# Patient Record
Sex: MALE | Race: WHITE | Employment: OTHER | URBAN - METROPOLITAN AREA
[De-identification: names, ages, dates, MRNs, and addresses within clinical notes are randomized per-mention and may not be internally consistent; named-entity substitution may affect disease eponyms.]

---

## 2017-03-08 ENCOUNTER — HOSPITAL ENCOUNTER (OUTPATIENT)
Dept: NEUROLOGY | Facility: CLINIC | Age: 82
Setting detail: THERAPIES SERIES
Discharge: STILL ADMITTED OR ADMINISTRATIVE | End: 2017-03-08
Attending: PSYCHIATRY & NEUROLOGY

## 2017-03-08 DIAGNOSIS — F06.70 MILD NEUROCOGNITIVE DISORDER DUE TO PARKINSON'S DISEASE (H): ICD-10-CM

## 2017-03-08 DIAGNOSIS — H91.90 HOH (HARD OF HEARING), UNSPECIFIED LATERALITY: ICD-10-CM

## 2017-03-08 DIAGNOSIS — G20.A1 MILD NEUROCOGNITIVE DISORDER DUE TO PARKINSON'S DISEASE (H): ICD-10-CM

## 2017-03-08 DIAGNOSIS — G20.A1 PARKINSON DISEASE (H): ICD-10-CM

## 2017-03-08 DIAGNOSIS — G47.52 REM SLEEP BEHAVIOR DISORDER: ICD-10-CM

## 2017-03-31 ENCOUNTER — HOSPITAL ENCOUNTER (OUTPATIENT)
Dept: PHYSICAL THERAPY | Age: 82
Setting detail: THERAPIES SERIES
Discharge: STILL ADMITTED OR ADMINISTRATIVE | End: 2017-03-31
Attending: PSYCHIATRY & NEUROLOGY

## 2017-03-31 ENCOUNTER — HOSPITAL ENCOUNTER (OUTPATIENT)
Dept: SPEECH THERAPY | Age: 82
Setting detail: THERAPIES SERIES
Discharge: STILL ADMITTED OR ADMINISTRATIVE | End: 2017-03-31
Attending: PSYCHIATRY & NEUROLOGY

## 2017-03-31 ENCOUNTER — HOSPITAL ENCOUNTER (OUTPATIENT)
Dept: OCCUPATIONAL THERAPY | Age: 82
Setting detail: THERAPIES SERIES
Discharge: STILL ADMITTED OR ADMINISTRATIVE | End: 2017-03-31
Attending: PSYCHIATRY & NEUROLOGY

## 2017-03-31 DIAGNOSIS — R27.9 LACK OF COORDINATION: ICD-10-CM

## 2017-03-31 DIAGNOSIS — G20.A1 HYPOKINETIC PARKINSONIAN DYSPHONIA (H): ICD-10-CM

## 2017-03-31 DIAGNOSIS — R29.898 WEAKNESS OF PROXIMAL END OF LOWER EXTREMITY: ICD-10-CM

## 2017-03-31 DIAGNOSIS — R29.3 POSTURAL INSTABILITY: ICD-10-CM

## 2017-03-31 DIAGNOSIS — G20.A1 PARKINSON DISEASE (H): ICD-10-CM

## 2017-03-31 DIAGNOSIS — R25.8 BRADYKINESIA: ICD-10-CM

## 2017-03-31 DIAGNOSIS — R49.0 HYPOKINETIC PARKINSONIAN DYSPHONIA (H): ICD-10-CM

## 2017-04-19 ENCOUNTER — HOSPITAL ENCOUNTER (OUTPATIENT)
Dept: SPEECH THERAPY | Age: 82
Setting detail: THERAPIES SERIES
Discharge: STILL ADMITTED OR ADMINISTRATIVE | End: 2017-04-19
Attending: PSYCHIATRY & NEUROLOGY

## 2017-04-19 ENCOUNTER — HOSPITAL ENCOUNTER (OUTPATIENT)
Dept: PHYSICAL THERAPY | Age: 82
Setting detail: THERAPIES SERIES
Discharge: STILL ADMITTED OR ADMINISTRATIVE | End: 2017-04-19
Attending: PSYCHIATRY & NEUROLOGY

## 2017-04-19 ENCOUNTER — HOSPITAL ENCOUNTER (OUTPATIENT)
Dept: OCCUPATIONAL THERAPY | Age: 82
Setting detail: THERAPIES SERIES
Discharge: STILL ADMITTED OR ADMINISTRATIVE | End: 2017-04-19
Attending: PSYCHIATRY & NEUROLOGY

## 2017-04-19 DIAGNOSIS — G20.A1 HYPOKINETIC PARKINSONIAN DYSPHONIA (H): ICD-10-CM

## 2017-04-19 DIAGNOSIS — R25.8 BRADYKINESIA: ICD-10-CM

## 2017-04-19 DIAGNOSIS — H53.2 DOUBLE VISION: ICD-10-CM

## 2017-04-19 DIAGNOSIS — R27.9 LACK OF COORDINATION: ICD-10-CM

## 2017-04-19 DIAGNOSIS — R29.898 WEAKNESS OF PROXIMAL END OF LOWER EXTREMITY: ICD-10-CM

## 2017-04-19 DIAGNOSIS — R49.0 HYPOKINETIC PARKINSONIAN DYSPHONIA (H): ICD-10-CM

## 2017-04-19 DIAGNOSIS — R29.3 POSTURAL INSTABILITY: ICD-10-CM

## 2017-04-21 ENCOUNTER — HOSPITAL ENCOUNTER (OUTPATIENT)
Dept: OCCUPATIONAL THERAPY | Age: 82
Setting detail: THERAPIES SERIES
Discharge: STILL ADMITTED OR ADMINISTRATIVE | End: 2017-04-21
Attending: PSYCHIATRY & NEUROLOGY

## 2017-04-21 ENCOUNTER — HOSPITAL ENCOUNTER (OUTPATIENT)
Dept: SPEECH THERAPY | Age: 82
Setting detail: THERAPIES SERIES
Discharge: STILL ADMITTED OR ADMINISTRATIVE | End: 2017-04-21
Attending: PSYCHIATRY & NEUROLOGY

## 2017-04-21 ENCOUNTER — HOSPITAL ENCOUNTER (OUTPATIENT)
Dept: PHYSICAL THERAPY | Age: 82
Setting detail: THERAPIES SERIES
Discharge: STILL ADMITTED OR ADMINISTRATIVE | End: 2017-04-21
Attending: PSYCHIATRY & NEUROLOGY

## 2017-04-21 DIAGNOSIS — R27.9 LACK OF COORDINATION: ICD-10-CM

## 2017-04-21 DIAGNOSIS — R25.8 BRADYKINESIA: ICD-10-CM

## 2017-04-21 DIAGNOSIS — G20.A1 HYPOKINETIC PARKINSONIAN DYSPHONIA (H): ICD-10-CM

## 2017-04-21 DIAGNOSIS — R49.0 HYPOKINETIC PARKINSONIAN DYSPHONIA (H): ICD-10-CM

## 2017-04-21 DIAGNOSIS — H53.2 DOUBLE VISION: ICD-10-CM

## 2017-04-21 DIAGNOSIS — R29.898 WEAKNESS OF PROXIMAL END OF LOWER EXTREMITY: ICD-10-CM

## 2017-04-21 DIAGNOSIS — R29.3 POSTURAL INSTABILITY: ICD-10-CM

## 2017-04-24 ENCOUNTER — HOSPITAL ENCOUNTER (OUTPATIENT)
Dept: OCCUPATIONAL THERAPY | Age: 82
Setting detail: THERAPIES SERIES
Discharge: STILL ADMITTED OR ADMINISTRATIVE | End: 2017-04-24
Attending: PSYCHIATRY & NEUROLOGY

## 2017-04-24 ENCOUNTER — HOSPITAL ENCOUNTER (OUTPATIENT)
Dept: SPEECH THERAPY | Age: 82
Setting detail: THERAPIES SERIES
Discharge: STILL ADMITTED OR ADMINISTRATIVE | End: 2017-04-24
Attending: PSYCHIATRY & NEUROLOGY

## 2017-04-24 ENCOUNTER — HOSPITAL ENCOUNTER (OUTPATIENT)
Dept: PHYSICAL THERAPY | Age: 82
Setting detail: THERAPIES SERIES
Discharge: STILL ADMITTED OR ADMINISTRATIVE | End: 2017-04-24
Attending: PSYCHIATRY & NEUROLOGY

## 2017-04-24 DIAGNOSIS — R29.3 POSTURAL INSTABILITY: ICD-10-CM

## 2017-04-24 DIAGNOSIS — G20.A1 HYPOKINETIC PARKINSONIAN DYSPHONIA (H): ICD-10-CM

## 2017-04-24 DIAGNOSIS — R27.9 LACK OF COORDINATION: ICD-10-CM

## 2017-04-24 DIAGNOSIS — R25.8 BRADYKINESIA: ICD-10-CM

## 2017-04-24 DIAGNOSIS — R49.0 HYPOKINETIC PARKINSONIAN DYSPHONIA (H): ICD-10-CM

## 2017-04-26 ENCOUNTER — HOSPITAL ENCOUNTER (OUTPATIENT)
Dept: SPEECH THERAPY | Age: 82
Setting detail: THERAPIES SERIES
Discharge: STILL ADMITTED OR ADMINISTRATIVE | End: 2017-04-26
Attending: PSYCHIATRY & NEUROLOGY

## 2017-04-26 ENCOUNTER — HOSPITAL ENCOUNTER (OUTPATIENT)
Dept: PHYSICAL THERAPY | Age: 82
Setting detail: THERAPIES SERIES
Discharge: STILL ADMITTED OR ADMINISTRATIVE | End: 2017-04-26
Attending: PSYCHIATRY & NEUROLOGY

## 2017-04-26 ENCOUNTER — HOSPITAL ENCOUNTER (OUTPATIENT)
Dept: OCCUPATIONAL THERAPY | Age: 82
Setting detail: THERAPIES SERIES
Discharge: STILL ADMITTED OR ADMINISTRATIVE | End: 2017-04-26
Attending: PSYCHIATRY & NEUROLOGY

## 2017-04-26 DIAGNOSIS — R29.3 POSTURAL INSTABILITY: ICD-10-CM

## 2017-04-26 DIAGNOSIS — R25.8 BRADYKINESIA: ICD-10-CM

## 2017-04-26 DIAGNOSIS — R27.9 LACK OF COORDINATION: ICD-10-CM

## 2017-04-26 DIAGNOSIS — G20.A1 HYPOKINETIC PARKINSONIAN DYSPHONIA (H): ICD-10-CM

## 2017-04-26 DIAGNOSIS — R49.0 HYPOKINETIC PARKINSONIAN DYSPHONIA (H): ICD-10-CM

## 2017-04-26 DIAGNOSIS — H53.2 DOUBLE VISION: ICD-10-CM

## 2017-04-26 DIAGNOSIS — R29.898 WEAKNESS OF PROXIMAL END OF LOWER EXTREMITY: ICD-10-CM

## 2017-05-01 ENCOUNTER — HOSPITAL ENCOUNTER (OUTPATIENT)
Dept: OCCUPATIONAL THERAPY | Age: 82
Setting detail: THERAPIES SERIES
Discharge: STILL ADMITTED OR ADMINISTRATIVE | End: 2017-05-01
Attending: PSYCHIATRY & NEUROLOGY

## 2017-05-01 ENCOUNTER — HOSPITAL ENCOUNTER (OUTPATIENT)
Dept: SPEECH THERAPY | Age: 82
Setting detail: THERAPIES SERIES
Discharge: STILL ADMITTED OR ADMINISTRATIVE | End: 2017-05-01
Attending: PSYCHIATRY & NEUROLOGY

## 2017-05-01 ENCOUNTER — HOSPITAL ENCOUNTER (OUTPATIENT)
Dept: PHYSICAL THERAPY | Age: 82
Setting detail: THERAPIES SERIES
Discharge: STILL ADMITTED OR ADMINISTRATIVE | End: 2017-05-01
Attending: PSYCHIATRY & NEUROLOGY

## 2017-05-01 DIAGNOSIS — R25.8 BRADYKINESIA: ICD-10-CM

## 2017-05-01 DIAGNOSIS — H53.2 DOUBLE VISION: ICD-10-CM

## 2017-05-01 DIAGNOSIS — R29.3 POSTURAL INSTABILITY: ICD-10-CM

## 2017-05-01 DIAGNOSIS — R49.0 HYPOKINETIC PARKINSONIAN DYSPHONIA (H): ICD-10-CM

## 2017-05-01 DIAGNOSIS — G20.A1 HYPOKINETIC PARKINSONIAN DYSPHONIA (H): ICD-10-CM

## 2017-05-03 ENCOUNTER — HOSPITAL ENCOUNTER (OUTPATIENT)
Dept: OCCUPATIONAL THERAPY | Age: 82
Setting detail: THERAPIES SERIES
Discharge: STILL ADMITTED OR ADMINISTRATIVE | End: 2017-05-03
Attending: PSYCHIATRY & NEUROLOGY

## 2017-05-03 ENCOUNTER — HOSPITAL ENCOUNTER (OUTPATIENT)
Dept: SPEECH THERAPY | Age: 82
Setting detail: THERAPIES SERIES
Discharge: STILL ADMITTED OR ADMINISTRATIVE | End: 2017-05-03
Attending: PSYCHIATRY & NEUROLOGY

## 2017-05-03 ENCOUNTER — HOSPITAL ENCOUNTER (OUTPATIENT)
Dept: PHYSICAL THERAPY | Age: 82
Setting detail: THERAPIES SERIES
Discharge: STILL ADMITTED OR ADMINISTRATIVE | End: 2017-05-03
Attending: PSYCHIATRY & NEUROLOGY

## 2017-05-03 DIAGNOSIS — R49.0 HYPOKINETIC PARKINSONIAN DYSPHONIA (H): ICD-10-CM

## 2017-05-03 DIAGNOSIS — G20.A1 HYPOKINETIC PARKINSONIAN DYSPHONIA (H): ICD-10-CM

## 2017-05-03 DIAGNOSIS — R25.8 BRADYKINESIA: ICD-10-CM

## 2017-05-03 DIAGNOSIS — R29.3 POSTURAL INSTABILITY: ICD-10-CM

## 2017-05-08 ENCOUNTER — HOSPITAL ENCOUNTER (OUTPATIENT)
Dept: PHYSICAL THERAPY | Age: 82
Setting detail: THERAPIES SERIES
Discharge: STILL ADMITTED OR ADMINISTRATIVE | End: 2017-05-08
Attending: PSYCHIATRY & NEUROLOGY

## 2017-05-08 ENCOUNTER — HOSPITAL ENCOUNTER (OUTPATIENT)
Dept: OCCUPATIONAL THERAPY | Age: 82
Setting detail: THERAPIES SERIES
Discharge: STILL ADMITTED OR ADMINISTRATIVE | End: 2017-05-08
Attending: PSYCHIATRY & NEUROLOGY

## 2017-05-08 ENCOUNTER — HOSPITAL ENCOUNTER (OUTPATIENT)
Dept: SPEECH THERAPY | Age: 82
Setting detail: THERAPIES SERIES
Discharge: STILL ADMITTED OR ADMINISTRATIVE | End: 2017-05-08
Attending: PSYCHIATRY & NEUROLOGY

## 2017-05-08 DIAGNOSIS — R29.898 WEAKNESS OF PROXIMAL END OF LOWER EXTREMITY: ICD-10-CM

## 2017-05-08 DIAGNOSIS — R25.8 BRADYKINESIA: ICD-10-CM

## 2017-05-08 DIAGNOSIS — R29.3 POSTURAL INSTABILITY: ICD-10-CM

## 2017-05-08 DIAGNOSIS — G20.A1 HYPOKINETIC PARKINSONIAN DYSPHONIA (H): ICD-10-CM

## 2017-05-08 DIAGNOSIS — R49.0 HYPOKINETIC PARKINSONIAN DYSPHONIA (H): ICD-10-CM

## 2017-05-08 DIAGNOSIS — R27.8 COORDINATION IMPAIRMENT: ICD-10-CM

## 2017-05-10 ENCOUNTER — HOSPITAL ENCOUNTER (OUTPATIENT)
Dept: OCCUPATIONAL THERAPY | Age: 82
Setting detail: THERAPIES SERIES
Discharge: STILL ADMITTED OR ADMINISTRATIVE | End: 2017-05-10
Attending: PSYCHIATRY & NEUROLOGY

## 2017-05-10 ENCOUNTER — HOSPITAL ENCOUNTER (OUTPATIENT)
Dept: SPEECH THERAPY | Age: 82
Setting detail: THERAPIES SERIES
Discharge: STILL ADMITTED OR ADMINISTRATIVE | End: 2017-05-10
Attending: PSYCHIATRY & NEUROLOGY

## 2017-05-10 ENCOUNTER — HOSPITAL ENCOUNTER (OUTPATIENT)
Dept: PHYSICAL THERAPY | Age: 82
Setting detail: THERAPIES SERIES
Discharge: STILL ADMITTED OR ADMINISTRATIVE | End: 2017-05-10
Attending: PSYCHIATRY & NEUROLOGY

## 2017-05-10 DIAGNOSIS — R29.898 WEAKNESS OF PROXIMAL END OF LOWER EXTREMITY: ICD-10-CM

## 2017-05-10 DIAGNOSIS — R29.3 POSTURAL INSTABILITY: ICD-10-CM

## 2017-05-10 DIAGNOSIS — R27.9 LACK OF COORDINATION: ICD-10-CM

## 2017-05-10 DIAGNOSIS — H53.2 DOUBLE VISION: ICD-10-CM

## 2017-05-10 DIAGNOSIS — R25.8 BRADYKINESIA: ICD-10-CM

## 2017-05-10 DIAGNOSIS — G20.A1 HYPOKINETIC PARKINSONIAN DYSPHONIA (H): ICD-10-CM

## 2017-05-10 DIAGNOSIS — R27.8 COORDINATION IMPAIRMENT: ICD-10-CM

## 2017-05-10 DIAGNOSIS — R49.0 HYPOKINETIC PARKINSONIAN DYSPHONIA (H): ICD-10-CM

## 2017-05-15 ENCOUNTER — HOSPITAL ENCOUNTER (OUTPATIENT)
Dept: PHYSICAL THERAPY | Age: 82
Setting detail: THERAPIES SERIES
Discharge: STILL ADMITTED OR ADMINISTRATIVE | End: 2017-05-15
Attending: PSYCHIATRY & NEUROLOGY

## 2017-05-15 ENCOUNTER — HOSPITAL ENCOUNTER (OUTPATIENT)
Dept: OCCUPATIONAL THERAPY | Age: 82
Setting detail: THERAPIES SERIES
Discharge: STILL ADMITTED OR ADMINISTRATIVE | End: 2017-05-15
Attending: PSYCHIATRY & NEUROLOGY

## 2017-05-15 DIAGNOSIS — R27.9 LACK OF COORDINATION: ICD-10-CM

## 2017-05-15 DIAGNOSIS — H53.2 DOUBLE VISION: ICD-10-CM

## 2017-05-15 DIAGNOSIS — R25.8 BRADYKINESIA: ICD-10-CM

## 2017-05-15 DIAGNOSIS — R27.8 COORDINATION IMPAIRMENT: ICD-10-CM

## 2017-05-15 DIAGNOSIS — R29.898 WEAKNESS OF PROXIMAL END OF LOWER EXTREMITY: ICD-10-CM

## 2017-05-15 DIAGNOSIS — R29.3 POSTURAL INSTABILITY: ICD-10-CM

## 2017-05-17 ENCOUNTER — HOSPITAL ENCOUNTER (OUTPATIENT)
Dept: PHYSICAL THERAPY | Age: 82
Setting detail: THERAPIES SERIES
Discharge: STILL ADMITTED OR ADMINISTRATIVE | End: 2017-05-17
Attending: PSYCHIATRY & NEUROLOGY

## 2017-05-17 ENCOUNTER — HOSPITAL ENCOUNTER (OUTPATIENT)
Dept: OCCUPATIONAL THERAPY | Age: 82
Setting detail: THERAPIES SERIES
Discharge: STILL ADMITTED OR ADMINISTRATIVE | End: 2017-05-17
Attending: PSYCHIATRY & NEUROLOGY

## 2017-05-17 DIAGNOSIS — R25.8 BRADYKINESIA: ICD-10-CM

## 2017-05-17 DIAGNOSIS — R29.898 WEAKNESS OF PROXIMAL END OF LOWER EXTREMITY: ICD-10-CM

## 2017-05-17 DIAGNOSIS — R29.3 POSTURAL INSTABILITY: ICD-10-CM

## 2017-05-22 ENCOUNTER — HOSPITAL ENCOUNTER (OUTPATIENT)
Dept: PHYSICAL THERAPY | Age: 82
Setting detail: THERAPIES SERIES
Discharge: STILL ADMITTED OR ADMINISTRATIVE | End: 2017-05-22
Attending: PSYCHIATRY & NEUROLOGY

## 2017-05-22 DIAGNOSIS — R29.3 POSTURAL INSTABILITY: ICD-10-CM

## 2017-05-22 DIAGNOSIS — R25.8 BRADYKINESIA: ICD-10-CM

## 2017-05-24 ENCOUNTER — HOSPITAL ENCOUNTER (OUTPATIENT)
Dept: PHYSICAL THERAPY | Age: 82
Setting detail: THERAPIES SERIES
Discharge: STILL ADMITTED OR ADMINISTRATIVE | End: 2017-05-24
Attending: PSYCHIATRY & NEUROLOGY

## 2017-05-24 DIAGNOSIS — R29.3 POSTURAL INSTABILITY: ICD-10-CM

## 2017-05-24 DIAGNOSIS — R29.898 WEAKNESS OF PROXIMAL END OF LOWER EXTREMITY: ICD-10-CM

## 2017-05-24 DIAGNOSIS — R25.8 BRADYKINESIA: ICD-10-CM

## 2017-05-26 ENCOUNTER — APPOINTMENT (OUTPATIENT)
Dept: GENERAL RADIOLOGY | Facility: CLINIC | Age: 82
End: 2017-05-26
Attending: EMERGENCY MEDICINE
Payer: MEDICARE

## 2017-05-26 ENCOUNTER — HOSPITAL ENCOUNTER (OUTPATIENT)
Facility: CLINIC | Age: 82
Setting detail: OBSERVATION
Discharge: HOME | End: 2017-05-27
Attending: EMERGENCY MEDICINE | Admitting: INTERNAL MEDICINE
Payer: MEDICARE

## 2017-05-26 DIAGNOSIS — R55 SYNCOPE, UNSPECIFIED SYNCOPE TYPE: ICD-10-CM

## 2017-05-26 DIAGNOSIS — F02.80 LEWY BODY DEMENTIA WITHOUT BEHAVIORAL DISTURBANCE (H): ICD-10-CM

## 2017-05-26 DIAGNOSIS — G31.83 LEWY BODY DEMENTIA WITHOUT BEHAVIORAL DISTURBANCE (H): ICD-10-CM

## 2017-05-26 DIAGNOSIS — G20.A1 PARKINSON'S DISEASE (H): Primary | ICD-10-CM

## 2017-05-26 LAB
ABO + RH BLD: NORMAL
ABO + RH BLD: NORMAL
ANION GAP SERPL CALCULATED.3IONS-SCNC: 5 MMOL/L (ref 3–14)
BASOPHILS # BLD AUTO: 0 10E9/L (ref 0–0.2)
BASOPHILS NFR BLD AUTO: 0.3 %
BLD GP AB SCN SERPL QL: NORMAL
BLOOD BANK CMNT PATIENT-IMP: NORMAL
BUN SERPL-MCNC: 27 MG/DL (ref 7–30)
CALCIUM SERPL-MCNC: 8.5 MG/DL (ref 8.5–10.1)
CHLORIDE SERPL-SCNC: 106 MMOL/L (ref 94–109)
CO2 SERPL-SCNC: 30 MMOL/L (ref 20–32)
CREAT SERPL-MCNC: 0.99 MG/DL (ref 0.66–1.25)
DIFFERENTIAL METHOD BLD: ABNORMAL
EOSINOPHIL # BLD AUTO: 0.1 10E9/L (ref 0–0.7)
EOSINOPHIL NFR BLD AUTO: 1.8 %
ERYTHROCYTE [DISTWIDTH] IN BLOOD BY AUTOMATED COUNT: 13.8 % (ref 10–15)
GFR SERPL CREATININE-BSD FRML MDRD: 72 ML/MIN/1.7M2
GLUCOSE SERPL-MCNC: 96 MG/DL (ref 70–99)
HCT VFR BLD AUTO: 45.4 % (ref 40–53)
HGB BLD-MCNC: 14.7 G/DL (ref 13.3–17.7)
IMM GRANULOCYTES # BLD: 0 10E9/L (ref 0–0.4)
IMM GRANULOCYTES NFR BLD: 0.5 %
LACTATE BLD-SCNC: 0.9 MMOL/L (ref 0.7–2.1)
LYMPHOCYTES # BLD AUTO: 2 10E9/L (ref 0.8–5.3)
LYMPHOCYTES NFR BLD AUTO: 30.5 %
MAGNESIUM SERPL-MCNC: 2 MG/DL (ref 1.6–2.3)
MCH RBC QN AUTO: 30.6 PG (ref 26.5–33)
MCHC RBC AUTO-ENTMCNC: 32.4 G/DL (ref 31.5–36.5)
MCV RBC AUTO: 94 FL (ref 78–100)
MONOCYTES # BLD AUTO: 0.6 10E9/L (ref 0–1.3)
MONOCYTES NFR BLD AUTO: 8.9 %
NEUTROPHILS # BLD AUTO: 3.8 10E9/L (ref 1.6–8.3)
NEUTROPHILS NFR BLD AUTO: 58 %
NRBC # BLD AUTO: 0 10*3/UL
NRBC BLD AUTO-RTO: 0 /100
PLATELET # BLD AUTO: 132 10E9/L (ref 150–450)
POTASSIUM SERPL-SCNC: 4.1 MMOL/L (ref 3.4–5.3)
RBC # BLD AUTO: 4.81 10E12/L (ref 4.4–5.9)
SODIUM SERPL-SCNC: 141 MMOL/L (ref 133–144)
SPECIMEN EXP DATE BLD: NORMAL
TROPONIN I SERPL-MCNC: NORMAL UG/L (ref 0–0.04)
TSH SERPL DL<=0.005 MIU/L-ACNC: 0.62 MU/L (ref 0.4–4)
WBC # BLD AUTO: 6.5 10E9/L (ref 4–11)

## 2017-05-26 PROCEDURE — 85025 COMPLETE CBC W/AUTO DIFF WBC: CPT | Performed by: EMERGENCY MEDICINE

## 2017-05-26 PROCEDURE — 86850 RBC ANTIBODY SCREEN: CPT | Performed by: EMERGENCY MEDICINE

## 2017-05-26 PROCEDURE — 83605 ASSAY OF LACTIC ACID: CPT | Performed by: EMERGENCY MEDICINE

## 2017-05-26 PROCEDURE — 93005 ELECTROCARDIOGRAM TRACING: CPT

## 2017-05-26 PROCEDURE — A9270 NON-COVERED ITEM OR SERVICE: HCPCS | Mod: GY | Performed by: INTERNAL MEDICINE

## 2017-05-26 PROCEDURE — 99285 EMERGENCY DEPT VISIT HI MDM: CPT | Mod: 25

## 2017-05-26 PROCEDURE — 96361 HYDRATE IV INFUSION ADD-ON: CPT

## 2017-05-26 PROCEDURE — 83735 ASSAY OF MAGNESIUM: CPT | Performed by: EMERGENCY MEDICINE

## 2017-05-26 PROCEDURE — 25000125 ZZHC RX 250: Performed by: INTERNAL MEDICINE

## 2017-05-26 PROCEDURE — 86901 BLOOD TYPING SEROLOGIC RH(D): CPT | Performed by: EMERGENCY MEDICINE

## 2017-05-26 PROCEDURE — 96360 HYDRATION IV INFUSION INIT: CPT

## 2017-05-26 PROCEDURE — 99220 ZZC INITIAL OBSERVATION CARE,LEVL III: CPT | Performed by: INTERNAL MEDICINE

## 2017-05-26 PROCEDURE — 84484 ASSAY OF TROPONIN QUANT: CPT | Performed by: EMERGENCY MEDICINE

## 2017-05-26 PROCEDURE — 71020 XR CHEST 2 VW: CPT

## 2017-05-26 PROCEDURE — 84443 ASSAY THYROID STIM HORMONE: CPT | Performed by: EMERGENCY MEDICINE

## 2017-05-26 PROCEDURE — 86900 BLOOD TYPING SEROLOGIC ABO: CPT | Performed by: EMERGENCY MEDICINE

## 2017-05-26 PROCEDURE — G0378 HOSPITAL OBSERVATION PER HR: HCPCS

## 2017-05-26 PROCEDURE — 80048 BASIC METABOLIC PNL TOTAL CA: CPT | Performed by: EMERGENCY MEDICINE

## 2017-05-26 PROCEDURE — 25000132 ZZH RX MED GY IP 250 OP 250 PS 637: Mod: GY | Performed by: INTERNAL MEDICINE

## 2017-05-26 RX ORDER — ROPINIROLE 0.25 MG/1
0.5 TABLET, FILM COATED ORAL AT BEDTIME
Status: DISCONTINUED | OUTPATIENT
Start: 2017-05-26 | End: 2017-05-27 | Stop reason: HOSPADM

## 2017-05-26 RX ORDER — ROPINIROLE 0.25 MG/1
0.5 TABLET, FILM COATED ORAL 3 TIMES DAILY
Status: DISCONTINUED | OUTPATIENT
Start: 2017-05-26 | End: 2017-05-27 | Stop reason: HOSPADM

## 2017-05-26 RX ORDER — SODIUM CHLORIDE AND POTASSIUM CHLORIDE 150; 900 MG/100ML; MG/100ML
INJECTION, SOLUTION INTRAVENOUS CONTINUOUS
Status: DISPENSED | OUTPATIENT
Start: 2017-05-26 | End: 2017-05-27

## 2017-05-26 RX ORDER — INDOMETHACIN 25 MG/1
25 CAPSULE ORAL 3 TIMES DAILY PRN
Status: DISCONTINUED | OUTPATIENT
Start: 2017-05-26 | End: 2017-05-27 | Stop reason: HOSPADM

## 2017-05-26 RX ORDER — CARBIDOPA AND LEVODOPA 25; 100 MG/1; MG/1
1 TABLET ORAL
Status: DISCONTINUED | OUTPATIENT
Start: 2017-05-27 | End: 2017-05-27 | Stop reason: HOSPADM

## 2017-05-26 RX ORDER — ACETAMINOPHEN 325 MG/1
975 TABLET ORAL EVERY 6 HOURS PRN
Status: DISCONTINUED | OUTPATIENT
Start: 2017-05-26 | End: 2017-05-27 | Stop reason: HOSPADM

## 2017-05-26 RX ORDER — LIDOCAINE 40 MG/G
CREAM TOPICAL
Status: DISCONTINUED | OUTPATIENT
Start: 2017-05-26 | End: 2017-05-27 | Stop reason: HOSPADM

## 2017-05-26 RX ORDER — ROPINIROLE 0.25 MG/1
0.25 TABLET, FILM COATED ORAL 2 TIMES DAILY
Status: DISCONTINUED | OUTPATIENT
Start: 2017-05-27 | End: 2017-05-27 | Stop reason: HOSPADM

## 2017-05-26 RX ORDER — NALOXONE HYDROCHLORIDE 0.4 MG/ML
.1-.4 INJECTION, SOLUTION INTRAMUSCULAR; INTRAVENOUS; SUBCUTANEOUS
Status: DISCONTINUED | OUTPATIENT
Start: 2017-05-26 | End: 2017-05-27 | Stop reason: HOSPADM

## 2017-05-26 RX ORDER — NITROGLYCERIN 0.4 MG/1
0.4 TABLET SUBLINGUAL EVERY 5 MIN PRN
Status: DISCONTINUED | OUTPATIENT
Start: 2017-05-26 | End: 2017-05-27 | Stop reason: HOSPADM

## 2017-05-26 RX ORDER — DONEPEZIL HYDROCHLORIDE 10 MG/1
10 TABLET, FILM COATED ORAL AT BEDTIME
Status: DISCONTINUED | OUTPATIENT
Start: 2017-05-26 | End: 2017-05-27 | Stop reason: HOSPADM

## 2017-05-26 RX ORDER — POLYETHYLENE GLYCOL 3350 17 G/17G
17 POWDER, FOR SOLUTION ORAL DAILY PRN
Status: DISCONTINUED | OUTPATIENT
Start: 2017-05-26 | End: 2017-05-27 | Stop reason: HOSPADM

## 2017-05-26 RX ORDER — ONDANSETRON 4 MG/1
4 TABLET, ORALLY DISINTEGRATING ORAL EVERY 6 HOURS PRN
Status: DISCONTINUED | OUTPATIENT
Start: 2017-05-26 | End: 2017-05-27 | Stop reason: HOSPADM

## 2017-05-26 RX ORDER — CARBIDOPA AND LEVODOPA 50; 200 MG/1; MG/1
1 TABLET, EXTENDED RELEASE ORAL AT BEDTIME
Status: DISCONTINUED | OUTPATIENT
Start: 2017-05-26 | End: 2017-05-27 | Stop reason: HOSPADM

## 2017-05-26 RX ORDER — CLONAZEPAM 1 MG/1
1 TABLET ORAL AT BEDTIME
Status: DISCONTINUED | OUTPATIENT
Start: 2017-05-26 | End: 2017-05-27 | Stop reason: HOSPADM

## 2017-05-26 RX ORDER — ONDANSETRON 2 MG/ML
4 INJECTION INTRAMUSCULAR; INTRAVENOUS EVERY 6 HOURS PRN
Status: DISCONTINUED | OUTPATIENT
Start: 2017-05-26 | End: 2017-05-27 | Stop reason: HOSPADM

## 2017-05-26 RX ADMIN — ROPINIROLE HYDROCHLORIDE 0.5 MG: 0.25 TABLET, FILM COATED ORAL at 21:46

## 2017-05-26 RX ADMIN — POTASSIUM CHLORIDE AND SODIUM CHLORIDE: 900; 150 INJECTION, SOLUTION INTRAVENOUS at 21:30

## 2017-05-26 RX ADMIN — CLONAZEPAM 1 MG: 1 TABLET ORAL at 21:48

## 2017-05-26 RX ADMIN — ROPINIROLE HYDROCHLORIDE 0.5 MG: 0.25 TABLET, FILM COATED ORAL at 21:51

## 2017-05-26 RX ADMIN — DONEPEZIL HYDROCHLORIDE 10 MG: 10 TABLET ORAL at 21:46

## 2017-05-26 RX ADMIN — CARBIDOPA AND LEVODOPA 1 TABLET: 50; 200 TABLET, EXTENDED RELEASE ORAL at 21:46

## 2017-05-26 ASSESSMENT — ENCOUNTER SYMPTOMS
DIAPHORESIS: 1
HEADACHES: 0
LIGHT-HEADEDNESS: 1
CHEST TIGHTNESS: 0
PALPITATIONS: 0
FEVER: 0
SHORTNESS OF BREATH: 0

## 2017-05-26 NOTE — ED NOTES
.RHEDHANDOFF.      BP stable.  Please see flowsheet.  Monitor SR-SB, no ectopy noted.   HR 57-70.  Patient denies any pain.  Patient up with assist of one to bathroom.  Patient needs frequent verbal guidance for tasks at hand.  Spouse and son at bedside.  Family kept updated regarding bed availability.   All questions answered.  Patient has taken own supply of Sinamet for his scheduled dose at 13:00 and 16:00.       Chief Complaint:  Fall     History provided by the patient's wife secondary to the patient's dementia.   HPI   Carlos Neri is a 81 year old male with Parkinson's with a history of recurrent falls who presents via EMS for evaluation after a fall. He and his wife were dining at a restaurant just prior to arrival when the patient had an unwitnessed fall from a sitting position. At this time, the wife found him and staff called EMS. Upon arrival the patient states he cannot recall the events leading up to or including the fall, and wife was not present. He states he may have been light-headed. Wife states the patient had a brief episode of diaphoresis 2 days prior, but notes no other traumatic events or symptoms. The patient denies any significant pain or injuries, chest pain, shortness of breath, palpitations, and has no other concerns at this time.      Allergies:  Morphine Sulfate      Medications:    carbidopa-levodopa (SINEMET)  MG per tablet  aspirin 81 MG chewable tablet  rOPINIRole (REQUIP) 0.5 MG tablet  carbidopa-levodopa (SINEMET CR)  MG per tablet  clonazePAM (KLONOPIN) 0.5 MG tablet  multivitamin, therapeutic with minerals (MULTI-VITAMIN) TABS  oxyCODONE-acetaminophen (PERCOCET) 5-325 MG per tablet  indomethacin (INDOCIN) 25 MG capsule     Past Medical History:    Lewy body dementia  Parkinson disease    RECEIVING UNIT ED HANDOFF REVIEW    Above ED Nurse Handoff Report was reviewed: Yes  Reviewed by: Jewels Marte on May 26, 2017 at 6:03 PM

## 2017-05-26 NOTE — IP AVS SNAPSHOT
` ` Patient Information     Patient Name Sex Carlos Morin ([id number]) Male 1935       Room Bed    224      Patient Demographics     Address Phone    [id number] [place] [address] [phone] (Home)  none (Work)  [phone] (Mobile)      Patient Ethnicity & Race     Ethnic Group Patient Race    American White      Emergency Contact(s)     Name Relation Home Work Mobile    Haylee Neri Spouse [phone]        Documents on File        Status Date Received Description       Documents for the Patient    Privacy Notice - [facility]       Insurance Card Received 06/10/13     External Medication Information Consent       Patient ID Received 06/10/13     Consent for Services - Hospital/Clinic  ()      Consent for EHR Access Sent 13     [facility] Specified Other       HIM CRISTHIAN Authorization  13 [facility] 2013    Consent to Communicate Received 13     HIM CRISTHIAN Authorization - File Only Received 13     Consent for Services - Hospital/Clinic Received () 13     Consent for Services - Hospital/Clinic       Privacy Notice - [facility] Received 13     Consent for EHR Access Received 13     HIM CRISTHIAN Authorization  13 [employer]    Consent for Services/Privacy Notice - Hospital/Clinic Received 17     Insurance Card Received 17     Patient ID Received 17        Documents for the Encounter    CMS IM for Patient Signature         Admission Information     Attending Provider Admitting Provider Admission Type Admission Date/Time    Cyndee Rogel, Cyndee Nicholas DO Emergency 17  1315    Discharge Date Hospital Service Auth/Cert Status Service Area     Observation [facility]    Unit Room/Bed Admission Status        OBSERVATION DEPT  Admission (Confirmed)       Admission     Complaint    Syncope      Hospital Account     Name Acct ID  Class Status Primary Coverage    Carlos Neri [id number] Observation Open MEDICARE - MEDICARE FOR HB SUPPLEMENT            Guarantor Account (for Hospital Account #[id number])     Name Relation to Pt Service Area Active? Acct Type    Carlos Neri Self FCS Yes Personal/Family    Address Phone          [address] [place] [address] [phone](H)  none(O)              Coverage Information (for Hospital Account #[id number])     1. MEDICARE/MEDICARE FOR HB SUPPLEMENT     F/O Payor/Plan Precert #    MEDICARE/MEDICARE FOR HB SUPPLEMENT     Subscriber Subscriber #    Carlos Neri [id number]    Address Phone    [employer]  [address]  [place] [address] [phone]          2. BCBS/BCBS PLATINUM BLUE     F/O Payor/Plan Precert #    BCBS/BCBS PLATINUM BLUE     Subscriber Subscriber #    Carlos Neri [id number]    Address Phone    [address] [address]  [place], [place] [id number] [phone]

## 2017-05-26 NOTE — IP AVS SNAPSHOT
MRN:[id number]                      After Visit Summary   5/26/2017    Carlos Neri    MRN: [id number]           Thank you!     Thank you for choosing [facility] for your care. Our goal is always to provide you with excellent care. Hearing back from our patients is one way we can continue to improve our services. Please take a few minutes to complete the written survey that you may receive in the mail after you visit. If you would like to speak to someone directly about your visit please contact Patient Relations at [phone]. Thank you!          Patient Information     Date Of Birth          1935        About your hospital stay     You were admitted on:  May 26, 2017 You last received care in the:  [facility] Observation Department    You were discharged on:  May 27, 2017        Reason for your hospital stay       You were admitted due to concerns for a fall and fainting episode. Your workup included basic labs and chest x-ray which did not show any acute infectious cause for your symptoms. An ultrasound of your heart was obtained which did not show any acute changes. You were monitored on telemetry which did not show any abnormal heart rhythms. You were evaluated by physical therapy who felt you would benefit from additional rehab. Social work was consulted and a home nurse will bet set up to come to your home to evaluate your needs for additional outpatient physical therapy and occupational therapy.                  Who to Call     For medical emergencies, please call [phone].  For non-urgent questions about your medical care, please call your primary care provider or clinic, [phone]          Attending Provider     Provider Specialty    Siddharth Hayes MD Emergency Medicine    Cyndee Rogel DO Internal Medicine       Primary Care Provider Office Phone # Fax #    Danny Morales [phone]       [facility]  [facility] [address] [facility] [address]        After Care Instructions     Activity       Your activity upon discharge: activity as tolerated            Diet       Follow this diet upon discharge: Orders Placed This Encounter      Regular Diet Adult                  Follow-up Appointments     Follow-up and recommended labs and tests        Follow up with primary care provider, Danny Morales, within 7 days for hospital follow- up.  No follow up labs or test are needed.  Keep scheduled follow up with Dr. Chapin of neurology.                  Additional Services     Home care nursing referral       RN skilled nursing visit. RN to assess home safety and patient's mobility and need for additional therapy.    Your provider has ordered home care nursing services. If you have not been contacted within 2 days of your discharge please call the inpatient department phone number at [phone] .                             Future tests that were ordered for you     Zio Patch Holter       Assess for arrhythmia                  Pending Results     Date and Time Order Name Status Description    5/26/2017 1328 EKG 12 lead Preliminary             Statement of Approval     Ordered          05/27/17 6637  I have reviewed and agree with all the recommendations and orders detailed in this document.  EFFECTIVE NOW     Approved and electronically signed by:  Eleni Tuttle PA-C             Admission Information     Date & Time Provider Department Dept. Phone    5/26/2017 Cyndee Rogel DO [facility] Observation Department [phone]      Your Vitals Were     Blood Pressure Pulse Temperature Respirations Height Weight    118/66 (BP Location: Left arm) 61 96.6  F (35.9  C) (Oral) 18 1.829 m (6') 78 kg (172 lb)    Pulse Oximetry BMI (Body Mass Index)                96% 23.33 kg/m2          MyChart Information     [employer] lets you send messages to your doctor, view your test results, renew your  "prescriptions, schedule appointments and more. To sign up, go to [email or web address] . Click on \"Log in\" on the left side of the screen, which will take you to the Welcome page. Then click on \"Sign up Now\" on the right side of the page.     You will be asked to enter the access code listed below, as well as some personal information. Please follow the directions to create your username and password.     Your access code is: [email or web address]  Expires: 2017  2:36 PM     Your access code will  in 90 days. If you need help or a new code, please call your [place] clinic or [phone].        Care EveryWhere ID     This is your Care EveryWhere ID. This could be used by other organizations to access your [place] medical records  [id number]           Review of your medicines      CONTINUE these medicines which have NOT CHANGED        Dose / Directions    * carbidopa-levodopa  MG per tablet   Commonly known as:  SINEMET        Dose:  1 tablet   Take 1 tablet by mouth 5 times daily 0700, 1000, 1300, 1600, 1900   Refills:  0       * carbidopa-levodopa  MG per CR tablet   Commonly known as:  SINEMET CR        Dose:  1 tablet   Take 1 tablet by mouth At Bedtime.   Refills:  0       DONEPEZIL HCL PO        Dose:  10 mg   Take 10 mg by mouth At Bedtime   Refills:  0       indomethacin 25 MG capsule   Commonly known as:  INDOCIN        Dose:  25 mg   Take 25 mg by mouth 3 times daily as needed.   Refills:  0       klonoPIN 0.5 MG tablet   Generic drug:  clonazePAM        Dose:  1 mg   Take 1 mg by mouth At Bedtime 2 tablets = 1 mg   Refills:  0       * rOPINIRole 0.5 MG tablet   Commonly known as:  REQUIP        Dose:  0.5 mg   Take 0.5 mg by mouth 3 times daily. 0600,1400,2200   Refills:  0       * ROPINIROLE HCL PO        Dose:  0.25 mg   Take 0.25 mg by mouth 2 times daily 0700, 1600   Refills:  0       * ROPINIROLE HCL PO        Dose:  0.5 mg   Take 0.5 mg by mouth At Bedtime 2 tablets of 0.25 mg = " 0.5 mg   Refills:  0       * Notice:  This list has 5 medication(s) that are the same as other medications prescribed for you. Read the directions carefully, and ask your doctor or other care provider to review them with you.             Protect others around you: Learn how to safely use, store and throw away your medicines at www.disposemymeds.org.             Medication List: This is a list of all your medications and when to take them. Check marks below indicate your daily home schedule. Keep this list as a reference.      Medications           Morning Afternoon Evening Bedtime As Needed    * carbidopa-levodopa  MG per tablet   Commonly known as:  SINEMET   Take 1 tablet by mouth 5 times daily 0700, 1000, 1300, 1600, 1900   Last time this was given:  1 tablet on 5/27/2017  1:07 PM                                * carbidopa-levodopa  MG per CR tablet   Commonly known as:  SINEMET CR   Take 1 tablet by mouth At Bedtime.   Last time this was given:  1 tablet on 5/26/2017  9:46 PM                                DONEPEZIL HCL PO   Take 10 mg by mouth At Bedtime   Last time this was given:  10 mg on 5/26/2017  9:46 PM                                indomethacin 25 MG capsule   Commonly known as:  INDOCIN   Take 25 mg by mouth 3 times daily as needed.                                klonoPIN 0.5 MG tablet   Take 1 mg by mouth At Bedtime 2 tablets = 1 mg   Last time this was given:  1 mg on 5/26/2017  9:48 PM   Generic drug:  clonazePAM                                * rOPINIRole 0.5 MG tablet   Commonly known as:  REQUIP   Take 0.5 mg by mouth 3 times daily. 0600,1400,2200   Last time this was given:  0.25 mg on 5/27/2017  7:25 AM                                * ROPINIROLE HCL PO   Take 0.25 mg by mouth 2 times daily 0700, 1600   Last time this was given:  0.25 mg on 5/27/2017  7:25 AM                                * ROPINIROLE HCL PO   Take 0.5 mg by mouth At Bedtime 2 tablets of 0.25 mg = 0.5 mg   Last  time this was given:  0.25 mg on 5/27/2017  7:25 AM                                * Notice:  This list has 5 medication(s) that are the same as other medications prescribed for you. Read the directions carefully, and ask your doctor or other care provider to review them with you.

## 2017-05-26 NOTE — ED NOTES
Patient and his wife were at [employer] for lunch; patient was at the table; wife went to their gift shop; patient fell? Passed out? Unwitnessed incident.    Wife came back to the table and patient was on the floor.  Medics were called and patient referred to the ED for further work-up.  Patient has not recollection of incident.  ABCs intact.

## 2017-05-26 NOTE — IP AVS SNAPSHOT
[facility] Observation Department    [address]    [facility] [address]    Phone:  [phone]                                       After Visit Summary   5/26/2017    Carlos Neri    MRN: [id number]           After Visit Summary Signature Page     I have received my discharge instructions, and my questions have been answered. I have discussed any challenges I see with this plan with the nurse or doctor.    ..........................................................................................................................................  Patient/Patient Representative Signature      ..........................................................................................................................................  Patient Representative Print Name and Relationship to Patient    ..................................................               ................................................  Date                                            Time    ..........................................................................................................................................  Reviewed by Signature/Title    ...................................................              ..............................................  Date                                                            Time

## 2017-05-26 NOTE — PHARMACY-ADMISSION MEDICATION HISTORY
Admission medication history interview status for this patient is complete. See [facility] admission navigator for allergy information, prior to admission medications and immunization status.     Medication history interview source(s):Patient and Family  Medication history resources (including written lists, pill bottles, clinic record):None  Primary pharmacy:[facility]    Changes made to PTA medication list:  Added: donepezil   Deleted: Percocet, aspirin 81 mg, multivitamin  Changed: Sinemet 25/100 mg from 1.5 tablets 4 times daily to 1 tab 5 times daily. Clonazepam 0.75 mg to 1 mg at bedtime. Ropinirole from 0.5 mg tid to 0.25 mg bid and 0.5 mg at bedtime.     Actions taken by pharmacist (provider contacted, etc): Called pharmacy. Pt and pt's wife knew how tablets and frequency. Medication strength per pharmacy.     Additional medication history information:none    Medication reconciliation/reorder completed by provider prior to medication history? No      Prior to Admission medications    Medication Sig Last Dose Taking? Auth Provider   ROPINIROLE HCL PO Take 0.25 mg by mouth 2 times daily 0700, 1600 5/26/2017 at x2 Yes Unknown, Entered By History   ROPINIROLE HCL PO Take 0.5 mg by mouth At Bedtime 2 tablets of 0.25 mg = 0.5 mg 5/25/2017 at Unknown time Yes Unknown, Entered By History   DONEPEZIL HCL PO Take 10 mg by mouth At Bedtime 5/25/2017 at Unknown time Yes Unknown, Entered By History   carbidopa-levodopa (SINEMET)  MG per tablet Take 1 tablet by mouth 5 times daily 0700, 1000, 1300, 1600, 1900 5/26/2017 at x4 Yes Unknown, Entered By History   rOPINIRole (REQUIP) 0.5 MG tablet Take 0.5 mg by mouth 3 times daily. 0600,1400,2200 5/26/2017 at Unknown time Yes Unknown, Entered By History   carbidopa-levodopa (SINEMET CR)  MG per tablet Take 1 tablet by mouth At Bedtime. 5/25/2017 at Unknown time Yes Unknown, Entered By History   clonazePAM (KLONOPIN) 0.5 MG tablet Take 1 mg by mouth At Bedtime 2 tablets = 1 mg  5/25/2017 at Unknown time Yes Unknown, Entered By History   indomethacin (INDOCIN) 25 MG capsule Take 25 mg by mouth 3 times daily as needed. prn Yes Unknown, Entered By History

## 2017-05-26 NOTE — ED NOTES
MD has been with patient and family discussing plan of care.  Son and wife of patient are at bedside.

## 2017-05-26 NOTE — ED PROVIDER NOTES
History     Chief Complaint:  Fall    History provided by the patient's wife secondary to the patient's dementia.   HPI   Carlos Neri is a 81 year old male with Parkinson's with a history of recurrent falls who presents via EMS for evaluation after a fall. He and his wife were dining at a restaurant just prior to arrival when the patient had an unwitnessed fall from a sitting position. At this time, the wife found him and staff called EMS. Upon arrival the patient states he cannot recall the events leading up to or including the fall, and wife was not present. He states he may have been light-headed. Wife states the patient had a brief episode of diaphoresis 2 days prior, but notes no other traumatic events or symptoms. The patient denies any significant pain or injuries, chest pain, shortness of breath, palpitations, and has no other concerns at this time.     Allergies:  Morphine Sulfate     Medications:    carbidopa-levodopa (SINEMET)  MG per tablet  aspirin 81 MG chewable tablet  rOPINIRole (REQUIP) 0.5 MG tablet  carbidopa-levodopa (SINEMET CR)  MG per tablet  clonazePAM (KLONOPIN) 0.5 MG tablet  multivitamin, therapeutic with minerals (MULTI-VITAMIN) TABS  oxyCODONE-acetaminophen (PERCOCET) 5-325 MG per tablet  indomethacin (INDOCIN) 25 MG capsule    Past Medical History:    Lewy body dementia  Parkinson disease    Past Surgical History:    Hernia repair    Family History:    History reviewed. No pertinent family history.    Social History:  Presents to the Emergency Department with his wife.  Marital Status:    Smoking status: Never smoker  Alcohol use: No    Review of Systems   Constitutional: Positive for diaphoresis (resolved). Negative for fever.   Respiratory: Negative for chest tightness and shortness of breath.    Cardiovascular: Negative for chest pain and palpitations.   Neurological: Positive for syncope (possibly) and light-headedness. Negative for headaches.   All other  systems reviewed and are negative.    Physical Exam     Patient Vitals for the past 24 hrs:   BP Temp Temp src Pulse Heart Rate Resp SpO2 Height Weight   05/26/17 1550 - - - - 63 - 97 % - -   05/26/17 1545 120/82 - - - 62 - - - -   05/26/17 1445 - - - - 64 - 97 % - -   05/26/17 1415 115/68 - - - 58 - 94 % - -   05/26/17 1345 102/67 - - - 58 18 94 % - -   05/26/17 1339 111/66 - - - - - - - -   05/26/17 1330 105/71 97.9  F (36.6  C) Oral 61 61 20 95 % 1.829 m (6') 78 kg (172 lb)   05/26/17 1324 105/71 - - - - - 96 % - -     Physical Exam  Constitutional:  Appears well-developed and well-nourished. Alert. Conversant. Non toxic.  HENT:   Head: Atraumatic. No depressed skull fracture, Racoon Eyes, Deleon's sign, or hemotympanum. Face normal.  Nose: Nose normal.  Mouth/Throat: Oral mucosa is clear and moist. no trismus. Pharynx normal. Tonsils symmetric. No tonsillar enlargement, erythema, or exudate.  Eyes: Conjunctivae normal. EOM normal. Pupils equal, round, and reactive to light. No scleral icterus.   Neck: Normal range of motion. Neck supple. No tracheal deviation present.   Cardiovascular: Normal rate, regular rhythm. No gallop. No friction rub. No murmur heard. Symmetric radial artery pulses .  No JVD.  Pulmonary/Chest: Effort normal. No stridor. No respiratory distress. No wheezes. No rales. No rhonchi . No tenderness.   Abdominal: Soft. Bowel sounds normal. No distension. No mass. No tenderness. No rebound. No guarding.   Musculoskeletal: Normal range of motion. No edema. No tenderness. No deformity   Lymph: No cervical adenopathy.   Neurological: Alert and oriented to person, place, and time. Normal strength. Mild tremor. CN II-VII intact. No sensory deficit. GCS eye subscore is 4. GCS verbal subscore is 5. GCS motor subscore is 6. Normal coordination   Skin: Skin is warm and dry. No rash noted. No pallor. Normal capillary refill.  Psychiatric:  Normal mood. Normal affect.     Emergency Department Course      Imaging:  Radiology findings were communicated with the patient and his wife who voiced understanding of the findings.  XR Chest 2 Views  IMPRESSION: Emphysematous lungs without evidence of acute abnormality.  Report per radiology     Laboratory:  Laboratory findings were communicated with the patient and his wife who voiced understanding of the findings.  CBC: (L) o/w WNL. (WBC 6.5, HGB 14.7)   BMP: AWNL (Creatinine 0.99)  Lactic Acid: 0.9  Troponin (Collected 1513): <0.015  ABO/Rh: AB, RH(D) positive, Antibody screen negative     Emergency Department Course:  Nursing notes and vitals reviewed.  I performed an exam of the patient as documented above.   The patient was sent for a XR Chest 2 Views while in the emergency department, results above.   IV was inserted and blood was drawn for laboratory testing, results above.  1729: I spoke with Dr. Rogel of the hospitalist service regarding patient's presentation, findings, and plan of care.  1745: Patient rechecked and updated.   I discussed the treatment plan with the patient. They expressed understanding of this plan and consented to admission. I discussed the patient with Dr. Rogel, who will admit the patient to a monitored bed for further evaluation and treatment.  I personally reviewed the laboratory and imaging results with the Patient and spouse and answered all related questions prior to admission.     Impression & Plan      Medical Decision Making:  Carlos Neri is a 81 year old male with history of Parkinson's disease, but no known history of diabetes, heart disease who is brought in by EMS from a restaurant this afternoon after he had an event.  He was found on the floor next to his restaurant table.  His fall was unwitnessed but we believe that he fainted as he doesn't recall any intention to stand up prior to the event.  Concern here is for syncope.  No antecedent symptoms to suggest a vasovagal event.  Cardiac monitor here in  the ER shows normal sinus rhythm, bordering on sinus bradycardia.  No other signs of sinus pause or ventricular escape rhythms.  No tachydysrhythmias.  He will require further cardiac monitoring to look for an occult brief episode.  He had a similar episode of possible syncope about 5 years ago with a negative workup.    At this point EKG shows no sign of ischemia and troponin is negative.  He is not anemic.  No other symptoms to suggest GI bleeding or other internal hemorrhage.  He has no headache or new focal deficits to suggest intracranial hemorrhage as cause for his syncope.     Plan he has no pain and denies any injury from the fall.    We will admit to hospitalist service for telemetry monitoring, serial troponins, further workup for syncope.    Diagnosis:    ICD-10-CM    1. Syncope, unspecified syncope type R55      Disposition:   Admitted to obs under the supervision of Dr. Rogel.    Scribe Disclosure:  Nadine CARBALLO, am serving as a scribe at 2:04 PM on 5/26/2017 to document services personally performed by Siddharth Hayes MD, based on my observations and the provider's statements to me.    5/26/2017   [facility] EMERGENCY DEPARTMENT       Siddharth Hayes MD  05/26/17 5827

## 2017-05-26 NOTE — ED NOTES
Patient was incontinent of urine.  Linens and clothing changed.  Patient is on commode with family and call light in reach.

## 2017-05-27 ENCOUNTER — APPOINTMENT (OUTPATIENT)
Dept: CARDIOLOGY | Facility: CLINIC | Age: 82
End: 2017-05-27
Attending: INTERNAL MEDICINE
Payer: MEDICARE

## 2017-05-27 ENCOUNTER — APPOINTMENT (OUTPATIENT)
Dept: PHYSICAL THERAPY | Facility: CLINIC | Age: 82
End: 2017-05-27
Attending: INTERNAL MEDICINE
Payer: MEDICARE

## 2017-05-27 VITALS
SYSTOLIC BLOOD PRESSURE: 110 MMHG | DIASTOLIC BLOOD PRESSURE: 63 MMHG | HEART RATE: 61 BPM | OXYGEN SATURATION: 95 % | WEIGHT: 172 LBS | TEMPERATURE: 97.7 F | HEIGHT: 72 IN | RESPIRATION RATE: 18 BRPM | BODY MASS INDEX: 23.3 KG/M2

## 2017-05-27 LAB
ALBUMIN SERPL-MCNC: 3.2 G/DL (ref 3.4–5)
ALBUMIN UR-MCNC: NEGATIVE MG/DL
ALP SERPL-CCNC: 57 U/L (ref 40–150)
ALT SERPL W P-5'-P-CCNC: 9 U/L (ref 0–70)
ANION GAP SERPL CALCULATED.3IONS-SCNC: 4 MMOL/L (ref 3–14)
APPEARANCE UR: CLEAR
AST SERPL W P-5'-P-CCNC: 11 U/L (ref 0–45)
BASOPHILS # BLD AUTO: 0 10E9/L (ref 0–0.2)
BASOPHILS NFR BLD AUTO: 0.5 %
BILIRUB SERPL-MCNC: 0.8 MG/DL (ref 0.2–1.3)
BILIRUB UR QL STRIP: NEGATIVE
BUN SERPL-MCNC: 22 MG/DL (ref 7–30)
CALCIUM SERPL-MCNC: 8.3 MG/DL (ref 8.5–10.1)
CHLORIDE SERPL-SCNC: 110 MMOL/L (ref 94–109)
CO2 SERPL-SCNC: 28 MMOL/L (ref 20–32)
COLOR UR AUTO: YELLOW
CREAT SERPL-MCNC: 0.87 MG/DL (ref 0.66–1.25)
DIFFERENTIAL METHOD BLD: ABNORMAL
EOSINOPHIL # BLD AUTO: 0.1 10E9/L (ref 0–0.7)
EOSINOPHIL NFR BLD AUTO: 1.7 %
ERYTHROCYTE [DISTWIDTH] IN BLOOD BY AUTOMATED COUNT: 13.7 % (ref 10–15)
GFR SERPL CREATININE-BSD FRML MDRD: 85 ML/MIN/1.7M2
GLUCOSE SERPL-MCNC: 95 MG/DL (ref 70–99)
GLUCOSE UR STRIP-MCNC: NEGATIVE MG/DL
HCT VFR BLD AUTO: 44.9 % (ref 40–53)
HGB BLD-MCNC: 14 G/DL (ref 13.3–17.7)
HGB UR QL STRIP: NEGATIVE
IMM GRANULOCYTES # BLD: 0 10E9/L (ref 0–0.4)
IMM GRANULOCYTES NFR BLD: 0.3 %
KETONES UR STRIP-MCNC: 5 MG/DL
LEUKOCYTE ESTERASE UR QL STRIP: NEGATIVE
LYMPHOCYTES # BLD AUTO: 2 10E9/L (ref 0.8–5.3)
LYMPHOCYTES NFR BLD AUTO: 34 %
MCH RBC QN AUTO: 29.4 PG (ref 26.5–33)
MCHC RBC AUTO-ENTMCNC: 31.2 G/DL (ref 31.5–36.5)
MCV RBC AUTO: 94 FL (ref 78–100)
MONOCYTES # BLD AUTO: 0.5 10E9/L (ref 0–1.3)
MONOCYTES NFR BLD AUTO: 8.8 %
MUCOUS THREADS #/AREA URNS LPF: PRESENT /LPF
NEUTROPHILS # BLD AUTO: 3.2 10E9/L (ref 1.6–8.3)
NEUTROPHILS NFR BLD AUTO: 54.7 %
NITRATE UR QL: NEGATIVE
NRBC # BLD AUTO: 0 10*3/UL
NRBC BLD AUTO-RTO: 0 /100
PH UR STRIP: 6 PH (ref 5–7)
PLATELET # BLD AUTO: 115 10E9/L (ref 150–450)
POTASSIUM SERPL-SCNC: 4.1 MMOL/L (ref 3.4–5.3)
PROT SERPL-MCNC: 6.1 G/DL (ref 6.8–8.8)
RBC # BLD AUTO: 4.77 10E12/L (ref 4.4–5.9)
RBC #/AREA URNS AUTO: 0 /HPF (ref 0–2)
SODIUM SERPL-SCNC: 142 MMOL/L (ref 133–144)
SP GR UR STRIP: 1.01 (ref 1–1.03)
URN SPEC COLLECT METH UR: ABNORMAL
UROBILINOGEN UR STRIP-MCNC: 4 MG/DL (ref 0–2)
WBC # BLD AUTO: 5.8 10E9/L (ref 4–11)
WBC #/AREA URNS AUTO: 0 /HPF (ref 0–2)

## 2017-05-27 PROCEDURE — 93306 TTE W/DOPPLER COMPLETE: CPT

## 2017-05-27 PROCEDURE — A9270 NON-COVERED ITEM OR SERVICE: HCPCS | Mod: GY | Performed by: INTERNAL MEDICINE

## 2017-05-27 PROCEDURE — 85025 COMPLETE CBC W/AUTO DIFF WBC: CPT | Performed by: INTERNAL MEDICINE

## 2017-05-27 PROCEDURE — 97161 PT EVAL LOW COMPLEX 20 MIN: CPT | Mod: GP | Performed by: PHYSICAL THERAPIST

## 2017-05-27 PROCEDURE — 93306 TTE W/DOPPLER COMPLETE: CPT | Mod: 26 | Performed by: INTERNAL MEDICINE

## 2017-05-27 PROCEDURE — 97530 THERAPEUTIC ACTIVITIES: CPT | Mod: GP | Performed by: PHYSICAL THERAPIST

## 2017-05-27 PROCEDURE — 80053 COMPREHEN METABOLIC PANEL: CPT | Performed by: INTERNAL MEDICINE

## 2017-05-27 PROCEDURE — 96361 HYDRATE IV INFUSION ADD-ON: CPT

## 2017-05-27 PROCEDURE — 25000132 ZZH RX MED GY IP 250 OP 250 PS 637: Mod: GY | Performed by: INTERNAL MEDICINE

## 2017-05-27 PROCEDURE — 81001 URINALYSIS AUTO W/SCOPE: CPT | Performed by: INTERNAL MEDICINE

## 2017-05-27 PROCEDURE — 99217 ZZC OBSERVATION CARE DISCHARGE: CPT | Performed by: PHYSICIAN ASSISTANT

## 2017-05-27 PROCEDURE — 36415 COLL VENOUS BLD VENIPUNCTURE: CPT | Performed by: INTERNAL MEDICINE

## 2017-05-27 PROCEDURE — G0378 HOSPITAL OBSERVATION PER HR: HCPCS

## 2017-05-27 PROCEDURE — 40000193 ZZH STATISTIC PT WARD VISIT: Performed by: PHYSICAL THERAPIST

## 2017-05-27 PROCEDURE — 97116 GAIT TRAINING THERAPY: CPT | Mod: GP | Performed by: PHYSICAL THERAPIST

## 2017-05-27 RX ADMIN — ROPINIROLE HYDROCHLORIDE 0.25 MG: 0.25 TABLET, FILM COATED ORAL at 07:25

## 2017-05-27 RX ADMIN — CARBIDOPA AND LEVODOPA 1 TABLET: 25; 100 TABLET ORAL at 10:07

## 2017-05-27 RX ADMIN — CARBIDOPA AND LEVODOPA 1 TABLET: 25; 100 TABLET ORAL at 07:24

## 2017-05-27 RX ADMIN — ROPINIROLE HYDROCHLORIDE 0.5 MG: 0.25 TABLET, FILM COATED ORAL at 06:29

## 2017-05-27 RX ADMIN — CARBIDOPA AND LEVODOPA 1 TABLET: 25; 100 TABLET ORAL at 13:07

## 2017-05-27 NOTE — PLAN OF CARE
Problem: Discharge Planning  Goal: Discharge Planning (Adult, OB, Behavioral, Peds)  ROOM # 224     Living Situation (if not independent, order SW consult): independently with wife in [facility], no services  Facility name: N/A  : Wife Stella     Activity level at baseline: independent with cane  Activity level on admit: assist of 1        Patient registered to observation; given Patient Bill of Rights; given the opportunity to ask questions about observation status and their plan of care.  Patient has been oriented to the observation room, bathroom and call light is in place.     Discussed discharge goals and expectations with patient/family.

## 2017-05-27 NOTE — PROGRESS NOTES
05/27/17 1100   Quick Adds   Type of Visit Initial PT Evaluation   Living Environment   Lives With spouse   Living Arrangements house   Home Accessibility stairs to enter home;stairs (1 railing present)   Number of Stairs to Enter Home 3   Number of Stairs Within Home 12  (basement- Pt reports not going down there x2 weeks)   Stair Railings at Home inside, present at both sides   Transportation Available car   Living Environment Comment Pt lives in rambler-style home with spouse, who cooks, cleans and drives Pt. Previously, Pt I in all ADls, dressing, bathing, ambulation. Adult son, Curtis, very involved.    Self-Care   Dominant Hand right   Usual Activity Tolerance good   Current Activity Tolerance fair   Regular Exercise yes   Activity/Exercise Type walking   Exercise Amount/Frequency 30 mins   Equipment Currently Used at Home cane, straight;shower chair   Activity/Exercise/Self-Care Comment Pt previously performing HEP from [employer] daily, walking 30 minutes without AD for exercise I outside.    Functional Level Prior   Ambulation 0-->independent   Transferring 0-->independent   Toileting 0-->independent   Bathing 0-->independent   Dressing 0-->independent   Eating 0-->independent   Communication 0-->understands/communicates without difficulty   Swallowing 0-->swallows foods/liquids without difficulty   Cognition 0 - no cognition issues reported   Fall history within last six months yes   Number of times patient has fallen within last six months 1   Which of the above functional risks had a recent onset or change? ambulation;transferring;bathing;dressing;fall history   Prior Functional Level Comment Pt previously I in all cares, ambulation   General Information   Onset of Illness/Injury or Date of Surgery - Date 05/26/17   Referring Physician Cyndee Rogel, DO   Patient/Family Goals Statement Pt verbalizes reservation about going home safely due to unsteadiness and unknown origen of syncope.   "  Pertinent History of Current Problem (include personal factors and/or comorbidities that impact the POC) Carlos Neri is a 81 year old male who presented to [facility] with unwitnessed syncope and fall to the ground from a seated position at resturaunt. Pt reported syncope. Pt has Parkinsons and was in OPPT for Parkinsons up until 4 days ago (was discharged to home program).   Precautions/Limitations fall precautions   Cognitive Status Examination   Orientation orientation to person, place and time   Level of Consciousness alert   Follows Commands and Answers Questions 100% of the time   Personal Safety and Judgment intact   Memory intact   Pain Assessment   Patient Currently in Pain No   Posture    Posture Forward head position;Kyphosis   Range of Motion (ROM)   ROM Quick Adds No deficits were identified   Strength   Strength Comments Pt demonstrates anti-gravity strength all 4 limbs. Pt tremors increased from baseline.    Bed Mobility   Bed Mobility Comments MinAx1-2 with positioning supine d/t \"ansy-ness\". Verbal cues needed for supine to sit transfer with bed rail, SBAx1.    Transfer Skills   Transfer Comments Pt demonstrates sit to stand CGAx1 with 2WW off toilet and EOB, VC needed for safe hand placement.    Gait   Gait Comments Pt ambulates with 2WW, tremors present, reports increased from baseline, 200' SBAx1 until 150'', then Pt reports feeling \"funny.\" Denies pain, dizziness, nausea. \"just feels like when I need my Parkinsons medication.\" Unable to complete stair evaluation due to this sensation, required sitting rest at EOB.    Balance   Balance Comments Once ambulating, Pt demonstrates safe dynamic balance, speed and use of 2WW. Static balance fair due to tremors/ Parkinsons.    General Therapy Interventions   Planned Therapy Interventions gait training;neuromuscular re-education;strengthening;transfer training;risk factor education;home program guidelines;progressive activity/exercise "   Intervention Comments Transfer, gait and stair evaluation   Clinical Impression   Criteria for Skilled Therapeutic Intervention yes, treatment indicated   PT Diagnosis Decreased endurance and from baseline, increasing falls risk with transfer, ambulation and stair ADls.    Influenced by the following impairments Decreased endurance and from baseline, increasing falls risk with transfer, ambulation and stair ADls.    Functional limitations due to impairments Decreased endurance and from baseline, increasing falls risk with transfer, ambulation and stair ADls.    Clinical Presentation Stable/Uncomplicated   Clinical Presentation Rationale Decreased endurance and from baseline, increasing falls risk with transfer, ambulation and stair ADls.    Clinical Decision Making (Complexity) Low complexity   Therapy Frequency` daily   Predicted Duration of Therapy Intervention (days/wks) 3 days   Anticipated Discharge Disposition Transitional Care Facility   Risk & Benefits of therapy have been explained Yes   Patient, Family & other staff in agreement with plan of care Yes   Total Evaluation Time   Total Evaluation Time (Minutes) 35

## 2017-05-27 NOTE — PLAN OF CARE
Problem: Discharge Planning  Goal: Discharge Planning (Adult, OB, Behavioral, Peds)  Outcome: No Change  PRIMARY DIAGNOSIS: Fall   Primary Symptoms: weakness      OUTPATIENT/OBSERVATION GOALS TO BE MET BEFORE DISCHARGE:      1. Orthostatic symptoms (BP decrease or HR increase with patient upright) Ortho's negative  2. Vital Signs stable? Yes  3. Documented urine output: Yes  4. Tolerating PO fluid: Yes  5. Symptoms improved: Yes  6. Labs WNL? Yes.   7. ADLs back to baseline?  No  8. Activity and level of assistance: Assist of 1 w/ walker  9. Pain status: Denies  10. Barriers to discharge noted - yes. PT to see pt      Interpretation of rhythm per telemetry tech: SR HR 60      Pt is alert and oriented x4. Pt denies any dizziness. Pt up to the bathroom with A1 w/walker due to tremors. Pt uses cane at baseline. PT to see pt at 11 am.

## 2017-05-27 NOTE — PLAN OF CARE
Problem: Discharge Planning  Goal: Discharge Planning (Adult, OB, Behavioral, Peds)  Outcome: No Change  PRIMARY DIAGNOSIS: Fall   Primary Symptoms: weakness      OUTPATIENT/OBSERVATION GOALS TO BE MET BEFORE DISCHARGE:      1. Orthostatic symptoms (BP decrease or HR increase with patient upright) Ortho's negative  2. Vital Signs stable? Yes  3. Documented urine output: Yes  4. Tolerating PO fluid: Yes  5. Symptoms improved: Yes  6. Labs WNL? Yes  7. ADLs back to baseline?  No, gen wkns  8. Activity and level of assistance: Assist of 1 w/ walker  9. Pain status: Denies  10. Barriers to discharge noted -  PT, echo in am      Interpretation of rhythm per telemetry tech: SR 60      VSS, up with assist of 1 with walker, A&Ox4, speech clear and logical, denies n/t, LS clear, RA, denies pain, dizziness and nausea, BS A&Ax4, passing gas, skin intact, will continue to monitor and provide supportive cares.

## 2017-05-27 NOTE — H&P
"[facility]    Hospitalist History and Physical    Name: Carlos Neri    MRN: [id number]  YOB: 1935    Age: 81 year old  Date of Admission:  5/26/2017    Assessment & Plan   Carlos Neri is a 81 year old male who presented to [facility] with unwitnessed syncope and fall to the ground from a seated position    1.  Syncope and fall to ground  Mr. Neri is a 80 yo male pt with a h/o parkinson's disease who presents to the ER after sustaining a syncopal episode at a restaurant.  He feels his normal self.  Recent new incontinence reported (will check UA/UC).  No palpitation or clear prodrome.  Was diaphoretic briefly earlier this week.    Will give IVF overnight and place on tele.  Echo in the am.  Await UA results.  Will order magnesium and tsh as add-on.  Will need to consider cardiac monitoring in the outpt setting if uneventful tele monitoring overnight.    2.  Parkinson's disease and restless leg syndrome  Resume home meds.  Follows with Dr. Tracy Chapin from [facility]---last increased dose of sinemet was in the fall 2016, per wife.    3. Dehydration, mild  Will give some IVF overnight.    DVT Prophylaxis: Ambulate every shift  Code Status: Full Code    Disposition: Expected discharge in 1-2 days once cardiac arrythmia is ruled out.    Primary Care Physician   Danny Morales    Chief Complaint   Unwitnessed syncope and fall in restaurant    History is obtained from the patient, wife and son in room.  Also discussed with Dr. Hayes of the ED department.    History of Present Illness   Carlos Nrei is a 81 year old male who presents to the ER with family.  Unwitnessed syncope and fall to the ground while awaiting lunch at [employer] restaurant earlier today.  He remembers starting to drink his coffee and then \"awakening\" on the floor.  Coffee was spilled on his pants.  He was not diaphoretic; no CP or palpitations.  Wife reports a brief " "episode of diaphoresis at a restaurant two days prior, but was short-lived and no syncope at that time.      He report new urinary incontinence 2 nights ago.  No SOB or fever.  No clear leg swelling or other recent falls.  No new CP or SOB with exertion.  Has been attending outpt PT/OT/speech therapy twice a week.  Coughs occasionally when eating---no choking.    Feels well now.  Occasional \"tooth aches\", but none recent or current.    Past Medical History    Past Medical History:   Diagnosis Date     Lewy body dementia      Parkinson disease (H)        Past Surgical History   Past Surgical History:   Procedure Laterality Date     HERNIA REPAIR         Prior to Admission Medications   Prior to Admission Medications   Prescriptions Last Dose Informant Patient Reported? Taking?   DONEPEZIL HCL PO 5/25/2017 at Unknown time  Yes Yes   Sig: Take 10 mg by mouth At Bedtime   ROPINIROLE HCL PO 5/26/2017 at x2  Yes Yes   Sig: Take 0.25 mg by mouth 2 times daily 0700, 1600   ROPINIROLE HCL PO 5/25/2017 at Unknown time  Yes Yes   Sig: Take 0.5 mg by mouth At Bedtime 2 tablets of 0.25 mg = 0.5 mg   carbidopa-levodopa (SINEMET CR)  MG per tablet 5/25/2017 at Unknown time  Yes Yes   Sig: Take 1 tablet by mouth At Bedtime.   carbidopa-levodopa (SINEMET)  MG per tablet 5/26/2017 at x4  Yes Yes   Sig: Take 1 tablet by mouth 5 times daily 0700, 1000, 1300, 1600, 1900   clonazePAM (KLONOPIN) 0.5 MG tablet 5/25/2017 at Unknown time  Yes Yes   Sig: Take 1 mg by mouth At Bedtime 2 tablets = 1 mg   indomethacin (INDOCIN) 25 MG capsule prn  Yes Yes   Sig: Take 25 mg by mouth 3 times daily as needed.   rOPINIRole (REQUIP) 0.5 MG tablet 5/26/2017 at Unknown time  Yes Yes   Sig: Take 0.5 mg by mouth 3 times daily. 0600,1400,2200      Facility-Administered Medications: None     Allergies   Allergies   Allergen Reactions     Morphine Sulfate        Social History   Social History   Substance Use Topics     Smoking status: Not on " file     Smokeless tobacco: Not on file     Alcohol use Not on file     Social History     Social History Narrative   lives with wife; uses walker and cane occassionally    Family History   Family history reviewed with patient and is noncontributory.    Review of Systems   A Comprehensive greater than 10 system review of systems was carried out.  Pertinent positives and negatives are noted above.  Otherwise negative for contributory information.    Physical Exam   Temp: 96.2  F (35.7  C) Temp src: Oral BP: 106/80 Pulse: 61 Heart Rate: 65 Resp: 16 SpO2: 94 % O2 Device: None (Room air)    Vital Signs with Ranges  Temp:  [96.2  F (35.7  C)-97.9  F (36.6  C)] 96.2  F (35.7  C)  Pulse:  [61] 61  Heart Rate:  [58-72] 65  Resp:  [16-20] 16  BP: (102-151)/() 106/80  SpO2:  [94 %-98 %] 94 %  172 lbs 0 oz      GEN:  Alert, oriented x 3, comfortable, NAD, tremors at rest occasionally.  HEENT:  Normocephalic/atraumatic, pupils reactive, no scleral icterus, no nasal discharge, mouth moist, no oral ulcers or thrush noted.  Dental work and fillings noted---no no clear open wounds or pus noted on limited examination  NECK: no clear thyromegaly or JVD  LA: none palpable in the cervical/clavicular area bilaterally  CV:  Regular rate and rhythm, no clear murmur to ausc.  S1 + S2 noted, no S3 or S4.  LUNGS:  Clear to auscultation bilaterally.  No rales/rhonchi/wheezing auscultated bilaterally.  No costal retractions bilaterally.  Symmetric chest rise on inhalation noted.  ABD:  Active bowel sounds, soft, non-tender/non-distended.  No rebound/guarding/rigidity.  No masses palpated.  No obvious HSM to exam.  EXT:  No edema or cyanosis bilaterally. No joint synovitis noted.  No calf-tenderness or asymmetry noted.  SKIN:  Dry to touch, no rashes or jaundice noted.  PSYCH:  Mood flattened (masked facies), not agitiated.  NEURO:  Resting tremors to both arms noted intermittently, masked facies, some rigidity in the arms noted to  passive ROM    Data   Data reviewed today:  I reviewed the studies reports and additionally personally reviewed the EKG tracing showing nsr with no clear ST wave changes or T wave abnormalities.  and the chest x-ray image(s) showing emph. changes, no clear infiltrates.    tsh and magnesium - pending    Recent Labs  Lab 05/26/17  1513   WBC 6.5   HGB 14.7   HCT 45.4   MCV 94   *       Recent Labs  Lab 05/26/17  1513      POTASSIUM 4.1   CHLORIDE 106   CO2 30   ANIONGAP 5   GLC 96   BUN 27   CR 0.99   GFRESTIMATED 72   GFRESTBLACK 87   SADIA 8.5     No results for input(s): CULT in the last 168 hours.  No results for input(s): INR in the last 168 hours.  No results for input(s): COLOR, APPEARANCE, URINEGLC, URINEBILI, URINEKETONE, SG, UBLD, URINEPH, PROTEIN, UROBILINOGEN, NITRITE, LEUKEST, RBCU, WBCU in the last 168 hours.    Recent Results (from the past 24 hour(s))   XR Chest 2 Views    Narrative    CHEST TWO VIEWS 5/26/2017 3:40 PM     HISTORY: Syncope    COMPARISON: 6/10/2013    FINDINGS: Heart size upper normal but unchanged. The lungs are  hyperexpanded with coarsened interstitial markings. No lobar  consolidation, pneumothorax, or pleural effusion.       Impression    IMPRESSION: Emphysematous lungs without evidence of acute abnormality.      MATT SAEED MD

## 2017-05-27 NOTE — PLAN OF CARE
"Problem: Goal Outcome Summary  Goal: Goal Outcome Summary  DC Planner PT   Patient plan for D/C: TCU  Current status: PT evaluation completed. Pt demonstrates SBAx1 for bed mobility and supine to sit tranfers with verbal cuing. Positioning supine due to \"ansy-ness\" MinAx2. Sit to stand transfers SBAx1. Ambulates 200\" with 2WW with good speed, balance, safety until 150' when Pt reports feeling \"funny.\" Denied dizziness, SOB, nausea or pain. Able to return to room with CGAx1 with 2WW to EOB sitting rest. Not able to complete stair evaluation due to \"funny\" feeling. Nursing and team alerted.   Barriers to return to prior living situation: Reduced ambulation and ADL safety from baseline.   Recommendations for D/C: TCU  Rationale for D/C recommendations: Reduced ambulation safety from baseline increasing falls risk.        Entered by: Nadia Valdes 05/27/2017 12:10 PM         "

## 2017-05-27 NOTE — PLAN OF CARE
Problem: Discharge Planning  Goal: Discharge Planning (Adult, OB, Behavioral, Peds)  Outcome: No Change  PRIMARY DIAGNOSIS: Fall   Primary Symptoms: weakness     OUTPATIENT/OBSERVATION GOALS TO BE MET BEFORE DISCHARGE:     1. Orthostatic symptoms (BP decrease or HR increase with patient upright)?  N/A  2. Vital Signs stable? Yes  3. Documented urine output: Yes  4. Tolerating PO fluid: Yes  5. Symptoms improved: Yes  6. Labs WNL? Yes  7. ADLs back to baseline?  No, gen wkns  8. Activity and level of assistance: Assist of 1  9. Pain status: Denies  10. Barriers to discharge noted - awaiting orders     Interpretation of rhythm per telemetry tech: N/A -- awaiting orders     VSS, up with assist of 1 with a cane, A&Ox4, speech clear and logical, denies n/t, LS clear, RA, denies pain and nausea, BS A&Ax4, passing gas, skin intact, awaiting orders, wife and son at bedside, will continue to monitor and provide supportive cares.

## 2017-05-27 NOTE — PLAN OF CARE
Problem: Discharge Planning  Goal: Discharge Planning (Adult, OB, Behavioral, Peds)  Outcome: No Change  PRIMARY DIAGNOSIS: Fall   Primary Symptoms: weakness      OUTPATIENT/OBSERVATION GOALS TO BE MET BEFORE DISCHARGE:      1. Orthostatic symptoms (BP decrease or HR increase with patient upright) Ortho's negative  2. Vital Signs stable? Yes  3. Documented urine output: Yes  4. Tolerating PO fluid: Yes  5. Symptoms improved: Yes  6. Labs WNL? Yes. UA sent, waiting for results.  7. ADLs back to baseline?  No, gen wkns  8. Activity and level of assistance: Assist of 1 w/ walker  9. Pain status: Denies  10. Barriers to discharge noted -  PT, echo in am      Interpretation of rhythm per telemetry tech: SR 60      VSS. Up with assist of 1 with walker, pt had slow somewhat unsteady gait when ambulating to bathroom. A&Ox4, speech clear and logical, denies n/t, LS clear, RA, denies pain, dizziness and nausea, BS A&Ax4, passing gas, skin intact, will continue to monitor and provide supportive cares.

## 2017-05-27 NOTE — DISCHARGE SUMMARY
[facility] Outpatient / Observation Unit  Discharge Summary        Carlos Neri MRN# [id number]   YOB: 1935 Age: 81 year old     Date of Admission:  5/26/2017  Date of Discharge:  5/27/2017  Admitting Physician:  Cyndee Rogel, DO  Discharge Physician: Eleni Tuttle PA-C  Discharging Service: Hospitalist      Primary Provider: Danny Morales  Primary Care Physician Phone Number: [phone]         Primary Discharge Diagnoses:    Carlos Neri is a 82 y/o male with PMH significant for Parkinson's disease and Lewy body dementia who was admitted on 5/26/2017 for episode of syncope.     1. Syncopal episode: unclear exact etiology but suspect may be related to vasovagal episode and dehydration. Patient's wife does report the patient has had prior episodes of falling asleep easily which may of occurred this time as well along with his h/o Parkinson's he has had recurrent falls occur. No findings on telemetry overnight but will have zio patch placed as an outpatient to rule out arrhythmia as the cause.   2. S/p fall: no injuries sustained during fall          Secondary Discharge Diagnoses:     Past Medical History:   Diagnosis Date     Lewy body dementia      Parkinson disease (H)             Code Status:      Full Code        Brief Hospital Summary:       Reason for your hospital stay      You were admitted due to concerns for a fall and fainting episode. Your workup included basic labs and chest x-ray which did not show any acute infectious cause for your symptoms. An ultrasound of your heart was obtained which did not show any acute changes. You were monitored on telemetry which did not show any abnormal heart rhythms. You were evaluated by physical therapy who felt you would benefit from additional rehab. Social work was consulted and a home nurse will bet set up to come to your home to evaluate your needs for additional outpatient physical therapy and occupational therapy.        Please refer to initial admission history and physical for further details.   Briefly, Carlos Neri was admitted on 5/26/2017 for episode of syncope and subsequent fall.Initial work up in the ED did not reveal evidence of significant cardiac arrhthymias or neurologic abnormalities.  Pt was registered to the Observation Unit for further evaluation.      Pt was placed on telemetry and started on IVF hydration. ECHO was performed to r/o significant valvular dysfunction with results outlined below. Labs reviewed and significant results addressed. On the day of discharge, pt has not had any further episodes of syncope, no significant arrhythmias detected or concerning ECHO findings as the cause of syncope. Vitals were stable and pt was deemed safe for discharge. Medications were reviewed and adjustments made as necessary. Pt is instructed to follow up as below.           Significant Lab During Hospitalization:        Recent Labs  Lab 05/27/17 0535 05/26/17  1513   WBC 5.8 6.5   HGB 14.0 14.7   HCT 44.9 45.4   MCV 94 94   * 132*       Recent Labs  Lab 05/27/17 0535 05/26/17  1513    141   POTASSIUM 4.1 4.1   CHLORIDE 110* 106   CO2 28 30   ANIONGAP 4 5   GLC 95 96   BUN 22 27   CR 0.87 0.99   GFRESTIMATED 85 72   GFRESTBLACK >90African American GFR Calc 87   SADIA 8.3* 8.5              Significant Imaging During Hospitalization:      Results for orders placed or performed during the hospital encounter of 05/26/17   XR Chest 2 Views    Narrative    CHEST TWO VIEWS 5/26/2017 3:40 PM     HISTORY: Syncope    COMPARISON: 6/10/2013    FINDINGS: Heart size upper normal but unchanged. The lungs are  hyperexpanded with coarsened interstitial markings. No lobar  consolidation, pneumothorax, or pleural effusion.       Impression    IMPRESSION: Emphysematous lungs without evidence of acute abnormality.      MATT SAEED MD     Echocardiogram:     Interpretation Summary     The visual ejection fraction is  estimated at 60-65%.  There is mild concentric left ventricular hypertrophy.  The right ventricle is normal in size and function.  Thickened mitral valve anterior leaflet  Moderate aortic root dilatation.  The rhythm was normal sinus.  The study was technically difficult. Compared to prior study, there is no  significant change.         Pending Results:      None        Consultations This Hospital Stay:      Consultation during this admission received from physical therapy.         Discharge Instructions and Follow-Up:      Follow-up Appointments    Follow up with primary care provider, Danny Morales, within 7 days   for hospital follow- up.  No follow up labs or test are needed.  Keep scheduled follow up with Dr. Chapin of neurology.            Discharge Disposition:      Discharged to home         Discharge Medications:        Current Discharge Medication List      CONTINUE these medications which have NOT CHANGED    Details   !! ROPINIROLE HCL PO Take 0.25 mg by mouth 2 times daily 0700, 1600      !! ROPINIROLE HCL PO Take 0.5 mg by mouth At Bedtime 2 tablets of 0.25 mg = 0.5 mg      DONEPEZIL HCL PO Take 10 mg by mouth At Bedtime      carbidopa-levodopa (SINEMET)  MG per tablet Take 1 tablet by mouth 5 times daily 0700, 1000, 1300, 1600, 1900      !! rOPINIRole (REQUIP) 0.5 MG tablet Take 0.5 mg by mouth 3 times daily. 0600,1400,2200      carbidopa-levodopa (SINEMET CR)  MG per tablet Take 1 tablet by mouth At Bedtime.      clonazePAM (KLONOPIN) 0.5 MG tablet Take 1 mg by mouth At Bedtime 2 tablets = 1 mg      indomethacin (INDOCIN) 25 MG capsule Take 25 mg by mouth 3 times daily as needed.       !! - Potential duplicate medications found. Please discuss with provider.            Allergies:         Allergies   Allergen Reactions     Morphine Sulfate            Condition and Physical on Discharge:      Discharge condition: Stable   Vitals: Blood pressure 118/66, pulse 61, temperature 96.6  F (35.9   C), temperature source Oral, resp. rate 18, height 1.829 m (6'), weight 78 kg (172 lb), SpO2 96 %.  172 lbs 0 oz      GENERAL:  Comfortable.  PSYCH: pleasant, oriented, No acute distress.  HEENT:  PERRLA. Normal conjunctiva, normal hearing, nasal mucosa and oropharynx are normal.  NECK:  Supple, no neck vein distention, adenopathy or bruits, normal thyroid.  HEART:  Normal S1, S2 with no murmur, no pericardial rub, gallops or S3 or S4.  LUNGS:  Clear to auscultation, normal respiratory effort. No wheezing, rales or ronchi.  ABDOMEN:  Soft, no hepatosplenomegaly, normal bowel sounds. Non-tender, non distended.   EXTREMITIES:  No pedal edema, +2 radial pulses bilateral and equal.  SKIN:  Dry to touch, No rash, wound or ulcerations.   NEUROLOGIC: bilateral UE resting tremors, masked facies, CN 2-12 intact, sensation is intact with no focal deficits.   PSYCH: masked facies, not agitated     Eleni Tuttle PA-C

## 2017-05-27 NOTE — CONSULTS
D:  Discharge planning  I/A:  Met with pt, son, wife.  D/W Arleen CHAPARRO.  Pt lives at home with wife and son.  Wife is with him 24/7.  She is not willing to pay privately for TCU and wants him to return home with her.  Will arrange for home RN support and they can evaluate need for PT/OT/HHA.  Wife requests referral to [facility].  Referral made.  P:  No further SW needs at this time.  All questions answered.

## 2017-05-27 NOTE — PLAN OF CARE
Problem: Discharge Planning  Goal: Discharge Planning (Adult, OB, Behavioral, Peds)  Outcome: Completed Date Met:  05/27/17  Patient's After Visit Summary was reviewed with patient and family.   Patient verbalized understanding of After Visit Summary, recommended follow up and was given an opportunity to ask questions.   Discharge medications sent home with patient/family: Not applicable   Discharged with spouse

## 2017-05-28 LAB — INTERPRETATION ECG - MUSE: NORMAL

## 2017-05-28 NOTE — PLAN OF CARE
Problem: Goal Outcome Summary  Goal: Goal Outcome Summary  Physical Therapy Discharge Summary     Reason for therapy discharge:    Discharged to transitional care facility.     Progress towards therapy goal(s). See goals on Care Plan in [facility] electronic health record for goal details.  Goals not met.  Barriers to achieving goals:   discharge on same date as initial evaluation.     Therapy recommendation(s):    Continued therapy is recommended.  Rationale/Recommendations:  strengthening and mobility for return home at [facility].   Patient not seen by documenting therapist, information obtained from previous therapy notes.

## 2017-05-30 ENCOUNTER — HOSPITAL ENCOUNTER (OUTPATIENT)
Dept: CARDIOLOGY | Facility: CLINIC | Age: 82
Discharge: HOME | End: 2017-05-30
Attending: PHYSICIAN ASSISTANT | Admitting: PHYSICIAN ASSISTANT
Payer: MEDICARE

## 2017-05-30 DIAGNOSIS — R55 SYNCOPE, UNSPECIFIED SYNCOPE TYPE: ICD-10-CM

## 2017-05-30 PROCEDURE — 0296T ZIO PATCH HOLTER: CPT | Performed by: PHYSICIAN ASSISTANT

## 2017-05-30 PROCEDURE — 0298T ZZC EXT ECG > 48HR TO 21 DAY REVIEW AND INTERPRETATN: CPT | Performed by: INTERNAL MEDICINE

## 2017-08-23 ENCOUNTER — HOSPITAL ENCOUNTER (OUTPATIENT)
Dept: CT IMAGING | Facility: CLINIC | Age: 82
Discharge: HOME | End: 2017-08-23
Attending: PSYCHIATRY & NEUROLOGY

## 2017-08-23 ENCOUNTER — HOSPITAL ENCOUNTER (OUTPATIENT)
Dept: NEUROLOGY | Facility: CLINIC | Age: 82
Setting detail: THERAPIES SERIES
Discharge: STILL ADMITTED OR ADMINISTRATIVE | End: 2017-08-23
Attending: PSYCHIATRY & NEUROLOGY

## 2017-08-23 DIAGNOSIS — R26.89 POOR BALANCE: ICD-10-CM

## 2017-08-23 DIAGNOSIS — H91.90 HOH (HARD OF HEARING), UNSPECIFIED LATERALITY: ICD-10-CM

## 2017-08-23 DIAGNOSIS — F03.90 DEMENTIA WITHOUT BEHAVIORAL DISTURBANCE (H): ICD-10-CM

## 2017-08-23 DIAGNOSIS — G20.A1 PARKINSON DISEASE (H): ICD-10-CM

## 2017-08-23 DIAGNOSIS — G47.52 REM SLEEP BEHAVIOR DISORDER: ICD-10-CM

## 2017-08-23 DIAGNOSIS — R55 SYNCOPE: ICD-10-CM

## 2017-08-28 ENCOUNTER — HOSPITAL ENCOUNTER (OUTPATIENT)
Dept: NEUROLOGY | Facility: CLINIC | Age: 82
Discharge: HOME | End: 2017-08-28
Attending: PSYCHIATRY & NEUROLOGY

## 2017-08-28 DIAGNOSIS — R55 SYNCOPE: ICD-10-CM

## 2017-08-28 DIAGNOSIS — G20.A1 PARKINSON DISEASE (H): ICD-10-CM

## 2017-10-04 ENCOUNTER — HOSPITAL ENCOUNTER (OUTPATIENT)
Dept: NEUROLOGY | Facility: CLINIC | Age: 82
Setting detail: THERAPIES SERIES
Discharge: STILL ADMITTED OR ADMINISTRATIVE | End: 2017-10-04
Attending: PSYCHIATRY & NEUROLOGY

## 2017-10-04 DIAGNOSIS — G25.81 RLS (RESTLESS LEGS SYNDROME): ICD-10-CM

## 2017-10-04 DIAGNOSIS — G20.A1 PARKINSON DISEASE (H): ICD-10-CM

## 2017-10-04 DIAGNOSIS — G47.52 REM SLEEP BEHAVIOR DISORDER: ICD-10-CM

## 2017-10-25 ENCOUNTER — AMBULATORY - HEALTHEAST (OUTPATIENT)
Dept: NEUROLOGY | Facility: CLINIC | Age: 82
End: 2017-10-25

## 2017-10-25 DIAGNOSIS — G20.A1 PARKINSON DISEASE (H): ICD-10-CM

## 2017-11-27 ENCOUNTER — HOSPITAL ENCOUNTER (EMERGENCY)
Facility: CLINIC | Age: 82
Discharge: HOME | End: 2017-11-27
Attending: EMERGENCY MEDICINE | Admitting: EMERGENCY MEDICINE
Payer: MEDICARE

## 2017-11-27 ENCOUNTER — APPOINTMENT (OUTPATIENT)
Dept: CT IMAGING | Facility: CLINIC | Age: 82
End: 2017-11-27
Attending: EMERGENCY MEDICINE
Payer: MEDICARE

## 2017-11-27 ENCOUNTER — APPOINTMENT (OUTPATIENT)
Dept: GENERAL RADIOLOGY | Facility: CLINIC | Age: 82
End: 2017-11-27
Attending: EMERGENCY MEDICINE
Payer: MEDICARE

## 2017-11-27 VITALS
HEART RATE: 63 BPM | OXYGEN SATURATION: 96 % | SYSTOLIC BLOOD PRESSURE: 127 MMHG | TEMPERATURE: 98.4 F | RESPIRATION RATE: 18 BRPM | DIASTOLIC BLOOD PRESSURE: 72 MMHG
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
## 2017-11-27 DIAGNOSIS — S32.020A CLOSED COMPRESSION FRACTURE OF SECOND LUMBAR VERTEBRA, INITIAL ENCOUNTER: ICD-10-CM

## 2017-11-27 LAB
ALBUMIN UR-MCNC: NEGATIVE MG/DL
ANION GAP SERPL CALCULATED.3IONS-SCNC: 6 MMOL/L (ref 3–14)
APPEARANCE UR: CLEAR
BACTERIA #/AREA URNS HPF: ABNORMAL /HPF
BASOPHILS # BLD AUTO: 0 10E9/L (ref 0–0.2)
BASOPHILS NFR BLD AUTO: 0.3 %
BILIRUB UR QL STRIP: NEGATIVE
BUN SERPL-MCNC: 22 MG/DL (ref 7–30)
CALCIUM SERPL-MCNC: 8.6 MG/DL (ref 8.5–10.1)
CHLORIDE SERPL-SCNC: 109 MMOL/L (ref 94–109)
CO2 SERPL-SCNC: 27 MMOL/L (ref 20–32)
COLOR UR AUTO: ABNORMAL
CREAT SERPL-MCNC: 0.91 MG/DL (ref 0.66–1.25)
DIFFERENTIAL METHOD BLD: NORMAL
EOSINOPHIL # BLD AUTO: 0.1 10E9/L (ref 0–0.7)
EOSINOPHIL NFR BLD AUTO: 0.8 %
ERYTHROCYTE [DISTWIDTH] IN BLOOD BY AUTOMATED COUNT: 14.1 % (ref 10–15)
GFR SERPL CREATININE-BSD FRML MDRD: 79 ML/MIN/1.7M2
GLUCOSE SERPL-MCNC: 91 MG/DL (ref 70–99)
GLUCOSE UR STRIP-MCNC: NEGATIVE MG/DL
HCT VFR BLD AUTO: 41.4 % (ref 40–53)
HGB BLD-MCNC: 13.4 G/DL (ref 13.3–17.7)
HGB UR QL STRIP: ABNORMAL
IMM GRANULOCYTES # BLD: 0.2 10E9/L (ref 0–0.4)
IMM GRANULOCYTES NFR BLD: 1.6 %
INTERPRETATION ECG - MUSE: NORMAL
KETONES UR STRIP-MCNC: 5 MG/DL
LEUKOCYTE ESTERASE UR QL STRIP: NEGATIVE
LYMPHOCYTES # BLD AUTO: 1.6 10E9/L (ref 0.8–5.3)
LYMPHOCYTES NFR BLD AUTO: 16.5 %
MCH RBC QN AUTO: 30.3 PG (ref 26.5–33)
MCHC RBC AUTO-ENTMCNC: 32.4 G/DL (ref 31.5–36.5)
MCV RBC AUTO: 94 FL (ref 78–100)
MONOCYTES # BLD AUTO: 0.6 10E9/L (ref 0–1.3)
MONOCYTES NFR BLD AUTO: 6.4 %
MUCOUS THREADS #/AREA URNS LPF: PRESENT /LPF
NEUTROPHILS # BLD AUTO: 7.1 10E9/L (ref 1.6–8.3)
NEUTROPHILS NFR BLD AUTO: 74.4 %
NITRATE UR QL: NEGATIVE
NRBC # BLD AUTO: 0 10*3/UL
NRBC BLD AUTO-RTO: 0 /100
PH UR STRIP: 5 PH (ref 5–7)
PLATELET # BLD AUTO: 152 10E9/L (ref 150–450)
POTASSIUM SERPL-SCNC: 3.8 MMOL/L (ref 3.4–5.3)
RBC # BLD AUTO: 4.42 10E12/L (ref 4.4–5.9)
RBC #/AREA URNS AUTO: 6 /HPF (ref 0–2)
SODIUM SERPL-SCNC: 142 MMOL/L (ref 133–144)
SOURCE: ABNORMAL
SP GR UR STRIP: 1.02 (ref 1–1.03)
SPERM #/AREA URNS HPF: PRESENT /HPF
SQUAMOUS #/AREA URNS AUTO: <1 /HPF (ref 0–1)
TROPONIN I SERPL-MCNC: <0.015 UG/L (ref 0–0.04)
UROBILINOGEN UR STRIP-MCNC: 0 MG/DL (ref 0–2)
WBC # BLD AUTO: 9.5 10E9/L (ref 4–11)
WBC #/AREA URNS AUTO: 2 /HPF (ref 0–2)

## 2017-11-27 PROCEDURE — 72100 X-RAY EXAM L-S SPINE 2/3 VWS: CPT

## 2017-11-27 PROCEDURE — 81001 URINALYSIS AUTO W/SCOPE: CPT | Performed by: EMERGENCY MEDICINE

## 2017-11-27 PROCEDURE — A9270 NON-COVERED ITEM OR SERVICE: HCPCS | Mod: GY | Performed by: EMERGENCY MEDICINE

## 2017-11-27 PROCEDURE — 25000132 ZZH RX MED GY IP 250 OP 250 PS 637: Mod: GY | Performed by: EMERGENCY MEDICINE

## 2017-11-27 PROCEDURE — 96360 HYDRATION IV INFUSION INIT: CPT

## 2017-11-27 PROCEDURE — 80048 BASIC METABOLIC PNL TOTAL CA: CPT | Performed by: EMERGENCY MEDICINE

## 2017-11-27 PROCEDURE — 99285 EMERGENCY DEPT VISIT HI MDM: CPT | Mod: 25

## 2017-11-27 PROCEDURE — 70450 CT HEAD/BRAIN W/O DYE: CPT

## 2017-11-27 PROCEDURE — 84484 ASSAY OF TROPONIN QUANT: CPT | Performed by: EMERGENCY MEDICINE

## 2017-11-27 PROCEDURE — 85025 COMPLETE CBC W/AUTO DIFF WBC: CPT | Performed by: EMERGENCY MEDICINE

## 2017-11-27 PROCEDURE — 93005 ELECTROCARDIOGRAM TRACING: CPT

## 2017-11-27 PROCEDURE — 25000128 H RX IP 250 OP 636: Performed by: EMERGENCY MEDICINE

## 2017-11-27 PROCEDURE — 96361 HYDRATE IV INFUSION ADD-ON: CPT

## 2017-11-27 RX ORDER — ACETAMINOPHEN 500 MG
1000 TABLET ORAL EVERY 4 HOURS PRN
Status: DISCONTINUED | OUTPATIENT
Start: 2017-11-27 | End: 2017-11-27 | Stop reason: HOSPADM

## 2017-11-27 RX ORDER — SODIUM CHLORIDE 9 MG/ML
1000 INJECTION, SOLUTION INTRAVENOUS CONTINUOUS
Status: DISCONTINUED | OUTPATIENT
Start: 2017-11-27 | End: 2017-11-27 | Stop reason: HOSPADM

## 2017-11-27 RX ORDER — LIDOCAINE 40 MG/G
CREAM TOPICAL
Status: DISCONTINUED | OUTPATIENT
Start: 2017-11-27 | End: 2017-11-27 | Stop reason: HOSPADM

## 2017-11-27 RX ADMIN — SODIUM CHLORIDE 500 ML: 9 INJECTION, SOLUTION INTRAVENOUS at 03:42

## 2017-11-27 RX ADMIN — ACETAMINOPHEN 1000 MG: 500 TABLET, FILM COATED ORAL at 03:40

## 2017-11-27 ASSESSMENT — ENCOUNTER SYMPTOMS
BACK PAIN: 1
WEAKNESS: 1
HEADACHES: 0

## 2017-11-27 NOTE — DISCHARGE INSTRUCTIONS
Take tylenol 500 mg oral every 6 hours or motrin 400 mg oral every 4 hours as needed for pain.  Discharge Instructions  Back Pain  You were seen today for back pain. Back pain can have many causes, but most will get better without surgery or other specific treatment. Sometimes there is a herniated ( slipped ) disc. We don t usually do MRI scans to look for these right away, since most herniated discs will get better on their own with time.  Today, we did not find any evidence that your back pain was caused by a serious condition, such as an infection, fracture, or tumor. However, sometimes symptoms develop over time and cannot be found during an emergency visit, so it is very important that you follow up with your primary doctor.  Return to the Emergency Department if:    You develop a fever with your back pain.     You have weakness or change in sensation in one or both legs.    You lose control of your bowels or bladder, or can t empty your bladder.    Your pain gets much worse.     Follow-up with your doctor:    Unless your pain has completely gone away, please make an appointment with your doctor within one week.  You may need further management of your back pain, such as more pain medication, imaging such as an X-ray or MRI, or physical therapy.    What can I do to help myself?    Remain Active -- People are often afraid that they will hurt their back further or delay recovery by remaining active, but this is one of the best things you can do for your back. In fact, prolonged bed rest is not recommended. Studies have shown that people with low back pain recover faster when they remain active. Movement helps to bring blood flow to the muscles and relieve muscle spasms as well as preventing loss of muscle strength.    Heat -- Using a heating pad can help with low back pain during the first few weeks. Do not sleep with a heating pad, as you can be burned.     Pain medications - You may take a pain medication such  as Tylenol  (acetaminophen), Advil , Nuprin  (ibuprofen) or Aleve  (naproxen).  If you have been given a narcotic such as Vicodin  (hydrocodone with acetaminophen), Percocet  (oxycodone with acetaminophen), codeine, or a muscle relaxant such as Flexeril  (cyclobenzaprine) or Soma  (carisoprodol), do not drive for four hours after you have taken it. If the narcotic contains Tylenol  (acetaminophen), do not take Tylenol  with it. All narcotics will cause constipation, so eat a high fiber diet.   If you were given a prescription for medicine here today, be sure to read all of the information (including the package insert) that comes with your prescription.  This will include important information about the medicine, its side effects, and any warnings that you need to know about.  The pharmacist who fills the prescription can provide more information and answer questions you may have about the medicine.  If you have questions or concerns that the pharmacist cannot address, please call or return to the Emergency Department.   Opioid Medication Information    Pain medications are among the most commonly prescribed medicines, so we are including this information for all our patients. If you did not receive pain medication or get a prescription for pain medicine, you can ignore it.     You may have been given a prescription for an opioid (narcotic) pain medicine and/or have received a pain medicine while here in the Emergency Department. These medicines can make you drowsy or impaired. You must not drive, operate dangerous equipment, or engage in any other dangerous activities while taking these medications. If you drive while taking these medications, you could be arrested for DUI, or driving under the influence. Do not drink any alcohol while you are taking these medications.     Opioid pain medications can cause addiction. If you have a history of chemical dependency of any type, you are at a higher risk of becoming  addicted to pain medications.  Only take these prescribed medications to treat your pain when all other options have been tried. Take it for as short a time and as few doses as possible. Store your pain pills in a secure place, as they are frequently stolen and provide a dangerous opportunity for children or visitors in your house to start abusing these powerful medications. We will not replace any lost or stolen medicine.  As soon as your pain is better, you should flush all your remaining medication.     Many prescription pain medications contain Tylenol  (acetaminophen), including Vicodin , Tylenol #3 , Norco , Lortab , and Percocet .  You should not take any extra pills of Tylenol  if you are using these prescription medications or you can get very sick.  Do not ever take more than 3000 mg of acetaminophen in any 24 hour period.    All opioids tend to cause constipation. Drink plenty of water and eat foods that have a lot of fiber, such as fruits, vegetables, prune juice, apple juice and high fiber cereal.  Take a laxative if you don t move your bowels at least every other day. Miralax , Milk of Magnesia, Colace , or Senna  can be used to keep you regular.      Remember that you can always come back to the Emergency Department if you are not able to see your regular doctor in the amount of time listed above, if you get any new symptoms, or if there is anything that worries you.

## 2017-11-27 NOTE — ED AVS SNAPSHOT
[facility] Emergency Department    [address]    [facility] [address]    Phone:  [phone]    Fax:  [phone]                                       Carlos Neri   MRN: [id number]    Department:  [facility] Emergency Department   Date of Visit:  11/27/2017           After Visit Summary Signature Page     I have received my discharge instructions, and my questions have been answered. I have discussed any challenges I see with this plan with the nurse or doctor.    ..........................................................................................................................................  Patient/Patient Representative Signature      ..........................................................................................................................................  Patient Representative Print Name and Relationship to Patient    ..................................................               ................................................  Date                                            Time    ..........................................................................................................................................  Reviewed by Signature/Title    ...................................................              ..............................................  Date                                                            Time

## 2017-11-27 NOTE — ED PROVIDER NOTES
History     Chief Complaint:  Back Pain and Fall    The history is provided by the spouse and the patient.      Carlos Neri is a 82 year old male with history of Lewy body dementia and Parkinson's who presents with back pain and fall. The patient's spouse states that the patient is experiencing more frequent falls recently. 2 days ago the patient fell in the kitchen and hit his head against a cabinet. The spouse states that the patient seemed to be staring off into the distance, but then was able to get himself up off the ground and ambulate. She also reports that he fell into his walker's seat yesterday evening at around 2130. Since then he has been complaining of lower back pain. The patient denies any headache, loss of consciousness, or other medical concerns. Of note, the spouse states the patient had similar falls and pain this past Spring and was seen here in the ED.    Allergies:  Morphine Sulfate      Medications:    Ropinirole  Donepezil  Sinemet  Requip  Klonopin  Indocin    Past Medical History:    Lewy body dementia  Parkinson disease  Renal carcinoma  Gout    Past Surgical History:    Hernia Repair  Tonsillectomy  Left kidney removal    Family History:    History reviewed. No pertinent family history.      Social History:  Presents via EMS   Tobacco use: Former smoker  Alcohol use: Yes  PCP: Danny Morales    Marital Status:       Review of Systems   Musculoskeletal: Positive for back pain.   Neurological: Positive for weakness. Negative for syncope and headaches.   All other systems reviewed and are negative.    Physical Exam     Patient Vitals for the past 24 hrs:   BP Temp Temp src Pulse Resp SpO2   11/27/17 0315 127/72 98.4  F (36.9  C) Oral 63 18 96 %      Physical Exam  Constitutional:  Oriented to person, place, and time. Elderly. Mild resting tremors noted. Dementia baseline.  HENT:   Head:    Normocephalic.   Mouth/Throat:   Oropharynx is clear and moist.   Eyes:    EOM are  normal. Pupils are equal, round, and reactive to light.   Neck:    Neck supple.   Cardiovascular:  Normal rate, regular rhythm and normal heart sounds.      Exam reveals no gallop and no friction rub.       No murmur heard.  Pulmonary/Chest:  Effort normal and breath sounds normal.      No respiratory distress. No wheezes. No rales.      No reproducible chest wall pain.  Abdominal:   Soft. No distension. No tenderness. No rebound and no guarding.   Musculoskeletal:  Normal range of motion. No midline cervical or thoracic tenderness. Mild lower lumbar sacral tenderness, no step off  Neurological:   Alert and oriented to person, place, and time. GCS 15. 5/5 strength in all 4 extremities.  Sensation intact to light touch in all 4 extremities.           Moves all 4 extremities spontaneously    Skin:    No rash noted. No pallor.      Emergency Department Course   ECG (3:27:33):  Rate 61 bpm. IA interval 144. QRS duration 88. QT/QTc 412/414. P-R-T axes 58 14 37. Normal sinus rhythm. Normal ECG. Agree with computer interpretation. Interpreted at 0333 by Behzad Quintero MD.     Imaging:  Radiographic findings were communicated with the patient and family who voiced understanding of the findings.  CT Head w/o Contrast  IMPRESSION: No evidence of acute intracranial trauma.    HERLINDA VARGAS MD    Lumbar Spine XR, 2-3 views  IMPRESSION:   1. Age-indeterminate mild compression deformity of the superior endplate of the L2 vertebral body.  2. Age-indeterminate mild compression deformity of the L3 vertebral body.  3. Normal alignment of the lumbar spine.  4. Mild to moderate degenerative changes in the lumbar spine.    HERLINDA VARGAS MD    Imaging independently reviewed and agree with radiologist interpretation.      Laboratory:  CBC: WNL (WBC 9.5, HGB 13.4, )   BMP: WNL (Creatinine 0.91)  0405: Troponin: <0.015    UA: Ketone 5 (A), Blood Small (A), RBC/HPF 6 (H), Bacteria Moderate (A), Mucous Urine Present (A), Sperm  Present (A), o/w Negative     Interventions:  0340: Tylenol 1000 mg PO  0342:  mL IV Bolus     Emergency Department Course:  Past medical records, nursing notes, and vitals reviewed.  0312: I performed an exam of the patient and obtained history, as documented above.     0520: I rechecked the patient. Findings and plan explained to the Patient and family. Patient discharged home with instructions regarding supportive care, medications, and reasons to return. The importance of close follow-up was reviewed.      I personally reviewed the laboratory results with the patient and family and answered all related questions prior to discharge.   Impression & Plan    Medical Decision Making:  Carlos Neri is a 82 year old male that came in status post fall earlier today. He has had frequent falls including hitting his head 2 days ago. He is complaining of new onset of lower back pain No weakness or numbness. No loss of control of bowel or bladder. No concerns for neurologic compromise or cauda equina. I did obtain a CT of his head as well as XR of his lumbar spine. Head CT shows no evidence of acute trauma. Lumbar XR shows possible compression fracture of L2 and L3 which I am unable to determine whether is acute or chronic. Workup is otherwise nonspecific for causes of falls which includes and is not limited to orthostatic hypotension, Parkinson's disease, anemia, hyperglycemia, arrhythmia, ACS equivalent, UTI, or other causes. I am suspicious of his Parkinson's causing his frequent falls at this time. I encouraged continued use of Tylenol and ibuprofen. He has been referred to spine surgery for reevaluation of huis compression fractures and they are to return for any worsening back pain, numbness, weakness, or any other concerning symptoms.      Diagnosis:    ICD-10-CM   1. Closed compression fracture of second lumbar vertebra, initial encounter (H) S32.020A   2. Fall, initial encounter W19.XXXA        Disposition:  Discharged to home with plan as outlined.        Neo De Luna  11/27/2017   [facility] EMERGENCY DEPARTMENT  I, Neo De Luna, am serving as a scribe at 3:12 AM on 11/27/2017 to document services personally performed by Behzad Quintero MD based on my observations and the provider's statements to me.       Behzad Quintero MD  11/27/17 0505

## 2017-11-27 NOTE — ED AVS SNAPSHOT
[facility] Emergency Department    [address] [place] [address]    Phone:  [phone]    Fax:  [phone]                                       Carlos Neri   MRN: [id number]    Department:  [facility] Emergency Department   Date of Visit:  11/27/2017           Patient Information     Date Of Birth          1935        Your diagnoses for this visit were:     Closed compression fracture of second lumbar vertebra, initial encounter (H)     Fall, initial encounter        You were seen by Behzad Quintero MD.      Follow-up Information     Follow up with Danny Moarles DO. Call in 3 days.    Specialty:  Family Practice    Contact information:    [facility]  [address]  [facility] [address] [phone]          Follow up with Parag Louis MD. Call in 3 days.    Specialty:  Neurosurgery    Contact information:    [facility]  [address] [facility] [address]  [facility] [address]  [phone]          Discharge Instructions       Take tylenol 500 mg oral every 6 hours or motrin 400 mg oral every 4 hours as needed for pain.  Discharge Instructions  Back Pain  You were seen today for back pain. Back pain can have many causes, but most will get better without surgery or other specific treatment. Sometimes there is a herniated ( slipped ) disc. We don t usually do MRI scans to look for these right away, since most herniated discs will get better on their own with time.  Today, we did not find any evidence that your back pain was caused by a serious condition, such as an infection, fracture, or tumor. However, sometimes symptoms develop over time and cannot be found during an emergency visit, so it is very important that you follow up with your primary doctor.  Return to the Emergency Department if:    You develop a fever with your back pain.     You have weakness or change in sensation in one or both legs.    You  lose control of your bowels or bladder, or can t empty your bladder.    Your pain gets much worse.     Follow-up with your doctor:    Unless your pain has completely gone away, please make an appointment with your doctor within one week.  You may need further management of your back pain, such as more pain medication, imaging such as an X-ray or MRI, or physical therapy.    What can I do to help myself?    Remain Active -- People are often afraid that they will hurt their back further or delay recovery by remaining active, but this is one of the best things you can do for your back. In fact, prolonged bed rest is not recommended. Studies have shown that people with low back pain recover faster when they remain active. Movement helps to bring blood flow to the muscles and relieve muscle spasms as well as preventing loss of muscle strength.    Heat -- Using a heating pad can help with low back pain during the first few weeks. Do not sleep with a heating pad, as you can be burned.     Pain medications - You may take a pain medication such as Tylenol  (acetaminophen), Advil , Nuprin  (ibuprofen) or Aleve  (naproxen).  If you have been given a narcotic such as Vicodin  (hydrocodone with acetaminophen), Percocet  (oxycodone with acetaminophen), codeine, or a muscle relaxant such as Flexeril  (cyclobenzaprine) or Soma  (carisoprodol), do not drive for four hours after you have taken it. If the narcotic contains Tylenol  (acetaminophen), do not take Tylenol  with it. All narcotics will cause constipation, so eat a high fiber diet.   If you were given a prescription for medicine here today, be sure to read all of the information (including the package insert) that comes with your prescription.  This will include important information about the medicine, its side effects, and any warnings that you need to know about.  The pharmacist who fills the prescription can provide more information and answer questions you may have  about the medicine.  If you have questions or concerns that the pharmacist cannot address, please call or return to the Emergency Department.   Opioid Medication Information    Pain medications are among the most commonly prescribed medicines, so we are including this information for all our patients. If you did not receive pain medication or get a prescription for pain medicine, you can ignore it.     You may have been given a prescription for an opioid (narcotic) pain medicine and/or have received a pain medicine while here in the Emergency Department. These medicines can make you drowsy or impaired. You must not drive, operate dangerous equipment, or engage in any other dangerous activities while taking these medications. If you drive while taking these medications, you could be arrested for DUI, or driving under the influence. Do not drink any alcohol while you are taking these medications.     Opioid pain medications can cause addiction. If you have a history of chemical dependency of any type, you are at a higher risk of becoming addicted to pain medications.  Only take these prescribed medications to treat your pain when all other options have been tried. Take it for as short a time and as few doses as possible. Store your pain pills in a secure place, as they are frequently stolen and provide a dangerous opportunity for children or visitors in your house to start abusing these powerful medications. We will not replace any lost or stolen medicine.  As soon as your pain is better, you should flush all your remaining medication.     Many prescription pain medications contain Tylenol  (acetaminophen), including Vicodin , Tylenol #3 , Norco , Lortab , and Percocet .  You should not take any extra pills of Tylenol  if you are using these prescription medications or you can get very sick.  Do not ever take more than 3000 mg of acetaminophen in any 24 hour period.    All opioids tend to cause constipation. Drink  plenty of water and eat foods that have a lot of fiber, such as fruits, vegetables, prune juice, apple juice and high fiber cereal.  Take a laxative if you don t move your bowels at least every other day. Miralax , Milk of Magnesia, Colace , or Senna  can be used to keep you regular.      Remember that you can always come back to the Emergency Department if you are not able to see your regular doctor in the amount of time listed above, if you get any new symptoms, or if there is anything that worries you.        Discharge References/Attachments     FRACTURE, VERTEBRAL COMPRESSION (ENGLISH)      24 Hour Appointment Hotline       To make an appointment at any [facility], call [phone]). If you don't have a family doctor or clinic, we will help you find one. [facility] are conveniently located to serve the needs of you and your family.             Review of your medicines      Our records show that you are taking the medicines listed below. If these are incorrect, please call your family doctor or clinic.        Dose / Directions Last dose taken    * carbidopa-levodopa  MG per tablet   Commonly known as:  SINEMET   Dose:  1 tablet        Take 1 tablet by mouth 5 times daily 0700, 1000, 1300, 1600, 1900   Refills:  0        * carbidopa-levodopa  MG per CR tablet   Commonly known as:  SINEMET CR   Dose:  1 tablet        Take 1 tablet by mouth At Bedtime.   Refills:  0        DONEPEZIL HCL PO   Dose:  10 mg        Take 10 mg by mouth At Bedtime   Refills:  0        indomethacin 25 MG capsule   Commonly known as:  INDOCIN   Dose:  25 mg        Take 25 mg by mouth 3 times daily as needed.   Refills:  0        klonoPIN 0.5 MG tablet   Dose:  1 mg   Generic drug:  clonazePAM        Take 1 mg by mouth At Bedtime 2 tablets = 1 mg   Refills:  0        * rOPINIRole 0.5 MG tablet   Commonly known as:  REQUIP   Dose:  0.5 mg        Take 0.5 mg by mouth 3 times daily. 0600,1400,2200    Refills:  0        * ROPINIROLE HCL PO   Dose:  0.25 mg        Take 0.25 mg by mouth 2 times daily 0700, 1600   Refills:  0        * ROPINIROLE HCL PO   Dose:  0.5 mg        Take 0.5 mg by mouth At Bedtime 2 tablets of 0.25 mg = 0.5 mg   Refills:  0        * Notice:  This list has 5 medication(s) that are the same as other medications prescribed for you. Read the directions carefully, and ask your doctor or other care provider to review them with you.            Procedures and tests performed during your visit     Basic metabolic panel    CBC with platelets differential    CT Head w/o Contrast    EKG 12-lead, tracing only    Lumbar spine XR, 2-3 views    Peripheral IV catheter    Troponin I    UA with Microscopic      Orders Needing Specimen Collection     None      Pending Results     Date and Time Order Name Status Description    11/27/2017 0322 EKG 12-lead, tracing only Preliminary             Pending Culture Results     No orders found from 11/25/2017 to 11/28/2017.            Pending Results Instructions     If you had any lab results that were not finalized at the time of your Discharge, you can call the ED Lab Result RN at [phone]. You will be contacted by this team for any positive Lab results or changes in treatment. The nurses are available 7 days a week from 10A to 6:30P.  You can leave a message 24 hours per day and they will return your call.        Test Results From Your Hospital Stay        11/27/2017  4:19 AM      Component Results     Component Value Ref Range & Units Status    WBC 9.5 4.0 - 11.0 10e9/L Final    RBC Count 4.42 4.4 - 5.9 10e12/L Final    Hemoglobin 13.4 13.3 - 17.7 g/dL Final    Hematocrit 41.4 40.0 - 53.0 % Final    MCV 94 78 - 100 fl Final    MCH 30.3 26.5 - 33.0 pg Final    MCHC 32.4 31.5 - 36.5 g/dL Final    RDW 14.1 10.0 - 15.0 % Final    Platelet Count 152 150 - 450 10e9/L Final    Diff Method Automated Method  Final    % Neutrophils 74.4 % Final    % Lymphocytes 16.5 %  Final    % Monocytes 6.4 % Final    % Eosinophils 0.8 % Final    % Basophils 0.3 % Final    % Immature Granulocytes 1.6 % Final    Nucleated RBCs 0 0 /100 Final    Absolute Neutrophil 7.1 1.6 - 8.3 10e9/L Final    Absolute Lymphocytes 1.6 0.8 - 5.3 10e9/L Final    Absolute Monocytes 0.6 0.0 - 1.3 10e9/L Final    Absolute Eosinophils 0.1 0.0 - 0.7 10e9/L Final    Absolute Basophils 0.0 0.0 - 0.2 10e9/L Final    Abs Immature Granulocytes 0.2 0 - 0.4 10e9/L Final    Absolute Nucleated RBC 0.0  Final         11/27/2017  4:05 AM      Component Results     Component Value Ref Range & Units Status    Sodium 142 133 - 144 mmol/L Final    Potassium 3.8 3.4 - 5.3 mmol/L Final    Chloride 109 94 - 109 mmol/L Final    Carbon Dioxide 27 20 - 32 mmol/L Final    Anion Gap 6 3 - 14 mmol/L Final    Glucose 91 70 - 99 mg/dL Final    Urea Nitrogen 22 7 - 30 mg/dL Final    Creatinine 0.91 0.66 - 1.25 mg/dL Final    GFR Estimate 79 >60 mL/min/1.7m2 Final    Non  GFR Calc    GFR Estimate If Black >90 >60 mL/min/1.7m2 Final    African American GFR Calc    Calcium 8.6 8.5 - 10.1 mg/dL Final         11/27/2017  4:05 AM      Component Results     Component Value Ref Range & Units Status    Troponin I ES <0.015 0.000 - 0.045 ug/L Final    The 99th percentile for upper reference range is 0.045 ug/L.  Troponin values   in the range of 0.045 - 0.120 ug/L may be associated with risks of adverse   clinical events.           11/27/2017  4:56 AM      Component Results     Component Value Ref Range & Units Status    Color Urine Bev  Final    Appearance Urine Clear  Final    Glucose Urine Negative NEG^Negative mg/dL Final    Bilirubin Urine Negative NEG^Negative Final    Ketones Urine 5 (A) NEG^Negative mg/dL Final    Specific Gravity Urine 1.019 1.003 - 1.035 Final    Blood Urine Small (A) NEG^Negative Final    pH Urine 5.0 5.0 - 7.0 pH Final    Protein Albumin Urine Negative NEG^Negative mg/dL Final    Urobilinogen mg/dL 0.0 0.0 -  2.0 mg/dL Final    Nitrite Urine Negative NEG^Negative Final    Leukocyte Esterase Urine Negative NEG^Negative Final    Source Catheterized Urine  Final    WBC Urine 2 0 - 2 /HPF Final    RBC Urine 6 (H) 0 - 2 /HPF Final    Bacteria Urine Moderate (A) NEG^Negative /HPF Final    Squamous Epithelial /HPF Urine <1 0 - 1 /HPF Final    Mucous Urine Present (A) NEG^Negative /LPF Final    sperm Present (A) NEG^Negative /HPF Final         11/27/2017  4:22 AM      Narrative     LUMBAR SPINE 3 VIEWS   11/27/2017 4:03 AM     HISTORY: Injury.    COMPARISON: None.        Impression     IMPRESSION:   1. Age-indeterminate mild compression deformity of the superior  endplate of the L2 vertebral body.  2. Age-indeterminate mild compression deformity of the L3 vertebral  body.  3. Normal alignment of the lumbar spine.  4. Mild to moderate degenerative changes in the lumbar spine.    HERLINDA VARGAS MD         11/27/2017  4:11 AM      Narrative     CT HEAD WITHOUT CONTRAST   11/27/2017 3:51 AM     HISTORY: Injury.    COMPARISON: 6/10/2013.    TECHNIQUE: Without intravenous contrast, helical sections were  acquired through the brain. Coronal reconstructions were generated.  Radiation dose for this scan was reduced using automated exposure  control, adjustment of the mA and/or kV according to the patient's  size, or iterative reconstruction technique.    FINDINGS: Mild to moderate diffuse cerebral atrophy. Mild  periventricular white matter low attenuation, likely relating to  chronic small vessel ischemic disease. No intra-axial mass, mass  effect or midline shift. Normal gray-white matter differentiation. No  visualized acute intra-axial hemorrhage. The cerebral ventricles are  normal in caliber. The basal cisterns are patent. No extra-axial fluid  collection. Mild polypoid mucosal thickening in the left maxillary  sinus.        Impression     IMPRESSION: No evidence of acute intracranial trauma.    HERLINDA VARGAS MD                 Clinical Quality Measure: Blood Pressure Screening     Your blood pressure was checked while you were in the emergency department today. The last reading we obtained was  BP: 127/72 . Please read the guidelines below about what these numbers mean and what you should do about them.  If your systolic blood pressure (the top number) is less than 120 and your diastolic blood pressure (the bottom number) is less than 80, then your blood pressure is normal. There is nothing more that you need to do about it.  If your systolic blood pressure (the top number) is 120-139 or your diastolic blood pressure (the bottom number) is 80-89, your blood pressure may be higher than it should be. You should have your blood pressure rechecked within a year by a primary care provider.  If your systolic blood pressure (the top number) is 140 or greater or your diastolic blood pressure (the bottom number) is 90 or greater, you may have high blood pressure. High blood pressure is treatable, but if left untreated over time it can put you at risk for heart attack, stroke, or kidney failure. You should have your blood pressure rechecked by a primary care provider within the next 4 weeks.  If your provider in the emergency department today gave you specific instructions to follow-up with your doctor or provider even sooner than that, you should follow that instruction and not wait for up to 4 weeks for your follow-up visit.        Thank you for choosing [facility]       Thank you for choosing [facility] for your care. Our goal is always to provide you with excellent care. Hearing back from our patients is one way we can continue to improve our services. Please take a few minutes to complete the written survey that you may receive in the mail after you visit with us. Thank you!        [facility] Information     [facility] lets you send messages to your doctor, view your test results, renew your prescriptions, schedule appointments and more. To sign up,  "go to [email or web address] . Click on \"Log in\" on the left side of the screen, which will take you to the Welcome page. Then click on \"Sign up Now\" on the right side of the page.     You will be asked to enter the access code listed below, as well as some personal information. Please follow the directions to create your username and password.     Your access code is: [email or web address]  Expires: 2018  5:13 AM     Your access code will  in 90 days. If you need help or a new code, please call your [facility] clinic or [phone].        Care EveryWhere ID     This is your Care EveryWhere ID. This could be used by other organizations to access your [facility] medical records  [id number]        Equal Access to Services     MARIA T SWANN : Victor Manuel Ortega, robert mireles, librado de la rosa, ángela ross. So [facility] [phone].    ATENCIÓN: Si habla español, tiene a arias disposición servicios gratuitos de asistencia lingüística. Homa al [phone].    We comply with applicable federal civil rights laws and [place] laws. We do not discriminate on the basis of race, color, national origin, age, disability, sex, sexual orientation, or gender identity.            After Visit Summary       This is your record. Keep this with you and show to your community pharmacist(s) and doctor(s) at your next visit.                  "

## 2017-12-06 ENCOUNTER — HOSPITAL ENCOUNTER (OUTPATIENT)
Dept: NEUROLOGY | Facility: CLINIC | Age: 82
Setting detail: THERAPIES SERIES
Discharge: STILL ADMITTED OR ADMINISTRATIVE | End: 2017-12-06
Attending: PSYCHIATRY & NEUROLOGY

## 2017-12-06 DIAGNOSIS — G47.52 REM SLEEP BEHAVIOR DISORDER: ICD-10-CM

## 2017-12-06 DIAGNOSIS — G20.A1 PARKINSON DISEASE (H): ICD-10-CM

## 2017-12-06 DIAGNOSIS — F03.90 DEMENTIA WITHOUT BEHAVIORAL DISTURBANCE (H): ICD-10-CM

## 2017-12-06 DIAGNOSIS — R55 SYNCOPE: ICD-10-CM

## 2017-12-06 DIAGNOSIS — G25.81 RLS (RESTLESS LEGS SYNDROME): ICD-10-CM

## 2017-12-13 ENCOUNTER — TRANSFERRED RECORDS (OUTPATIENT)
Dept: HEALTH INFORMATION MANAGEMENT | Facility: CLINIC | Age: 82
End: 2017-12-13

## 2018-01-10 ENCOUNTER — HOSPITAL ENCOUNTER (OUTPATIENT)
Dept: NEUROLOGY | Facility: CLINIC | Age: 83
Setting detail: THERAPIES SERIES
Discharge: STILL ADMITTED OR ADMINISTRATIVE | End: 2018-01-10
Attending: PSYCHIATRY & NEUROLOGY

## 2018-01-10 DIAGNOSIS — G47.52 REM SLEEP BEHAVIOR DISORDER: ICD-10-CM

## 2018-01-10 DIAGNOSIS — G20.A1 PARKINSON DISEASE (H): ICD-10-CM

## 2018-01-10 DIAGNOSIS — F03.90 DEMENTIA WITHOUT BEHAVIORAL DISTURBANCE (H): ICD-10-CM

## 2018-01-19 ENCOUNTER — AMBULATORY - HEALTHEAST (OUTPATIENT)
Dept: NEUROLOGY | Facility: CLINIC | Age: 83
End: 2018-01-19

## 2018-04-04 ENCOUNTER — HOSPITAL ENCOUNTER (OUTPATIENT)
Dept: NEUROLOGY | Facility: CLINIC | Age: 83
Setting detail: THERAPIES SERIES
Discharge: STILL ADMITTED OR ADMINISTRATIVE | End: 2018-04-04
Attending: PSYCHIATRY & NEUROLOGY

## 2018-04-04 DIAGNOSIS — F03.90 DEMENTIA WITHOUT BEHAVIORAL DISTURBANCE (H): ICD-10-CM

## 2018-04-04 DIAGNOSIS — G20.A1 PARKINSON DISEASE (H): ICD-10-CM

## 2018-04-04 DIAGNOSIS — G47.52 REM SLEEP BEHAVIOR DISORDER: ICD-10-CM

## 2018-04-04 DIAGNOSIS — R55 SYNCOPE: ICD-10-CM

## 2018-04-04 DIAGNOSIS — G25.81 RLS (RESTLESS LEGS SYNDROME): ICD-10-CM

## 2018-05-11 ENCOUNTER — APPOINTMENT (OUTPATIENT)
Dept: CT IMAGING | Facility: CLINIC | Age: 83
End: 2018-05-11
Attending: EMERGENCY MEDICINE
Payer: MEDICARE

## 2018-05-11 ENCOUNTER — HOSPITAL ENCOUNTER (OUTPATIENT)
Facility: CLINIC | Age: 83
Setting detail: OBSERVATION
Discharge: NURSING, REHAB OR LONG-TERM CARE | End: 2018-05-13
Attending: EMERGENCY MEDICINE | Admitting: INTERNAL MEDICINE
Payer: MEDICARE
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
## 2018-05-11 PROBLEM — R29.6 FALLS FREQUENTLY: Status: ACTIVE | Noted: 2018-05-11

## 2018-05-11 LAB
ALBUMIN UR-MCNC: NEGATIVE MG/DL
ANION GAP SERPL CALCULATED.3IONS-SCNC: 6 MMOL/L (ref 3–14)
APPEARANCE UR: CLEAR
BASOPHILS # BLD AUTO: 0 10E9/L (ref 0–0.2)
BASOPHILS NFR BLD AUTO: 0.2 %
BILIRUB UR QL STRIP: NEGATIVE
BUN SERPL-MCNC: 25 MG/DL (ref 7–30)
CALCIUM SERPL-MCNC: 8.2 MG/DL (ref 8.5–10.1)
CHLORIDE SERPL-SCNC: 108 MMOL/L (ref 94–109)
CO2 SERPL-SCNC: 29 MMOL/L (ref 20–32)
COLOR UR AUTO: ABNORMAL
CREAT SERPL-MCNC: 0.86 MG/DL (ref 0.66–1.25)
DIFFERENTIAL METHOD BLD: ABNORMAL
EOSINOPHIL # BLD AUTO: 0.1 10E9/L (ref 0–0.7)
EOSINOPHIL NFR BLD AUTO: 1.5 %
ERYTHROCYTE [DISTWIDTH] IN BLOOD BY AUTOMATED COUNT: 14 % (ref 10–15)
GFR SERPL CREATININE-BSD FRML MDRD: 85 ML/MIN/1.7M2
GLUCOSE SERPL-MCNC: 107 MG/DL (ref 70–99)
GLUCOSE UR STRIP-MCNC: NEGATIVE MG/DL
HCT VFR BLD AUTO: 42.2 % (ref 40–53)
HGB BLD-MCNC: 13.7 G/DL (ref 13.3–17.7)
HGB UR QL STRIP: NEGATIVE
IMM GRANULOCYTES # BLD: 0 10E9/L (ref 0–0.4)
IMM GRANULOCYTES NFR BLD: 0.4 %
KETONES UR STRIP-MCNC: 5 MG/DL
LEUKOCYTE ESTERASE UR QL STRIP: NEGATIVE
LYMPHOCYTES # BLD AUTO: 0.7 10E9/L (ref 0.8–5.3)
LYMPHOCYTES NFR BLD AUTO: 15.7 %
MCH RBC QN AUTO: 30.8 PG (ref 26.5–33)
MCHC RBC AUTO-ENTMCNC: 32.5 G/DL (ref 31.5–36.5)
MCV RBC AUTO: 95 FL (ref 78–100)
MONOCYTES # BLD AUTO: 0.3 10E9/L (ref 0–1.3)
MONOCYTES NFR BLD AUTO: 7.4 %
MUCOUS THREADS #/AREA URNS LPF: PRESENT /LPF
NEUTROPHILS # BLD AUTO: 3.4 10E9/L (ref 1.6–8.3)
NEUTROPHILS NFR BLD AUTO: 74.8 %
NITRATE UR QL: NEGATIVE
NRBC # BLD AUTO: 0 10*3/UL
NRBC BLD AUTO-RTO: 0 /100
PH UR STRIP: 6 PH (ref 5–7)
PLATELET # BLD AUTO: 127 10E9/L (ref 150–450)
POTASSIUM SERPL-SCNC: 3.7 MMOL/L (ref 3.4–5.3)
RBC # BLD AUTO: 4.45 10E12/L (ref 4.4–5.9)
RBC #/AREA URNS AUTO: 1 /HPF (ref 0–2)
SODIUM SERPL-SCNC: 143 MMOL/L (ref 133–144)
SOURCE: ABNORMAL
SP GR UR STRIP: 1.02 (ref 1–1.03)
UROBILINOGEN UR STRIP-MCNC: 4 MG/DL (ref 0–2)
WBC # BLD AUTO: 4.6 10E9/L (ref 4–11)
WBC #/AREA URNS AUTO: 1 /HPF (ref 0–5)

## 2018-05-11 PROCEDURE — 72125 CT NECK SPINE W/O DYE: CPT

## 2018-05-11 PROCEDURE — 81001 URINALYSIS AUTO W/SCOPE: CPT | Performed by: INTERNAL MEDICINE

## 2018-05-11 PROCEDURE — 70450 CT HEAD/BRAIN W/O DYE: CPT

## 2018-05-11 PROCEDURE — A9270 NON-COVERED ITEM OR SERVICE: HCPCS | Mod: GY | Performed by: INTERNAL MEDICINE

## 2018-05-11 PROCEDURE — 80048 BASIC METABOLIC PNL TOTAL CA: CPT | Performed by: EMERGENCY MEDICINE

## 2018-05-11 PROCEDURE — G0378 HOSPITAL OBSERVATION PER HR: HCPCS

## 2018-05-11 PROCEDURE — 99220 ZZC INITIAL OBSERVATION CARE,LEVL III: CPT | Performed by: INTERNAL MEDICINE

## 2018-05-11 PROCEDURE — 85025 COMPLETE CBC W/AUTO DIFF WBC: CPT | Performed by: EMERGENCY MEDICINE

## 2018-05-11 PROCEDURE — 93005 ELECTROCARDIOGRAM TRACING: CPT

## 2018-05-11 PROCEDURE — 99285 EMERGENCY DEPT VISIT HI MDM: CPT | Mod: 25

## 2018-05-11 PROCEDURE — 25000132 ZZH RX MED GY IP 250 OP 250 PS 637: Mod: GY | Performed by: INTERNAL MEDICINE

## 2018-05-11 RX ORDER — ROPINIROLE 0.25 MG/1
0.25 TABLET, FILM COATED ORAL 3 TIMES DAILY
Status: DISCONTINUED | OUTPATIENT
Start: 2018-05-12 | End: 2018-05-13 | Stop reason: HOSPADM

## 2018-05-11 RX ORDER — ACETAMINOPHEN 325 MG/1
650 TABLET ORAL EVERY 4 HOURS PRN
Status: DISCONTINUED | OUTPATIENT
Start: 2018-05-11 | End: 2018-05-13 | Stop reason: HOSPADM

## 2018-05-11 RX ORDER — GABAPENTIN 300 MG/1
300 CAPSULE ORAL AT BEDTIME
COMMUNITY
End: 2019-04-18 | Stop reason: DRUGHIGH

## 2018-05-11 RX ORDER — ROPINIROLE 0.25 MG/1
0.25 TABLET, FILM COATED ORAL AT BEDTIME
COMMUNITY

## 2018-05-11 RX ORDER — CARBIDOPA AND LEVODOPA 50; 200 MG/1; MG/1
1 TABLET, EXTENDED RELEASE ORAL AT BEDTIME
Status: DISCONTINUED | OUTPATIENT
Start: 2018-05-11 | End: 2018-05-13 | Stop reason: HOSPADM

## 2018-05-11 RX ORDER — ONDANSETRON 2 MG/ML
4 INJECTION INTRAMUSCULAR; INTRAVENOUS ONCE
Status: DISCONTINUED | OUTPATIENT
Start: 2018-05-11 | End: 2018-05-11

## 2018-05-11 RX ORDER — ROPINIROLE 0.25 MG/1
0.25 TABLET, FILM COATED ORAL AT BEDTIME
Status: DISCONTINUED | OUTPATIENT
Start: 2018-05-11 | End: 2018-05-13 | Stop reason: HOSPADM

## 2018-05-11 RX ORDER — NALOXONE HYDROCHLORIDE 0.4 MG/ML
.1-.4 INJECTION, SOLUTION INTRAMUSCULAR; INTRAVENOUS; SUBCUTANEOUS
Status: DISCONTINUED | OUTPATIENT
Start: 2018-05-11 | End: 2018-05-13 | Stop reason: HOSPADM

## 2018-05-11 RX ORDER — ONDANSETRON 4 MG/1
4 TABLET, ORALLY DISINTEGRATING ORAL EVERY 6 HOURS PRN
Status: DISCONTINUED | OUTPATIENT
Start: 2018-05-11 | End: 2018-05-13 | Stop reason: HOSPADM

## 2018-05-11 RX ORDER — CARBIDOPA AND LEVODOPA 25; 100 MG/1; MG/1
1 TABLET ORAL
Status: DISCONTINUED | OUTPATIENT
Start: 2018-05-11 | End: 2018-05-13 | Stop reason: HOSPADM

## 2018-05-11 RX ORDER — GABAPENTIN 300 MG/1
300 CAPSULE ORAL AT BEDTIME
Status: DISCONTINUED | OUTPATIENT
Start: 2018-05-11 | End: 2018-05-13 | Stop reason: HOSPADM

## 2018-05-11 RX ORDER — ONDANSETRON 2 MG/ML
4 INJECTION INTRAMUSCULAR; INTRAVENOUS EVERY 6 HOURS PRN
Status: DISCONTINUED | OUTPATIENT
Start: 2018-05-11 | End: 2018-05-13 | Stop reason: HOSPADM

## 2018-05-11 RX ORDER — ACETAMINOPHEN 650 MG/1
650 SUPPOSITORY RECTAL EVERY 4 HOURS PRN
Status: DISCONTINUED | OUTPATIENT
Start: 2018-05-11 | End: 2018-05-13 | Stop reason: HOSPADM

## 2018-05-11 RX ORDER — LANOLIN ALCOHOL/MO/W.PET/CERES
3 CREAM (GRAM) TOPICAL AT BEDTIME
COMMUNITY
End: 2018-08-28

## 2018-05-11 RX ORDER — CLONAZEPAM 1 MG/1
1 TABLET ORAL AT BEDTIME
Status: DISCONTINUED | OUTPATIENT
Start: 2018-05-11 | End: 2018-05-13 | Stop reason: HOSPADM

## 2018-05-11 RX ORDER — ENTACAPONE 200 MG/1
100 TABLET ORAL 4 TIMES DAILY
COMMUNITY

## 2018-05-11 RX ORDER — DONEPEZIL HYDROCHLORIDE 10 MG/1
10 TABLET, FILM COATED ORAL AT BEDTIME
Status: DISCONTINUED | OUTPATIENT
Start: 2018-05-11 | End: 2018-05-13 | Stop reason: HOSPADM

## 2018-05-11 RX ORDER — LANOLIN ALCOHOL/MO/W.PET/CERES
3 CREAM (GRAM) TOPICAL AT BEDTIME
Status: DISCONTINUED | OUTPATIENT
Start: 2018-05-11 | End: 2018-05-13 | Stop reason: HOSPADM

## 2018-05-11 RX ORDER — ROPINIROLE 0.25 MG/1
0.75 TABLET, FILM COATED ORAL 3 TIMES DAILY
COMMUNITY

## 2018-05-11 RX ADMIN — LINEZOLID 100 MG: 600 TABLET, FILM COATED ORAL at 22:25

## 2018-05-11 RX ADMIN — ROPINIROLE HYDROCHLORIDE 0.25 MG: 0.25 TABLET, FILM COATED ORAL at 22:26

## 2018-05-11 RX ADMIN — CARBIDOPA AND LEVODOPA 1 TABLET: 25; 100 TABLET ORAL at 22:26

## 2018-05-11 RX ADMIN — MELATONIN TAB 3 MG 3 MG: 3 TAB at 22:26

## 2018-05-11 RX ADMIN — DONEPEZIL HYDROCHLORIDE 10 MG: 10 TABLET, FILM COATED ORAL at 22:26

## 2018-05-11 RX ADMIN — CARBIDOPA AND LEVODOPA 1 TABLET: 50; 200 TABLET, EXTENDED RELEASE ORAL at 22:26

## 2018-05-11 RX ADMIN — CLONAZEPAM 1 MG: 1 TABLET ORAL at 22:26

## 2018-05-11 RX ADMIN — GABAPENTIN 300 MG: 300 CAPSULE ORAL at 22:26

## 2018-05-11 ASSESSMENT — ENCOUNTER SYMPTOMS: NECK PAIN: 0

## 2018-05-11 NOTE — IP AVS SNAPSHOT
[facility] Observation Department    [address]    [facility] [address]    Phone:  [phone]                                       After Visit Summary   5/11/2018    Carlos Neri    MRN: [id number]           After Visit Summary Signature Page     I have received my discharge instructions, and my questions have been answered. I have discussed any challenges I see with this plan with the nurse or doctor.    ..........................................................................................................................................  Patient/Patient Representative Signature      ..........................................................................................................................................  Patient Representative Print Name and Relationship to Patient    ..................................................               ................................................  Date                                            Time    ..........................................................................................................................................  Reviewed by Signature/Title    ...................................................              ..............................................  Date                                                            Time

## 2018-05-11 NOTE — IP AVS SNAPSHOT
MRN:[id number]                      After Visit Summary   5/11/2018    Carlos Neri    MRN: [id number]           Thank you!     Thank you for choosing [facility] for your care. Our goal is always to provide you with excellent care. Hearing back from our patients is one way we can continue to improve our services. Please take a few minutes to complete the written survey that you may receive in the mail after you visit. If you would like to speak to someone directly about your visit please contact Patient Relations at [phone]. Thank you!          Patient Information     Date Of Birth          1935        About your hospital stay     You were admitted on:  May 11, 2018 You last received care in the:  [facility] Observation Department    You were discharged on:  May 13, 2018        Reason for your hospital stay       Frequent falls. Likely related to Parkinson's                  Who to Call     For medical emergencies, please call 911.  For non-urgent questions about your medical care, please call your primary care provider or clinic, [phone]          Attending Provider     Provider Specialty    Mel Pradhan MD Emergency Medicine    Andrew Law MD Internal Medicine       Primary Care Provider Office Phone # Fax #    Danny Morales [phone]      After Care Instructions     Activity - Up with nursing assistance           Advance Diet as Tolerated       Follow this diet upon discharge: Regular            Fall precautions           General info for SNF       Length of Stay Estimate: Short Term Care: Estimated # of Days 31-90  Condition at Discharge: Stable  Level of care:skilled   Rehabilitation Potential: Fair  Admission H&P remains valid and up-to-date: Yes  Recent Chemotherapy: N/A  Use Nursing Home Standing Orders: Yes            Mantoux instructions       Give two-step Mantoux (PPD) Per Facility Policy Yes                 "  Additional Services     Occupational Therapy Adult Consult       Evaluate and treat as clinically indicated.    Reason:   Frequent falls/Parkinsons            Physical Therapy Adult Consult       Evaluate and treat as clinically indicated.    Reason:  Frequent falls/Parkinsons                             Future tests that were ordered for you     Walker adult                 Pending Results     No orders found from 2018 to 2018.            Statement of Approval     Ordered          18 0948  I have reviewed and agree with all the recommendations and orders detailed in this document.  EFFECTIVE NOW     Approved and electronically signed by:  Eleuterio Moe MD             Admission Information     Date & Time Provider Department Dept. Phone    2018 Andrew Law MD [facility] Observation Department [phone]      Your Vitals Were     Blood Pressure Pulse Temperature Respirations Height Weight    131/73 (BP Location: Left arm) 62 96.8  F (36  C) (Oral) 20 1.829 m (6') 75.3 kg (166 lb)    Pulse Oximetry BMI (Body Mass Index)                93% 22.51 kg/m2          [employer] Information     [employer] lets you send messages to your doctor, view your test results, renew your prescriptions, schedule appointments and more. To sign up, go to [email or web address] . Click on \"Log in\" on the left side of the screen, which will take you to the Welcome page. Then click on \"Sign up Now\" on the right side of the page.     You will be asked to enter the access code listed below, as well as some personal information. Please follow the directions to create your username and password.     Your access code is: [email or web address]  Expires: 2018 10:14 AM     Your access code will  in 90 days. If you need help or a new code, please call your [place] clinic or [phone].        Care EveryWhere ID     This is your Care EveryWhere ID. This could be used by other organizations to access your " [place] medical records  [id number]        Equal Access to Services     MARIA T SWANN : Hadii aad ku hadserjioronda Soofelia, waaxda luqadaha, qaybta kaarceliaguera de la rosa, ángela carrerafabianapramod ross. So [facility] [phone].    ATENCIÓN: Si habla español, tiene a arias disposición servicios gratuitos de asistencia lingüística. [facility] [phone].    We comply with applicable federal civil rights laws and [place] laws. We do not discriminate on the basis of race, color, national origin, age, disability, sex, sexual orientation, or gender identity.               Review of your medicines      CONTINUE these medicines which have NOT CHANGED        Dose / Directions    * carbidopa-levodopa  MG per tablet   Commonly known as:  SINEMET        Dose:  1 tablet   Take 1 tablet by mouth 5 times daily 0700, 1000, 1300, 1600, 1900   Refills:  0       * carbidopa-levodopa  MG per CR tablet   Commonly known as:  SINEMET CR        Dose:  1 tablet   Take 1 tablet by mouth At Bedtime.   Refills:  0       DONEPEZIL HCL PO        Dose:  10 mg   Take 10 mg by mouth At Bedtime   Refills:  0       entacapone 200 MG tablet   Commonly known as:  COMTAN        Dose:  100 mg   Take 100 mg by mouth 5 times daily At 0700,1000, 1300, 1600, 1900   Refills:  0       gabapentin 300 MG capsule   Commonly known as:  NEURONTIN        Dose:  300 mg   Take 300 mg by mouth At Bedtime   Refills:  0       indomethacin 25 MG capsule   Commonly known as:  INDOCIN        Dose:  25 mg   Take 25 mg by mouth 3 times daily as needed.   Refills:  0       klonoPIN 0.5 MG tablet   Generic drug:  clonazePAM        Dose:  1 mg   Take 1 mg by mouth At Bedtime 2 tablets = 1 mg   Refills:  0       melatonin 3 MG tablet        Dose:  3 mg   Take 3 mg by mouth At Bedtime   Refills:  0       * rOPINIRole 0.25 MG tablet   Commonly known as:  REQUIP        Dose:  0.25 mg   Take 0.25 mg by mouth 3 times daily At 0700, 1100, 1600   Refills:  0       *  rOPINIRole 0.25 MG tablet   Commonly known as:  REQUIP        Dose:  0.25 mg   Take 0.25 mg by mouth At Bedtime   Refills:  0       * Notice:  This list has 4 medication(s) that are the same as other medications prescribed for you. Read the directions carefully, and ask your doctor or other care provider to review them with you.             Protect others around you: Learn how to safely use, store and throw away your medicines at www.disposemymeds.org.             Medication List: This is a list of all your medications and when to take them. Check marks below indicate your daily home schedule. Keep this list as a reference.      Medications           Morning Afternoon Evening Bedtime As Needed    * carbidopa-levodopa  MG per tablet   Commonly known as:  SINEMET   Take 1 tablet by mouth 5 times daily 0700, 1000, 1300, 1600, 1900   Last time this was given:  1 tablet on 5/13/2018 10:45 AM                                * carbidopa-levodopa  MG per CR tablet   Commonly known as:  SINEMET CR   Take 1 tablet by mouth At Bedtime.   Last time this was given:  1 tablet on 5/12/2018 10:05 PM                                DONEPEZIL HCL PO   Take 10 mg by mouth At Bedtime   Last time this was given:  10 mg on 5/12/2018 10:09 PM                                entacapone 200 MG tablet   Commonly known as:  COMTAN   Take 100 mg by mouth 5 times daily At 0700,1000, 1300, 1600, 1900   Last time this was given:  100 mg on 5/13/2018 10:45 AM                                gabapentin 300 MG capsule   Commonly known as:  NEURONTIN   Take 300 mg by mouth At Bedtime   Last time this was given:  300 mg on 5/12/2018 10:09 PM                                indomethacin 25 MG capsule   Commonly known as:  INDOCIN   Take 25 mg by mouth 3 times daily as needed.                                klonoPIN 0.5 MG tablet   Take 1 mg by mouth At Bedtime 2 tablets = 1 mg   Last time this was given:  1 mg on 5/12/2018 10:09 PM   Generic  drug:  clonazePAM                                melatonin 3 MG tablet   Take 3 mg by mouth At Bedtime   Last time this was given:  3 mg on 5/12/2018 10:10 PM                                * rOPINIRole 0.25 MG tablet   Commonly known as:  REQUIP   Take 0.25 mg by mouth 3 times daily At 0700, 1100, 1600   Last time this was given:  0.25 mg on 5/13/2018 10:45 AM                                * rOPINIRole 0.25 MG tablet   Commonly known as:  REQUIP   Take 0.25 mg by mouth At Bedtime   Last time this was given:  0.25 mg on 5/13/2018 10:45 AM                                * Notice:  This list has 4 medication(s) that are the same as other medications prescribed for you. Read the directions carefully, and ask your doctor or other care provider to review them with you.

## 2018-05-11 NOTE — ED TRIAGE NOTES
Patient presents with complaints of multiple falls the past month, Today patient had two falls and hit his head. Denies LOC. Last fall was at 1400. Patient  complains of left side pain from fall.  Family states that patient falls 4-5 times a week.  ABC intact without need for intervention at this time.

## 2018-05-11 NOTE — IP AVS SNAPSHOT
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 underlying dementia.  His falls occur after he stumbles.  There is no loss of consciousness.  Today, he was walking when he stumbled and subsequently hit his head on the cabinet.  There was no loss of consciousness.  He uses a walker at home to ambulate at home.  In the ER over here, he was seen by Dr. Leal.  I discussed care with her.  He denies any chest pain or shortness of breath.  He denies any fevers or chills.  He denies any new medications.  He denies any lightheadedness or dizziness.  He [facility] at [facility].         Significant Physical Exam Findings:  Patient today is doing well. States earlier this am he had some blurry vision in his left eye. He states this has been happening for about 6 months. He has had maybe about 10 episodes of blurry vision that resolves on its own. He had some sort of  laser treatment to his eyes and sees an ophthalmologist. Vision is now normal. No chest pain, sob, abdo pain, n/v/d  s1s2 rrr, lungs clear, pupils equal and reactive. EOM normal      Procedures:  none     Imaging:[JY1.1]  Results for orders placed or performed during the hospital encounter of 05/11/18   CT Head w/o Contrast    Narrative    CT SCAN OF THE HEAD WITHOUT CONTRAST  5/11/2018  5:01 PM     HISTORY: Multiple falls in the past month with 2 falls today and head  trauma. Left-sided pain.    TECHNIQUE: Axial images of the head and coronal reformations without  IV contrast material. Radiation dose for this scan was reduced using  automated exposure control, adjustment of the mA and/or kV according  to patient size, or iterative reconstruction technique.    COMPARISON: 11/27/2017    FINDINGS: There is generalized atrophy of the brain. There is low  attenuation in the white matter of the cerebral hemispheres consistent  with sequelae of small vessel ischemic disease. There is no evidence  of intracranial hemorrhage, mass, acute infarct or anomaly.     The visualized portions of the sinuses and mastoids appear normal.  There is no evidence of trauma.       Impression    IMPRESSION:   1. No acute abnormality.  2. Atrophy of the brain. White matter changes consistent with sequelae  of small vessel ischemic disease.  3. No change.    JULIÁN LINCOLN MD   CT Cervical Spine w/o Contrast    Narrative    CT CERVICAL SPINE WITHOUT CONTRAST   5/11/2018 5:04 PM     HISTORY: Trauma, multiple falls with two falls today. Head trauma and  left-sided pain.     TECHNIQUE: Axial images of the cervical spine were obtained without  intravenous contrast. Multiplanar reformations were performed.   Radiation dose for this scan was reduced using automated exposure  control, adjustment of the mA and/or kV according to patient size, or  iterative reconstruction technique.    COMPARISON: 6/10/2013    FINDINGS: There is no evidence of  fracture. There is multilevel  degenerative disc disease and degenerative facet arthropathy. There is  no soft tissue swelling. There is no significant spinal canal  stenosis. There is acquired fusion of C6-C7 anteriorly.    Radiolucency in the spinous process of T1 noted previously is  unchanged. Tiny right apical pulmonary nodules unchanged.      Impression    IMPRESSION:  1. No acute abnormality.  2. Degenerative changes and acquired fusion at C6-C7. Since previous  exam the degenerative changes have advanced at C2-C3 and C3-C4.  3. No change in right apical 5 mm pulmonary nodule or in the  radiolucency in the T1 spinous process, indicating that these are  benign.    JULIÁN LINCOLN MD[JY1.3]        Consultations:  No consultations were requested during this admission.     Significant Lab Results:[JY1.1]    Recent Labs  Lab 05/11/18  1549   WBC 4.6   HGB 13.7   HCT 42.2   MCV 95   *[JY1.3]          Lab Results   Component Value Date     05/11/2018     11/27/2017     05/27/2017    Lab Results   Component Value Date    CHLORIDE 108 05/11/2018    CHLORIDE 109 11/27/2017    CHLORIDE 110 05/27/2017    Lab Results   Component Value Date    BUN 25 05/11/2018    BUN 22 11/27/2017    BUN 22 05/27/2017      Lab Results   Component Value Date    POTASSIUM 3.7 05/11/2018    POTASSIUM 3.8 11/27/2017    POTASSIUM 4.1 05/27/2017    Lab Results   Component Value Date    CO2 29 05/11/2018    CO2 27 11/27/2017    CO2 28 05/27/2017    Lab Results   Component Value Date    CR 0.86 05/11/2018    CR 0.91 11/27/2017    CR 0.87 05/27/2017           Pending Results:    Unresulted Labs Ordered in the Past 30 Days of this Admission     No orders found from 3/12/2018 to 5/12/2018.           Discharge Instructions and Follow-Up:   Discharge diet:   Active Diet Order      Regular Diet Adult      Advance Diet as Tolerated   Discharge activity: Activity as tolerated   Discharge follow-up: Follow up with primary care  provider in 7 days   Outpatient therapy: None    Home Care agency: None    Other instructions: None      Hospital Course:  Patient was admitted to the Obs Unit. He has a history of frequent falls. He was recommended to go to TCU. He has a bed for today. He has been stable while here. As above, he had some blurry vision this am. This appears chronic. I did speak to his wife. She states he has been seeing an ophthalmologist. He has had some sort of laser treatment. He should follow-up as an outpt.     Total time spent in face to face contact with the patient and coordinating discharge was:  40 Minutes.    Eleuterio Moe MD  Pager: [phone][JY1.1]     Revision History        User Key Date/Time User Provider Type Action    > JY1.3 5/13/2018 10:04 AM Eleuterio Moe MD Physician Sign     JY1.2 5/13/2018  9:53 AM Eleutreio Moe MD Physician      JY1.1 5/13/2018  9:52 AM Eleuterio Moe MD Physician                   Consult Notes     No notes of this type exist for this encounter.         Progress Notes - Physician (Notes for yesterday and today)      Progress Notes by Ivette Aparicio MD at 5/12/2018  3:09 PM     Author:  Ivette Aparicio MD Service:  Internal Medicine Author Type:  Physician    Filed:  5/12/2018  3:27 PM Date of Service:  5/12/2018  3:09 PM Creation Time:  5/12/2018  3:09 PM    Status:  Signed :  Ivette Aparicio MD (Physician)         [facility]  Hospitalist Progress Note( observation unit)[NS1.1]        Ivette Aparicio MD  05/12/2018[NS1.2]        Interval History:[NS1.1]      Patient was evaluated by physical therapy  It was recommended to go to TCU  Initially patient and family was reluctant as it is going to be out of pocket cost.  But then they agreed later in the day  Patient has history of recurrent falls  His blood pressure was lower but that was because when they were checking his blood pressure he had so much tremors that accurate reading could not be  obtained  He continued to have tremors due to his Parkinson's disease  He walks with the help of walker  Denied any nausea vomiting diarrhea or fever or any lightheadedness or dizziness[NS1.3]         Assessment and Plan:[NS1.1]      82-year-old male patient with past medical history significant for Parkinson's disease and dementia who presented to the emergency room on May 10 due to recurrent falls    Recurrent fall;  -Related to her Parkinson's disease  -He uses a walker but has slowness of movement and incoordination.  -He was seen by physical therapy and they recommended that he should go to TCU for rehab  -Initially family was reluctant as it would be out-of-pocket cost but then they agreed  -He lives with his wife and son  - is making arrangement for him to go to the transitional care unit  -Patient had episodes of low blood pressure which could be due to the fact that they were checking it with automatic cuff and was moving so much due to tremors  When checked it carefully his blood pressure was fine and he is entirely asymptomatic for low BP   We will continue to monitor his blood pressure    Parkinson's disease and restless leg:  Patient also has essential tremors  -He is followed by neurologist Tracy Chapin as out patient   -Continue his PTA medications which include Sinemet, Requip and Comtane    Dementia;  He is on Aricept    Pain is under control and is not complaining of any pain    Disposition:  -Will be discharged to TCU tomorrow  - is making arrangements    Discussed care with patient and his RN as well as with  for discharge planning  Also discussed with his wife and son               This note was done by using voice recognition software.    Time spent >35 minutes[NS1.3]       Physical Exam:[NS1.1]      Heart Rate: 83[NS1.2],[NS1.1] Blood pressure 112/61, pulse 72, temperature 97.4  F (36.3  C), temperature source Oral, resp. rate 16, height 1.829 m (6'), weight  75.3 kg (166 lb), SpO2 96 %.  Vitals:    05/11/18 2036   Weight: 75.3 kg (166 lb)[NS1.2]     Vital Signs with Ranges[NS1.1]  Temp:  [97  F (36.1  C)-98  F (36.7  C)] 97.4  F (36.3  C)  Pulse:  [72-98] 72  Heart Rate:  [57-98] 83  Resp:  [16-18] 16  BP: ()/() 112/61  SpO2:  [94 %-100 %] 96 %[NS1.2]  I/O's Last 24 hours[NS1.1]  I/O last 3 completed shifts:  In: -   Out: 675 [Urine:675][NS1.2]    Constitutional:[NS1.1]  He is alert and awake but has generalized tremors and shakes when he walks[NS1.3]   Lungs:[NS1.1] Good air entry on both sides of lungs[NS1.3]     Cardiovascular:[NS1.1] S1 and S2 no S3[NS1.3]      Abdomen:    Skin:    Other:           Medications:[NS1.1]          carbidopa-levodopa  1 tablet Oral At Bedtime     carbidopa-levodopa  1 tablet Oral 5x Daily     clonazePAM  1 mg Oral At Bedtime     donepezil (ARICEPT) tablet 10 mg  10 mg Oral At Bedtime     entacapone  100 mg Oral 5x Daily     gabapentin  300 mg Oral At Bedtime     melatonin  3 mg Oral At Bedtime     rOPINIRole  0.25 mg Oral TID     rOPINIRole  0.25 mg Oral At Bedtime[NS1.2]     PRN Meds:[NS1.1] acetaminophen, acetaminophen, naloxone, ondansetron **OR** ondansetron[NS1.2]         Data:      All new lab and imaging data was reviewed.[NS1.1]   Recent Labs   Lab Test  05/11/18   1549  11/27/17   0330  05/27/17   0535   WBC  4.6  9.5  5.8   HGB  13.7  13.4  14.0   MCV  95  94  94   PLT  127*  152  115*      Recent Labs   Lab Test  05/11/18   1549  11/27/17   0330  05/27/17   0535   NA  143  142  142   POTASSIUM  3.7  3.8  4.1   CHLORIDE  108  109  110*   CO2  29  27  28   BUN  25  22  22   CR  0.86  0.91  0.87   ANIONGAP  6  6  4   SADIA  8.2*  8.6  8.3*   GLC  107*  91  95     Recent Labs   Lab Test  11/27/17   0330  05/26/17   1513  06/10/13   0955   TROPI  <0.015  <0.015  The 99th percentile for upper reference range is 0.045 ug/L.  Troponin values in   the range of 0.045 - 0.120 ug/L may be associated with risks of adverse    clinical events.    <0.012[NS1.2]             Revision History        User Key Date/Time User Provider Type Action    > NS1.3 5/12/2018  3:27 PM Ivette Aparicio MD Physician Sign     NS1.2 5/12/2018  3:10 PM Ivette Aparicio MD Physician      NS1.1 5/12/2018  3:09 PM Ivette Aparicio MD Physician                   Procedure Notes     No notes of this type exist for this encounter.         Progress Notes - Therapies (Notes from 05/10/18 through 05/13/18)      Progress Notes by Shilpa Velazquez PT at 5/12/2018  6:15 PM     Author:  Shilpa Velazquez PT Service:  (none) Author Type:  Physical Therapist    Filed:  5/12/2018  6:15 PM Date of Service:  5/12/2018  6:15 PM Creation Time:  5/12/2018  6:15 PM    Status:  Signed :  Shilpa Velazquez PT (Physical Therapist)          05/12/18 1100   Quick Adds   Type of Visit Initial PT Evaluation   Living Environment   Lives With spouse;child(lacey), adult   Living Arrangements house   Home Accessibility stairs to enter home   Number of Stairs to Enter Home 2   Number of Stairs Within Home 0   Stair Railings at Home outside, present at both sides   Transportation Available car;family or friend will provide   Living Environment Comment Pt lives in a one level home, has grab bar for the two steps to enter the home, pt's son is currently living with his parents   Self-Care   Usual Activity Tolerance good   Current Activity Tolerance moderate   Regular Exercise no   Equipment Currently Used at Home shower chair  (4WW)   Activity/Exercise/Self-Care Comment Pt's wife states that pt dresses himself, ambulates in the house mod I with his 4WW, he cooks his own breakfast.    Functional Level Prior   Ambulation 1-->assistive equipment   Transferring 1-->assistive equipment   Toileting 0-->independent   Bathing 1-->assistive equipment   Dressing 0-->independent   Eating 0-->independent   Communication 0-->understands/communicates without difficulty   Swallowing 0-->swallows foods/liquids  without difficulty   Cognition 1 - attention or memory deficits   Fall history within last six months yes   Number of times patient has fallen within last six months 12   Which of the above functional risks had a recent onset or change? ambulation;transferring;toileting;bathing;dressing;fall history   Prior Functional Level Comment Pt's wife states that in the last week that pt has fallen more frequently, having more difficulty with ambulating with 4WW where he is pushing it too far ahead and only using one hand on the 4WW   General Information   Onset of Illness/Injury or Date of Surgery - Date 05/11/18   Referring Physician Andrew Law MD   Patient/Family Goals Statement Pt's wife and son would like pt to have skilled PT at TCU   Pertinent History of Current Problem (include personal factors and/or comorbidities that impact the POC) Pt is a 82 year old male with hx of Parkinson's, lewy body dementia, frequent falls, renal cell carcinoma. Pt has a hx of frequent falls however pt's wife stated over the last week they have increased and pt had 2 falls yesterday.    Precautions/Limitations fall precautions   General Observations Pt is resting in bed, wife and son and the bedside. Pt's supine BP 90/58.   Cognitive Status Examination   Orientation person   Level of Consciousness alert   Follows Commands and Answers Questions 75% of the time   Personal Safety and Judgment impaired   Memory impaired   Pain Assessment   Patient Currently in Pain No   Posture    Posture Forward head position;Protracted shoulders   Range of Motion (ROM)   ROM Comment Pt's B UE and B LE ROM WFL.    Strength   Strength Comments PT's B UE and B LE grossly 4+/5 in all major muscle groups, appears to be generalized weakness    Bed Mobility   Bed Mobility Comments Pt transferred supine to sit with SBA, increased time and effort.    Transfer Skills   Transfer Comments Pt tranfserred sit to and from stand with CGA, moderate cues for safety and  "hand placment.    Gait   Gait Comments Pt ambulated 100 feet with FWW and min A x 1 due decreased balance, unsteady gait, difficutly with turns and keeping his body inside of the FWW.    Balance   Balance Comments Pt has fair static standing balance and poor dynamic standing balance, requires assist x 1 and FWW for safety .   Sensory Examination   Sensory Perception Comments NT   Coordination   Coordination Comments not formally tested however pt exhibits decreased B UE and B LE coordination as seen through functional movement   General Therapy Interventions   Planned Therapy Interventions gait training;balance training;strengthening;transfer training;home program guidelines;progressive activity/exercise   Clinical Impression   Criteria for Skilled Therapeutic Intervention yes, treatment indicated   PT Diagnosis difficulty with gait   Influenced by the following impairments decreased balance, decreased cognition, decreased strength   Functional limitations due to impairments need for assist with all functional mobility, gait for safety   Clinical Presentation Evolving/Changing   Clinical Presentation Rationale Pt is a 82 year old male with Parkinson's and lewy body dementia, hx of  frequent falls, unable to demonstrate safe use of FWW with gait and would benefit from skilled PT to work on improving his safety with transfers, gait and ADL's with use of FWW.    Clinical Decision Making (Complexity) Moderate complexity   Therapy Frequency` daily   Predicted Duration of Therapy Intervention (days/wks) 5 days   Anticipated Equipment Needs at Discharge front wheeled walker   Anticipated Discharge Disposition Transitional Care Facility   Risk & Benefits of therapy have been explained Yes   Patient, Family & other staff in agreement with plan of care Yes   [facility] AM-PAC TM \"6 Clicks\"   2016, Trustees of [facility], under license to [employer].  All rights reserved.   6 Clicks Short Forms Basic Mobility " "Inpatient Short Form   [facility] AM-PAC  \"6 Clicks\" V.2 Basic Mobility Inpatient Short Form   1. Turning from your back to your side while in a flat bed without using bedrails? 4 - None   2. Moving from lying on your back to sitting on the side of a flat bed without using bedrails? 4 - None   3. Moving to and from a bed to a chair (including a wheelchair)? 3 - A Little   4. Standing up from a chair using your arms (e.g., wheelchair, or bedside chair)? 3 - A Little   5. To walk in hospital room? 3 - A Little   6. Climbing 3-5 steps with a railing? 2 - A Lot   Basic Mobility Raw Score (Score out of 24.Lower scores equate to lower levels of function) 19   Total Evaluation Time   Total Evaluation Time (Minutes) 15[KM1.1]        Revision History        User Key Date/Time User Provider Type Action    > KM1.1 5/12/2018  6:15 PM Shilpa Velazquez, PT Physical Therapist Sign                                                      INTERAGENCY TRANSFER FORM - LAB / IMAGING / EKG / EMG RESULTS   5/11/2018                       [facility] OBSERVATION DEPARTMENT: [phone]            Unresulted Labs     None         Lab Results - 3 Days      UA with Microscopic reflex to Culture [[id number]] (Abnormal)  Resulted: 05/11/18 2219, Result status: Final result    Ordering provider: Andrew Law MD  05/11/18 2027 Resulting lab: [facility]    Specimen Information    Type Source Collected On   Unspecified Urine  05/11/18 2120          Components       Value Reference Range Flag Lab   Color Urine Bev   FrRdHs   Appearance Urine Clear   FrRdHs   Glucose Urine Negative NEG^Negative mg/dL  FrRdHs   Bilirubin Urine Negative NEG^Negative  FrRdHs   Ketones Urine 5 NEG^Negative mg/dL A FrRdHs   Specific Gravity Urine 1.018 1.003 - 1.035  FrRdHs   Blood Urine Negative NEG^Negative  FrRdHs   pH Urine 6.0 5.0 - 7.0 pH  FrRdHs   Protein Albumin Urine Negative NEG^Negative mg/dL  FrRdHs   Urobilinogen mg/dL 4.0 " 0.0 - 2.0 mg/dL H FrRdHs   Nitrite Urine Negative NEG^Negative  FrRdHs   Leukocyte Esterase Urine Negative NEG^Negative  FrRdHs   Source Unspecified Urine   FrRdHs   WBC Urine 1 0 - 5 /HPF  FrRdHs   RBC Urine 1 0 - 2 /HPF  FrRdHs   Mucous Urine Present NEG^Negative /LPF A RdHs            Basic metabolic panel [[id number]] (Abnormal)  Resulted: 05/11/18 1641, Result status: Final result    Ordering provider: Mel Pradhan MD  05/11/18 1605 Resulting lab: [facility]    Specimen Information    Type Source Collected On   Blood  05/11/18 1549          Components       Value Reference Range Flag Lab   Sodium 143 133 - 144 mmol/L  FrRdHs   Potassium 3.7 3.4 - 5.3 mmol/L  FrRdHs   Chloride 108 94 - 109 mmol/L  FrRdHs   Carbon Dioxide 29 20 - 32 mmol/L  FrRdHs   Anion Gap 6 3 - 14 mmol/L  FrRdHs   Glucose 107 70 - 99 mg/dL H FrRdHs   Urea Nitrogen 25 7 - 30 mg/dL  FrRdHs   Creatinine 0.86 0.66 - 1.25 mg/dL  FrRdHs   GFR Estimate 85 >60 mL/min/1.7m2  FrRdHs   Comment:  Non  GFR Calc   GFR Estimate If Black >90 >60 mL/min/1.7m2  Rd   Comment:  African American GFR Calc   Calcium 8.2 8.5 - 10.1 mg/dL L FrRdHs            CBC with platelets differential [[id number]] (Abnormal)  Resulted: 05/11/18 1628, Result status: Final result    Ordering provider: Mel Pradhan MD  05/11/18 1605 Resulting lab: [facility]    Specimen Information    Type Source Collected On   Blood  05/11/18 1549          Components       Value Reference Range Flag Lab   WBC 4.6 4.0 - 11.0 10e9/L  FrRdHs   RBC Count 4.45 4.4 - 5.9 10e12/L  FrRdHs   Hemoglobin 13.7 13.3 - 17.7 g/dL  FrRdHs   Hematocrit 42.2 40.0 - 53.0 %  FrRdHs   MCV 95 78 - 100 fl  FrRdHs   MCH 30.8 26.5 - 33.0 pg  FrRdHs   MCHC 32.5 31.5 - 36.5 g/dL  FrRdHs   RDW 14.0 10.0 - 15.0 %  FrRdHs   Platelet Count 127 150 - 450 10e9/L L FrRdHs   Diff Method Automated Method   FrRdHs   % Neutrophils 74.8 %  FrRdHs   % Lymphocytes 15.7 %  FrRdHs   %  Monocytes 7.4 %  FrRdHs   % Eosinophils 1.5 %  FrRdHs   % Basophils 0.2 %  FrRdHs   % Immature Granulocytes 0.4 %  FrRdHs   Nucleated RBCs 0 0 /100  FrRdHs   Absolute Neutrophil 3.4 1.6 - 8.3 10e9/L  FrRdHs   Absolute Lymphocytes 0.7 0.8 - 5.3 10e9/L L FrRdHs   Absolute Monocytes 0.3 0.0 - 1.3 10e9/L  FrRdHs   Absolute Eosinophils 0.1 0.0 - 0.7 10e9/L  FrRdHs   Absolute Basophils 0.0 0.0 - 0.2 10e9/L  FrRdHs   Abs Immature Granulocytes 0.0 0 - 0.4 10e9/L  FrRdHs   Absolute Nucleated RBC 0.0   FrRdHs            Testing Performed By     Lab - Abbreviation Name Director Address Valid Date Range    [facility] [address]  [facility] [address] 05/08/15 1057 - Present               Imaging Results - 3 Days      CT Cervical Spine w/o Contrast [[id number]]  Resulted: 05/11/18 2118, Result status: Edited Result - FINAL    Ordering provider: Mel Pradhan MD  05/11/18 1605 Resulted by: Felix Worthington MD    Performed: 05/11/18 1645 - 05/11/18 1704 Resulting lab: RADIOLOGY RESULTS    Narrative:       CT CERVICAL SPINE WITHOUT CONTRAST   5/11/2018 5:04 PM     HISTORY: Trauma, multiple falls with two falls today. Head trauma and  left-sided pain.     TECHNIQUE: Axial images of the cervical spine were obtained without  intravenous contrast. Multiplanar reformations were performed.   Radiation dose for this scan was reduced using automated exposure  control, adjustment of the mA and/or kV according to patient size, or  iterative reconstruction technique.    COMPARISON: 6/10/2013    FINDINGS: There is no evidence of fracture. There is multilevel  degenerative disc disease and degenerative facet arthropathy. There is  no soft tissue swelling. There is no significant spinal canal  stenosis. There is acquired fusion of C6-C7 anteriorly.    Radiolucency in the spinous process of T1 noted previously is  unchanged. Tiny right apical pulmonary nodules unchanged.      Impression:        IMPRESSION:  1. No acute abnormality.  2. Degenerative changes and acquired fusion at C6-C7. Since previous  exam the degenerative changes have advanced at C2-C3 and C3-C4.  3. No change in right apical 5 mm pulmonary nodule or in the  radiolucency in the T1 spinous process, indicating that these are  benign.    FELIX LINCOLN MD      CT Head w/o Contrast [[id number]]  Resulted: 05/11/18 2118, Result status: Edited Result - FINAL    Ordering provider: Mel Pradhan MD  05/11/18 1605 Resulted by: Felix Lincoln MD    Performed: 05/11/18 1645 - 05/11/18 1701 Resulting lab: RADIOLOGY RESULTS    Narrative:       CT SCAN OF THE HEAD WITHOUT CONTRAST  5/11/2018  5:01 PM     HISTORY: Multiple falls in the past month with 2 falls today and head  trauma. Left-sided pain.    TECHNIQUE: Axial images of the head and coronal reformations without  IV contrast material. Radiation dose for this scan was reduced using  automated exposure control, adjustment of the mA and/or kV according  to patient size, or iterative reconstruction technique.    COMPARISON: 11/27/2017    FINDINGS: There is generalized atrophy of the brain. There is low  attenuation in the white matter of the cerebral hemispheres consistent  with sequelae of small vessel ischemic disease. There is no evidence  of intracranial hemorrhage, mass, acute infarct or anomaly.     The visualized portions of the sinuses and mastoids appear normal.  There is no evidence of trauma.       Impression:       IMPRESSION:   1. No acute abnormality.  2. Atrophy of the brain. White matter changes consistent with sequelae  of small vessel ischemic disease.  3. No change.    FELIX LINCOLN MD      Testing Performed By     Lab - Abbreviation Name Director Address Valid Date Range    104 - Rad Rslts RADIOLOGY RESULTS Unknown Unknown 02/16/05 1553 - Present            Encounter-Level Documents:     There are no encounter-level documents.      Order-Level Documents:     There are no  order-level documents.

## 2018-05-11 NOTE — PROGRESS NOTES
I was asked to speak with the patient and his spouse about different options if he was found to be medically cleared. I discussed options with the patient and his wife regarding TCU and cost. We also discussed home care and the services provided. I gave them a booklet on TCUs and answered their questions.

## 2018-05-11 NOTE — ED NOTES
Pt assisted in using urinal.  Pt unable to void while standing due to unsteadiness.  Attempted to position urinal while sittting, but pt unable to void.  Pt finally seated on commode and able to urinate.  Pt with significant difficulty maintiaining balance while standing.  Unable to initiate movement of legs without momentum of movement in direction needed.

## 2018-05-11 NOTE — ED PROVIDER NOTES
History     Chief Complaint:  Fall    HPI   Carlos Neri is a 82 year old male with history of Parkinson's disease and dementiawho presents to the emergency department today for evaluation after a fall. The patient reports having two falls today, most recently around 1400. The patient states it second fall he hit his head, but denies any loss of consciousness. The patient denies any dizziness or chest pain preceding his falls, he states he was just walking from one room to the other when he collapsed and fell. He did hit his left side on the ground and is now complaining of pain to his left side. He denies any neck pain. Family at bedside are concerned as the patient has been falling more recently, around 4-5 times weekly. They do note the patient has balance issues at baseline secondary to his Parkinson's disease. The patient has previously undergone eight sessions of physical therapy one month ago, however they note the patient did not continue this therapy at home.The patient last saw his neurologist one month ago at which time the patient's neurologist recommended the patient undergo more physical therapy.  His wife notes a few days ago the patient had what appeared to be a near-syncopal episode while seated at the table for a meal. The patient has not had any syncope with any of his falls in the last month, but he does describe a felling of his body tensing up and a need to suddenly grab onto something before some of his falls.     Allergies:  Morphine Sulfate      Medications:   carbidopa-levodopa (SINEMET CR)  MG per tablet  carbidopa-levodopa (SINEMET)  MG per tablet  clonazePAM (KLONOPIN) 0.5 MG tablet  DONEPEZIL HCL PO  indomethacin (INDOCIN) 25 MG capsule  rOPINIRole (REQUIP) 0.5 MG tablet  ROPINIROLE HCL PO    Past Medical History:    Dementia   Parkinson's disease    Past Surgical History:    Hernia repair     Family History:    History reviewed. No pertinent family history.      Social  History:  The patient was accompanied to the ED by his wife and son.  Smoking Status: No  Smokeless Tobacco: No  Alcohol Use: No   Marital Status:       Review of Systems   Musculoskeletal: Positive for gait problem. Negative for neck pain.   Neurological: Negative for syncope.   All other systems reviewed and are negative.    Physical Exam   First Vitals:  BP: (!) 141/123  Pulse: 98  Heart Rate: 98  Temp: 97.8  F (36.6  C)  Resp: 18  SpO2: 99 %    Physical Exam  General: Patient is alert and interactive when I enter the room, tired appearing  Head:  The scalp, face, and head appear normal  Eyes:  Conjunctivae are normal  ENT:    The nose is normal    Pinnae are normal    External acoustic canals are normal  Neck:  Trachea midline, mild midline cervical spinal tenderness  CV:  Pulses are normal, RRR.    Resp:  No respiratory distress, CTAB   Abdomen:      Soft, non-tender, non-distended  Musc:  Normal muscular tone    No major joint effusions  Skin:  No rash or lesions noted  Neuro: Slowed speech. Moves extremities. Slow to initiation movements.   Psych: Awake. Alert.  Normal affect.  Appropriate interactions.    Emergency Department Course     ECG:  Indication: Fall  Completed at 1637.  Read at 1637.   Normal sinus rhythm. Normal ECG.   Rate 63 bpm. WY interval 150. QRS duration 86. QT/QTc 408/417. P-R-T axes 68 37 54.     Imaging:  Radiology findings were communicated with the patient who voiced understanding of the findings.    CT Head w/o contrast:  IMPRESSION:   1. No acute abnormality.  2. Atrophy of the brain. White matter changes consistent with sequelae  of small vessel ischemic disease.  3. No change.  Report per radiology     Radiology findings were communicated with the patient who voiced understanding of the findings.    CT Cervical Spine w/o contrast:  IMPRESSION:  1. No acute abnormality.  2. Degenerative changes and acquired fusion at C6-C7. Since previous  exam the degenerative changes have  advanced at C2-C3 and C3-C4.  3. No change in right apical 5 mm pulmonary nodule or in the  radiolucency in the T1 spinous process, indicating that these are  benign.  Report per radiology      Laboratory:  Laboratory findings were communicated with the patient who voiced understanding of the findings.    CBC: WBC 4.6, HGB 13.7,  (L)  BMP: Glucose 107 (H), Calcium 8.2 (L), o/w WNL (Creatinine 0.86)     Emergency Department Course:  Nursing notes and vitals reviewed.  1552 I performed an exam of the patient as documented above.   IV was inserted and blood was drawn for laboratory testing, results above.   The patient was sent for a head CT while in the emergency department, results above.    EKG obtained in the ED, see results above.    1642 Patient rechecked and updated.    1829 I spoke with Dr. Law of the Hospitalist service regarding patient's presentation, findings, and plan of care.   1844 Patient rechecked and updated.    I discussed the treatment plan with the patient. They expressed understanding of this plan and consented to admission. I discussed the patient with Dr. Law, who will admit the patient to a monitored bed for further evaluation and treatment. I personally reviewed the laboratory and imaging results with the Patient and family and answered all related questions prior to admission.   Impression & Plan      Medical Decision Making:  Carlos Neri is a 82 year old male wit history of Parkinson's and lewy body dementia who presents with multiple falls today. The patient arrives and clearly has symptoms of Parkinson's with slow initiation of both speech and movements. He has no signs of trauma and denies any pain. Given that he did hit his head and has mild neck pain on exam, I did do a CT cervical spine and CT head. These were negative. His blood work was also negative including an EKG. He does not really have any syncope so not concerned for ACS or any PE. Patient was not able to give  a urine as he has difficulties with this. I had a long discussion with the family about the next steps in his care. It seems that this is a progressive issue and is all related to his Parkinson's and dementia. They were concerned about cost so I had the  and I consult with the patient and the hospitalist. The best management of his frequent falls is to be admitted under observation. He needs assessed as he seems to be falling more frequently and is at risk for hurting himself. The patient and family were in agreement with this plan. Patient was admitted to observation. Do not think any acute medical condition beyond Parkinson's is going on. Patient admitted to obs in stable condition.     Diagnosis:    ICD-10-CM    1. Fall W19.XXXA        Disposition:  Admitted under the supervision of Dr. Ani Priest Disclosure:  I, Gomez Matta, am serving as a scribe at 3:47 PM on 5/11/2018 to document services personally performed by Mel Pradhan MD based on my observations and the provider's statements to me.    5/11/2018   [facility] EMERGENCY DEPARTMENT       Mel Pradhan MD  05/11/18 [id number]

## 2018-05-12 ENCOUNTER — APPOINTMENT (OUTPATIENT)
Dept: PHYSICAL THERAPY | Facility: CLINIC | Age: 83
End: 2018-05-12
Attending: INTERNAL MEDICINE
Payer: MEDICARE

## 2018-05-12 PROCEDURE — 97530 THERAPEUTIC ACTIVITIES: CPT | Mod: GP

## 2018-05-12 PROCEDURE — A9270 NON-COVERED ITEM OR SERVICE: HCPCS | Mod: GY | Performed by: INTERNAL MEDICINE

## 2018-05-12 PROCEDURE — 97116 GAIT TRAINING THERAPY: CPT | Mod: GP,XU

## 2018-05-12 PROCEDURE — 99225 ZZC SUBSEQUENT OBSERVATION CARE,LEVEL II: CPT | Performed by: INTERNAL MEDICINE

## 2018-05-12 PROCEDURE — G0378 HOSPITAL OBSERVATION PER HR: HCPCS

## 2018-05-12 PROCEDURE — 25000132 ZZH RX MED GY IP 250 OP 250 PS 637: Mod: GY | Performed by: INTERNAL MEDICINE

## 2018-05-12 PROCEDURE — 40000193 ZZH STATISTIC PT WARD VISIT

## 2018-05-12 PROCEDURE — 97162 PT EVAL MOD COMPLEX 30 MIN: CPT | Mod: GP

## 2018-05-12 PROCEDURE — 99207 ZZC CDG-CODE CATEGORY CHANGED: CPT | Performed by: INTERNAL MEDICINE

## 2018-05-12 RX ADMIN — LINEZOLID 100 MG: 600 TABLET, FILM COATED ORAL at 22:09

## 2018-05-12 RX ADMIN — DONEPEZIL HYDROCHLORIDE 10 MG: 10 TABLET, FILM COATED ORAL at 22:09

## 2018-05-12 RX ADMIN — ROPINIROLE HYDROCHLORIDE 0.25 MG: 0.25 TABLET, FILM COATED ORAL at 06:40

## 2018-05-12 RX ADMIN — CARBIDOPA AND LEVODOPA 1 TABLET: 25; 100 TABLET ORAL at 14:27

## 2018-05-12 RX ADMIN — CLONAZEPAM 1 MG: 1 TABLET ORAL at 22:09

## 2018-05-12 RX ADMIN — LINEZOLID 100 MG: 600 TABLET, FILM COATED ORAL at 10:56

## 2018-05-12 RX ADMIN — ROPINIROLE HYDROCHLORIDE 0.25 MG: 0.25 TABLET, FILM COATED ORAL at 10:55

## 2018-05-12 RX ADMIN — LINEZOLID 100 MG: 600 TABLET, FILM COATED ORAL at 18:44

## 2018-05-12 RX ADMIN — CARBIDOPA AND LEVODOPA 1 TABLET: 25; 100 TABLET ORAL at 08:19

## 2018-05-12 RX ADMIN — CARBIDOPA AND LEVODOPA 1 TABLET: 25; 100 TABLET ORAL at 10:55

## 2018-05-12 RX ADMIN — CARBIDOPA AND LEVODOPA 1 TABLET: 25; 100 TABLET ORAL at 18:45

## 2018-05-12 RX ADMIN — CARBIDOPA AND LEVODOPA 1 TABLET: 50; 200 TABLET, EXTENDED RELEASE ORAL at 22:05

## 2018-05-12 RX ADMIN — GABAPENTIN 300 MG: 300 CAPSULE ORAL at 22:09

## 2018-05-12 RX ADMIN — LINEZOLID 100 MG: 600 TABLET, FILM COATED ORAL at 08:19

## 2018-05-12 RX ADMIN — MELATONIN TAB 3 MG 3 MG: 3 TAB at 22:10

## 2018-05-12 RX ADMIN — LINEZOLID 100 MG: 600 TABLET, FILM COATED ORAL at 14:27

## 2018-05-12 RX ADMIN — ROPINIROLE HYDROCHLORIDE 0.25 MG: 0.25 TABLET, FILM COATED ORAL at 22:09

## 2018-05-12 RX ADMIN — ROPINIROLE HYDROCHLORIDE 0.25 MG: 0.25 TABLET, FILM COATED ORAL at 16:25

## 2018-05-12 RX ADMIN — CARBIDOPA AND LEVODOPA 1 TABLET: 25; 100 TABLET ORAL at 22:09

## 2018-05-12 NOTE — PROGRESS NOTES
[facility]  Hospitalist Progress Note( observation unit)        Ivette Aparicio MD  05/12/2018        Interval History:      Patient was evaluated by physical therapy  It was recommended to go to TCU  Initially patient and family was reluctant as it is going to be out of pocket cost.  But then they agreed later in the day  Patient has history of recurrent falls  His blood pressure was lower but that was because when they were checking his blood pressure he had so much tremors that accurate reading could not be obtained  He continued to have tremors due to his Parkinson's disease  He walks with the help of walker  Denied any nausea vomiting diarrhea or fever or any lightheadedness or dizziness         Assessment and Plan:      82-year-old male patient with past medical history significant for Parkinson's disease and dementia who presented to the emergency room on May 10 due to recurrent falls    Recurrent fall;  -Related to her Parkinson's disease  -He uses a walker but has slowness of movement and incoordination.  -He was seen by physical therapy and they recommended that he should go to TCU for rehab  -Initially family was reluctant as it would be out-of-pocket cost but then they agreed  -He lives with his wife and son  - is making arrangement for him to go to the transitional care unit  -Patient had episodes of low blood pressure which could be due to the fact that they were checking it with automatic cuff and was moving so much due to tremors  When checked it carefully his blood pressure was fine and he is entirely asymptomatic for low BP   We will continue to monitor his blood pressure    Parkinson's disease and restless leg:  Patient also has essential tremors  -He is followed by neurologist Tracy Chapin as out patient   -Continue his PTA medications which include Sinemet, Requip and Comtane    Dementia;  He is on Aricept    Pain is under control and is not complaining of any  pain    Disposition:  -Will be discharged to TCU tomorrow  - is making arrangements    Discussed care with patient and his RN as well as with  for discharge planning  Also discussed with his wife and son               This note was done by using voice recognition software.    Time spent >35 minutes       Physical Exam:      Heart Rate: 83, Blood pressure 112/61, pulse 72, temperature 97.4  F (36.3  C), temperature source Oral, resp. rate 16, height 1.829 m (6'), weight 75.3 kg (166 lb), SpO2 96 %.  Vitals:    05/11/18 2036   Weight: 75.3 kg (166 lb)     Vital Signs with Ranges  Temp:  [97  F (36.1  C)-98  F (36.7  C)] 97.4  F (36.3  C)  Pulse:  [72-98] 72  Heart Rate:  [57-98] 83  Resp:  [16-18] 16  BP: ()/() 112/61  SpO2:  [94 %-100 %] 96 %  I/O's Last 24 hours  I/O last 3 completed shifts:  In: -   Out: 675 [Urine:675]    Constitutional:  He is alert and awake but has generalized tremors and shakes when he walks   Lungs: Good air entry on both sides of lungs     Cardiovascular: S1 and S2 no S3      Abdomen:    Skin:    Other:           Medications:          carbidopa-levodopa  1 tablet Oral At Bedtime     carbidopa-levodopa  1 tablet Oral 5x Daily     clonazePAM  1 mg Oral At Bedtime     donepezil (ARICEPT) tablet 10 mg  10 mg Oral At Bedtime     entacapone  100 mg Oral 5x Daily     gabapentin  300 mg Oral At Bedtime     melatonin  3 mg Oral At Bedtime     rOPINIRole  0.25 mg Oral TID     rOPINIRole  0.25 mg Oral At Bedtime     PRN Meds: acetaminophen, acetaminophen, naloxone, ondansetron **OR** ondansetron         Data:      All new lab and imaging data was reviewed.   Recent Labs   Lab Test  05/11/18   1549  11/27/17 0330 05/27/17   0535   WBC  4.6  9.5  5.8   HGB  13.7  13.4  14.0   MCV  95  94  94   PLT  127*  152  115*      Recent Labs   Lab Test  05/11/18   1549  11/27/17   0330  05/27/17   0535   NA  143  142  142   POTASSIUM  3.7  3.8  4.1   CHLORIDE  108  109  110*    CO2  29  27  28   BUN  25  22  22   CR  0.86  0.91  0.87   ANIONGAP  6  6  4   SADIA  8.2*  8.6  8.3*   GLC  107*  91  95     Recent Labs   Lab Test  11/27/17   0330  05/26/17   1513  06/10/13   0955   TROPI  <0.015  <0.015  The 99th percentile for upper reference range is 0.045 ug/L.  Troponin values in   the range of 0.045 - 0.120 ug/L may be associated with risks of adverse   clinical events.    <0.012

## 2018-05-12 NOTE — PLAN OF CARE
Problem: Patient Care Overview  Goal: Plan of Care/Patient Progress Review  PRIMARY DIAGNOSIS: GENERALIZED WEAKNESS/FALL     OUTPATIENT/OBSERVATION GOALS TO BE MET BEFORE DISCHARGE  1. Orthostatic performed: N/A     2. Tolerating PO medications: Yes     3. Return to near baseline physical activity: No     4. Cleared for discharge by consultants (if involved): No     Discharge Planner Nurse   Safe discharge environment identified: Yes  Barriers to discharge: Yes        Alert and oriented, speech delayed. Denies pain. Up with asssit x2 and walker. UA negative. Pt bed alarm on for safety. Will continue to monitor.     Please review provider order for any additional goals.   Nurse to notify provider when observation goals have been met and patient is ready for discharge.

## 2018-05-12 NOTE — PHARMACY-ADMISSION MEDICATION HISTORY
Admission medication history interview status for this patient is complete. See Psychiatric admission navigator for allergy information, prior to admission medications and immunization status.     Medication history interview source(s):Patient and Family  Medication history resources (including written lists, pill bottles, clinic record):med list  Primary pharmacy:[facility] [address]    Changes made to PTA medication list:  Added: melatonin, gabapentin, entacapone  Deleted: -  Changed: ropinirole    Actions taken by pharmacist (provider contacted, etc):None     Additional medication history information:None    Medication reconciliation/reorder completed by provider prior to medication history? No    Do you take OTC medications (eg tylenol, ibuprofen, fish oil, eye/ear drops, etc)? no(Y/N)    For patients on insulin therapy: no (Y/N)  Lantus/levemir/NPH/Mix 70/30 dose:   (Y/N) (see Med list for doses)   Sliding scale Novolog Y/N  If Yes, do you have a baseline novolog pre-meal dose:  units with meals  Patients eat three meals a day:   Y/N    How many episodes of hypoglycemia do you have per week: _______  How many missed doses do you have per week: ______  How many times do you check your blood glucose per day: _______   Any Barriers to therapy - Be specific :  cost of medications, comfortable with giving injections (if applicable), comfortable and confident with current diabetes regimen: Y/N ______________      Prior to Admission medications    Medication Sig Last Dose Taking? Auth Provider   carbidopa-levodopa (SINEMET CR)  MG per tablet Take 1 tablet by mouth At Bedtime. 5/10/2018 at Unknown time Yes Unknown, Entered By History   carbidopa-levodopa (SINEMET)  MG per tablet Take 1 tablet by mouth 5 times daily 0700, 1000, 1300, 1600, 1900 5/11/2018 at x3 Yes Unknown, Entered By History   clonazePAM (KLONOPIN) 0.5 MG tablet Take 1 mg by mouth At Bedtime 2 tablets = 1 mg 5/10/2018 at Unknown time Yes Unknown,  Entered By History   DONEPEZIL HCL PO Take 10 mg by mouth At Bedtime 5/10/2018 at Unknown time Yes Unknown, Entered By History   entacapone (COMTAN) 200 MG tablet Take 100 mg by mouth 5 times daily At 0700,1000, 1300, 1600, 1900 5/11/2018 at x3 Yes Unknown, Entered By History   gabapentin (NEURONTIN) 300 MG capsule Take 300 mg by mouth At Bedtime 5/10/2018 at Unknown time Yes Unknown, Entered By History   melatonin 3 MG tablet Take 3 mg by mouth At Bedtime 5/10/2018 at Unknown time Yes Unknown, Entered By History   rOPINIRole (REQUIP) 0.25 MG tablet Take 0.25 mg by mouth 3 times daily At 0700, 1100, 1600 5/11/2018 at x2 Yes Unknown, Entered By History   rOPINIRole (REQUIP) 0.25 MG tablet Take 0.25 mg by mouth At Bedtime 5/10/2018 at Unknown time Yes Unknown, Entered By History   indomethacin (INDOCIN) 25 MG capsule Take 25 mg by mouth 3 times daily as needed.   Unknown, Entered By History

## 2018-05-12 NOTE — PROVIDER NOTIFICATION
RN notified of low BP. BP re-checked, 77/54. Pt asymptomatic. Denies lightheadedness, dizziness. Pt up in chair eating crackers. Provider notified and will assess pt before deciding on if intervention is needed. Primary RN notified.

## 2018-05-12 NOTE — PLAN OF CARE
Problem: Patient Care Overview  Goal: Plan of Care/Patient Progress Review  PRIMARY DIAGNOSIS: GENERALIZED WEAKNESS    OUTPATIENT/OBSERVATION GOALS TO BE MET BEFORE DISCHARGE  1. Orthostatic performed: No    2. Tolerating PO medications: Yes    3. Return to near baseline physical activity: No    4. Cleared for discharge by consultants (if involved): No    Pt A&O, forgetful at times. VSS. Pt denies pain or discomfort this morning. Up with Ax2 with walker. Plan for PT consult. SW consulted. Will continue to monoitor.    Discharge Planner Nurse   Safe discharge environment identified: No  Barriers to discharge: Yes       Entered by: Nisreen Barrientos 05/12/2018 8:30 AM     Please review provider order for any additional goals.   Nurse to notify provider when observation goals have been met and patient is ready for discharge.

## 2018-05-12 NOTE — PLAN OF CARE
Problem: Patient Care Overview  Goal: Plan of Care/Patient Progress Review  Outcome: No Change  PRIMARY DIAGNOSIS: GENERALIZED WEAKNESS     OUTPATIENT/OBSERVATION GOALS TO BE MET BEFORE DISCHARGE  1. Orthostatic performed: No     2. Tolerating PO medications: Yes - able to take 2 pills at a time w/ sips of water     3. Return to near baseline physical activity: No - PT had pt up w/ gait belt and walker in bundy.  Earlier today pt was heavy assist of 2 to get to [facility].     4. Cleared for discharge by consultants (if involved): No - PT recommending TCU, if pt not agreeable to TCU he will at least need a front wheeled walker at dc.     Discharge Planner Nurse   Safe discharge environment identified: No  Barriers to discharge: Yes - not safe to return to current living situation         Please review provider order for any additional goals.   Nurse to notify provider when observation goals have been met and patient is ready for discharge.    BPs carter, MD saw pt but no fluids ordered.  BPs are difficult to assess due to patient's tremor w/ lewy body.  Pt forgetful but aox4.  Up w/ 1-2, changes depending on patient's rigidity.  SW visited with patient and family who are agreeable to paying out of pocket for TCU.  Due to time of day, he will stay one more night on Observation and dc tomorrow to a TCU.

## 2018-05-12 NOTE — PROGRESS NOTES
D: [facility] is following to coordinate d/c to [facility].   A: Pt was accepted at [facility] for Sunday.  P: [facility] will continue to follow to coordinate for d/c.     JANUSZ Price  Casual  [phone]

## 2018-05-12 NOTE — H&P
Admitted:     05/11/2018      CHIEF COMPLAINT:  Multiple falls.      HISTORY OF PRESENT ILLNESS:  This is an 82-year-old gentlemanwho presents with multiple falls.  The patient has a history of falling that is longstanding.  He at least has had 3-4 times a week, whereby he has fallen.  He had 2 falls today.  Subsequently, he was brought into the ER for further evaluation.  Most of the history is obtained from his wife as the patient has underlying dementia.  His falls occur after he stumbles.  There is no loss of consciousness.  Today, he was walking when he stumbled and subsequently hit his head on the cabinet.  There was no loss of consciousness.  He uses a walker at home to ambulate at home.  In the ER over here, he was seen by Dr. Leal.  I discussed care with her.  He denies any chest pain or shortness of breath.  He denies any fevers or chills.  He denies any new medications.  He denies any lightheadedness or dizziness.  He follows at [facility].      ALLERGIES:  HE IS ALLERGIC TO MORPHINE.      MEDICATIONS:  His medication list includes Sinemet, Aricept, Requip, Klonopin.      PAST MEDICAL HISTORY:  Significant for Lewy body dementia, Parkinson disease, restless leg syndrome, renal cell carcinoma, status post nephrectomy, essential tremor.      PAST SURGICAL HISTORY:  Significant for nephrectomy, tonsillectomy, left hernia repair.      SOCIAL HISTORY:  He does not smoke.  He does not drink alcohol.      FAMILY HISTORY:  Significant for depression in his father.      REVIEW OF SYSTEMS:  As mentioned in the HPI.  No fevers or chills.  All other systems are extensively reviewed and deemed unremarkable and negative.      PHYSICAL EXAMINATION:   VITAL SIGNS:  Temperature is 97.8, pulse 98, blood pressure is 132/79, respiratory rate 18, O2 sat is 97% on room air.   GENERAL:  The patient is alert, awake, stoic, oriented to somewhat time.  He is aware that we are in the correct month.  However, is  not sure about the year.  He knows he lives [place].  In no acute distress.  He follows commands.   HEENT:  His pupils equal, round, reactive to light.  His head is normocephalic, atraumatic.  Pharynx, there is no exudate noted.   LUNGS:  Clear to auscultation bilaterally.   HEART:  Regular rate, S1, S2 normal.  No murmurs or gallops.   ABDOMEN:  Soft, nontender, nondistended, with good bowel sounds.   EXTREMITIES:  There is no edema.   NEUROLOGIC:  Cranial nerves II-XII are grossly within normal limits.  Finger-to-nose is grossly within normal limits, though he does have a tremor.  On his upper extremities, he has a fine tremor.  His power in the upper extremities is 5/5; in the lower, he is 4/5.      LABORATORY:  Lab work obtained included a basic metabolic panel as well as a CBC with diff, which is grossly within normal limits other than a platelet count of 127,000.      IMAGING:  He had the following imaging studies carried out here in the ER:  CT of the head with no contrast which showed no acute abnormality, atrophy of the brain.  CT of the C-spine which showed no acute abnormality.  No change in the apical 5 mm pulmonary nodule or the radiolucency in the T1 spinous process, indicating that these are benign.  An EKG performed showed normal sinus rhythm at 63 beats per minute.  There are no significant ST-T wave changes to indicate ischemia.      ASSESSMENT AND PLAN:   1.  Fall, likely due to lack of balance.  We will admit him under observation status.  We will place him on fall precautions.  We will have a sitter in place as he does get confused in an unfamiliar environment.  Will have Physical Therapy and Occupational Therapy evaluate him.  He may benefit from a rehab facility.  He has been to a transitional care unit before.   2.  Parkinson disease.  We will resume his home meds once we consult.      CODE STATUS:  Full code.      He will be admitted under observation status.         JAYLEN KESSLER MD              D: 2018   T: 2018   MT: VIJAY      Name:     CORINNE SERRANO   MRN:      [id number]        Account:      [id number]   :      1935        Admitted:     2018                   Document: [id number]

## 2018-05-12 NOTE — PLAN OF CARE
Problem: Patient Care Overview  Goal: Plan of Care/Patient Progress Review  OT: admitted to observation status after having several falls    Discharge Planner OT   Patient plan for discharge: unknown  Current status: IP OT evaluation not needed at this time, PT will plan to complete assessment to provide safe discharge recommendations.   Barriers to return to prior living situation: unknown  Recommendations for discharge: defer to PT  Rationale for recommendations: pt under observation status, will plan to have PT assessment and OT assessment not indicated at this time, will complete order.        Entered by: Valeri Barahona 05/12/2018 10:36 AM

## 2018-05-12 NOTE — PLAN OF CARE
Problem: Patient Care Overview  Goal: Plan of Care/Patient Progress Review  PRIMARY DIAGNOSIS: GENERALIZED WEAKNESS/FALL    OUTPATIENT/OBSERVATION GOALS TO BE MET BEFORE DISCHARGE  1. Orthostatic performed: N/A    2. Tolerating PO medications: Yes    3. Return to near baseline physical activity: No    4. Cleared for discharge by consultants (if involved): No    Discharge Planner Nurse   Safe discharge environment identified: Yes  Barriers to discharge: Yes       Entered by: Nereida Woody 05/11/2018 11:08 PM     VSS. Alert and oriented, speech delayed. Denies pain. BLE scabs from falls. Right knee open wound from fall, mepilex applied. Up with asssit x2 and walker. UA negative.  Please review provider order for any additional goals.   Nurse to notify provider when observation goals have been met and patient is ready for discharge.

## 2018-05-12 NOTE — PROGRESS NOTES
05/12/18 1100   Quick Adds   Type of Visit Initial PT Evaluation   Living Environment   Lives With spouse;child(lacey), adult   Living Arrangements house   Home Accessibility stairs to enter home   Number of Stairs to Enter Home 2   Number of Stairs Within Home 0   Stair Railings at Home outside, present at both sides   Transportation Available car;family or friend will provide   Living Environment Comment Pt lives in a one level home, has grab bar for the two steps to enter the home, pt's son is currently living with his parents   Self-Care   Usual Activity Tolerance good   Current Activity Tolerance moderate   Regular Exercise no   Equipment Currently Used at Home shower chair  (4WW)   Activity/Exercise/Self-Care Comment Pt's wife states that pt dresses himself, ambulates in the house mod I with his 4WW, he cooks his own breakfast.    Functional Level Prior   Ambulation 1-->assistive equipment   Transferring 1-->assistive equipment   Toileting 0-->independent   Bathing 1-->assistive equipment   Dressing 0-->independent   Eating 0-->independent   Communication 0-->understands/communicates without difficulty   Swallowing 0-->swallows foods/liquids without difficulty   Cognition 1 - attention or memory deficits   Fall history within last six months yes   Number of times patient has fallen within last six months 12   Which of the above functional risks had a recent onset or change? ambulation;transferring;toileting;bathing;dressing;fall history   Prior Functional Level Comment Pt's wife states that in the last week that pt has fallen more frequently, having more difficulty with ambulating with 4WW where he is pushing it too far ahead and only using one hand on the 4WW   General Information   Onset of Illness/Injury or Date of Surgery - Date 05/11/18   Referring Physician Andrew Law MD   Patient/Family Goals Statement Pt's wife and son would like pt to have skilled PT at [facility]   Pertinent History of Current Problem  (include personal factors and/or comorbidities that impact the POC) Pt is a 82 year old male with hx of Parkinson's, lewy body dementia, frequent falls, renal cell carcinoma. Pt has a hx of frequent falls however pt's wife stated over the last week they have increased and pt had 2 falls yesterday.    Precautions/Limitations fall precautions   General Observations Pt is resting in bed, wife and son and the bedside. Pt's supine BP 90/58.   Cognitive Status Examination   Orientation person   Level of Consciousness alert   Follows Commands and Answers Questions 75% of the time   Personal Safety and Judgment impaired   Memory impaired   Pain Assessment   Patient Currently in Pain No   Posture    Posture Forward head position;Protracted shoulders   Range of Motion (ROM)   ROM Comment Pt's B UE and B LE ROM WFL.    Strength   Strength Comments PT's B UE and B LE grossly 4+/5 in all major muscle groups, appears to be generalized weakness    Bed Mobility   Bed Mobility Comments Pt transferred supine to sit with SBA, increased time and effort.    Transfer Skills   Transfer Comments Pt tranfserred sit to and from stand with CGA, moderate cues for safety and hand placment.    Gait   Gait Comments Pt ambulated 100 feet with FWW and min A x 1 due decreased balance, unsteady gait, difficutly with turns and keeping his body inside of the FWW.    Balance   Balance Comments Pt has fair static standing balance and poor dynamic standing balance, requires assist x 1 and FWW for safety .   Sensory Examination   Sensory Perception Comments NT   Coordination   Coordination Comments not formally tested however pt exhibits decreased B UE and B LE coordination as seen through functional movement   General Therapy Interventions   Planned Therapy Interventions gait training;balance training;strengthening;transfer training;home program guidelines;progressive activity/exercise   Clinical Impression   Criteria for Skilled Therapeutic Intervention  "yes, treatment indicated   PT Diagnosis difficulty with gait   Influenced by the following impairments decreased balance, decreased cognition, decreased strength   Functional limitations due to impairments need for assist with all functional mobility, gait for safety   Clinical Presentation Evolving/Changing   Clinical Presentation Rationale Pt is a 82 year old male with Parkinson's and lewy body dementia, hx of  frequent falls, unable to demonstrate safe use of FWW with gait and would benefit from skilled PT to work on improving his safety with transfers, gait and ADL's with use of FWW.    Clinical Decision Making (Complexity) Moderate complexity   Therapy Frequency` daily   Predicted Duration of Therapy Intervention (days/wks) 5 days   Anticipated Equipment Needs at Discharge front wheeled walker   Anticipated Discharge Disposition Transitional Care Facility   Risk & Benefits of therapy have been explained Yes   Patient, Family & other staff in agreement with plan of care Yes   [facility] TM \"6 Clicks\"   2016, Trustees of [facility], under license to [employer].  All rights reserved.   6 Clicks Short Forms Basic Mobility Inpatient Short Form   [facility]  \"6 Clicks\" V.2 Basic Mobility Inpatient Short Form   1. Turning from your back to your side while in a flat bed without using bedrails? 4 - None   2. Moving from lying on your back to sitting on the side of a flat bed without using bedrails? 4 - None   3. Moving to and from a bed to a chair (including a wheelchair)? 3 - A Little   4. Standing up from a chair using your arms (e.g., wheelchair, or bedside chair)? 3 - A Little   5. To walk in hospital room? 3 - A Little   6. Climbing 3-5 steps with a railing? 2 - A Lot   Basic Mobility Raw Score (Score out of 24.Lower scores equate to lower levels of function) 19   Total Evaluation Time   Total Evaluation Time (Minutes) 15     "

## 2018-05-12 NOTE — PLAN OF CARE
Problem: Patient Care Overview  Goal: Plan of Care/Patient Progress Review  Discharge Planner PT   Patient plan for discharge: Pt did not state a plan but pt's wife would like pt to go to a TCU for daily therapy   Current status: PT: PT evaluation ordered, chart reviewed, PT evaluation completed for this 82 year old male with hx of Parkinson's and lewy body dementia who was brought to the hospital due to frequent falls at home. Pt's wife and son were present during the PT evaluation and provided pt's hx on his prior level of function. Pt lives with his wife and son and they report that pt has been ambulating with a 4WW modified I, getting himself dressed, and even preparing his own breakfast independently until the last week he started to have more frequent falls, 2 falls yesterday. Pt required supervision for supine to sit at the EOB, CGA for sit to and from standing transfer with a FWW, and min A x 1 to ambulate 100 feet with FWW due to festinating gait and difficulty with turns with use of the FWW. Pt's BP was low during PT session, averaging upper 80's over upper 50's, however pt denied any lightheadness or dizziness.   Barriers to return to prior living situation: Pt has 2 steps to enter the home, pt's family states they are unable to provide 24 hour assist and pt gets up on his own at night to use the BR. Pt requires 24 hour assist with mobility and gait for safety at this time.   Recommendations for discharge: TCU  Rationale for recommendations: Pt requires min A x 1 with transfers and ambulation at this time due to decreased balance, coordination, festinating gait, requires 24 hour assist which pt's family is not able to provide at this time, pt would benefit from skilled PT at the TCU setting to work on increasing his safety with functional mobility and gait with FWW, reducing his fall risk.       Entered by: Shilpa Velazquez 05/12/2018 12:21 PM
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
## 2018-05-12 NOTE — PLAN OF CARE
Problem: Patient Care Overview  Goal: Plan of Care/Patient Progress Review  ROOM # 221    Living Situation (if not independent, order SW consult): at home with wife and son  Facility name:  : [facility] (wife) [phone]    Activity level at baseline: with walker by himself  Activity level on admit: assist of two      Patient registered to observation; given Patient Bill of Rights; given the opportunity to ask questions about observation status and their plan of care.  Patient has been oriented to the observation room, bathroom and call light is in place.    Discussed discharge goals and expectations with patient/family.

## 2018-05-12 NOTE — PLAN OF CARE
Problem: Patient Care Overview  Goal: Plan of Care/Patient Progress Review  Outcome: No Change  PRIMARY DIAGNOSIS: GENERALIZED WEAKNESS    OUTPATIENT/OBSERVATION GOALS TO BE MET BEFORE DISCHARGE  1. Orthostatic performed: No    2. Tolerating PO medications: Yes    3. Return to near baseline physical activity: No.  Patient requires the assist of two caregivers to pivot/transfer to bedside commode.  Patient is unsteady on      Feet, profound shakiness of upper extremities noted.      4. Cleared for discharge by consultants (if involved): No    VSS.  LS clear, adequate sats on room air.  Patient alert, orientated x 4, cooperative with cares.  Patient requires the assist of two caregivers to pivot/transfer  To bedside commode.  Unsteadiness on feet noted.  Patient taking oral medications without difficulty.  Plan:  Continue to provide supportive cares.     Discharge Planner Nurse   Safe discharge environment identified: No,  to see, as TCU is planned,    Barriers to discharge: Yes, identification of TCU planned.         Entered by: Karen M. Lesch 05/12/2018 6:18 PM     Please review provider order for any additional goals.   Nurse to notify provider when observation goals have been met and patient is ready for discharge.

## 2018-05-13 VITALS
HEART RATE: 62 BPM | HEIGHT: 72 IN | DIASTOLIC BLOOD PRESSURE: 52 MMHG | SYSTOLIC BLOOD PRESSURE: 114 MMHG | RESPIRATION RATE: 20 BRPM | BODY MASS INDEX: 22.48 KG/M2 | OXYGEN SATURATION: 97 % | WEIGHT: 166 LBS | TEMPERATURE: 96.3 F

## 2018-05-13 LAB — INTERPRETATION ECG - MUSE: NORMAL

## 2018-05-13 PROCEDURE — G0378 HOSPITAL OBSERVATION PER HR: HCPCS

## 2018-05-13 PROCEDURE — 99217 ZZC OBSERVATION CARE DISCHARGE: CPT | Performed by: HOSPITALIST

## 2018-05-13 PROCEDURE — 25000132 ZZH RX MED GY IP 250 OP 250 PS 637: Mod: GY | Performed by: INTERNAL MEDICINE

## 2018-05-13 PROCEDURE — A9270 NON-COVERED ITEM OR SERVICE: HCPCS | Mod: GY | Performed by: INTERNAL MEDICINE

## 2018-05-13 RX ADMIN — LINEZOLID 100 MG: 600 TABLET, FILM COATED ORAL at 13:50

## 2018-05-13 RX ADMIN — ROPINIROLE HYDROCHLORIDE 0.25 MG: 0.25 TABLET, FILM COATED ORAL at 06:25

## 2018-05-13 RX ADMIN — LINEZOLID 100 MG: 600 TABLET, FILM COATED ORAL at 10:45

## 2018-05-13 RX ADMIN — LINEZOLID 100 MG: 600 TABLET, FILM COATED ORAL at 08:33

## 2018-05-13 RX ADMIN — CARBIDOPA AND LEVODOPA 1 TABLET: 25; 100 TABLET ORAL at 10:45

## 2018-05-13 RX ADMIN — CARBIDOPA AND LEVODOPA 1 TABLET: 25; 100 TABLET ORAL at 13:50

## 2018-05-13 RX ADMIN — CARBIDOPA AND LEVODOPA 1 TABLET: 25; 100 TABLET ORAL at 08:33

## 2018-05-13 RX ADMIN — ROPINIROLE HYDROCHLORIDE 0.25 MG: 0.25 TABLET, FILM COATED ORAL at 10:45

## 2018-05-13 NOTE — PLAN OF CARE
Problem: Patient Care Overview  Goal: Plan of Care/Patient Progress Review  Problem: Patient Care Overview  Goal: Plan of Care/Patient Progress Review  Patient's After Visit Summary was reviewed with patient and/or spouse.   Patient verbalized understanding of After Visit Summary, recommended follow up and was given an opportunity to ask questions.   Discharge medications sent home with patient/family: No, no new meds.    Discharged with spouse and son in WC to TCU. All belongings sent with patient.      /59 (BP Location: Right arm)  Pulse 71  Temp 97.6  F (36.4  C) (Oral)  Resp 20  Wt 59.7 kg (131 lb 9.6 oz)  SpO2 94%     OBSERVATION patient END time: 1410

## 2018-05-13 NOTE — PLAN OF CARE
Problem: Patient Care Overview  Goal: Plan of Care/Patient Progress Review      Physical Therapy Discharge Summary    Reason for therapy discharge:    Discharged to transitional care facility.    Progress towards therapy goal(s). See goals on Care Plan [facility] electronic health record for goal details.  Goals not met.  Barriers to achieving goals:   discharge from facility.    Therapy recommendation(s):    Continued therapy is recommended.  Rationale/Recommendations:  PT eval and treat as indicated at TCU.     Note: Pt not seen by documenting PT on this date. Information obtained from chart review.

## 2018-05-13 NOTE — PROGRESS NOTES
D: SWS is following to coordinate d/c to TCU. Pt was accepted at [facility]. Pt has a d/c order.  A: SW called pt's wife and lvm for a return call. The TCU like pt there after 1:00pm. SW sent the d/c order. SW met with pt and family. Pt is due to d/c with family at 2:30. [facility] was updated regarding the d/c time. The updated order with code status and DME was sent.  P: Pt to d/c to [facility] today ay 2:30.    JANUSZ Price  Casual SW [phone]

## 2018-05-13 NOTE — PROGRESS NOTES
Your information has been submitted on May 13th, 2018 at 10:18:06 AM CDT. The confirmation number is [id number]

## 2018-05-13 NOTE — PLAN OF CARE
Problem: Patient Care Overview  Goal: Plan of Care/Patient Progress Review  PRIMARY DIAGNOSIS: Fall and weakness     OUTPATIENT/OBSERVATION GOALS TO BE MET BEFORE DISCHARGE  1. Orthostatic performed: No    2. Tolerating PO medications: Yes    3. Return to near baseline physical activity: No    4. Cleared for discharge by consultants (if involved): No    Discharge Planner Nurse   Safe discharge environment identified: Yes  Barriers to discharge: Yes, patient not medically cleared.       Entered by: Merlyn Platt 05/13/2018 8:51 AM     Please review provider order for any additional goals.   Nurse to notify provider when observation goals have been met and patient is ready for discharge.    Patient is alert to person, place, and time. Patient has a history of dementia. VPM in room. Patient does not know why he is here. VS WNL and documented. Lung sounds clear in all lobes and patient on RA. Denies SOB. Active bowel sounds and patient not sure when his LBM was. Incontinent of bladder and wears a depends. Patient is a heavy 2 assist for pivot transfers.  Patient on a regular diet and ate 100% of his breakfast. Patient has been complaining of his eye sight being blurry and only seeing half of the viewing field. MD aware. In notes patient has been accepted to [facility] today.     /67 (BP Location: Right arm)  Pulse 62  Temp 95.7  F (35.4  C) (Oral)  Resp 16  Ht 1.829 m (6')  Wt 75.3 kg (166 lb)  SpO2 95%  BMI 22.51 kg/m2

## 2018-05-13 NOTE — PLAN OF CARE
Problem: Patient Care Overview  Goal: Plan of Care/Patient Progress Review  Outcome: No Change  PRIMARY DIAGNOSIS: GENERALIZED WEAKNESS     OUTPATIENT/OBSERVATION GOALS TO BE MET BEFORE DISCHARGE  1. Orthostatic performed: No     2. Tolerating PO medications: Yes     3. Return to near baseline physical activity: No.  Patient requires the assist of two caregivers to pivot/transfer to bedside commode.  Patient is unsteady on  Feet, profound shakiness of upper extremities noted, increasing agitation and confusion  This pm.      4. Cleared for discharge by consultants (if involved): No      Patient becoming increasingly agitated, frequently calling out, attempting to get out of bed.  VPM placed for patient safety.  Patient requires the assist of two caregivers to pivot/transfer to bedside commode.  Unsteadiness on feet noted.  Patient taking oral medications without difficulty.  Plan:  Continue to provide supportive cares.      Discharge Planner Nurse   Safe discharge environment identified: No,  to see, as TCU is planned,    Barriers to discharge: Yes, identification of TCU planned.         Entered by: Karen M. Lesch 05/12/2018 6:18 PM  Please review provider order for any additional goals.   Nurse to notify provider when observation goals have been met and patient is ready for discharge.

## 2018-05-13 NOTE — PLAN OF CARE
Problem: Patient Care Overview  Goal: Plan of Care/Patient Progress Review  Outcome: No Change  PRIMARY DIAGNOSIS: GENERALIZED WEAKNESS    OUTPATIENT/OBSERVATION GOALS TO BE MET BEFORE DISCHARGE  1. Orthostatic performed: No    2. Tolerating PO medications: Yes    3. Return to near baseline physical activity: No.  Patient requires the assist of two caregivers to pivot/transfer to bedside commode.  Patient is unsteady on feet. Has severe tremors  In UE          4. Cleared for discharge by consultants (if involved): No    Alert and oriented  X 4 VSS.  LS clear.   cooperative with cares. Assist of two to pivot/transfer to bedside commode.  Unsteady on feet.  Tolerating regular diet Continue to provide supportive cares.     Discharge Planner Nurse   Safe discharge environment identified: No,  to see, as TCU is planned,    Barriers to discharge: Yes, identification of TCU planned.         Entered by: Nicole MCMAHAN Che 05/13/2018 5:59 AM     Please review provider order for any additional goals.   Nurse to notify provider when observation goals have been met and patient is ready for discharge.

## 2018-05-13 NOTE — PLAN OF CARE
Problem: Patient Care Overview  Goal: Plan of Care/Patient Progress Review  Outcome: No Change  PRIMARY DIAGNOSIS: GENERALIZED WEAKNESS    OUTPATIENT/OBSERVATION GOALS TO BE MET BEFORE DISCHARGE  1. Orthostatic performed: No    2. Tolerating PO medications: Yes    3. Return to near baseline physical activity: No.  Patient requires the assist of two caregivers to pivot/transfer to bedside commode.  Patient is unsteady on feet. Has severe tremors  In UE          4. Cleared for discharge by consultants (if involved): No    Alert and oriented  X 4 VSS.  LS clear.   cooperative with cares. Assist of two to pivot/transfer to bedside commode.  Unsteady on feet.  Tolerating regular diet Continue to provide supportive cares.     Discharge Planner Nurse   Safe discharge environment identified: No,  to see, as TCU is planned,    Barriers to discharge: Yes, identification of TCU planned.         Entered by: Nicole MCMAHAN Che 05/13/2018 1:58 AM     Please review provider order for any additional goals.   Nurse to notify provider when observation goals have been met and patient is ready for discharge.

## 2018-05-13 NOTE — DISCHARGE SUMMARY
[facility]  Discharge Summary  Name: Carlos Neri    MRN: [id number]  YOB: 1935    Age: 82 year old  Date of Discharge:  5/13/2018  Date of Admission: 5/11/2018  Primary Care Provider: Danny Morales  Discharge Physician:  Eleuterio Moe MD  Discharging Service:  Hospitalist  Date of Service (when I saw the patient): 05/13/18    Discharge Diagnosis:  Frequent falls     Other Diagnosis:  Lewy body dementia, Parkinson disease, restless leg syndrome, renal cell carcinoma, status post nephrectomy, essential tremor.      Discharge Disposition:  Discharged to short-term care facility     Allergies:  Allergies   Allergen Reactions     Morphine Sulfate         Discharge Medications:   Current Discharge Medication List      CONTINUE these medications which have NOT CHANGED    Details   carbidopa-levodopa (SINEMET CR)  MG per tablet Take 1 tablet by mouth At Bedtime.      carbidopa-levodopa (SINEMET)  MG per tablet Take 1 tablet by mouth 5 times daily 0700, 1000, 1300, 1600, 1900      clonazePAM (KLONOPIN) 0.5 MG tablet Take 1 mg by mouth At Bedtime 2 tablets = 1 mg      DONEPEZIL HCL PO Take 10 mg by mouth At Bedtime      entacapone (COMTAN) 200 MG tablet Take 100 mg by mouth 5 times daily At 0700,1000, 1300, 1600, 1900      gabapentin (NEURONTIN) 300 MG capsule Take 300 mg by mouth At Bedtime      melatonin 3 MG tablet Take 3 mg by mouth At Bedtime      !! rOPINIRole (REQUIP) 0.25 MG tablet Take 0.25 mg by mouth 3 times daily At 0700, 1100, 1600      !! rOPINIRole (REQUIP) 0.25 MG tablet Take 0.25 mg by mouth At Bedtime      indomethacin (INDOCIN) 25 MG capsule Take 25 mg by mouth 3 times daily as needed.       !! - Potential duplicate medications found. Please discuss with provider.           Condition on Discharge:  Discharge condition: Stable   Discharge vitals: Blood pressure 122/67, pulse 62, temperature 95.7  F (35.4  C), temperature source Oral, resp. rate 16, height 1.829  m (6'), weight 75.3 kg (166 lb), SpO2 95 %.   Code status on discharge: Full Code     History of Illness:  See detailed admission note for full details.  This is an 82-year-old gentleman  who presents with multiple falls.  The patient has a history of falling that is longstanding.  There are occasions when he  .  He at least has had 3-4 times a week, whereby he has fallen.  He had 2 falls today.  Subsequently, he was brought into the ER for further evaluation.  Most of the history is obtained from his wife as the patient has underlying dementia.  His falls occur after he stumbles.  There is no loss of consciousness.  Today, he was walking when he stumbled and subsequently hit his head on the cabinet.  There was no loss of consciousness.  He uses a walker at home to ambulate at home.  In the ER over here, he was seen by Dr. Leal.  I discussed care with her.  He denies any chest pain or shortness of breath.  He denies any fevers or chills.  He denies any new medications.  He denies any lightheadedness or dizziness.  He follows at [facility].         Significant Physical Exam Findings:  Patient today is doing well. States earlier this am he had some blurry vision in his left eye. He states this has been happening for about 6 months. He has had maybe about 10 episodes of blurry vision that resolves on its own. He had some sort of laser treatment to his eyes and sees an ophthalmologist. Vision is now normal. No chest pain, sob, abdo pain, n/v/d  s1s2 rrr, lungs clear, pupils equal and reactive. EOM normal      Procedures:  none     Imaging:  Results for orders placed or performed during the hospital encounter of 05/11/18   CT Head w/o Contrast    Narrative    CT SCAN OF THE HEAD WITHOUT CONTRAST  5/11/2018  5:01 PM     HISTORY: Multiple falls in the past month with 2 falls today and head  trauma. Left-sided pain.    TECHNIQUE: Axial images of the head and coronal reformations without  IV contrast  material. Radiation dose for this scan was reduced using  automated exposure control, adjustment of the mA and/or kV according  to patient size, or iterative reconstruction technique.    COMPARISON: 11/27/2017    FINDINGS: There is generalized atrophy of the brain. There is low  attenuation in the white matter of the cerebral hemispheres consistent  with sequelae of small vessel ischemic disease. There is no evidence  of intracranial hemorrhage, mass, acute infarct or anomaly.     The visualized portions of the sinuses and mastoids appear normal.  There is no evidence of trauma.       Impression    IMPRESSION:   1. No acute abnormality.  2. Atrophy of the brain. White matter changes consistent with sequelae  of small vessel ischemic disease.  3. No change.    JULIÁN LINCOLN MD   CT Cervical Spine w/o Contrast    Narrative    CT CERVICAL SPINE WITHOUT CONTRAST   5/11/2018 5:04 PM     HISTORY: Trauma, multiple falls with two falls today. Head trauma and  left-sided pain.     TECHNIQUE: Axial images of the cervical spine were obtained without  intravenous contrast. Multiplanar reformations were performed.   Radiation dose for this scan was reduced using automated exposure  control, adjustment of the mA and/or kV according to patient size, or  iterative reconstruction technique.    COMPARISON: 6/10/2013    FINDINGS: There is no evidence of fracture. There is multilevel  degenerative disc disease and degenerative facet arthropathy. There is  no soft tissue swelling. There is no significant spinal canal  stenosis. There is acquired fusion of C6-C7 anteriorly.    Radiolucency in the spinous process of T1 noted previously is  unchanged. Tiny right apical pulmonary nodules unchanged.      Impression    IMPRESSION:  1. No acute abnormality.  2. Degenerative changes and acquired fusion at C6-C7. Since previous  exam the degenerative changes have advanced at C2-C3 and C3-C4.  3. No change in right apical 5 mm pulmonary nodule  or in the  radiolucency in the T1 spinous process, indicating that these are  benign.    JULIÁN LINCOLN MD        Consultations:  No consultations were requested during this admission.     Significant Lab Results:    Recent Labs  Lab 05/11/18  1549   WBC 4.6   HGB 13.7   HCT 42.2   MCV 95   *          Lab Results   Component Value Date     05/11/2018     11/27/2017     05/27/2017    Lab Results   Component Value Date    CHLORIDE 108 05/11/2018    CHLORIDE 109 11/27/2017    CHLORIDE 110 05/27/2017    Lab Results   Component Value Date    BUN 25 05/11/2018    BUN 22 11/27/2017    BUN 22 05/27/2017      Lab Results   Component Value Date    POTASSIUM 3.7 05/11/2018    POTASSIUM 3.8 11/27/2017    POTASSIUM 4.1 05/27/2017    Lab Results   Component Value Date    CO2 29 05/11/2018    CO2 27 11/27/2017    CO2 28 05/27/2017    Lab Results   Component Value Date    CR 0.86 05/11/2018    CR 0.91 11/27/2017    CR 0.87 05/27/2017           Pending Results:    Unresulted Labs Ordered in the Past 30 Days of this Admission     No orders found from 3/12/2018 to 5/12/2018.           Discharge Instructions and Follow-Up:   Discharge diet:   Active Diet Order      Regular Diet Adult      Advance Diet as Tolerated   Discharge activity: Activity as tolerated   Discharge follow-up: Follow up with primary care provider in 7 days   Outpatient therapy: None    Home Care agency: None    Other instructions: None      Hospital Course:  Patient was admitted to the Obs Unit. He has a history of frequent falls. He was recommended to go to TCU. He has a bed for today. He has been stable while here. As above, he had some blurry vision this am. This appears chronic. I did speak to his wife. She states he has been seeing an ophthalmologist. He has had some sort of laser treatment. He should follow-up as an outpt.     Total time spent in face to face contact with the patient and coordinating discharge was:  40 Minutes.    Eleuterio  MD Abhi  Pager: [phone]

## 2018-05-23 ENCOUNTER — RECORDS - HEALTHEAST (OUTPATIENT)
Dept: LAB | Facility: CLINIC | Age: 83
End: 2018-05-23

## 2018-05-24 LAB
ANION GAP SERPL CALCULATED.3IONS-SCNC: 13 MMOL/L (ref 5–18)
BUN SERPL-MCNC: 22 MG/DL (ref 8–28)
CALCIUM SERPL-MCNC: 9.7 MG/DL (ref 8.5–10.5)
CHLORIDE BLD-SCNC: 104 MMOL/L (ref 98–107)
CO2 SERPL-SCNC: 24 MMOL/L (ref 22–31)
CREAT SERPL-MCNC: 0.89 MG/DL (ref 0.7–1.3)
ERYTHROCYTE [DISTWIDTH] IN BLOOD BY AUTOMATED COUNT: 13.5 % (ref 11–14.5)
GFR SERPL CREATININE-BSD FRML MDRD: >60 ML/MIN/1.73M2
GLUCOSE BLD-MCNC: 96 MG/DL (ref 70–125)
HCT VFR BLD AUTO: 50.7 % (ref 40–54)
HGB BLD-MCNC: 15.8 G/DL (ref 14–18)
MCH RBC QN AUTO: 30.4 PG (ref 27–34)
MCHC RBC AUTO-ENTMCNC: 31.2 G/DL (ref 32–36)
MCV RBC AUTO: 98 FL (ref 80–100)
PLATELET # BLD AUTO: 249 THOU/UL (ref 140–440)
PMV BLD AUTO: 11.3 FL (ref 8.5–12.5)
POTASSIUM BLD-SCNC: 3.7 MMOL/L (ref 3.5–5)
RBC # BLD AUTO: 5.19 MILL/UL (ref 4.4–6.2)
SODIUM SERPL-SCNC: 141 MMOL/L (ref 136–145)
WBC: 10.1 THOU/UL (ref 4–11)

## 2018-07-26 ENCOUNTER — HOSPITAL ENCOUNTER (OUTPATIENT)
Dept: GENERAL RADIOLOGY | Facility: CLINIC | Age: 83
Discharge: HOME | End: 2018-07-26
Attending: FAMILY MEDICINE | Admitting: FAMILY MEDICINE
Payer: MEDICARE

## 2018-07-26 ENCOUNTER — HOSPITAL ENCOUNTER (OUTPATIENT)
Dept: SPEECH THERAPY | Facility: CLINIC | Age: 83
Setting detail: THERAPIES SERIES
End: 2018-07-26
Attending: FAMILY MEDICINE
Payer: MEDICARE

## 2018-07-26 DIAGNOSIS — R05.9 COUGH: ICD-10-CM

## 2018-07-26 DIAGNOSIS — R13.10 DYSPHAGIA, UNSPECIFIED TYPE: ICD-10-CM

## 2018-07-26 PROCEDURE — 74230 X-RAY XM SWLNG FUNCJ C+: CPT

## 2018-07-26 PROCEDURE — 92611 MOTION FLUOROSCOPY/SWALLOW: CPT | Mod: GN

## 2018-07-26 PROCEDURE — 92610 EVALUATE SWALLOWING FUNCTION: CPT | Mod: GN

## 2018-07-26 PROCEDURE — G8997 SWALLOW GOAL STATUS: HCPCS | Mod: GN,CM

## 2018-07-26 PROCEDURE — 40000211 ZZHC STATISTIC SLP  DEPARTMENT VISIT

## 2018-07-26 PROCEDURE — 92526 ORAL FUNCTION THERAPY: CPT | Mod: GN

## 2018-07-26 PROCEDURE — G8996 SWALLOW CURRENT STATUS: HCPCS | Mod: GN,CM

## 2018-07-26 PROCEDURE — G8998 SWALLOW D/C STATUS: HCPCS | Mod: GN,CM

## 2018-07-26 RX ORDER — BARIUM SULFATE 400 MG/ML
SUSPENSION ORAL ONCE
Status: COMPLETED | OUTPATIENT
Start: 2018-07-26 | End: 2018-07-26

## 2018-07-26 RX ADMIN — BARIUM SULFATE: 400 SUSPENSION ORAL at 14:14

## 2018-07-26 NOTE — PROGRESS NOTES
[facility] OP video swallow study  07/26/18 1400       Present No   General Information   Type Of Visit Initial   Start Of Care Date 07/26/18   Referring Physician Danny Morales DO   Orders Evaluate And Treat   Medical Diagnosis dysphagia   Onset Of Illness/injury Or Date Of Surgery 06/29/18  (per MD order date)   Precautions/limitations Fall Precautions;Swallowing Precautions   Hearing WFL   Pertinent History of Current Problem/OT: Additional Occupational Profile Info Pt is an 82 year old male with past medical hx of Parkinson's disease (~15-20yrs), Essnetial tremor, and Lewy body dementia. Per family, pt with occasional weak cough during PO intake. Pts family reports this happens frequently on thin liquids, however home health ST recommended thickened liquids and pt has been tolerating without difficulty. Pts family deny any hx of frequent PNA. Pt currently eats regular textures and thickened liquids, however pts wife states he will also drink thin liquids at times. Video swallow study completed per MD orders to further assess oropharyngeal swallow function.    Respiratory Status Room air   Prior Level Of Function Swallowing   Prior Level Of Function Comment Pt currently eats regular foods and nectar thick and thin liquids   Patient Role/employment History Retired   Living Environment [facility]   Patient/family Goals Pts family hopeful pt does not need to continue thickened liquids   Pain Assessment   Pain Reported No   Fall Risk Screen   Fall screen comments Please see radiology report for further details   Clinical Swallow Evaluation   Oral Musculature other (see comments)  (generalized weakness; facial tremors)   Structural Abnormalities none present   Dentition present and adequate   Mucosal Quality adequate   Mandibular Strength and Mobility intact   Oral Labial Strength and Mobility impaired coordination   Lingual Strength and Mobility impaired coordination   Laryngeal Function  Throat clear;Cough;Swallow;Voicing initiated   Oral Musculature Comments Weak cough response   VFSS Evaluation   VFSS Additional Documentation Yes   VFSS Eval: Radiology   Radiologist Dr. Partida   Views Taken left lateral   Physical Location of Procedure [facility]   VFSS Eval: Thin Liquid Texture Trial   Mode of Presentation, Thin Liquid spoon;fed by clinician   Order of Presentation 10   Preparatory Phase Poor bolus control   Oral Phase, Thin Liquid Premature pharyngeal entry   Pharyngeal Phase, Thin Liquid Delayed swallow reflex;Residue in pyriform sinus;Residue in valleculae   Rosenbek's Penetration Aspiration Scale: Thin Liquid Trial Results 8 - contrast passes glottis, visible subglottic residue remains, absent patient response (aspiration)   Response to Aspiration absent response, silent aspiration   Diagnostic Statement Thin liquids via spoon resulted in haider silent aspiration. Pt with moderate pharyngeal residuals which pt was unable to fully clear despite cues to double swallow   VFSS Eval: Nectar Thick Liquid Texture Trial   Mode of Presentation, Nectar cup;spoon;self-fed;fed by clinician   Order of Presentation 1, 2, 9   Preparatory Phase Poor bolus control   Oral Phase, Nectar Premature pharyngeal entry   Pharyngeal Phase, Nectar Delayed swallow reflex;Residue in pyriform sinus;Residue in valleculae   Rosenbek's Penetration Aspiration Scale: Nectar-Thick Liquid Trial Results 8 - contrast passes glottis, visible subglottic residue remains, absent patient response (aspiration)   Response to Aspiration, Nectar productive volitional cough following clinician cue   Successful Strategies Trialed During Procedure, Nectar chin tuck  (pt independently using chin tuck )   Diagnostic Statement nectar thick liquids via cup resulted in x1 instance of silent aspiration. Pt with minimal penetration on additional trials. However, pt with moderate pharyngeal residuals that did not fully clear despite cues to double swallow    VFSS Eval: Honey Thick Texture Trial   Mode of Presentation, Honey cup;self-fed   Order of Presentation 3, 4, 8   Preparatory Phase Poor bolus control   Oral Phase, Honey Premature pharyngeal entry   Pharyngeal Phase, Honey Delayed swallow reflex;Residue in valleculae;Residue in pyriform sinus   Rosenbek's Penetration Aspiration Scale: Honey Trial Results 2 - contrast enters airway, remains above the vocal cords, no residue remains (penetration)   Diagnostic Statement Honey thick liquids via cup resulted in mild penetration of residuals from previous PO trials. No aspiration. Pt with moderate pharyngeal residuals which pt was unable to fully clear despite cues to double swallow   VFSS Eval: Puree Solid Texture Trial   Mode of Presentation, Puree spoon;fed by clinician   Order of Presentation 5, 6   Preparatory Phase Poor bolus control   Oral Phase, Puree Premature pharyngeal entry   Pharyngeal Phase, Puree Delayed swallow reflex;Residue in valleculae   Rosenbek's Penetration Aspiration Scale: Puree Food Trial Results 1 - no aspiration, contrast does not enter airway   Diagnostic Statement No aspiration or penetration. Pt with moderate pharyngeal residuals remaining in valleculae which pt was unable to fully clear despite cues to double swallow    VFSS Eval: Solid Food Texture Trial   Mode of Presentation, Solid spoon;fed by clinician   Order of Presentation 7   Preparatory Phase Poor bolus control   Oral Phase, Solid Premature pharyngeal entry   Pharyngeal Phase, Solid Delayed swallow reflex;Residue in valleculae   Rosenbek's Penetration Aspiration Scale: Solid Food Trial Results 1 - no aspiration, contrast does not enter airway   Diagnostic Statement No aspiration or penetration. Pt with moderate pharyngeal residuals remaining in valleculae which pt was unable to fully clear despite cues to double swallow    FEES Evaluation   Additional Documentation No   Swallow Compensations   Swallow Compensations Alternate  viscosity of consistencies;Chin tuck;Effortful swallow;Pacing;Reduce amounts;Multiple swallow   Results Aspiration   Educational Assessment   Barriers to Learning Cognitive   Preferred Learning Style Listening;Reading;Demonstration;Pictures/video   Esophageal Phase of Swallow   Patient reports or presents with symptoms of esophageal dysphagia No   General Therapy Interventions   Planned Therapy Interventions Dysphagia Treatment   Dysphagia treatment Compensatory strategies for swallowing;Instruction of safe swallow strategies;Modified diet education;Oropharyngeal exercise training   Swallow Eval: Clinical Impressions   Skilled Criteria for Therapy Intervention Skilled criteria met.  Treatment indicated.   Dysphagia Outcome Severity Scale (MECCA) Level 2 - MECCA   Treatment Diagnosis moderate-severe oropharyngeal dysphagia   Diet texture recommendations Dysphagia diet level 2;Honey thick liquids   Recommended Feeding/Eating Techniques alternate between small bites and sips of food/liquid;check mouth frequently for oral residue/pocketing;hard swallow w/ each bite or sip;maintain upright posture during/after eating for 30 mins;small sips/bites   Rehab Potential fair, will monitor progress closely   Demonstrates Need for Referral to Another Service other (see comments)  (ongoing ST follow up)   Therapy Frequency other (see comments)  (defer to treating SLP)   Predicted Duration of Therapy Intervention (days/wks) x1/week for 12 weeks   (treating SLP to modify as appropriate)   Anticipated Discharge Disposition home w/ home health   Risks and Benefits of Treatment have been explained. Yes   Patient, family and/or staff in agreement with Plan of Care Yes   Clinical Impression Comments SLP: Video swallow study completed per MD orders. Pt presents with moderate-severe oropharyngeal dysphagia. Pts swallow function characterized by poor AP movement, poor bolus control, premature pharyngeal entry, and no epiglottic inversion. Pt  with SILENT aspiration on thin liquids via spoon and nectar thick liquids via cup. Pt with mild penetration on nectar thick liquids and honey thick liquids after the swallow response d/t pharyngeal residuals. All PO trials resulted in pharyngeal residuals which pt was unable to fully clear despite cues to double swallow. Increased pharyngeal residuals noted in valleculae with increasing viscosities of PO. Pt is likely at ongoing high risk for aspiration given severity of pharyngeal residuals and no epiglottic inversion. Recommend dysphagia diet 2 and honey thick liquids. Pt should be fully upright for all PO, take small single sips/bites, alternate between consistencies, double swallow each bite, and pace self. OK for regular H20 between meals (thickened liquids during meals) following thorough oral cares. Recommend MD to discuss goals of care given family report of pt disliking thickened liquids and likely chronic moderate-severe oropharyngeal dysphagia. Pt would benefit from continued ST to assess diet tolerance and trial oropharyngeal exercises as appropriate. If goals remain restorative, recommend repeat VFSS in 2-3 months to asses liquid tolerance given evidence of silent aspiration this date. Pt and family demonstrated good understanding of recommendations and rationale.    Swallow Goals   SLP Swallow Goals 1   Swallow Goal 1   Goal Identifier LTG   Goal Description Pt and family will verbalize understanding and agreement with VFSS results and recommendations   Target Date 07/26/18   Date Met 07/26/18   Total Session Time   Total Session Time 45   Total Evaluation Time 30   Therapy Certification   Certification date from 07/26/18   Certification date to 07/27/18   Medical Diagnosis moderate-severe oropharyngeal dysphagia   Certification I certify the need for these services furnished under this plan of treatment and while under my care.  (Physician co-signature of this document indicates review and  certification of the therapy plan).

## 2018-07-26 NOTE — PROGRESS NOTES
[facility]          OUTPATIENT SWALLOW  EVALUATION  PLAN OF TREATMENT FOR OUTPATIENT REHABILITATION  (COMPLETE FOR INITIAL CLAIMS ONLY)  Patient's Last Name, First Name, M.I.  YOB: 1935  Carlos Neri     Provider's Name   [facility]   Medical Record No.  [id number]     Start of Care Date:  07/26/18   Onset Date:  06/29/18 (per MD order date)   Type:     ___PT   ____OT  ___X_SLP Medical Diagnosis:  moderate-severe oropharyngeal dysphagia     Treatment Diagnosis:  moderate-severe oropharyngeal dysphagia Visits from SOC:  1     _________________________________________________________________________________  Plan of Treatment/Functional Goals:  Planned Therapy Interventions: Dysphagia Treatment  Dysphagia treatment: Compensatory strategies for swallowing, Instruction of safe swallow strategies, Modified diet education, Oropharyngeal exercise training                     Goals   1. Goal Identifier: LTG       Goal Description: Pt and family will verbalize understanding and agreement with VFSS results and recommendations       Target Date: 07/26/18       Date Met: 07/26/18                                                    Therapy Frequency: other (see comments) (defer to treating SLP)  Predicted Duration of Therapy Intervention (days/wks): x1/week for 12 weeks  (treating SLP to modify as appropriate)    Tracy Iglesias, SLP       I CERTIFY THE NEED FOR THESE SERVICES FURNISHED UNDER        THIS PLAN OF TREATMENT AND WHILE UNDER MY CARE .             Physician Signature               Date    X_____________________________________________________                        Certification date from: 07/26/18 Certification date to: 07/27/18          Referring Physician: Danny Morales, DO    Initial Assessment        See Epic Evaluation Start Of Care Date: 07/26/18

## 2018-07-27 ENCOUNTER — HOSPITAL ENCOUNTER (EMERGENCY)
Facility: CLINIC | Age: 83
Discharge: HOME | End: 2018-07-27
Attending: EMERGENCY MEDICINE | Admitting: EMERGENCY MEDICINE
Payer: MEDICARE

## 2018-07-27 VITALS
RESPIRATION RATE: 16 BRPM | OXYGEN SATURATION: 97 % | SYSTOLIC BLOOD PRESSURE: 106 MMHG | TEMPERATURE: 97.2 F | HEART RATE: 50 BPM | DIASTOLIC BLOOD PRESSURE: 86 MMHG

## 2018-07-27 DIAGNOSIS — I95.9 HYPOTENSION, UNSPECIFIED HYPOTENSION TYPE: ICD-10-CM

## 2018-07-27 LAB
ANION GAP SERPL CALCULATED.3IONS-SCNC: 3 MMOL/L (ref 3–14)
BASOPHILS # BLD AUTO: 0 10E9/L (ref 0–0.2)
BASOPHILS NFR BLD AUTO: 0.2 %
BUN SERPL-MCNC: 18 MG/DL (ref 7–30)
CALCIUM SERPL-MCNC: 8.7 MG/DL (ref 8.5–10.1)
CHLORIDE SERPL-SCNC: 106 MMOL/L (ref 94–109)
CO2 SERPL-SCNC: 30 MMOL/L (ref 20–32)
CREAT SERPL-MCNC: 0.88 MG/DL (ref 0.66–1.25)
DIFFERENTIAL METHOD BLD: ABNORMAL
EOSINOPHIL # BLD AUTO: 0.1 10E9/L (ref 0–0.7)
EOSINOPHIL NFR BLD AUTO: 1.9 %
ERYTHROCYTE [DISTWIDTH] IN BLOOD BY AUTOMATED COUNT: 14.5 % (ref 10–15)
GFR SERPL CREATININE-BSD FRML MDRD: 83 ML/MIN/1.7M2
GLUCOSE SERPL-MCNC: 88 MG/DL (ref 70–99)
HCT VFR BLD AUTO: 43.1 % (ref 40–53)
HGB BLD-MCNC: 13.8 G/DL (ref 13.3–17.7)
IMM GRANULOCYTES # BLD: 0 10E9/L (ref 0–0.4)
IMM GRANULOCYTES NFR BLD: 0.6 %
LYMPHOCYTES # BLD AUTO: 1.5 10E9/L (ref 0.8–5.3)
LYMPHOCYTES NFR BLD AUTO: 32 %
MAGNESIUM SERPL-MCNC: 2 MG/DL (ref 1.6–2.3)
MCH RBC QN AUTO: 30.5 PG (ref 26.5–33)
MCHC RBC AUTO-ENTMCNC: 32 G/DL (ref 31.5–36.5)
MCV RBC AUTO: 95 FL (ref 78–100)
MONOCYTES # BLD AUTO: 0.5 10E9/L (ref 0–1.3)
MONOCYTES NFR BLD AUTO: 9.7 %
NEUTROPHILS # BLD AUTO: 2.6 10E9/L (ref 1.6–8.3)
NEUTROPHILS NFR BLD AUTO: 55.6 %
NRBC # BLD AUTO: 0 10*3/UL
NRBC BLD AUTO-RTO: 0 /100
PLATELET # BLD AUTO: 120 10E9/L (ref 150–450)
POTASSIUM SERPL-SCNC: 3.7 MMOL/L (ref 3.4–5.3)
RBC # BLD AUTO: 4.52 10E12/L (ref 4.4–5.9)
SODIUM SERPL-SCNC: 139 MMOL/L (ref 133–144)
WBC # BLD AUTO: 4.7 10E9/L (ref 4–11)

## 2018-07-27 PROCEDURE — 36415 COLL VENOUS BLD VENIPUNCTURE: CPT | Performed by: EMERGENCY MEDICINE

## 2018-07-27 PROCEDURE — 83735 ASSAY OF MAGNESIUM: CPT | Performed by: EMERGENCY MEDICINE

## 2018-07-27 PROCEDURE — 93005 ELECTROCARDIOGRAM TRACING: CPT

## 2018-07-27 PROCEDURE — 80048 BASIC METABOLIC PNL TOTAL CA: CPT | Performed by: EMERGENCY MEDICINE

## 2018-07-27 PROCEDURE — 99284 EMERGENCY DEPT VISIT MOD MDM: CPT

## 2018-07-27 PROCEDURE — 85025 COMPLETE CBC W/AUTO DIFF WBC: CPT | Performed by: EMERGENCY MEDICINE

## 2018-07-27 ASSESSMENT — ENCOUNTER SYMPTOMS
PALPITATIONS: 0
LIGHT-HEADEDNESS: 0
SHORTNESS OF BREATH: 0
CHILLS: 0
FEVER: 0

## 2018-07-27 NOTE — ED AVS SNAPSHOT
[facility]    [address]    [facility] [address]    Phone:  [phone]    Fax:  [phone]                                       Carlos Neri   MRN: [id number]    Department:  [facility] Emergency Department   Date of Visit:  7/27/2018           After Visit Summary Signature Page     I have received my discharge instructions, and my questions have been answered. I have discussed any challenges I see with this plan with the nurse or doctor.    ..........................................................................................................................................  Patient/Patient Representative Signature      ..........................................................................................................................................  Patient Representative Print Name and Relationship to Patient    ..................................................               ................................................  Date                                            Time    ..........................................................................................................................................  Reviewed by Signature/Title    ...................................................              ..............................................  Date                                                            Time

## 2018-07-27 NOTE — ED PROVIDER NOTES
History     Chief Complaint:  Hypotension    The history is provided by the spouse.      Carlos Neri is a 82 year old male with a history of Lewy body dementia, frequent falls, and Parkinson's disease who presents with hypotension. The patient's wife reports the patient's home health nurse was at their home this morning and found the patient's blood pressure to be in the 70s over 40s and recommended he come to the ED for evaluation. She notes the patient falls frequently and has tremors secondary to his Parkinson's disease and was seen in the ED a few months ago after falling and hitting his head, and since then the patient was in a TCU for 3 weeks and now has the home health nurse who checks in daily. She states the patient has not had any symptoms accompanying his hypotension, and she adds that the patient had a swallow study yesterday that was significant for dysphagia, but his blood pressure was in the 110s over 60s at that time. She denies the patient complaining of any lightheadedness, having any appetite changes, choking while eating, or syncopal episodes. The patient denies any weakness or nausea.     Allergies:  Morphine    Medications:    Carbidopa-Levodopa  Klonopin  Donepezil  Comtan  Gabapentin  Indocin  Requip     Past Medical History:    Frequent falls  Lewy body dementia  Parkinson's disease    Past Surgical History:    Hernia repair    Family History:    History reviewed. No pertinent family history.      Social History:  Smoking status: Never smoker  Alcohol use: No   Marital Status:   [2]     Review of Systems   Constitutional: Negative for chills and fever.   Respiratory: Negative for shortness of breath.    Cardiovascular: Negative for chest pain and palpitations.   Neurological: Negative for syncope and light-headedness.   All other systems reviewed and are negative.    Physical Exam     Patient Vitals for the past 24 hrs:   BP Temp Temp src Pulse Heart Rate Resp SpO2   07/27/18  1330 - - - - 53 16 -   07/27/18 1315 118/69 - - - 52 14 -   07/27/18 1300 123/77 - - - 51 - -   07/27/18 1245 131/80 - - - 53 - 97 %   07/27/18 1235 - - - - 52 - 97 %   07/27/18 1234 115/82 - - - - - -   07/27/18 1215 - - - - - - 97 %   07/27/18 1214 - - - - 48 - 98 %   07/27/18 1211 - - - - - 15 -   07/27/18 1209 - - - - 50 - -   07/27/18 1205 - 97.2  F (36.2  C) Temporal - - - 100 %   07/27/18 1204 - - - 50 - 16 96 %   07/27/18 1159 108/71 - - - - - -      Physical Exam  Constitutional: Patient appears well-developed and well-nourished. There is no acute distress.   Head: No external signs of trauma noted.  Eyes: Pupils are equal, round, and reactive to light.   Neck: No JVD noted  Cardiovascular: Bradycardic rate, regular rhythm and normal heart sounds.  Exam reveals no gallop and no friction rub.  No murmur heard. Equal B/L peripheral pulses.  Pulmonary/Chest: Effort normal and breath sounds normal. No respiratory distress. Patient has no wheezes. Patient has no rales.   Abdominal: Soft. There is no tenderness.   Extremities: No edema noted  Neurological: Patient is alert and oriented to person, place, and time. He is somewhat soft spoken and has a tremor due to Parkinson's  Skin: Skin is warm and dry. There is no diaphoresis noted.     Emergency Department Course   ECG (12:06:56):  Rate 49 bpm. ND interval 146. QRS duration 88. QT/QTc 456/411. P-R-T axes * 8 27. Sinus bradycardia. Junctional ST depression, probably normal. Borderline ECG. When compared to 5/11/2018, there is bradycardia noted. Interpreted at 1218 by Sid Simpson DO.     Laboratory:  CBC:  (L), o/w WNL (WBC 4.7, HGB 13.8)  BMP: WNL (Creatinine 0.88)  Magnesium: 2.0    Emergency Department Course:  Past medical records, nursing notes, and vitals reviewed.  1205: I performed an exam of the patient and obtained history, as documented above.   IV inserted and blood drawn.   EKG obtained, results above.    1250: I rechecked the  patient after he returned from the bathroom. Patient ambulated with his walker. No lightheadedness. BP on return was 115/82.    1340: I rechecked the patient. Explained findings to the patient.    Findings and plan explained to the patient. Patient discharged home with instructions regarding supportive care, medications, and reasons to return. The importance of close follow-up was reviewed.    Impression & Plan    Medical Decision Making:  This 82-year-old male patient presents the ED due to hypotension.  Please see the HPI and exam for specifics.  The patient has been normotensive in the ED.  He has been bradycardic but otherwise feels entirely well.  He has been able to ambulate to and from the bathroom using his walker without assistance.  At this time given normal blood work and otherwise normal EKG I believe he can be discharged to follow-up in the outpatient setting.  Anticipatory guidance given prior to discharge.    Diagnosis:    ICD-10-CM   1. Hypotension, unspecified hypotension type I95.9     Disposition:  discharged to home with wife and son    Madhuri Zhu  7/27/2018   [facility] EMERGENCY DEPARTMENT    IMadhuri, am serving as a scribe at 12:05 PM on 7/27/2018 to document services personally performed by Sid Simpson DO based on my observations and the provider's statements to me.       Sid Simpson DO  07/27/18 [id number]

## 2018-07-27 NOTE — DISCHARGE INSTRUCTIONS
Low Blood Pressure (All Causes)  A blood pressure reading is made up of 2 numbers There is a top number over a bottom number. The top number is the systolic pressure. The bottom number is the diastolic pressure. A normal blood pressure is a systolic pressure less than 120 over a diastolic pressure less than 80. Low blood pressure (hypotension) is a blood pressure that is less than what is normal for you. Low blood pressure can cause dizziness, lightheadedness, or fainting.  Some medicines can cause low blood pressure. They include:    High blood pressure pills    Water pills (diuretics)    Some heart medicines    Some antidepressants    Pain, anxiety, sedative, and sleeping medicines  Other causes include:    Dehydration, severe infection, or fever    Blood loss, such as bleeding from the stomach or intestines.    Heart failure    Change in heart rate or rhythm (arrhythmia)    A drop in blood pressure from a sudden change in body position, from lying down to standing (orthostatic hypotension)    Alcohol or drug intoxication    Neurological diseases that impair the autonomic nervous system  Treatment will depend on what is causing your low blood pressure.  Home care  Follow these guidelines when caring for yourself at home:    Rest until your symptoms get better.    Keep a record of your symptoms and what you were doing when they occurred. Bring the record with you to your next appointment.    Be aware of how quickly your blood pressure drops when you become dehydrated, spend a lot of time in the sun, or have low blood sugar. Take measures to prevent blood pressure drops at these times.    Follow the treatment plan described by your healthcare provider.  Follow-up care  Follow up with your healthcare provider, or as advised.  When to seek medical advice  Call your healthcare provider right away if any of these occur:    Dizziness, lightheadedness, or fainting    Black or red color in your stools or  vomit    Shortness of breath or difficulty breathing    Chest, shoulder, arm, neck, or upper back pain    Abdominal pain    Diarrhea or vomiting that doesn t go away    You aren t able to eat or drink    Fever of 100.4 F (38 C) or higher, or as directed by your healthcare provider    Burning when you urinate    Foul-smelling urine  Date Last Reviewed: 12/1/2016 2000-2017 [employer]. [address] [place] [address], [place] [id number]. All rights reserved. This information is not intended as a substitute for professional medical care. Always follow your healthcare professional's instructions.

## 2018-07-27 NOTE — ED AVS SNAPSHOT
[facility] Emergency Department    [address]    [facility] [address]    Phone:  [phone]    Fax:  [phone]                                       Carlos Neri   MRN: [id number]    Department:  [facility] Emergency Department   Date of Visit:  7/27/2018           Patient Information     Date Of Birth          1935        Your diagnoses for this visit were:     Hypotension, unspecified hypotension type        You were seen by Sid Simpson DO.      Follow-up Information     Follow up with Danny Morales DO. Call in 3 days.    Specialty:  Family Practice    Why:  As needed    Contact information:    [facility]  [address] [facility] [address] [phone]          Follow up with [facility] Emergency Department.    Specialty:  EMERGENCY MEDICINE    Why:  If symptoms worsen    Contact information:    [address]  [facility] [id number] [phone]        Discharge Instructions         Low Blood Pressure (All Causes)  A blood pressure reading is made up of 2 numbers There is a top number over a bottom number. The top number is the systolic pressure. The bottom number is the diastolic pressure. A normal blood pressure is a systolic pressure less than 120 over a diastolic pressure less than 80. Low blood pressure (hypotension) is a blood pressure that is less than what is normal for you. Low blood pressure can cause dizziness, lightheadedness, or fainting.  Some medicines can cause low blood pressure. They include:    High blood pressure pills    Water pills (diuretics)    Some heart medicines    Some antidepressants    Pain, anxiety, sedative, and sleeping medicines  Other causes include:    Dehydration, severe infection, or fever    Blood loss, such as bleeding from the stomach or intestines.    Heart failure    Change in heart rate or rhythm (arrhythmia)    A drop in blood pressure from a  sudden change in body position, from lying down to standing (orthostatic hypotension)    Alcohol or drug intoxication    Neurological diseases that impair the autonomic nervous system  Treatment will depend on what is causing your low blood pressure.  Home care  Follow these guidelines when caring for yourself at home:    Rest until your symptoms get better.    Keep a record of your symptoms and what you were doing when they occurred. Bring the record with you to your next appointment.    Be aware of how quickly your blood pressure drops when you become dehydrated, spend a lot of time in the sun, or have low blood sugar. Take measures to prevent blood pressure drops at these times.    Follow the treatment plan described by your healthcare provider.  Follow-up care  Follow up with your healthcare provider, or as advised.  When to seek medical advice  Call your healthcare provider right away if any of these occur:    Dizziness, lightheadedness, or fainting    Black or red color in your stools or vomit    Shortness of breath or difficulty breathing    Chest, shoulder, arm, neck, or upper back pain    Abdominal pain    Diarrhea or vomiting that doesn t go away    You aren t able to eat or drink    Fever of 100.4 F (38 C) or higher, or as directed by your healthcare provider    Burning when you urinate    Foul-smelling urine  Date Last Reviewed: 12/1/2016 2000-2017 [employer]. [address] [place] [address], [place] [address]. All rights reserved. This information is not intended as a substitute for professional medical care. Always follow your healthcare professional's instructions.          24 Hour Appointment Hotline       To make an appointment at any [facility], call [phone] ([phone]). If you don't have a family doctor or clinic, we will help you find one. [facility] are conveniently located to serve the needs of you and your family.             Review of your medicines      Our  records show that you are taking the medicines listed below. If these are incorrect, please call your family doctor or clinic.        Dose / Directions Last dose taken    * carbidopa-levodopa  MG per tablet   Commonly known as:  SINEMET   Dose:  1 tablet        Take 1 tablet by mouth 5 times daily 0700, 1000, 1300, 1600, 1900   Refills:  0        * carbidopa-levodopa  MG per CR tablet   Commonly known as:  SINEMET CR   Dose:  1 tablet        Take 1 tablet by mouth At Bedtime.   Refills:  0        DONEPEZIL HCL PO   Dose:  10 mg        Take 10 mg by mouth At Bedtime   Refills:  0        entacapone 200 MG tablet   Commonly known as:  COMTAN   Dose:  100 mg        Take 100 mg by mouth 5 times daily At 0700,1000, 1300, 1600, 1900   Refills:  0        gabapentin 300 MG capsule   Commonly known as:  NEURONTIN   Dose:  300 mg        Take 300 mg by mouth At Bedtime   Refills:  0        indomethacin 25 MG capsule   Commonly known as:  INDOCIN   Dose:  25 mg        Take 25 mg by mouth 3 times daily as needed.   Refills:  0        klonoPIN 0.5 MG tablet   Dose:  1 mg   Generic drug:  clonazePAM        Take 1 mg by mouth At Bedtime 2 tablets = 1 mg   Refills:  0        melatonin 3 MG tablet   Dose:  3 mg        Take 3 mg by mouth At Bedtime   Refills:  0        * rOPINIRole 0.25 MG tablet   Commonly known as:  REQUIP   Dose:  0.25 mg        Take 0.25 mg by mouth 3 times daily At 0700, 1100, 1600   Refills:  0        * rOPINIRole 0.25 MG tablet   Commonly known as:  REQUIP   Dose:  0.25 mg        Take 0.25 mg by mouth At Bedtime   Refills:  0        * Notice:  This list has 4 medication(s) that are the same as other medications prescribed for you. Read the directions carefully, and ask your doctor or other care provider to review them with you.            Procedures and tests performed during your visit     Basic metabolic panel    CBC with platelets differential    Magnesium      Orders Needing Specimen Collection      None      Pending Results     No orders found from 7/25/2018 to 7/28/2018.            Pending Culture Results     No orders found from 7/25/2018 to 7/28/2018.            Pending Results Instructions     If you had any lab results that were not finalized at the time of your Discharge, you can call the ED Lab Result RN at [phone]. You will be contacted by this team for any positive Lab results or changes in treatment. The nurses are available 7 days a week from 10A to 6:30P.  You can leave a message 24 hours per day and they will return your call.        Test Results From Your Hospital Stay        7/27/2018  1:26 PM      Component Results     Component Value Ref Range & Units Status    Sodium 139 133 - 144 mmol/L Final    Potassium 3.7 3.4 - 5.3 mmol/L Final    Chloride 106 94 - 109 mmol/L Final    Carbon Dioxide 30 20 - 32 mmol/L Final    Anion Gap 3 3 - 14 mmol/L Final    Glucose 88 70 - 99 mg/dL Final    Urea Nitrogen 18 7 - 30 mg/dL Final    Creatinine 0.88 0.66 - 1.25 mg/dL Final    GFR Estimate 83 >60 mL/min/1.7m2 Final    Non  GFR Calc    GFR Estimate If Black >90 >60 mL/min/1.7m2 Final    African American GFR Calc    Calcium 8.7 8.5 - 10.1 mg/dL Final         7/27/2018 12:56 PM      Component Results     Component Value Ref Range & Units Status    WBC 4.7 4.0 - 11.0 10e9/L Final    RBC Count 4.52 4.4 - 5.9 10e12/L Final    Hemoglobin 13.8 13.3 - 17.7 g/dL Final    Hematocrit 43.1 40.0 - 53.0 % Final    MCV 95 78 - 100 fl Final    MCH 30.5 26.5 - 33.0 pg Final    MCHC 32.0 31.5 - 36.5 g/dL Final    RDW 14.5 10.0 - 15.0 % Final    Platelet Count 120 (L) 150 - 450 10e9/L Final    Diff Method Automated Method  Final    % Neutrophils 55.6 % Final    % Lymphocytes 32.0 % Final    % Monocytes 9.7 % Final    % Eosinophils 1.9 % Final    % Basophils 0.2 % Final    % Immature Granulocytes 0.6 % Final    Nucleated RBCs 0 0 /100 Final    Absolute Neutrophil 2.6 1.6 - 8.3 10e9/L Final    Absolute  Lymphocytes 1.5 0.8 - 5.3 10e9/L Final    Absolute Monocytes 0.5 0.0 - 1.3 10e9/L Final    Absolute Eosinophils 0.1 0.0 - 0.7 10e9/L Final    Absolute Basophils 0.0 0.0 - 0.2 10e9/L Final    Abs Immature Granulocytes 0.0 0 - 0.4 10e9/L Final    Absolute Nucleated RBC 0.0  Final         7/27/2018  1:26 PM      Component Results     Component Value Ref Range & Units Status    Magnesium 2.0 1.6 - 2.3 mg/dL Final                Clinical Quality Measure: Blood Pressure Screening     Your blood pressure was checked while you were in the emergency department today. The last reading we obtained was  BP: 118/69 . Please read the guidelines below about what these numbers mean and what you should do about them.  If your systolic blood pressure (the top number) is less than 120 and your diastolic blood pressure (the bottom number) is less than 80, then your blood pressure is normal. There is nothing more that you need to do about it.  If your systolic blood pressure (the top number) is 120-139 or your diastolic blood pressure (the bottom number) is 80-89, your blood pressure may be higher than it should be. You should have your blood pressure rechecked within a year by a primary care provider.  If your systolic blood pressure (the top number) is 140 or greater or your diastolic blood pressure (the bottom number) is 90 or greater, you may have high blood pressure. High blood pressure is treatable, but if left untreated over time it can put you at risk for heart attack, stroke, or kidney failure. You should have your blood pressure rechecked by a primary care provider within the next 4 weeks.  If your provider in the emergency department today gave you specific instructions to follow-up with your doctor or provider even sooner than that, you should follow that instruction and not wait for up to 4 weeks for your follow-up visit.        Thank you for choosing [facility]       Thank you for choosing [facility] for your care. Our goal is  "always to provide you with excellent care. Hearing back from our patients is one way we can continue to improve our services. Please take a few minutes to complete the written survey that you may receive in the mail after you visit with us. Thank you!        [employer] Information     [employer] lets you send messages to your doctor, view your test results, renew your prescriptions, schedule appointments and more. To sign up, go to [email or web address] . Click on \"Log in\" on the left side of the screen, which will take you to the Welcome page. Then click on \"Sign up Now\" on the right side of the page.     You will be asked to enter the access code listed below, as well as some personal information. Please follow the directions to create your username and password.     Your access code is: [email or web address]  Expires: 10/24/2018  1:04 PM     Your access code will  in 90 days. If you need help or a new code, please call your [facility] clinic or [phone].        Care EveryWhere ID     This is your Care EveryWhere ID. This could be used by other organizations to access your [facility] medical records  [id number]        Equal Access to Services     JORGE SWANN : Hadii jenifer Ortega, waambroseda chi, qasofiata kaalmada rj, ángela ross. So [facility] [phone].    ATENCIÓN: Si habla español, tiene a arias disposición servicios gratuitos de asistencia lingüística. Llkong al [phone].    We comply with applicable federal civil rights laws and [place] laws. We do not discriminate on the basis of race, color, national origin, age, disability, sex, sexual orientation, or gender identity.            After Visit Summary       This is your record. Keep this with you and show to your community pharmacist(s) and doctor(s) at your next visit.                  "

## 2018-07-27 NOTE — ED NOTES
Spoke with family, Curtis at [phone] and provided update. Per family, they are here in ER lobby. Pt discharged.

## 2018-07-27 NOTE — ED NOTES
Pt's BP WNL.  Abulated to bathroom using wheeled walker from home.  Walked well, somewhat steady gait.  No complaints of dizziness.  /82 upon return to bed.

## 2018-07-28 LAB — INTERPRETATION ECG - MUSE: NORMAL

## 2018-08-01 ENCOUNTER — HOSPITAL ENCOUNTER (OUTPATIENT)
Dept: NEUROLOGY | Facility: CLINIC | Age: 83
Setting detail: THERAPIES SERIES
Discharge: STILL ADMITTED OR ADMINISTRATIVE | End: 2018-08-01
Attending: PSYCHIATRY & NEUROLOGY

## 2018-08-01 DIAGNOSIS — I95.1 ORTHOSTATIC HYPOTENSION: ICD-10-CM

## 2018-08-09 ENCOUNTER — TRANSFERRED RECORDS (OUTPATIENT)
Dept: HEALTH INFORMATION MANAGEMENT | Facility: CLINIC | Age: 83
End: 2018-08-09

## 2018-08-28 ENCOUNTER — ASSISTED LIVING VISIT (OUTPATIENT)
Dept: GERIATRICS | Facility: CLINIC | Age: 83
End: 2018-08-28
Payer: COMMERCIAL

## 2018-08-28 VITALS
DIASTOLIC BLOOD PRESSURE: 63 MMHG | OXYGEN SATURATION: 95 % | SYSTOLIC BLOOD PRESSURE: 127 MMHG | RESPIRATION RATE: 18 BRPM | HEART RATE: 70 BPM

## 2018-08-28 DIAGNOSIS — R29.6 FALLS FREQUENTLY: Primary | ICD-10-CM

## 2018-08-28 DIAGNOSIS — G20.A1 PD (PARKINSON'S DISEASE) (H): ICD-10-CM

## 2018-08-28 DIAGNOSIS — F02.80 LEWY BODY DEMENTIA WITHOUT BEHAVIORAL DISTURBANCE (H): ICD-10-CM

## 2018-08-28 DIAGNOSIS — G40.909 SEIZURE DISORDER (H): ICD-10-CM

## 2018-08-28 DIAGNOSIS — Z71.89 ADVANCED DIRECTIVES, COUNSELING/DISCUSSION: ICD-10-CM

## 2018-08-28 DIAGNOSIS — R13.10 DYSPHAGIA, UNSPECIFIED TYPE: ICD-10-CM

## 2018-08-28 DIAGNOSIS — G31.83 LEWY BODY DEMENTIA WITHOUT BEHAVIORAL DISTURBANCE (H): ICD-10-CM

## 2018-08-28 DIAGNOSIS — Z90.5 S/P NEPHRECTOMY: ICD-10-CM

## 2018-08-28 RX ORDER — CARBIDOPA/LEVODOPA 25MG-250MG
1 TABLET ORAL 4 TIMES DAILY
COMMUNITY

## 2018-08-28 NOTE — LETTER
"    8/28/2018        RE: Carlos Neri  [facility]  [address]  [facility] [address]        [place] [facility]  PRIMARY CARE PROVIDER AND CLINIC:  Yang Martines [address] / [place] [address]  Chief Complaint   Patient presents with     [facility] Care     [place] Medical Record Number:  [id number]    HPI:    Carlos Neri is a 83 year old  (1935),[facility]  Admitted to this facility for  rehab, medical management and nursing care.  HPI information obtained from: facility chart records, facility staff, patient report, [facility] chart review, Care Everywhere Epic chart review and family/first contact spouse report.  Current issues are:      Falls frequently  Last fall 8/24/18-during exercise class missed chair when attempting to sit. No apparent inj.  Has h/o frequent falls. 7/18 ED trip with hypotension.  Uses walker. Started on midodrine per neurology    PD (Parkinson's disease) (H)  UE tremors, dyskinesia.  Cont. On sinemet, ropinerole, entacapone.  Also has RLS-stable. No increased rigidity of extremities. No reports of \"freezing\", on/off symptoms.    Seizure disorder (H)  Per spouse had initial event a year ago, subsequent event a year later 5/18. Has not had seizure w/u due to infreq.  Does take clonazepam.     Lewy body dementia without behavioral disturbance  Memory loss. SLUMS 9/30. CPT 4.1/5.6.  Cont. On aricept. Mood, behaviors gen. stable    Dysphagia, unspecified type  Cont. On HTL. VSS last hosp. Stay. No reports of coughing with meals    S/p nephrectomy  L. BMP stable    Advanced directives, counseling/discussion  Cont. Full code      CODE STATUS/ADVANCE DIRECTIVES DISCUSSION:   CPR/Full code   Patient's living condition: lives in an assisted living facility    ALLERGIES:Morphine sulfate  PAST MEDICAL HISTORY:  has a past medical history of Dysphagia; Lewy body dementia; and Parkinson disease (H).  PAST SURGICAL " HISTORY:  has a past surgical history that includes hernia repair.  FAMILY HISTORY: family history is not on file.  SOCIAL HISTORY:  reports that he has never smoked. He has never used smokeless tobacco. He reports that he does not drink alcohol or use illicit drugs.    Post Discharge Medication Reconciliation Status: discharge medications reconciled, continue medications without change.  Current Outpatient Prescriptions   Medication Sig Dispense Refill     carbidopa-levodopa (SINEMET CR)  MG per tablet Take 1 tablet by mouth At Bedtime.       carbidopa-levodopa (SINEMET)  MG per tablet Take 1 tablet by mouth 4 times daily       clonazePAM (KLONOPIN) 0.5 MG tablet Take 1 mg by mouth At Bedtime 2 tablets = 1 mg       DONEPEZIL HCL PO Take 10 mg by mouth At Bedtime       entacapone (COMTAN) 200 MG tablet Take 100 mg by mouth 4 times daily        gabapentin (NEURONTIN) 300 MG capsule Take 300 mg by mouth At Bedtime       MIDODRINE HCL PO Take 2.5 mg by mouth 3 times daily       rOPINIRole (REQUIP) 0.25 MG tablet Take 0.75 mg by mouth 3 times daily        rOPINIRole (REQUIP) 0.25 MG tablet Take 0.25 mg by mouth At Bedtime         ROS:  CONSTITUTIONAL:  forgetfulness, EYES:  neg, ENT:  [place] and dysphagia, CV:  neg, RESPIRATORY: neg, GI:  occ. constipation, NEURO:  tremor, PSYCH: neg, MUSCULOSKELETAL: recent R great toe gout and SKIN: neg    Exam:  /63  Pulse 70  Resp 18  SpO2 95%  GENERAL APPEARANCE:  Alert, in no distress, thin, cooperative  ENT:  Mouth and posterior oropharynx normal, moist mucous membranes, [place], adequate dentition  EYES:  EOM, conjunctivae, lids, pupils and irises normal, PERRL, no drainage  NECK:  No adenopathy,masses or thyromegaly, FROM  RESP:  respiratory effort and palpation of chest normal, lungs clear to auscultation , no respiratory distress  CV:  Palpation and auscultation of heart done , no edema, rate-normal, grade 2/6 murmur  ABDOMEN:  normal bowel sounds, soft,  nontender, no hepatosplenomegaly or other masses, no guarding or rebound  LYMPHATICS:  No adenopathy in neck , No adenopathy in axillae  M/S:   Gait and station normal  muscle strength 5/5 all 4 ext., normal tone  SKIN:  Inspection of skin and subcutaneous tissue baseline, Palpation of skin and subcutaneous tissue baseline  NEURO:   Cranial nerves 2-12 are normal tested and grossly at patient's baseline, alert, speech soft, clear, resting UE tremor L>R, dyskinesia  PSYCH:  insight and judgement impaired, memory impaired , affect abnormal -flat    Lab/Diagnostic data:     CBC RESULTS:   Recent Labs   Lab Test  07/27/18   1245  05/11/18   1549   WBC  4.7  4.6   RBC  4.52  4.45   HGB  13.8  13.7   HCT  43.1  42.2   MCV  95  95   MCH  30.5  30.8   MCHC  32.0  32.5   RDW  14.5  14.0   PLT  120*  127*       Last Basic Metabolic Panel:  Recent Labs   Lab Test  07/27/18   1245  05/11/18   1549   NA  139  143   POTASSIUM  3.7  3.7   CHLORIDE  106  108   SADIA  8.7  8.2*   CO2  30  29   BUN  18  25   CR  0.88  0.86   GLC  88  107*       ASSESSMENT/PLAN:  Falls frequently  Recurrent. Last fall 8/24/18  1. Cont. Midodrine  2. Monitor for dizziness, bradycardia, lower BPs  3. For above s/s, may decrease aricept dose  4. PT, OT eval    PD (Parkinson's disease) (H)  Recurrent falls  1. Cont. Sinemet, ropinerole, entacapone, neurontin  2. Monitor for changes in gait, rigidity  3. Monitor for freezing, on/off symptoms  4. F/u Neurology appt. 11/18    Seizure disorder (H)  Last episode 5/18  1. Cont. Clonazepam  2. Monitor for changes in neuros-further seizure act.    Lewy body dementia without behavioral disturbance  Memory loss. Mood, behaviors stable  1. Cont. aricept  2. Monitor for changes in mood, behavior  3. Monitor for hallucinations, sleep dist., increased RLS s/s    Dysphagia, unspecified type  Currently stable, recent VSS  1. Cont. HTL  2. Monitor for coughing with meals  3. Encourage fluids  4. ST eval/tx    S/p  nephrectomy  L, BMP stable  1. Follow BMPs-recheck in next 1-3 mos    Advanced directives, counseling/discussion  Cont. Full code. POLST in chart       Electronically signed by:  DONY Clark CNP        Documentation of Face-to-Face and Certification for Home Health Services     Patient: Carlos Neri   YOB: 1935  MR Number: [id number]  Today's Date: 8/28/2018    I certify that patient: Carlos Neri is under my care and that I, or a nurse practitioner or physician's assistant working with me, had a face-to-face encounter that meets the physician face-to-face encounter requirements with this patient on: 8/28/2018.    This encounter with the patient was in whole, or in part, for the following medical condition, which is the primary reason for home health care: PD, dysphagia.    I certify that, based on my findings, the following services are medically necessary home health services: Occupational Therapy, Physical Therapy and Speech Language Therapy.    My clinical findings support the need for the above services because: Occupational Therapy Services are needed to assess and treat cognitive ability and address ADL safety due to impairment in falls., Physical Therapy Services are needed to assess and treat the following functional impairments: falls. and Speech Therapy Services are needed to assess and treat impairments in language and/or swallow functions due to dysphagia.    Further, I certify that my clinical findings support that this patient is homebound (i.e. absences from home require considerable and taxing effort and are for medical reasons or Yazidism services or infrequently or of short duration when for other reasons) because: Requires assistance of another person or specialized equipment to access medical services because patient: Range of motion limitations prevents ability to exit home safely. and  dementia..    Based on the above findings. I certify that this patient  is confined to the home and needs intermittent skilled nursing care, physical therapy and/or speech therapy.  The patient is under my care, and I have initiated the establishment of the plan of care.  This patient will be followed by a physician who will periodically review the plan of care.  Physician/Provider to provide follow up care: Yang Martines    Responsible Medicare certified PECOS Physician:   Physician Signature: See electronic signature associated with these discharge orders.  Date: 8/28/2018                Sincerely,        DONY Clark CNP

## 2018-08-28 NOTE — PROGRESS NOTES
"[place] [facility]  PRIMARY CARE PROVIDER AND CLINIC:  Yang Martines [address] / [facility] [address]  Chief Complaint   Patient presents with     [facility] Care     [place] Medical Record Number:  [id number]    HPI:    Carlos Neri is a 83 year old  (1935),admitted to [facility] from Home.  Admitted to this facility for  rehab, medical management and nursing care.  HPI information obtained from: facility chart records, facility staff, patient report, [facility] chart review, Care Everywhere [facility] chart review and family/first contact spouse report.  Current issues are:      Falls frequently  Last fall 8/24/18-during exercise class missed chair when attempting to sit. No apparent inj.  Has h/o frequent falls. 7/18 ED trip with hypotension.  Uses walker. Started on midodrine per neurology    PD (Parkinson's disease) (H)  UE tremors, dyskinesia.  Cont. On sinemet, ropinerole, entacapone.  Also has RLS-stable. No increased rigidity of extremities. No reports of \"freezing\", on/off symptoms.    Seizure disorder (H)  Per spouse had initial event a year ago, subsequent event a year later 5/18. Has not had seizure w/u due to infreq.  Does take clonazepam.     Lewy body dementia without behavioral disturbance  Memory loss. SLUMS 9/30. CPT 4.1/5.6.  Cont. On aricept. Mood, behaviors gen. stable    Dysphagia, unspecified type  Cont. On HTL. VSS last hosp. Stay. No reports of coughing with meals    S/p nephrectomy  L. BMP stable    Advanced directives, counseling/discussion  Cont. Full code      CODE STATUS/ADVANCE DIRECTIVES DISCUSSION:   CPR/Full code   Patient's living condition: lives in an assisted living facility    ALLERGIES:Morphine sulfate  PAST MEDICAL HISTORY:  has a past medical history of Dysphagia; Lewy body dementia; and Parkinson disease (H).  PAST SURGICAL HISTORY:  has a past surgical history that includes hernia repair.  FAMILY HISTORY: family history is not on " file.  SOCIAL HISTORY:  reports that he has never smoked. He has never used smokeless tobacco. He reports that he does not drink alcohol or use illicit drugs.    Post Discharge Medication Reconciliation Status: discharge medications reconciled, continue medications without change.  Current Outpatient Prescriptions   Medication Sig Dispense Refill     carbidopa-levodopa (SINEMET CR)  MG per tablet Take 1 tablet by mouth At Bedtime.       carbidopa-levodopa (SINEMET)  MG per tablet Take 1 tablet by mouth 4 times daily       clonazePAM (KLONOPIN) 0.5 MG tablet Take 1 mg by mouth At Bedtime 2 tablets = 1 mg       DONEPEZIL HCL PO Take 10 mg by mouth At Bedtime       entacapone (COMTAN) 200 MG tablet Take 100 mg by mouth 4 times daily        gabapentin (NEURONTIN) 300 MG capsule Take 300 mg by mouth At Bedtime       MIDODRINE HCL PO Take 2.5 mg by mouth 3 times daily       rOPINIRole (REQUIP) 0.25 MG tablet Take 0.75 mg by mouth 3 times daily        rOPINIRole (REQUIP) 0.25 MG tablet Take 0.25 mg by mouth At Bedtime         ROS:  CONSTITUTIONAL:  forgetfulness, EYES:  neg, ENT:  [place] and dysphagia, CV:  neg, RESPIRATORY: neg, GI:  occ. constipation, NEURO:  tremor, PSYCH: neg, MUSCULOSKELETAL: recent R great toe gout and SKIN: neg    Exam:  /63  Pulse 70  Resp 18  SpO2 95%  GENERAL APPEARANCE:  Alert, in no distress, thin, cooperative  ENT:  Mouth and posterior oropharynx normal, moist mucous membranes, [place], adequate dentition  EYES:  EOM, conjunctivae, lids, pupils and irises normal, PERRL, no drainage  NECK:  No adenopathy,masses or thyromegaly, FROM  RESP:  respiratory effort and palpation of chest normal, lungs clear to auscultation , no respiratory distress  CV:  Palpation and auscultation of heart done , no edema, rate-normal, grade 2/6 murmur  ABDOMEN:  normal bowel sounds, soft, nontender, no hepatosplenomegaly or other masses, no guarding or rebound  LYMPHATICS:  No adenopathy in neck , No  adenopathy in axillae  M/S:   Gait and station normal  muscle strength 5/5 all 4 ext., normal tone  SKIN:  Inspection of skin and subcutaneous tissue baseline, Palpation of skin and subcutaneous tissue baseline  NEURO:   Cranial nerves 2-12 are normal tested and grossly at patient's baseline, alert, speech soft, clear, resting UE tremor L>R, dyskinesia  PSYCH:  insight and judgement impaired, memory impaired , affect abnormal -flat    Lab/Diagnostic data:     CBC RESULTS:   Recent Labs   Lab Test  07/27/18   1245  05/11/18   1549   WBC  4.7  4.6   RBC  4.52  4.45   HGB  13.8  13.7   HCT  43.1  42.2   MCV  95  95   MCH  30.5  30.8   MCHC  32.0  32.5   RDW  14.5  14.0   PLT  120*  127*       Last Basic Metabolic Panel:  Recent Labs   Lab Test  07/27/18   1245  05/11/18   1549   NA  139  143   POTASSIUM  3.7  3.7   CHLORIDE  106  108   SADIA  8.7  8.2*   CO2  30  29   BUN  18  25   CR  0.88  0.86   GLC  88  107*       ASSESSMENT/PLAN:  Falls frequently  Recurrent. Last fall 8/24/18  1. Cont. Midodrine  2. Monitor for dizziness, bradycardia, lower BPs  3. For above s/s, may decrease aricept dose  4. PT, OT eval    PD (Parkinson's disease) (H)  Recurrent falls  1. Cont. Sinemet, ropinerole, entacapone, neurontin  2. Monitor for changes in gait, rigidity  3. Monitor for freezing, on/off symptoms  4. F/u Neurology appt. 11/18    Seizure disorder (H)  Last episode 5/18  1. Cont. Clonazepam  2. Monitor for changes in neuros-further seizure act.    Lewy body dementia without behavioral disturbance  Memory loss. Mood, behaviors stable  1. Cont. aricept  2. Monitor for changes in mood, behavior  3. Monitor for hallucinations, sleep dist., increased RLS s/s    Dysphagia, unspecified type  Currently stable, recent VSS  1. Cont. HTL  2. Monitor for coughing with meals  3. Encourage fluids  4. ST eval/tx    S/p nephrectomy  L, BMP stable  1. Follow BMPs-recheck in next 1-3 mos    Advanced directives, counseling/discussion  Cont. Full  code. POLST in chart       Electronically signed by:  DONY Clark CNP        Documentation of Face-to-Face and Certification for Home Health Services     Patient: Carlos Neri   YOB: 1935  MR Number: [id number]  Today's Date: 8/28/2018    I certify that patient: Carlos Neri is under my care and that I, or a nurse practitioner or physician's assistant working with me, had a face-to-face encounter that meets the physician face-to-face encounter requirements with this patient on: 8/28/2018.    This encounter with the patient was in whole, or in part, for the following medical condition, which is the primary reason for home health care: PD, dysphagia.    I certify that, based on my findings, the following services are medically necessary home health services: Occupational Therapy, Physical Therapy and Speech Language Therapy.    My clinical findings support the need for the above services because: Occupational Therapy Services are needed to assess and treat cognitive ability and address ADL safety due to impairment in falls., Physical Therapy Services are needed to assess and treat the following functional impairments: falls. and Speech Therapy Services are needed to assess and treat impairments in language and/or swallow functions due to dysphagia.    Further, I certify that my clinical findings support that this patient is homebound (i.e. absences from home require considerable and taxing effort and are for medical reasons or Episcopalian services or infrequently or of short duration when for other reasons) because: Requires assistance of another person or specialized equipment to access medical services because patient: Range of motion limitations prevents ability to exit home safely. and  dementia..    Based on the above findings. I certify that this patient is confined to the home and needs intermittent skilled nursing care, physical therapy and/or speech therapy.  The patient is  under my care, and I have initiated the establishment of the plan of care.  This patient will be followed by a physician who will periodically review the plan of care.  Physician/Provider to provide follow up care: Yang Martines    Responsible Medicare certified PECOS Physician:   Physician Signature: See electronic signature associated with these discharge orders.  Date: 8/28/2018

## 2018-09-01 DIAGNOSIS — F41.9 ANXIETY: Primary | ICD-10-CM

## 2018-09-07 ENCOUNTER — ASSISTED LIVING VISIT (OUTPATIENT)
Dept: GERIATRICS | Facility: CLINIC | Age: 83
End: 2018-09-07
Payer: COMMERCIAL

## 2018-09-07 VITALS
HEART RATE: 56 BPM | RESPIRATION RATE: 16 BRPM | OXYGEN SATURATION: 93 % | DIASTOLIC BLOOD PRESSURE: 50 MMHG | SYSTOLIC BLOOD PRESSURE: 93 MMHG

## 2018-09-07 DIAGNOSIS — R13.10 DYSPHAGIA, UNSPECIFIED TYPE: ICD-10-CM

## 2018-09-07 DIAGNOSIS — G20.A1 PARALYSIS AGITANS (H): Primary | ICD-10-CM

## 2018-09-07 DIAGNOSIS — I95.9 HYPOTENSION, UNSPECIFIED HYPOTENSION TYPE: ICD-10-CM

## 2018-09-07 NOTE — LETTER
9/7/2018        RE: Carlos Neri  [facility]  [address] [facility] [address]        [place] [facility]    Chief Complaint   Patient presents with     Fatigue       [place] Medical Record Number:  [id number]    HPI:    Carlos Neri is a 83 year old  (1935), who is being seen today for an episodic care visit at [facility].  HPI information obtained from: facility chart records, facility staff, patient report and [facility] chart review.Today's concern is:  Hypotension, PD, dysphagia. Has h/o low BPs, decrease responsiveness, syncope. Followed by Neurology for PD. For lower BPs started on midodrine 2.5 mg tid 8/1/18.  Has had decreased fall freq. Cont. With PT/OT. Occ. Freezing per therapies, gait shuffling. Cont. On sinemet, ropinirole, entacapone, neruontin, clonazepam. Has UEs tremor. Ongoing dysphagia. Last VSS 6/28 with last hosp. Stay. Cont.on HTL,mech soft diet. Working with ST.     ALLERGIES: Morphine sulfate  Past Medical, Surgical, Family and Social History reviewed and updated in [facility].    Current Outpatient Prescriptions   Medication Sig Dispense Refill     carbidopa-levodopa (SINEMET CR)  MG per tablet Take 1 tablet by mouth At Bedtime.       carbidopa-levodopa (SINEMET)  MG per tablet Take 1 tablet by mouth 4 times daily       clonazePAM (KLONOPIN) 0.5 MG tablet Take 1 mg by mouth At Bedtime 2 tablets = 1 mg       DONEPEZIL HCL PO Take 10 mg by mouth At Bedtime       entacapone (COMTAN) 200 MG tablet Take 100 mg by mouth 4 times daily        gabapentin (NEURONTIN) 300 MG capsule Take 300 mg by mouth At Bedtime       MIDODRINE HCL PO Take 5 mg by mouth 3 times daily (before meals)       rOPINIRole (REQUIP) 0.25 MG tablet Take 0.75 mg by mouth 3 times daily        rOPINIRole (REQUIP) 0.25 MG tablet Take 0.25 mg by mouth At Bedtime       Medications reviewed:  Medications reconciled to facility chart and  changes were made to reflect current medications as identified as above med list. Below are the changes that were made:   Medications stopped since last EPIC medication reconciliation:   There are no discontinued medications.    Medications started since last [facility] medication reconciliation:  No orders of the defined types were placed in this encounter.        REVIEW OF SYSTEMS:  Limited secondary to aphasia impairment but today pt reports feeling ok    Physical Exam:  BP 93/50  Pulse 56  Resp 16  SpO2 93%  GENERAL APPEARANCE:  Alert, in no distress, cooperative  ENT:  Mouth and posterior oropharynx normal, moist mucous membranes, [place]  EYES:  EOM, conjunctivae, lids, pupils and irises normal, PERRL  NECK:  No adenopathy,masses or thyromegaly  RESP:  respiratory effort and palpation of chest normal, lungs clear to auscultation , no respiratory distress  CV:  Palpation and auscultation of heart done , no edema, irregular rhythm : sl. irreg, rate-normal, grade 2/6 murmur  ABDOMEN:  normal bowel sounds, soft, nontender, no hepatosplenomegaly or other masses, no guarding or rebound  M/S:   gait slowed, shuffling, flexed at waist.   NEURO:   Cranial nerves 2-12 are normal tested and grossly at patient's baseline, alert, fine resting tremor UEs  PSYCH:  insight and judgement impaired, memory impaired , affect abnormal -flat    Recent Labs:     CBC RESULTS:   Recent Labs   Lab Test  07/27/18   1245  05/11/18   1549   WBC  4.7  4.6   RBC  4.52  4.45   HGB  13.8  13.7   HCT  43.1  42.2   MCV  95  95   MCH  30.5  30.8   MCHC  32.0  32.5   RDW  14.5  14.0   PLT  120*  127*       Last Basic Metabolic Panel:  Recent Labs   Lab Test  07/27/18   1245  05/11/18   1549   NA  139  143   POTASSIUM  3.7  3.7   CHLORIDE  106  108   SADIA  8.7  8.2*   CO2  30  29   BUN  18  25   CR  0.88  0.86   GLC  88  107*       Assessment/Plan:  Hypotension, unspecified hypotension type  Ongoing  1. Increase midodrine to 5 mg tid  2. BPs, HRs every  day x 3 days, reassess  3. Bmp next week  4. Encourage fluid itake  5. Monitor for decreased responsiveness,falls    Paralysis agitans (H)  UE tremor, shuffling gait  1. Cont. Sinemet  2.cont. Ropinirole, entacapone, neurontin, clonazepam  3. Cont. PT/OT  4. Monitor for further changes in gait    Dysphagia, unspecified type  Ongoing.  1. Cont.ST  2. Cont. HTL, mech soft diet  3. Consider f/u VSS in 2 mos  4. Encourage fluids as above        Electronically signed by  DONY Clark CNP                      Sincerely,        DONY Clark CNP

## 2018-09-07 NOTE — PROGRESS NOTES
[place] [facility]    Chief Complaint   Patient presents with     Fatigue       [place] Medical Record Number:  [id number]    HPI:    Carlos Neri is a 83 year old  (1935), who is being seen today for an episodic care visit at [facility].  HPI information obtained from: facility chart records, facility staff, patient report and [facility] chart review.Today's concern is:  Hypotension, PD, dysphagia. Has h/o low BPs, decrease responsiveness, syncope. Followed by Neurology for PD. For lower BPs started on midodrine 2.5 mg tid 8/1/18.  Has had decreased fall freq. Cont. With PT/OT. Occ. Freezing per therapies, gait shuffling. Cont. On sinemet, ropinirole, entacapone, neruontin, clonazepam. Has UEs tremor. Ongoing dysphagia. Last VSS 6/28 with last hosp. Stay. Cont.on HTL,mech soft diet. Working with ST.     ALLERGIES: Morphine sulfate  Past Medical, Surgical, Family and Social History reviewed and updated in [facility].    Current Outpatient Prescriptions   Medication Sig Dispense Refill     carbidopa-levodopa (SINEMET CR)  MG per tablet Take 1 tablet by mouth At Bedtime.       carbidopa-levodopa (SINEMET)  MG per tablet Take 1 tablet by mouth 4 times daily       clonazePAM (KLONOPIN) 0.5 MG tablet Take 1 mg by mouth At Bedtime 2 tablets = 1 mg       DONEPEZIL HCL PO Take 10 mg by mouth At Bedtime       entacapone (COMTAN) 200 MG tablet Take 100 mg by mouth 4 times daily        gabapentin (NEURONTIN) 300 MG capsule Take 300 mg by mouth At Bedtime       MIDODRINE HCL PO Take 5 mg by mouth 3 times daily (before meals)       rOPINIRole (REQUIP) 0.25 MG tablet Take 0.75 mg by mouth 3 times daily        rOPINIRole (REQUIP) 0.25 MG tablet Take 0.25 mg by mouth At Bedtime       Medications reviewed:  Medications reconciled to facility chart and changes were made to reflect current medications as identified as above med list. Below are the changes that were made:    Medications stopped since last EPIC medication reconciliation:   There are no discontinued medications.    Medications started since last [facility] medication reconciliation:  No orders of the defined types were placed in this encounter.        REVIEW OF SYSTEMS:  Limited secondary to aphasia impairment but today pt reports feeling ok    Physical Exam:  BP 93/50  Pulse 56  Resp 16  SpO2 93%  GENERAL APPEARANCE:  Alert, in no distress, cooperative  ENT:  Mouth and posterior oropharynx normal, moist mucous membranes, [place]  EYES:  EOM, conjunctivae, lids, pupils and irises normal, PERRL  NECK:  No adenopathy,masses or thyromegaly  RESP:  respiratory effort and palpation of chest normal, lungs clear to auscultation , no respiratory distress  CV:  Palpation and auscultation of heart done , no edema, irregular rhythm : sl. irreg, rate-normal, grade 2/6 murmur  ABDOMEN:  normal bowel sounds, soft, nontender, no hepatosplenomegaly or other masses, no guarding or rebound  M/S:   gait slowed, shuffling, flexed at waist.   NEURO:   Cranial nerves 2-12 are normal tested and grossly at patient's baseline, alert, fine resting tremor UEs  PSYCH:  insight and judgement impaired, memory impaired , affect abnormal -flat    Recent Labs:     CBC RESULTS:   Recent Labs   Lab Test  07/27/18   1245  05/11/18   1549   WBC  4.7  4.6   RBC  4.52  4.45   HGB  13.8  13.7   HCT  43.1  42.2   MCV  95  95   MCH  30.5  30.8   MCHC  32.0  32.5   RDW  14.5  14.0   PLT  120*  127*       Last Basic Metabolic Panel:  Recent Labs   Lab Test  07/27/18   1245  05/11/18   1549   NA  139  143   POTASSIUM  3.7  3.7   CHLORIDE  106  108   SADIA  8.7  8.2*   CO2  30  29   BUN  18  25   CR  0.88  0.86   GLC  88  107*       Assessment/Plan:  Hypotension, unspecified hypotension type  Ongoing  1. Increase midodrine to 5 mg tid  2. BPs, HRs every day x 3 days, reassess  3. Bmp next week  4. Encourage fluid itake  5. Monitor for decreased  responsiveness,falls    Paralysis agitans (H)  UE tremor, shuffling gait  1. Cont. Sinemet  2.cont. Ropinirole, entacapone, neurontin, clonazepam  3. Cont. PT/OT  4. Monitor for further changes in gait    Dysphagia, unspecified type  Ongoing.  1. Cont.ST  2. Cont. HTL, mech soft diet  3. Consider f/u VSS in 2 mos  4. Encourage fluids as above        Electronically signed by  DONY Clark CNP

## 2018-09-10 ENCOUNTER — RECORDS - HEALTHEAST (OUTPATIENT)
Dept: LAB | Facility: CLINIC | Age: 83
End: 2018-09-10

## 2018-09-11 ENCOUNTER — ASSISTED LIVING VISIT (OUTPATIENT)
Dept: GERIATRICS | Facility: CLINIC | Age: 83
End: 2018-09-11
Payer: COMMERCIAL

## 2018-09-11 ENCOUNTER — TRANSFERRED RECORDS (OUTPATIENT)
Dept: HEALTH INFORMATION MANAGEMENT | Facility: CLINIC | Age: 83
End: 2018-09-11

## 2018-09-11 VITALS
RESPIRATION RATE: 16 BRPM | DIASTOLIC BLOOD PRESSURE: 63 MMHG | OXYGEN SATURATION: 96 % | SYSTOLIC BLOOD PRESSURE: 111 MMHG | HEART RATE: 54 BPM

## 2018-09-11 DIAGNOSIS — I95.9 HYPOTENSION, UNSPECIFIED HYPOTENSION TYPE: Primary | ICD-10-CM

## 2018-09-11 DIAGNOSIS — R29.6 FALLS FREQUENTLY: ICD-10-CM

## 2018-09-11 DIAGNOSIS — R13.10 DYSPHAGIA, UNSPECIFIED TYPE: ICD-10-CM

## 2018-09-11 LAB
ANION GAP SERPL CALCULATED.3IONS-SCNC: 7 MMOL/L (ref 5–18)
ANION GAP SERPL CALCULATED.3IONS-SCNC: 7 MMOL/L (ref 5–18)
BUN SERPL-MCNC: 21 MG/DL (ref 8–28)
BUN SERPL-MCNC: 21 MG/DL (ref 8–28)
CALCIUM SERPL-MCNC: 9 MG/DL (ref 8.5–10.5)
CALCIUM SERPL-MCNC: 9 MG/DL (ref 8.5–10.5)
CHLORIDE BLD-SCNC: 108 MMOL/L (ref 98–107)
CHLORIDE SERPLBLD-SCNC: 108 MMOL/L (ref 98–107)
CO2 SERPL-SCNC: 28 MMOL/L (ref 22–31)
CO2 SERPL-SCNC: 28 MMOL/L (ref 22–31)
CREAT SERPL-MCNC: 0.9 MG/DL (ref 0.7–1.3)
CREAT SERPL-MCNC: 0.9 MG/DL (ref 0.7–1.3)
GFR SERPL CREATININE-BSD FRML MDRD: >60 ML/MIN/1.73M2
GFR SERPL CREATININE-BSD FRML MDRD: >60 ML/MIN/1.73M2
GLUCOSE BLD-MCNC: 84 MG/DL (ref 70–125)
GLUCOSE SERPL-MCNC: 84 MG/DL (ref 70–125)
POTASSIUM BLD-SCNC: 3.9 MMOL/L (ref 3.5–5)
POTASSIUM SERPL-SCNC: 3.9 MMOL/L (ref 3.5–5)
SODIUM SERPL-SCNC: 143 MMOL/L (ref 136–145)
SODIUM SERPL-SCNC: 143 MMOL/L (ref 136–145)

## 2018-09-11 NOTE — PROGRESS NOTES
[place] [facility]    Chief Complaint   Patient presents with     Fatigue       [place] Medical Record Number:  [id number]    HPI:    Carlos Neri is a 83 year old  (1935), who is being seen today for an episodic care visit at [facility].  HPI information obtained from: facility chart records, facility staff, patient report and [facility] chart review.Today's concern is:  Hypotension, falls, dysphagia.  Has h/o low BPs, syncope, falls.  Last week had lower BPs with SBP<100.  Cont on HTL due to dysphagia. Fluid intake variable. Started on midodrine last month.  Dose increased to 5 mg tid last week.  Since this point BPs more stable.  Has h/o freq. Falls.  No recent falls. Working with PT for LE strengthening, gait.  Cont. On ropinirole, entacapone, neurontin, sinement for PD. Taking clonazepam at hs.      ALLERGIES: Morphine sulfate  Past Medical, Surgical, Family and Social History reviewed and updated in [facility].    Current Outpatient Prescriptions   Medication Sig Dispense Refill     carbidopa-levodopa (SINEMET CR)  MG per tablet Take 1 tablet by mouth At Bedtime.       carbidopa-levodopa (SINEMET)  MG per tablet Take 1 tablet by mouth 4 times daily       clonazePAM (KLONOPIN) 0.5 MG tablet Take 1 mg by mouth At Bedtime 2 tablets = 1 mg       DONEPEZIL HCL PO Take 10 mg by mouth At Bedtime       entacapone (COMTAN) 200 MG tablet Take 100 mg by mouth 4 times daily        gabapentin (NEURONTIN) 300 MG capsule Take 300 mg by mouth At Bedtime       MIDODRINE HCL PO Take 5 mg by mouth 3 times daily (before meals)       rOPINIRole (REQUIP) 0.25 MG tablet Take 0.75 mg by mouth 3 times daily        rOPINIRole (REQUIP) 0.25 MG tablet Take 0.25 mg by mouth At Bedtime       Medications reviewed:  Medications reconciled to facility chart and changes were made to reflect current medications as identified as above med list. Below are the changes that were made:    Medications stopped since last EPIC medication reconciliation:   There are no discontinued medications.    Medications started since last [facility] medication reconciliation:  No orders of the defined types were placed in this encounter.        REVIEW OF SYSTEMS:  Limited secondary to cognitive impairment but today pt reports feeling ok    Physical Exam:  /63  Pulse 54  Resp 16  SpO2 96%  GENERAL APPEARANCE:  Alert, in no distress, cooperative  ENT:  Mouth and posterior oropharynx normal, moist mucous membranes, [place]  EYES:  EOM, conjunctivae, lids, pupils and irises normal, PERRL  NECK:  No adenopathy,masses or thyromegaly, FROM  RESP:  respiratory effort and palpation of chest normal, lungs clear to auscultation , no respiratory distress  CV:  Palpation and auscultation of heart done , no edema, grade 2/6 murmur  ABDOMEN:  normal bowel sounds, soft, nontender, no hepatosplenomegaly or other masses, no guarding or rebound  M/S:   gait slowed, some festination. uses walker. shuffling gait  NEURO:   Cranial nerves 2-12 are normal tested and grossly at patient's baseline, alert  PSYCH:  insight and judgement impaired, memory impaired , affect abnormal -flat    Recent Labs:     CBC RESULTS:   Recent Labs   Lab Test  07/27/18   1245  05/11/18   1549   WBC  4.7  4.6   RBC  4.52  4.45   HGB  13.8  13.7   HCT  43.1  42.2   MCV  95  95   MCH  30.5  30.8   MCHC  32.0  32.5   RDW  14.5  14.0   PLT  120*  127*       Last Basic Metabolic Panel:  Recent Labs   Lab Test  07/27/18   1245  05/11/18   1549   NA  139  143   POTASSIUM  3.7  3.7   CHLORIDE  106  108   SADIA  8.7  8.2*   CO2  30  29   BUN  18  25   CR  0.88  0.86   GLC  88  107*       Assessment/Plan:  Hypotension, unspecified hypotension type  Improved over past few day with increased midodrine dose  1. Cont. Midodrine  2. BPs, HRs bid x 3 days, then reassess  3. Encourage fluid intake    Falls frequently  No recent falls. PD,slowed gait  1. Cont. PD meds  2. Cont.  PT/OT  3. Monitor for further changes in gait, falls  4. Monitor for c/o dizziness    Dysphagia, unspecified type  Ongoing. HTL, mech soft diet  1. Cont. HTL  2. Monitor/encourage increased fluid intake  3. Cont. ST  4. Consider f/u VSS over next couple mos per ST recommendation         Electronically signed by  DONY Clark CNP

## 2018-09-11 NOTE — LETTER
9/11/2018        RE: Carlos Neri  Care Of [facility]lee Neri  [address] [address] [address]        [place] [facility]    Chief Complaint   Patient presents with     Fatigue       [place] Medical Record Number:  [id number]    HPI:    Carlos Neri is a 83 year old  (1935), who is being seen today for an episodic care visit at [facility].  HPI information obtained from: facility chart records, facility staff, patient report and [facility] chart review.Today's concern is:  Hypotension, falls, dysphagia.  Has h/o low BPs, syncope, falls.  Last week had lower BPs with SBP<100.  Cont on HTL due to dysphagia. Fluid intake variable. Started on midodrine last month.  Dose increased to 5 mg tid last week.  Since this point BPs more stable.  Has h/o freq. Falls.  No recent falls. Working with PT for LE strengthening, gait.  Cont. On ropinirole, entacapone, neurontin, sinement for PD. Taking clonazepam at .      ALLERGIES: Morphine sulfate  Past Medical, Surgical, Family and Social History reviewed and updated in [facility].    Current Outpatient Prescriptions   Medication Sig Dispense Refill     carbidopa-levodopa (SINEMET CR)  MG per tablet Take 1 tablet by mouth At Bedtime.       carbidopa-levodopa (SINEMET)  MG per tablet Take 1 tablet by mouth 4 times daily       clonazePAM (KLONOPIN) 0.5 MG tablet Take 1 mg by mouth At Bedtime 2 tablets = 1 mg       DONEPEZIL HCL PO Take 10 mg by mouth At Bedtime       entacapone (COMTAN) 200 MG tablet Take 100 mg by mouth 4 times daily        gabapentin (NEURONTIN) 300 MG capsule Take 300 mg by mouth At Bedtime       MIDODRINE HCL PO Take 5 mg by mouth 3 times daily (before meals)       rOPINIRole (REQUIP) 0.25 MG tablet Take 0.75 mg by mouth 3 times daily        rOPINIRole (REQUIP) 0.25 MG tablet Take 0.25 mg by mouth At Bedtime       Medications reviewed:  Medications reconciled to facility chart and  changes were made to reflect current medications as identified as above med list. Below are the changes that were made:   Medications stopped since last EPIC medication reconciliation:   There are no discontinued medications.    Medications started since last [facility] medication reconciliation:  No orders of the defined types were placed in this encounter.        REVIEW OF SYSTEMS:  Limited secondary to cognitive impairment but today pt reports feeling ok    Physical Exam:  /63  Pulse 54  Resp 16  SpO2 96%  GENERAL APPEARANCE:  Alert, in no distress, cooperative  ENT:  Mouth and posterior oropharynx normal, moist mucous membranes, [place]  EYES:  EOM, conjunctivae, lids, pupils and irises normal, PERRL  NECK:  No adenopathy,masses or thyromegaly, FROM  RESP:  respiratory effort and palpation of chest normal, lungs clear to auscultation , no respiratory distress  CV:  Palpation and auscultation of heart done , no edema, grade 2/6 murmur  ABDOMEN:  normal bowel sounds, soft, nontender, no hepatosplenomegaly or other masses, no guarding or rebound  M/S:   gait slowed, some festination. uses walker. shuffling gait  NEURO:   Cranial nerves 2-12 are normal tested and grossly at patient's baseline, alert  PSYCH:  insight and judgement impaired, memory impaired , affect abnormal -flat    Recent Labs:     CBC RESULTS:   Recent Labs   Lab Test  07/27/18   1245  05/11/18   1549   WBC  4.7  4.6   RBC  4.52  4.45   HGB  13.8  13.7   HCT  43.1  42.2   MCV  95  95   MCH  30.5  30.8   MCHC  32.0  32.5   RDW  14.5  14.0   PLT  120*  127*       Last Basic Metabolic Panel:  Recent Labs   Lab Test  07/27/18   1245  05/11/18   1549   NA  139  143   POTASSIUM  3.7  3.7   CHLORIDE  106  108   SADIA  8.7  8.2*   CO2  30  29   BUN  18  25   CR  0.88  0.86   GLC  88  107*       Assessment/Plan:  Hypotension, unspecified hypotension type  Improved over past few day with increased midodrine dose  1. Cont. Midodrine  2. BPs, HRs bid x 3 days,  then reassess  3. Encourage fluid intake    Falls frequently  No recent falls. PD,slowed gait  1. Cont. PD meds  2. Cont. PT/OT  3. Monitor for further changes in gait, falls  4. Monitor for c/o dizziness    Dysphagia, unspecified type  Ongoing. HTL, mech soft diet  1. Cont. HTL  2. Monitor/encourage increased fluid intake  3. Cont. ST  4. Consider f/u VSS over next couple mos per ST recommendation         Electronically signed by  DONY Clark CNP                      Sincerely,        DONY Clark CNP

## 2018-09-18 ENCOUNTER — MEDICAL CORRESPONDENCE (OUTPATIENT)
Dept: HEALTH INFORMATION MANAGEMENT | Facility: CLINIC | Age: 83
End: 2018-09-18

## 2018-09-24 ENCOUNTER — DOCUMENTATION ONLY (OUTPATIENT)
Dept: OTHER | Facility: CLINIC | Age: 83
End: 2018-09-24

## 2018-09-28 ENCOUNTER — ASSISTED LIVING VISIT (OUTPATIENT)
Dept: GERIATRICS | Facility: CLINIC | Age: 83
End: 2018-09-28
Payer: COMMERCIAL

## 2018-09-28 VITALS
SYSTOLIC BLOOD PRESSURE: 102 MMHG | TEMPERATURE: 97.9 F | RESPIRATION RATE: 22 BRPM | BODY MASS INDEX: 21.05 KG/M2 | HEART RATE: 85 BPM | DIASTOLIC BLOOD PRESSURE: 69 MMHG | OXYGEN SATURATION: 96 % | WEIGHT: 155.2 LBS

## 2018-09-28 DIAGNOSIS — G40.909 SEIZURE DISORDER (H): ICD-10-CM

## 2018-09-28 DIAGNOSIS — R29.6 FALLS FREQUENTLY: ICD-10-CM

## 2018-09-28 DIAGNOSIS — I95.1 ORTHOSTATIC HYPOTENSION: ICD-10-CM

## 2018-09-28 DIAGNOSIS — G20.A1 PARKINSON'S DISEASE (H): ICD-10-CM

## 2018-09-28 DIAGNOSIS — R13.12 OROPHARYNGEAL DYSPHAGIA: Primary | ICD-10-CM

## 2018-09-28 DIAGNOSIS — F02.80 LEWY BODY DEMENTIA WITHOUT BEHAVIORAL DISTURBANCE (H): ICD-10-CM

## 2018-09-28 DIAGNOSIS — G31.83 LEWY BODY DEMENTIA WITHOUT BEHAVIORAL DISTURBANCE (H): ICD-10-CM

## 2018-09-28 NOTE — PROGRESS NOTES
Carlos Neri is a 83 year old male seen September 28, 2018 at [facility] where he was admitted last month, seen for initial visit.      Patient is seen in his apartment with his wife Haylee and son Curtis present.   Patient was diagnosed with essential tremor in 2000, then with worsening symptoms was seen at [place] in 2004 and diagnosed with Parkinson's disease.   Tx'd with Sinemet since that time, with gradual decline in cognition and mobility   .    Patient had a swallow study in July, since admit to [place] has been given pureed diet and thickened liquids because of those results.   On his own, and when out with family he has a regular diet and thin liquids.   Patient would like to eat a salad, have a soda.     Enjoys Bingo and other activities.   Sleeping okay at night.   Needs midodrine for bp support, was seen at [facility] ER in July for hypotension.    Reviewed BPs here, systolics 102-181, usually 120-140s    HR 49-65           Past Medical History:   Diagnosis Date     Dysphagia      Lewy body dementia      Parkinson disease (H)     With dementia.   Seizure disorder, 2017  S/p nephrectomy for renal cell CA, 2003    Past Surgical History:   Procedure Laterality Date     HERNIA REPAIR       Social History   Substance Use Topics     Smoking status: Never Smoker     Smokeless tobacco: Never Used     Alcohol use No      SH:  Previously lived with his wife Haylee in [place]  They have 2 sons    ROS:    SLUMS 9/30   CPT 4.1    Ambulatory with FWW    Frequent falls   Sleep variable, likes to go to bed early.  Wt Readings from Last 5 Encounters:   09/26/18 155 lb 3.2 oz (70.4 kg)   05/11/18 166 lb (75.3 kg)   05/26/17 172 lb (78 kg)        EXAM:  Pleasant, NAD  /69  Pulse 85  Temp 97.9  F (36.6  C)  Resp 22  Wt 155 lb 3.2 oz (70.4 kg)  SpO2 96%  BMI 21.05 kg/m2   Hypophonic  Neck supple without adenopathy  Lungs clear bilaterally with fair air movement  Heart RRR s1s2  Abd soft, NT, no distention,  +BS  Ext without edema, +kyphosis  Neuro: bilateral hand resting tremor, +cogwheeling  Psych: affect okay    Last Comprehensive Metabolic Panel:  Sodium   Date Value Ref Range Status   09/11/2018 143 136 - 145 mmol/L Final     Potassium   Date Value Ref Range Status   09/11/2018 3.9 3.5 - 5.0 mmol/L Final     Chloride   Date Value Ref Range Status   09/11/2018 108 (H) 98 - 107 mmol/L Final     Carbon Dioxide   Date Value Ref Range Status   09/11/2018 28 22 - 31 mmol/L Final     Anion Gap   Date Value Ref Range Status   09/11/2018 7 5 - 18 mmol/L Final     Glucose   Date Value Ref Range Status   09/11/2018 84 70 - 125 mg/dL Final     Urea Nitrogen   Date Value Ref Range Status   09/11/2018 21 8 - 28 mg/dL Final     Creatinine   Date Value Ref Range Status   09/11/2018 0.90 0.70 - 1.30 mg/dL Final     GFR Estimate   Date Value Ref Range Status   09/11/2018 >60 >60 ml/min/1.73m2 Final     Calcium   Date Value Ref Range Status   09/11/2018 9.0 8.5 - 10.5 mg/dL Final     Lab Results   Component Value Date    WBC 4.7 07/27/2018      HGB 13.8 07/27/2018      MCV 95 07/27/2018       07/27/2018     TSH   Date Value Ref Range Status   05/26/2017 0.62 0.40 - 4.00 mU/L Final        IMP/PLAN:   (R13.12) Oropharyngeal dysphagia  (primary encounter diagnosis)  Comment: reviewed risks and benefits with family  Plan: [facility] speech therapy for swallow, and to assess need for formal swallow study.   Can change to regular diet and thin liquids now if patient/family sign waiver.        (G20) Parkinson's disease (H)  Comment: longstanding, gradually progressive     Plan: continue Sinemet, ropinirole, entacapone, and Neurology follow up      Reviewed medication times at length with family.    Would like last HS medications at 9pm, so he is now awakened for nighttime meds, then first midodrine dose at 6 AM, with daytime dosing related to meals     (R29.6) Falls frequently  Comment: no current injury    Plan: [facility] PHYSICAL THERAPY /  OCCUPATIONAL THERAPY for balance, gait, strengthening.       (I95.1) Orthostatic hypotension  Comment: autonomic dysfunction     Plan: midodrine 5 mg tid     Follow for any related HTN        (G31.83,  F02.80) Lewy body dementia without behavioral disturbance  Comment: cognitive decline, low functional status    Plan: [facility] for assist with meds, meals, activity, safety  Continue PTA donepezil.          (G40.909) Seizure disorder (H)  Comment: no recent sz   Plan: remains on gabapentin and clonazepam, per Neurology        Bety Rowe MD

## 2018-09-28 NOTE — LETTER
9/28/2018        RE: Carlos Neri  [facility] N[facility]  [address]  [facility] [address]        Carlos Neri is a 83 year old male seen September 28, 2018 at [facility] where he was admitted last month, seen for initial visit.      Patient is seen in his apartment with his wife Haylee and son Curtis present.   Patient was diagnosed with essential tremor in 2000, then with worsening symptoms was seen at [place] in 2004 and diagnosed with Parkinson's disease.   Tx'd with Sinemet since that time, with gradual decline in cognition and mobility   .    Patient had a swallow study in July, since admit to [place] has been given pureed diet and thickened liquids because of those results.   On his own, and when out with family he has a regular diet and thin liquids.   Patient would like to eat a salad, have a soda.     Enjoys Bingo and other activities.   Sleeping okay at night.   Needs midodrine for bp support, was seen at [facility] ER in July for hypotension.    Reviewed BPs here, systolics 102-181, usually 120-140s    HR 49-65           Past Medical History:   Diagnosis Date     Dysphagia      Lewy body dementia      Parkinson disease (H)     With dementia.   Seizure disorder, 2017  S/p nephrectomy for renal cell CA, 2003    Past Surgical History:   Procedure Laterality Date     HERNIA REPAIR       Social History   Substance Use Topics     Smoking status: Never Smoker     Smokeless tobacco: Never Used     Alcohol use No      SH:  Previously lived with his wife Haylee in [place]  They have 2 sons    ROS:    SLUMS 9/30   CPT 4.1    Ambulatory with FWW    Frequent falls   Sleep variable, likes to go to bed early.  Wt Readings from Last 5 Encounters:   09/26/18 155 lb 3.2 oz (70.4 kg)   05/11/18 166 lb (75.3 kg)   05/26/17 172 lb (78 kg)        EXAM:  Pleasant, NAD  /69  Pulse 85  Temp 97.9  F (36.6  C)  Resp 22  Wt 155 lb 3.2 oz (70.4 kg)  SpO2 96%  BMI 21.05 kg/m2   Hypophonic  Neck supple  without adenopathy  Lungs clear bilaterally with fair air movement  Heart RRR s1s2  Abd soft, NT, no distention, +BS  Ext without edema, +kyphosis  Neuro: bilateral hand resting tremor, +cogwheeling  Psych: affect okay    Last Comprehensive Metabolic Panel:  Sodium   Date Value Ref Range Status   09/11/2018 143 136 - 145 mmol/L Final     Potassium   Date Value Ref Range Status   09/11/2018 3.9 3.5 - 5.0 mmol/L Final     Chloride   Date Value Ref Range Status   09/11/2018 108 (H) 98 - 107 mmol/L Final     Carbon Dioxide   Date Value Ref Range Status   09/11/2018 28 22 - 31 mmol/L Final     Anion Gap   Date Value Ref Range Status   09/11/2018 7 5 - 18 mmol/L Final     Glucose   Date Value Ref Range Status   09/11/2018 84 70 - 125 mg/dL Final     Urea Nitrogen   Date Value Ref Range Status   09/11/2018 21 8 - 28 mg/dL Final     Creatinine   Date Value Ref Range Status   09/11/2018 0.90 0.70 - 1.30 mg/dL Final     GFR Estimate   Date Value Ref Range Status   09/11/2018 >60 >60 ml/min/1.73m2 Final     Calcium   Date Value Ref Range Status   09/11/2018 9.0 8.5 - 10.5 mg/dL Final     Lab Results   Component Value Date    WBC 4.7 07/27/2018      HGB 13.8 07/27/2018      MCV 95 07/27/2018       07/27/2018     TSH   Date Value Ref Range Status   05/26/2017 0.62 0.40 - 4.00 mU/L Final        IMP/PLAN:   (R13.12) Oropharyngeal dysphagia  (primary encounter diagnosis)  Comment: reviewed risks and benefits with family  Plan: [facility] speech therapy for swallow, and to assess need for formal swallow study.   Can change to regular diet and thin liquids now if patient/family sign waiver.        (G20) Parkinson's disease (H)  Comment: longstanding, gradually progressive     Plan: continue Sinemet, ropinirole, entacapone, and Neurology follow up      Reviewed medication times at length with family.    Would like last HS medications at 9pm, so he is now awakened for nighttime meds, then first midodrine dose at 6 AM, with daytime  dosing related to meals     (R29.6) Falls frequently  Comment: no current injury    Plan: [facility] PHYSICAL THERAPY / OCCUPATIONAL THERAPY for balance, gait, strengthening.       (I95.1) Orthostatic hypotension  Comment: autonomic dysfunction     Plan: midodrine 5 mg tid     Follow for any related HTN        (G31.83,  F02.80) Lewy body dementia without behavioral disturbance  Comment: cognitive decline, low functional status    Plan: [facility] for assist with meds, meals, activity, safety  Continue PTA donepezil.          (G40.909) Seizure disorder (H)  Comment: no recent sz   Plan: remains on gabapentin and clonazepam, per Neurology        Bety Rowe MD       Sincerely,        Bety Rowe MD

## 2018-10-05 ENCOUNTER — ASSISTED LIVING VISIT (OUTPATIENT)
Dept: GERIATRICS | Facility: CLINIC | Age: 83
End: 2018-10-05
Payer: COMMERCIAL

## 2018-10-05 VITALS
HEART RATE: 69 BPM | OXYGEN SATURATION: 95 % | DIASTOLIC BLOOD PRESSURE: 65 MMHG | TEMPERATURE: 97.7 F | RESPIRATION RATE: 18 BRPM | SYSTOLIC BLOOD PRESSURE: 110 MMHG

## 2018-10-05 DIAGNOSIS — R13.10 DYSPHAGIA, UNSPECIFIED TYPE: Primary | ICD-10-CM

## 2018-10-05 DIAGNOSIS — R50.9 FEVER, UNSPECIFIED FEVER CAUSE: ICD-10-CM

## 2018-10-05 DIAGNOSIS — I95.9 HYPOTENSION, UNSPECIFIED HYPOTENSION TYPE: ICD-10-CM

## 2018-10-05 NOTE — PROGRESS NOTES
[place] [facility]    Chief Complaint   Patient presents with     Throat Problem       [place] Medical Record Number:  [id number]  Place of Service where encounter took place:  [facility] ([facility]) [[id number]]    HPI:    Carlos Neri is a 83 year old  (1935), who is being seen today for an episodic care visit.  HPI information obtained from: facility chart records, facility staff, patient report, [facility] chart review and family/first contact son report.Today's concern is:  Dysphagia, unspecified type  PD, followed by [facility] soft, HTL. Had discussed liberalizing diet with family, resident, who now would like to cont. HTL, soft diet. Would like to have f/u VSS  - Speech Therapy Referral; Future  - XR Video Swallow w/o Esophagram - Order with Speech Therapy Referral; Future    Fever, unspecified fever cause  Low-grade temp last pm, reported to have cold s/s. Afebrile this am. No cough. O2 sats stable    Hypotension, unspecified hypotension type  H/o low BPs. Had recent midodrine dose increase which has been effective      ALLERGIES: Morphine sulfate  Past Medical, Surgical, Family and Social History reviewed and updated in [facility].    Current Outpatient Prescriptions   Medication Sig Dispense Refill     carbidopa-levodopa (SINEMET CR)  MG per tablet Take 1 tablet by mouth At Bedtime.       carbidopa-levodopa (SINEMET)  MG per tablet Take 1 tablet by mouth 4 times daily       clonazePAM (KLONOPIN) 0.5 MG tablet Take 1 mg by mouth At Bedtime 2 tablets = 1 mg       DONEPEZIL HCL PO Take 10 mg by mouth At Bedtime       entacapone (COMTAN) 200 MG tablet Take 100 mg by mouth 4 times daily        gabapentin (NEURONTIN) 300 MG capsule Take 300 mg by mouth At Bedtime       MIDODRINE HCL PO Take 5 mg by mouth 3 times daily (before meals)       rOPINIRole (REQUIP) 0.25 MG tablet Take 0.75 mg by mouth 3 times daily        rOPINIRole (REQUIP) 0.25 MG tablet  Take 0.25 mg by mouth At Bedtime       Medications reviewed:  Medications reconciled to facility chart and changes were made to reflect current medications as identified as above med list. Below are the changes that were made:   Medications stopped since last EPIC medication reconciliation:   There are no discontinued medications.    Medications started since last [facility] medication reconciliation:  No orders of the defined types were placed in this encounter.        REVIEW OF SYSTEMS:  Limited secondary to cognitive impairment but today pt reports feeling ok, some clearing of throat needed at times.    Physical Exam:  /65  Pulse 69  Temp 97.7  F (36.5  C)  Resp 18  SpO2 95%  GENERAL APPEARANCE:  Alert, in no distress, thin, cooperative  ENT:  Mouth and posterior oropharynx normal, moist mucous membranes, [place], no rhinitis  EYES:  EOM, conjunctivae, lids, pupils and irises normal, PERRL, no drainage  NECK:  No adenopathy,masses or thyromegaly, no carotid bruit, FROM  RESP:  respiratory effort and palpation of chest normal, lungs clear to auscultation , no respiratory distress  CV:  Palpation and auscultation of heart done , regular rate and rhythm, no murmur, rub, or gallop, no edema  ABDOMEN:  normal bowel sounds, soft, nontender, no hepatosplenomegaly or other masses, no guarding or rebound  LYMPHATICS:  No adenopathy in neck , No adenopathy in axillae  NEURO:   Cranial nerves 2-12 are normal tested and grossly at patient's baseline, alert, bilat UE resting tremor. speech soft, clear  PSYCH:  insight and judgement impaired, memory impaired , affect abnormal -flat    Recent Labs:   CBC RESULTS:   Recent Labs   Lab Test  07/27/18   1245  05/11/18   1549   WBC  4.7  4.6   RBC  4.52  4.45   HGB  13.8  13.7   HCT  43.1  42.2   MCV  95  95   MCH  30.5  30.8   MCHC  32.0  32.5   RDW  14.5  14.0   PLT  120*  127*       Last Basic Metabolic Panel:  Recent Labs   Lab Test 09/11/18 07/27/18   1245   NA  143  139    POTASSIUM  3.9  3.7   CHLORIDE  108*  106   SADIA  9.0  8.7   CO2  28  30   BUN  21  18   CR  0.90  0.88   GLC  84  88         Assessment/Plan:  Dysphagia, unspecified type  Po intake stable  1. Cont. ST  2. Cont. Soft, HTL diet  3. Sched. VSS  4. Monitor for coughing with meals, cont. To crush meds  - Speech Therapy Referral; Future  - XR Video Swallow w/o Esophagram - Order with Speech Therapy Referral; Future    Fever, unspecified fever cause  Currently resolved, lungs CTA  1. Cont. To monitor for fever  2. For cough, fever, may check CXR  3. Follow O2 sats    Hypotension, unspecified hypotension type  Improved  1. Cont. Increased midodrine dose  2. Follow BPs,HRs  3. If stable over next few weeks, may consider GDR midodrine  4. Encourage fluid intake        Electronically signed by  DONY Clark CNP

## 2018-10-05 NOTE — LETTER
10/5/2018        RE: Carlos Neri  [facility]  [address]  [facility] [address]        [place] [facility]    Chief Complaint   Patient presents with     Throat Problem       [place] Medical Record Number:  [id number]  Place of Service where encounter took place:  [facility] ([facility]) [[id number]]    HPI:    Carlos Neri is a 83 year old  (1935), who is being seen today for an episodic care visit.  HPI information obtained from: facility chart records, facility staff, patient report, [facility] chart review and family/first contact son report.Today's concern is:  Dysphagia, unspecified type  PD, followed by [facility] soft, HTL. Had discussed liberalizing diet with family, resident, who now would like to cont. HTL, soft diet. Would like to have f/u VSS  - Speech Therapy Referral; Future  - XR Video Swallow w/o Esophagram - Order with Speech Therapy Referral; Future    Fever, unspecified fever cause  Low-grade temp last pm, reported to have cold s/s. Afebrile this am. No cough. O2 sats stable    Hypotension, unspecified hypotension type  H/o low BPs. Had recent midodrine dose increase which has been effective      ALLERGIES: Morphine sulfate  Past Medical, Surgical, Family and Social History reviewed and updated in [facility].    Current Outpatient Prescriptions   Medication Sig Dispense Refill     carbidopa-levodopa (SINEMET CR)  MG per tablet Take 1 tablet by mouth At Bedtime.       carbidopa-levodopa (SINEMET)  MG per tablet Take 1 tablet by mouth 4 times daily       clonazePAM (KLONOPIN) 0.5 MG tablet Take 1 mg by mouth At Bedtime 2 tablets = 1 mg       DONEPEZIL HCL PO Take 10 mg by mouth At Bedtime       entacapone (COMTAN) 200 MG tablet Take 100 mg by mouth 4 times daily        gabapentin (NEURONTIN) 300 MG capsule Take 300 mg by mouth At Bedtime       MIDODRINE HCL PO Take 5 mg by mouth 3 times daily (before meals)        rOPINIRole (REQUIP) 0.25 MG tablet Take 0.75 mg by mouth 3 times daily        rOPINIRole (REQUIP) 0.25 MG tablet Take 0.25 mg by mouth At Bedtime       Medications reviewed:  Medications reconciled to facility chart and changes were made to reflect current medications as identified as above med list. Below are the changes that were made:   Medications stopped since last EPIC medication reconciliation:   There are no discontinued medications.    Medications started since last [facility] medication reconciliation:  No orders of the defined types were placed in this encounter.        REVIEW OF SYSTEMS:  Limited secondary to cognitive impairment but today pt reports feeling ok, some clearing of throat needed at times.    Physical Exam:  /65  Pulse 69  Temp 97.7  F (36.5  C)  Resp 18  SpO2 95%  GENERAL APPEARANCE:  Alert, in no distress, thin, cooperative  ENT:  Mouth and posterior oropharynx normal, moist mucous membranes, [place], no rhinitis  EYES:  EOM, conjunctivae, lids, pupils and irises normal, PERRL, no drainage  NECK:  No adenopathy,masses or thyromegaly, no carotid bruit, FROM  RESP:  respiratory effort and palpation of chest normal, lungs clear to auscultation , no respiratory distress  CV:  Palpation and auscultation of heart done , regular rate and rhythm, no murmur, rub, or gallop, no edema  ABDOMEN:  normal bowel sounds, soft, nontender, no hepatosplenomegaly or other masses, no guarding or rebound  LYMPHATICS:  No adenopathy in neck , No adenopathy in axillae  NEURO:   Cranial nerves 2-12 are normal tested and grossly at patient's baseline, alert, bilat UE resting tremor. speech soft, clear  PSYCH:  insight and judgement impaired, memory impaired , affect abnormal -flat    Recent Labs:   CBC RESULTS:   Recent Labs   Lab Test  07/27/18   1245  05/11/18   1549   WBC  4.7  4.6   RBC  4.52  4.45   HGB  13.8  13.7   HCT  43.1  42.2   MCV  95  95   MCH  30.5  30.8   MCHC  32.0  32.5   RDW  14.5  14.0   PLT   120*  127*       Last Basic Metabolic Panel:  Recent Labs   Lab Test 09/11/18 07/27/18   1245   NA  143  139   POTASSIUM  3.9  3.7   CHLORIDE  108*  106   SADIA  9.0  8.7   CO2  28  30   BUN  21  18   CR  0.90  0.88   GLC  84  88         Assessment/Plan:  Dysphagia, unspecified type  Po intake stable  1. Cont. ST  2. Cont. Soft, HTL diet  3. Sched. VSS  4. Monitor for coughing with meals, cont. To crush meds  - Speech Therapy Referral; Future  - XR Video Swallow w/o Esophagram - Order with Speech Therapy Referral; Future    Fever, unspecified fever cause  Currently resolved, lungs CTA  1. Cont. To monitor for fever  2. For cough, fever, may check CXR  3. Follow O2 sats    Hypotension, unspecified hypotension type  Improved  1. Cont. Increased midodrine dose  2. Follow BPs,HRs  3. If stable over next few weeks, may consider GDR midodrine  4. Encourage fluid intake        Electronically signed by  DONY Clark CNP                      Sincerely,        DONY Clark CNP

## 2018-10-06 ENCOUNTER — TELEPHONE (OUTPATIENT)
Dept: GERIATRICS | Facility: CLINIC | Age: 83
End: 2018-10-06

## 2018-10-06 NOTE — TELEPHONE ENCOUNTER
Nursing reporting that Carlos has not received his Clonazepam since 9/30/18  Pharmacy is bringing medication out today    Electronically signed by Maddie Emanuel RN, CNP

## 2018-10-07 PROBLEM — I95.1 ORTHOSTATIC HYPOTENSION: Status: ACTIVE | Noted: 2018-10-07

## 2018-10-07 PROBLEM — G31.83 LEWY BODY DEMENTIA WITHOUT BEHAVIORAL DISTURBANCE (H): Status: ACTIVE | Noted: 2018-10-07

## 2018-10-07 PROBLEM — R13.12 OROPHARYNGEAL DYSPHAGIA: Status: ACTIVE | Noted: 2018-10-07

## 2018-10-07 PROBLEM — G20.A1 PARKINSON'S DISEASE (H): Status: ACTIVE | Noted: 2018-10-07

## 2018-10-07 PROBLEM — F02.80 LEWY BODY DEMENTIA WITHOUT BEHAVIORAL DISTURBANCE (H): Status: ACTIVE | Noted: 2018-10-07

## 2018-10-30 ENCOUNTER — HOSPITAL ENCOUNTER (OUTPATIENT)
Dept: GENERAL RADIOLOGY | Facility: CLINIC | Age: 83
Discharge: HOME | End: 2018-10-30
Attending: NURSE PRACTITIONER | Admitting: NURSE PRACTITIONER
Payer: MEDICARE

## 2018-10-30 ENCOUNTER — HOSPITAL ENCOUNTER (OUTPATIENT)
Dept: SPEECH THERAPY | Facility: CLINIC | Age: 83
End: 2018-10-30
Attending: NURSE PRACTITIONER
Payer: MEDICARE

## 2018-10-30 DIAGNOSIS — R13.10 DYSPHAGIA, UNSPECIFIED TYPE: ICD-10-CM

## 2018-10-30 PROCEDURE — 40000211 ZZHC STATISTIC SLP  DEPARTMENT VISIT: Performed by: SPEECH-LANGUAGE PATHOLOGIST

## 2018-10-30 PROCEDURE — 92611 MOTION FLUOROSCOPY/SWALLOW: CPT | Mod: GN | Performed by: SPEECH-LANGUAGE PATHOLOGIST

## 2018-10-30 PROCEDURE — G8996 SWALLOW CURRENT STATUS: HCPCS | Mod: CK,GN | Performed by: SPEECH-LANGUAGE PATHOLOGIST

## 2018-10-30 PROCEDURE — 40000225 ZZH STATISTIC SLP WARD VISIT: Performed by: SPEECH-LANGUAGE PATHOLOGIST

## 2018-10-30 PROCEDURE — G8997 SWALLOW GOAL STATUS: HCPCS | Mod: CK,GN | Performed by: SPEECH-LANGUAGE PATHOLOGIST

## 2018-10-30 PROCEDURE — 74230 X-RAY XM SWLNG FUNCJ C+: CPT

## 2018-10-30 PROCEDURE — G8998 SWALLOW D/C STATUS: HCPCS | Mod: GN,CK | Performed by: SPEECH-LANGUAGE PATHOLOGIST

## 2018-10-30 RX ORDER — BARIUM SULFATE 400 MG/ML
SUSPENSION ORAL ONCE
Status: COMPLETED | OUTPATIENT
Start: 2018-10-30 | End: 2018-10-30

## 2018-10-30 RX ADMIN — BARIUM SULFATE 5 ML: 400 SUSPENSION ORAL at 11:45

## 2018-10-30 RX ADMIN — BARIUM SULFATE: 400 SUSPENSION ORAL at 11:44

## 2018-10-30 NOTE — PROGRESS NOTES
10/30/18 1100       Present No   General Information   Type Of Visit Initial   Start Of Care Date 10/30/18   Referring Physician Yang Martines CNP   Orders Other  (Evaluate)   Medical Diagnosis dysphagia   Onset Of Illness/injury Or Date Of Surgery 10/05/18   Precautions/limitations Fall Precautions;Swallowing Precautions   Hearing ([place])   Pertinent History of Current Problem/OT: Additional Occupational Profile Info Video swallow study completed per MD orders to further assess oropharyngeal swallow function. He has a history of Parkinsons disease, seizure disorder, Lewy body dementia, and dysphagia.  His insight and judgment are impaired per his medical chart.  Pt has been receiving home SLP services through [place].  He was on a soft diet with honey texture liquids.  Pt demonstrated mild oral dysphagia and moderate pharyngeal dysphagia characterized by mild oral residue and increased transit times on cracker bolus and decreased tongue base retraction on all textures presented with decreased posterior pharyngeal wall movement.  Pt was able to clear most of vallecular residue with additional swallows.  He did demonstrate penetration that cleared on the swallow with thin liquids.  On the cracker swallow patient demonstrated mod-severe vallecular residue and posterior pharyngeal wall residue as well and required extra swallows to reduce vallecular residue to mild residue.  No aspiration was observed during this study.  Recommend: Soft foods and nectar texture liquids in small amounts, double swallows on all boluses, alternation of solids and liquids, patient should be upright all po intake and for 45 minutes after.  Family concerned that he wants to eat more regular type textures.  Recommended dipping crackers, cookies in hot drinks and also talking with nutritionist at his assisted living facility for more ideas on soft snack foods.  SLP services are recommended to teach safe  swallow strategies and perform swallowing exercises to improve swallow function.   Prior Level Of Function Swallowing   Prior Level Of Function Comment Pt currently eats a soft diet with honey texture liquids   Patient Role/employment History Retired   Living Environment Assisted Living   Patient/family Goals To have more variety in his meals and snack foods.   Pain Assessment   Pain Reported No   Clinical Swallow Evaluation   Oral Musculature generally intact   Dentition present and adequate   Mucosal Quality good   Mandibular Strength and Mobility intact   Oral Labial Strength and Mobility WFL   Lingual Strength and Mobility WFL   Buccal Strength and Mobility intact   VFSS Eval: Radiology   Radiologist Dr. Soto   Views Taken left lateral   Physical Location of Procedure [facility]   VFSS Eval: Thin Liquid Texture Trial   Mode of Presentation, Thin Liquid cup;self-fed   Order of Presentation 4   Preparatory Phase WFL   Oral Phase, Thin Liquid WFL   Pharyngeal Phase, Thin Liquid Delayed swallow reflex   Rosenbek's Penetration Aspiration Scale: Thin Liquid Trial Results 2 - contrast enters airway, remains above the vocal cords, no residue remains (penetration)   Diagnostic Statement Thin liquids via cup resulted in penetration that cleared due to delay in trigger of the swallow.  No aspiration.   VFSS Eval: Nectar Thick Liquid Texture Trial   Mode of Presentation, Nectar spoon;fed by clinician   Order of Presentation 2,3   Preparatory Phase WFL   Oral Phase, Nectar WFL   Pharyngeal Phase, Nectar Residue in valleculae   Rosenbek's Penetration Aspiration Scale: Nectar-Thick Liquid Trial Results 1 - no aspiration, contrast does not enter airway   Diagnostic Statement [place] texture liquids via spoon resulted in mild vallecular residue which cleared with additional swallow.   VFSS Eval: Honey Thick Texture Trial   Mode of Presentation, Honey spoon;fed by clinician   Order of Presentation 1   Preparatory Phase WFL   Oral  Phase, Honey WFL   Pharyngeal Phase, Honey Residue in valleculae   Rosenbek's Penetration Aspiration Scale: Honey Trial Results 1 - no aspiration, contrast does not enter airway   Diagnostic Statement Honey texture liquids via spoon resulted in mild vallecular residue which cleared with additional swallows.   VFSS Eval: Puree Solid Texture Trial   Mode of Presentation, Puree spoon;fed by clinician   Order of Presentation 5,6   Preparatory Phase WFL   Oral Phase, Puree WFL   Pharyngeal Phase, Puree Residue in valleculae   Rosenbek's Penetration Aspiration Scale: Puree Food Trial Results 1 - no aspiration, contrast does not enter airway   Diagnostic Statement Moderate vallecular residue, when cued to cough residue refluxed back into pt's mouth and then swallowed.  Requires extra swallow to clear residue.   VFSS Eval: Solid Food Texture Trial   Mode of Presentation, Solid spoon;fed by clinician   Order of Presentation 7   Preparatory Phase WFL   Oral Phase, Solid Poor AP movement;Residue in oral cavity   Pharyngeal Phase, Solid Pharyngeal wall coating;Residue in valleculae   Rosenbek's Penetration Aspiration Scale: Solid Food Trial Results 1 - no aspiration, contrast does not enter airway   Diagnostic Statement Presentations of cracker resulted in posterior pharyngeal wall residue and moderate vallecular residue with mild residue remaining after additional swallow.  Oral phase with mild residue and increased oral transit times.   Educational Assessment   Barriers to Learning Hearing   Preferred Learning Style Listening;Demonstration;Pictures/video   Esophageal Phase of Swallow   Patient reports or presents with symptoms of esophageal dysphagia No   General Therapy Interventions   Planned Therapy Interventions Dysphagia Treatment   Dysphagia treatment Oropharyngeal exercise training;Modified diet education;Instruction of safe swallow strategies   Intervention Comments modified diet education, safe swallow strategies and  exercises to improve pharyngeal strength   Swallow Eval: Clinical Impressions   Skilled Criteria for Therapy Intervention Skilled criteria met.  Treatment indicated.   Dysphagia Outcome Severity Scale (MECCA) Level 4 - MECCA   Treatment Diagnosis mild-moderate oropharyngeal dysphagia   Diet texture recommendations Dysphagia diet level 3;Nectar thick liquids   Recommended Feeding/Eating Techniques alternate between small bites and sips of food/liquid;hard swallow w/ each bite or sip;maintain upright posture during/after eating for 30 mins;small sips/bites;tuck chin during every swallow   Rehab Potential good, to achieve stated therapy goals   Demonstrates Need for Referral to Another Service dietitian   Therapy Frequency 3 times/wk   Predicted Duration of Therapy Intervention (days/wks) 4-6 weeks   Anticipated Discharge Disposition home w/ home health   Risks and Benefits of Treatment have been explained. Yes   Patient, family and/or staff in agreement with Plan of Care Yes   Clinical Impression Comments Video swallow study completed per MD orders to further assess oropharyngeal swallow function. He has a history of Parkinsons disease, seizure disorder, Lewy body dementia, and dysphagia.  His insight and judgment are impaired per his medical chart.  Pt has been receiving home SLP services through [place].  He was on a soft diet with honey texture liquids.  Pt demonstrated mild oral dysphagia and moderate pharyngeal dysphagia characterized by mild oral residue and increased transit times on cracker bolus and decreased tongue base retraction on all textures presented with decreased posterior pharyngeal wall movement.  Pt was able to clear most of vallecular residue with additional swallows.  He did demonstrate penetration that cleared on the swallow with thin liquids.  On the cracker swallow patient demonstrated mod-severe vallecular residue and posterior pharyngeal wall residue as well and required extra swallows to  reduce vallecular residue to mild residue.  No aspiration was observed during this study.  Recommend: Soft foods and nectar texture liquids in small amounts, double swallows on all boluses, alternation of solids and liquids, patient should be upright all po intake and for 45 minutes after.  Family concerned that he wants to eat more regular type textures.  Recommended dipping crackers, cookies in hot drinks and also talking with nutritionist at his assisted living facility for more ideas on soft snack foods.  SLP services are recommended to teach safe swallow strategies and perform swallowing exercises to improve swallow function.   Total Session Time   Total Session Time 20 minutes   Total Evaluation Time 20 minutes

## 2018-11-06 ENCOUNTER — ASSISTED LIVING VISIT (OUTPATIENT)
Dept: GERIATRICS | Facility: CLINIC | Age: 83
End: 2018-11-06
Payer: COMMERCIAL

## 2018-11-06 DIAGNOSIS — I95.1 ORTHOSTATIC HYPOTENSION: ICD-10-CM

## 2018-11-06 DIAGNOSIS — G20.A1 PARKINSON'S DISEASE (H): ICD-10-CM

## 2018-11-06 DIAGNOSIS — R13.10 DYSPHAGIA, UNSPECIFIED TYPE: Primary | ICD-10-CM

## 2018-11-06 RX ORDER — POLYETHYLENE GLYCOL 3350 17 G/17G
17 POWDER, FOR SOLUTION ORAL DAILY PRN
COMMUNITY

## 2018-11-06 NOTE — LETTER
11/6/2018        RE: Carlos Neri  [address]  [facility] [address]        [place] [facility]    Chief Complaint   Patient presents with     Throat Problem       [place] Medical Record Number:  [id number]  Place of Service where encounter took place:  [facility] ([facility]) [[id number]]    HPI:    Carlos Neri is a 83 year old  (1935), who is being seen today for an episodic care visit.  HPI information obtained from: facility chart records, facility staff, patient report and [facility] chart review.Today's concern is: dysphagia, PD, hypotension. Has dysphagia. Cont. On HTL. Had repeat VSS 10/31/18 which showed silent aspiration, no cough reflex.  Cont. With ST. Working with PT for PD. Gait stable. No recent falls. No recent increased rigidity. Has ongoing lower BPs at times-per PT today had SBP in upper 80s.  Fluid intake variable-resident reports not liking thickened liquids once they have been sitting out a while-too dried out.  Cont. On midodrine. No recent reports of dizziness. No recent syncope.       ALLERGIES: Morphine sulfate  Past Medical, Surgical, Family and Social History reviewed and updated in [facility].    Current Outpatient Prescriptions   Medication Sig Dispense Refill     carbidopa-levodopa (SINEMET CR)  MG per tablet Take 1 tablet by mouth At Bedtime.       carbidopa-levodopa (SINEMET)  MG per tablet Take 1 tablet by mouth 4 times daily       clonazePAM (KLONOPIN) 0.5 MG tablet Take 1 mg by mouth At Bedtime 2 tablets = 1 mg       DONEPEZIL HCL PO Take 10 mg by mouth At Bedtime       entacapone (COMTAN) 200 MG tablet Take 100 mg by mouth 4 times daily        gabapentin (NEURONTIN) 300 MG capsule Take 300 mg by mouth At Bedtime       MIDODRINE HCL PO Take 5 mg by mouth 3 times daily (before meals)       polyethylene glycol (MIRALAX/GLYCOLAX) Packet Take 17 g by mouth daily as needed for constipation       rOPINIRole (REQUIP) 0.25  MG tablet Take 0.75 mg by mouth 3 times daily        rOPINIRole (REQUIP) 0.25 MG tablet Take 0.25 mg by mouth At Bedtime       Medications reviewed:  Medications reconciled to facility chart and changes were made to reflect current medications as identified as above med list. Below are the changes that were made:   Medications stopped since last EPIC medication reconciliation:   There are no discontinued medications.    Medications started since last [facility] medication reconciliation:  Orders Placed This Encounter   Medications     polyethylene glycol (MIRALAX/GLYCOLAX) Packet     Sig: Take 17 g by mouth daily as needed for constipation         REVIEW OF SYSTEMS:  CONSTITUTIONAL:  forgetfulness, EYES:  neg, ENT:  [place] and dysphagia, CV:  neg, RESPIRATORY: neg, GI:  neg, NEURO:  tremor, PSYCH: neg and MUSCULOSKELETAL: neg    Physical Exam:  /83  Pulse 68  Resp 18  SpO2 93%  GENERAL APPEARANCE:  Alert, in no distress, cooperative  ENT:  Mouth and posterior oropharynx normal, moist mucous membranes, [place]  EYES:  EOM, conjunctivae, lids, pupils and irises normal, PERRL  NECK:  No adenopathy,masses or thyromegaly, no carotid bruit  RESP:  respiratory effort and palpation of chest normal, lungs clear to auscultation , no respiratory distress  CV:  Palpation and auscultation of heart done , no edema, irregular rhythm sl. irreg, rate-normal  ABDOMEN:  normal bowel sounds, soft, nontender, no hepatosplenomegaly or other masses, no guarding or rebound  M/S:   gait slowed, stable. muscle strength 5/5 all 4 ext.  NEURO:   Cranial nerves 2-12 are normal tested and grossly at patient's baseline, alert, speech soft, clear  PSYCH:  insight and judgement impaired, memory impaired , affect abnormal -flat    Recent Labs:     CBC RESULTS:   Recent Labs   Lab Test  07/27/18   1245  05/11/18   1549   WBC  4.7  4.6   RBC  4.52  4.45   HGB  13.8  13.7   HCT  43.1  42.2   MCV  95  95   MCH  30.5  30.8   MCHC  32.0  32.5   RDW  14.5   14.0   PLT  120*  127*       Last Basic Metabolic Panel:  Recent Labs   Lab Test 09/11/18 07/27/18   1245   NA  143  139   POTASSIUM  3.9  3.7   CHLORIDE  108*  106   SADIA  9.0  8.7   CO2  28  30   BUN  21  18   CR  0.90  0.88   GLC  84  88       Assessment/Plan:  Dysphagia, unspecified type  Ongoing, recent VSS, silent asp. No cough reflex  1. Cont. ST  2. Cont. HTL  3. Monitor for changes in resp. distress    Parkinson's disease (H)  Gait currently stable. No recent falls  1. Cont. PT  2. Encourage amb as anupam  3. Monitor for increased rigidity, decreased mobility  4. Monitor for falls  5. Cont sinemet, ropinirole, comtan, neurontin    Orthostatic hypotension  Gen. Stable. Variable BPs a times  1. Cont. Midodrine  2. Cont. To encourage fluids-staff to offer smaller cups of fluids more freq. Throughout day  3. Follow BPs, HRs  4. Bmp in next 1-3 mos        Electronically signed by  DONY Clark CNP                      Sincerely,        DONY Clark CNP

## 2018-11-06 NOTE — PROGRESS NOTES
[place] [facility]    Chief Complaint   Patient presents with     Throat Problem       [place] Medical Record Number:  [id number]  Place of Service where encounter took place:  [facility] [[id number]]    HPI:    Carlos Neri is a 83 year old  (1935), who is being seen today for an episodic care visit.  HPI information obtained from: facility chart records, facility staff, patient report and [facility] chart review.Today's concern is: dysphagia, PD, hypotension. Has dysphagia. Cont. On HTL. Had repeat VSS 10/31/18 which showed silent aspiration, no cough reflex.  Cont. With ST. Working with PT for PD. Gait stable. No recent falls. No recent increased rigidity. Has ongoing lower BPs at times-per PT today had SBP in upper 80s.  Fluid intake variable-resident reports not liking thickened liquids once they have been sitting out a while-too dried out.  Cont. On midodrine. No recent reports of dizziness. No recent syncope.       ALLERGIES: Morphine sulfate  Past Medical, Surgical, Family and Social History reviewed and updated in [facility].    Current Outpatient Prescriptions   Medication Sig Dispense Refill     carbidopa-levodopa (SINEMET CR)  MG per tablet Take 1 tablet by mouth At Bedtime.       carbidopa-levodopa (SINEMET)  MG per tablet Take 1 tablet by mouth 4 times daily       clonazePAM (KLONOPIN) 0.5 MG tablet Take 1 mg by mouth At Bedtime 2 tablets = 1 mg       DONEPEZIL HCL PO Take 10 mg by mouth At Bedtime       entacapone (COMTAN) 200 MG tablet Take 100 mg by mouth 4 times daily        gabapentin (NEURONTIN) 300 MG capsule Take 300 mg by mouth At Bedtime       MIDODRINE HCL PO Take 5 mg by mouth 3 times daily (before meals)       polyethylene glycol (MIRALAX/GLYCOLAX) Packet Take 17 g by mouth daily as needed for constipation       rOPINIRole (REQUIP) 0.25 MG tablet Take 0.75 mg by mouth 3 times daily        rOPINIRole (REQUIP) 0.25 MG tablet  Take 0.25 mg by mouth At Bedtime       Medications reviewed:  Medications reconciled to facility chart and changes were made to reflect current medications as identified as above med list. Below are the changes that were made:   Medications stopped since last EPIC medication reconciliation:   There are no discontinued medications.    Medications started since last [facility] medication reconciliation:  Orders Placed This Encounter   Medications     polyethylene glycol (MIRALAX/GLYCOLAX) Packet     Sig: Take 17 g by mouth daily as needed for constipation         REVIEW OF SYSTEMS:  CONSTITUTIONAL:  forgetfulness, EYES:  neg, ENT:  [place] and dysphagia, CV:  neg, RESPIRATORY: neg, GI:  neg, NEURO:  tremor, PSYCH: neg and MUSCULOSKELETAL: neg    Physical Exam:  /83  Pulse 68  Resp 18  SpO2 93%  GENERAL APPEARANCE:  Alert, in no distress, cooperative  ENT:  Mouth and posterior oropharynx normal, moist mucous membranes, [place]  EYES:  EOM, conjunctivae, lids, pupils and irises normal, PERRL  NECK:  No adenopathy,masses or thyromegaly, no carotid bruit  RESP:  respiratory effort and palpation of chest normal, lungs clear to auscultation , no respiratory distress  CV:  Palpation and auscultation of heart done , no edema, irregular rhythm sl. irreg, rate-normal  ABDOMEN:  normal bowel sounds, soft, nontender, no hepatosplenomegaly or other masses, no guarding or rebound  M/S:   gait slowed, stable. muscle strength 5/5 all 4 ext.  NEURO:   Cranial nerves 2-12 are normal tested and grossly at patient's baseline, alert, speech soft, clear  PSYCH:  insight and judgement impaired, memory impaired , affect abnormal -flat    Recent Labs:     CBC RESULTS:   Recent Labs   Lab Test  07/27/18   1245  05/11/18   1549   WBC  4.7  4.6   RBC  4.52  4.45   HGB  13.8  13.7   HCT  43.1  42.2   MCV  95  95   MCH  30.5  30.8   MCHC  32.0  32.5   RDW  14.5  14.0   PLT  120*  127*       Last Basic Metabolic Panel:  Recent Labs   Lab Test 09/11/18   07/27/18   1245   NA  143  139   POTASSIUM  3.9  3.7   CHLORIDE  108*  106   SADIA  9.0  8.7   CO2  28  30   BUN  21  18   CR  0.90  0.88   GLC  84  88       Assessment/Plan:  Dysphagia, unspecified type  Ongoing, recent VSS, silent asp. No cough reflex  1. Cont. ST  2. Cont. HTL  3. Monitor for changes in resp. distress    Parkinson's disease (H)  Gait currently stable. No recent falls  1. Cont. PT  2. Encourage amb as anupam  3. Monitor for increased rigidity, decreased mobility  4. Monitor for falls  5. Cont sinemet, ropinirole, comtan, neurontin    Orthostatic hypotension  Gen. Stable. Variable BPs a times  1. Cont. Midodrine  2. Cont. To encourage fluids-staff to offer smaller cups of fluids more freq. Throughout day  3. Follow BPs, HRs  4. Bmp in next 1-3 mos        Electronically signed by  DONY Clark CNP

## 2018-11-08 VITALS
SYSTOLIC BLOOD PRESSURE: 122 MMHG | OXYGEN SATURATION: 93 % | RESPIRATION RATE: 18 BRPM | HEART RATE: 68 BPM | DIASTOLIC BLOOD PRESSURE: 83 MMHG

## 2018-11-16 ENCOUNTER — MEDICAL CORRESPONDENCE (OUTPATIENT)
Dept: HEALTH INFORMATION MANAGEMENT | Facility: CLINIC | Age: 83
End: 2018-11-16

## 2018-11-22 NOTE — PROGRESS NOTES
[facility]          OUTPATIENT SWALLOW  EVALUATION  PLAN OF TREATMENT FOR OUTPATIENT REHABILITATION  (COMPLETE FOR INITIAL CLAIMS ONLY)  Patient's Last Name, First Name, M.I.  YOB: 1935  Carlos Neri     Provider's Name   [facility]   Medical Record No.  [id number]     Start of Care Date:      Onset Date:      Type:     ___PT   ____OT  ___X_SLP Medical Diagnosis:        Treatment Diagnosis:    Visits from SOC:  1     _________________________________________________________________________________  Plan of Treatment/Functional Goals:                           Goals   1.                             2.                             3.                             4.                             5.                            6.                              7.                              8.                                      Lee Puri SLP       I CERTIFY THE NEED FOR THESE SERVICES FURNISHED UNDER        THIS PLAN OF TREATMENT AND WHILE UNDER MY CARE     (Physician co-signature of this document indicates review and certification of the therapy plan).                                    Initial Assessment        See Epic Evaluation

## 2018-11-22 NOTE — ADDENDUM NOTE
Encounter addended by: Lee Puri, SLP on: 11/22/2018 11:21 AM<BR>     Actions taken: Sign clinical note

## 2018-12-03 NOTE — ED NOTES
82-year-old male presents to the ER with complaints of falling 3-4 times in the last couple of weeks. Pt states he has lower back pain after the fall tonight and hasn't been able to get comfortable with ibuprofen.   
Discharge instructions gone over with pt and wife, verbalized understanding. Discharged home with plan for follow up and no new prescriptions. Denies questions at time of discharge.   
Arms Incubation Time: 4 hours incubation
Frequency Of Pdt: Single Treatment
Legs Incubation Time: 3 hours under occlusion
Face Incubation Time: 2 hours incubation
Face And Scalp Incubation Time: Face 2 hours and Scalp 3 hours
Location: Face and Scalp
Detail Level: Zone
Consent: The procedure and risks were reviewed with the patient including but not limited to: burning, pigmentary changes, pain, blistering, scabbing, redness, and the possibility of needing numerous treatments. Strict photoprotection after the procedure was also discussed.
Scalp Incubation Time: 3 hour incubation
Pdt Type: JOSEFINA-U

## 2018-12-05 RX ORDER — CLONAZEPAM 0.5 MG/1
TABLET ORAL
Qty: 60 TABLET | Refills: 5 | Status: SHIPPED | OUTPATIENT
Start: 2018-12-05 | End: 2019-02-27

## 2019-01-30 ENCOUNTER — HOSPITAL ENCOUNTER (OUTPATIENT)
Dept: NEUROLOGY | Facility: CLINIC | Age: 84
Setting detail: THERAPIES SERIES
Discharge: STILL ADMITTED OR ADMINISTRATIVE | End: 2019-01-30
Attending: PSYCHIATRY & NEUROLOGY

## 2019-01-30 DIAGNOSIS — F01.50 VASCULAR DEMENTIA WITHOUT BEHAVIORAL DISTURBANCE (H): ICD-10-CM

## 2019-01-30 DIAGNOSIS — R26.89 POOR BALANCE: ICD-10-CM

## 2019-01-30 DIAGNOSIS — G20.A1 PARKINSON DISEASE (H): ICD-10-CM

## 2019-01-30 DIAGNOSIS — G25.81 RLS (RESTLESS LEGS SYNDROME): ICD-10-CM

## 2019-01-30 DIAGNOSIS — I95.1 ORTHOSTATIC HYPOTENSION: ICD-10-CM

## 2019-01-30 DIAGNOSIS — F43.23 ADJUSTMENT DISORDER WITH MIXED ANXIETY AND DEPRESSED MOOD: ICD-10-CM

## 2019-01-30 DIAGNOSIS — G47.52 REM SLEEP BEHAVIOR DISORDER: ICD-10-CM

## 2019-02-05 ENCOUNTER — ASSISTED LIVING VISIT (OUTPATIENT)
Dept: GERIATRICS | Facility: CLINIC | Age: 84
End: 2019-02-05
Payer: COMMERCIAL

## 2019-02-05 DIAGNOSIS — G20.A1 PARKINSON'S DISEASE (H): ICD-10-CM

## 2019-02-05 DIAGNOSIS — M79.641 PAIN OF RIGHT HAND: Primary | ICD-10-CM

## 2019-02-05 DIAGNOSIS — F41.9 ANXIETY: ICD-10-CM

## 2019-02-05 RX ORDER — CARBIDOPA AND LEVODOPA 25; 100 MG/1; MG/1
1 TABLET ORAL 2 TIMES DAILY
COMMUNITY
End: 2019-02-19

## 2019-02-05 RX ORDER — VENLAFAXINE HYDROCHLORIDE 37.5 MG/1
37.5 CAPSULE, EXTENDED RELEASE ORAL DAILY
COMMUNITY
End: 2019-03-07

## 2019-02-05 NOTE — PROGRESS NOTES
[place] [facility]  Chief Complaint   Patient presents with     Annual Comprehensive Exam Assisted Living       [place] Medical Record Number:  [id number]  Place of Service where encounter took place:  [facility] ([facility] [[id number]]      HPI:    Carlos Neri is a 83 year old  (1935), who is being seen today for an annual comprehensive visit.  HPI information obtained from: facility chart records, facility staff, patient report and [facility] chart review.  Today's concerns are:  Pain of right hand  S/p fall last pm in bathroom. Trauma to R hand with edema, bruising    Parkinson's disease (H)  Fall as above. Amb. With walker. No increased tremor. Cont. On sinemet, entacapone, neurontin    Anxiety  Gen. Stable. Cont. On effexor, klonopin        BP goals are  <140/90 mm Hg.This is higher than ACC and AHA recommendations due to risk for hypotension. Patient is stable and continue without pharmacological invention with routine assessment.      ALLERGIES: Morphine sulfate  PROBLEM LIST:  Patient Active Problem List   Diagnosis     Fall     Syncope     Falls frequently     Oropharyngeal dysphagia     Parkinson's disease (H)     Orthostatic hypotension     Lewy body dementia without behavioral disturbance     PAST MEDICAL HISTORY:  has a past medical history of Dysphagia, Lewy body dementia, and Parkinson disease (H).  PAST SURGICAL HISTORY:  has a past surgical history that includes hernia repair.  FAMILY HISTORY: family history is not on file.  SOCIAL HISTORY:  reports that  has never smoked. he has never used smokeless tobacco. He reports that he does not drink alcohol or use drugs.  IMMUNIZATIONS:  Most Recent Immunizations   Administered Date(s) Administered     DT (PEDS <7y) 05/31/1989     Influenza (High Dose) 3 valent vaccine 10/17/2018     Pneumo Conj 13-V (2010&after) 07/31/2018     Pneumococcal 23 valent 02/21/2002     Zoster vaccine, live 12/15/2010     Above  immunizations pulled from [facility]. [facility] and facility records also reconciled. Outstanding information sent to  to update [facility].  Future immunizations are not needed at this point as all recommended immunizations are up to date.   MEDICATIONS:  Current Outpatient Medications   Medication Sig Dispense Refill     carbidopa-levodopa (SINEMET CR)  MG per tablet Take 1 tablet by mouth At Bedtime.       carbidopa-levodopa (SINEMET)  MG tablet Take 1 tablet by mouth 2 times daily       carbidopa-levodopa (SINEMET)  MG per tablet Take 1 tablet by mouth 4 times daily       clonazePAM (KLONOPIN) 0.5 MG tablet 2 TABLETS (1MG) BY MOUTH AT BEDTIME *OK TO CRUSH AND PUT IN YOGURT, PUDDING OR APPLESAUCE* 60 tablet 5     entacapone (COMTAN) 200 MG tablet Take 100 mg by mouth 4 times daily        gabapentin (NEURONTIN) 300 MG capsule Take 300 mg by mouth At Bedtime       MIDODRINE HCL PO Take 5 mg by mouth 3 times daily (before meals)       polyethylene glycol (MIRALAX/GLYCOLAX) Packet Take 17 g by mouth daily as needed for constipation       venlafaxine (EFFEXOR-XR) 37.5 MG 24 hr capsule Take 37.5 mg by mouth daily       DONEPEZIL HCL PO Take 10 mg by mouth At Bedtime       rOPINIRole (REQUIP) 0.25 MG tablet Take 0.75 mg by mouth 3 times daily        rOPINIRole (REQUIP) 0.25 MG tablet Take 0.25 mg by mouth At Bedtime       Medications reviewed:  Medications reconciled to facility chart and changes were made to reflect current medications as identified as above med list. Below are the changes that were made:   Medications stopped since last EPIC medication reconciliation:   There are no discontinued medications.    Medications started since last [facility] medication reconciliation:  Orders Placed This Encounter   Medications     carbidopa-levodopa (SINEMET)  MG tablet     Sig: Take 1 tablet by mouth 2 times daily     venlafaxine (EFFEXOR-XR) 37.5 MG 24 hr capsule     Sig: Take 37.5 mg  by mouth daily         Case Management:  I have reviewed the Assisted Living care plan, current immunizations and preventive care/cancer screening..Future cancer screening is not clinically indicated secondary to age/goals of care Patient's desire to return to the community is not assessible due to cognitive impairment. Current Level of Care is appropriate.    Advance Directive Discussion:    I reviewed the current advanced directives as reflected in EPIC, the POLST and the facility chart, and verified the congruency of orders 2/4/19. I contacted the first party spouse and discussed the plan of Care.  I did not due to cognitive impairment review the advance directives with the resident.     Team Discussion:  I communicated with the appropriate disciplines involved with the Plan of Care:   Nursing      Patient Goal:  Patient's goal is unobtainable secondary to cognitive impairment.    Information reviewed:  Medications, vital signs, orders, and nursing notes.    ROS:  Unobtainable secondary to cognitive impairment.     Exam:  /76   Pulse 73   Resp 18   SpO2 94%     GENERAL APPEARANCE:  Alert, in no distress, cooperative  ENT:  Mouth and posterior oropharynx normal, moist mucous membranes, [place]  EYES:  EOM, conjunctivae, lids, pupils and irises normal, PERRL  NECK:  No adenopathy,masses or thyromegaly, FROM  RESP:  respiratory effort and palpation of chest normal, lungs clear to auscultation , no respiratory distress  CV:  Palpation and auscultation of heart done , regular rate and rhythm, no murmur, rub, or gallop, peripheral edema 1+ in R hand  ABDOMEN:  normal bowel sounds, soft, nontender, no hepatosplenomegaly or other masses, no guarding or rebound  M/S:   gait slowed, steady with walker. muscle strength 5/5 all 4 ext.  SKIN:  bruising to R hand. no open areas  NEURO:   Cranial nerves 2-12 are normal tested and grossly at patient's baseline, alert, speech clear  PSYCH:  insight and judgement impaired,  affect abnormal -flat     Lab/Diagnostic data:      CBC RESULTS:   Recent Labs   Lab Test 07/27/18  1245 05/11/18  1549   WBC 4.7 4.6   RBC 4.52 4.45   HGB 13.8 13.7   HCT 43.1 42.2   MCV 95 95   MCH 30.5 30.8   MCHC 32.0 32.5   RDW 14.5 14.0   * 127*       Last Basic Metabolic Panel:  Recent Labs   Lab Test 09/11/18 07/27/18  1245    139   POTASSIUM 3.9 3.7   CHLORIDE 108* 106   SADIA 9.0 8.7   CO2 28 30   BUN 21 18   CR 0.90 0.88   GLC 84 88       ASSESSMENT/PLAN  Pain of right hand  S/p falls  1. XR r hand r/o fx  2. For + XR, refer to [facility]  3. For increased pain, may start prn tylenol  4. Cold compress to R hand    Parkinson's disease (H)  Fall last pm, no further increased rigidity  1. Cont. Sinemet  2. Cont. Entacapone  3. Cont. neurontin  4. Monitor for increased rigidity, changes in gait, further falls    Anxiety  Currently controlled  1. Cont. effexor  2. Cont. Klonopin  3. Monitor for reports of increased anxiety  4. Monitor for insomnia    Electronically signed by:  DONY Clark CNP

## 2019-02-05 NOTE — LETTER
2/5/2019        RE: Carlos Neri  [address]  [place] [id number]        [place] [facility]  Chief Complaint   Patient presents with     Annual Comprehensive Exam Assisted Living       [place] Medical Record Number:  [id number]  Place of Service where encounter took place:  [facility] ([facility]) [[id number]]      HPI:    Carlos Neri is a 83 year old  (1935), who is being seen today for an annual comprehensive visit.  HPI information obtained from: facility chart records, facility staff, patient report and [facility] chart review.  Today's concerns are:  Pain of right hand  S/p fall last pm in bathroom. Trauma to R hand with edema, bruising    Parkinson's disease (H)  Fall as above. Amb. With walker. No increased tremor. Cont. On sinemet, entacapone, neurontin    Anxiety  Gen. Stable. Cont. On effexor, klonopin        BP goals are  <140/90 mm Hg.This is higher than ACC and AHA recommendations due to risk for hypotension. Patient is stable and continue without pharmacological invention with routine assessment.      ALLERGIES: Morphine sulfate  PROBLEM LIST:  Patient Active Problem List   Diagnosis     Fall     Syncope     Falls frequently     Oropharyngeal dysphagia     Parkinson's disease (H)     Orthostatic hypotension     Lewy body dementia without behavioral disturbance     PAST MEDICAL HISTORY:  has a past medical history of Dysphagia, Lewy body dementia, and Parkinson disease (H).  PAST SURGICAL HISTORY:  has a past surgical history that includes hernia repair.  FAMILY HISTORY: family history is not on file.  SOCIAL HISTORY:  reports that  has never smoked. he has never used smokeless tobacco. He reports that he does not drink alcohol or use drugs.  IMMUNIZATIONS:  Most Recent Immunizations   Administered Date(s) Administered     DT (PEDS <7y) 05/31/1989     Influenza (High Dose) 3 valent vaccine 10/17/2018     Pneumo Conj 13-V (2010&after)  07/31/2018     Pneumococcal 23 valent 02/21/2002     Zoster vaccine, live 12/15/2010     Above immunizations pulled from [facility]. [facility] and facility records also reconciled. Outstanding information sent to  to update [facility].  Future immunizations are not needed at this point as all recommended immunizations are up to date.   MEDICATIONS:  Current Outpatient Medications   Medication Sig Dispense Refill     carbidopa-levodopa (SINEMET CR)  MG per tablet Take 1 tablet by mouth At Bedtime.       carbidopa-levodopa (SINEMET)  MG tablet Take 1 tablet by mouth 2 times daily       carbidopa-levodopa (SINEMET)  MG per tablet Take 1 tablet by mouth 4 times daily       clonazePAM (KLONOPIN) 0.5 MG tablet 2 TABLETS (1MG) BY MOUTH AT BEDTIME *OK TO CRUSH AND PUT IN YOGURT, PUDDING OR APPLESAUCE* 60 tablet 5     entacapone (COMTAN) 200 MG tablet Take 100 mg by mouth 4 times daily        gabapentin (NEURONTIN) 300 MG capsule Take 300 mg by mouth At Bedtime       MIDODRINE HCL PO Take 5 mg by mouth 3 times daily (before meals)       polyethylene glycol (MIRALAX/GLYCOLAX) Packet Take 17 g by mouth daily as needed for constipation       venlafaxine (EFFEXOR-XR) 37.5 MG 24 hr capsule Take 37.5 mg by mouth daily       DONEPEZIL HCL PO Take 10 mg by mouth At Bedtime       rOPINIRole (REQUIP) 0.25 MG tablet Take 0.75 mg by mouth 3 times daily        rOPINIRole (REQUIP) 0.25 MG tablet Take 0.25 mg by mouth At Bedtime       Medications reviewed:  Medications reconciled to facility chart and changes were made to reflect current medications as identified as above med list. Below are the changes that were made:   Medications stopped since last EPIC medication reconciliation:   There are no discontinued medications.    Medications started since last [facility] medication reconciliation:  Orders Placed This Encounter   Medications     carbidopa-levodopa (SINEMET)  MG tablet     Sig: Take 1 tablet by  mouth 2 times daily     venlafaxine (EFFEXOR-XR) 37.5 MG 24 hr capsule     Sig: Take 37.5 mg by mouth daily         Case Management:  I have reviewed the Assisted Living care plan, current immunizations and preventive care/cancer screening..Future cancer screening is not clinically indicated secondary to age/goals of care Patient's desire to return to the community is not assessible due to cognitive impairment. Current Level of Care is appropriate.    Advance Directive Discussion:    I reviewed the current advanced directives as reflected in EPIC, the POLST and the facility chart, and verified the congruency of orders 2/4/19. I contacted the first party spouse and discussed the plan of Care.  I did not due to cognitive impairment review the advance directives with the resident.     Team Discussion:  I communicated with the appropriate disciplines involved with the Plan of Care:   Nursing      Patient Goal:  Patient's goal is unobtainable secondary to cognitive impairment.    Information reviewed:  Medications, vital signs, orders, and nursing notes.    ROS:  Unobtainable secondary to cognitive impairment.     Exam:  /76   Pulse 73   Resp 18   SpO2 94%     GENERAL APPEARANCE:  Alert, in no distress, cooperative  ENT:  Mouth and posterior oropharynx normal, moist mucous membranes, [place]  EYES:  EOM, conjunctivae, lids, pupils and irises normal, PERRL  NECK:  No adenopathy,masses or thyromegaly, FROM  RESP:  respiratory effort and palpation of chest normal, lungs clear to auscultation , no respiratory distress  CV:  Palpation and auscultation of heart done , regular rate and rhythm, no murmur, rub, or gallop, peripheral edema 1+ in R hand  ABDOMEN:  normal bowel sounds, soft, nontender, no hepatosplenomegaly or other masses, no guarding or rebound  M/S:   gait slowed, steady with walker. muscle strength 5/5 all 4 ext.  SKIN:  bruising to R hand. no open areas  NEURO:   Cranial nerves 2-12 are normal tested and  grossly at patient's baseline, alert, speech clear  PSYCH:  insight and judgement impaired, affect abnormal -flat     Lab/Diagnostic data:      CBC RESULTS:   Recent Labs   Lab Test 07/27/18  1245 05/11/18  1549   WBC 4.7 4.6   RBC 4.52 4.45   HGB 13.8 13.7   HCT 43.1 42.2   MCV 95 95   MCH 30.5 30.8   MCHC 32.0 32.5   RDW 14.5 14.0   * 127*       Last Basic Metabolic Panel:  Recent Labs   Lab Test 09/11/18 07/27/18  1245    139   POTASSIUM 3.9 3.7   CHLORIDE 108* 106   SADIA 9.0 8.7   CO2 28 30   BUN 21 18   CR 0.90 0.88   GLC 84 88       ASSESSMENT/PLAN  Pain of right hand  S/p falls  1. XR r hand r/o fx  2. For + XR, refer to [facility]  3. For increased pain, may start prn tylenol  4. Cold compress to R hand    Parkinson's disease (H)  Fall last pm, no further increased rigidity  1. Cont. Sinemet  2. Cont. Entacapone  3. Cont. neurontin  4. Monitor for increased rigidity, changes in gait, further falls    Anxiety  Currently controlled  1. Cont. effexor  2. Cont. Klonopin  3. Monitor for reports of increased anxiety  4. Monitor for insomnia    Electronically signed by:  DONY Clark CNP          Sincerely,        DONY Clark CNP

## 2019-02-06 VITALS
RESPIRATION RATE: 18 BRPM | DIASTOLIC BLOOD PRESSURE: 76 MMHG | SYSTOLIC BLOOD PRESSURE: 124 MMHG | HEART RATE: 73 BPM | OXYGEN SATURATION: 94 %

## 2019-02-07 ENCOUNTER — ASSISTED LIVING VISIT (OUTPATIENT)
Dept: GERIATRICS | Facility: CLINIC | Age: 84
End: 2019-02-07
Payer: COMMERCIAL

## 2019-02-07 DIAGNOSIS — S62.326A CLOSED DISPLACED FRACTURE OF SHAFT OF FIFTH METACARPAL BONE OF RIGHT HAND, INITIAL ENCOUNTER: Primary | ICD-10-CM

## 2019-02-07 NOTE — PROGRESS NOTES
"Ortho Nursing home visit    Carlos Neri is a 83 year old male who resides at [facility]. [place]    Patient is seen today for fall, and resulting right hand fracture, 5th metacarpal, oblique fracture with noted shortening;     Past Medical History:   Diagnosis Date     Dysphagia      Lewy body dementia      Parkinson disease (H)       Past Surgical History:   Procedure Laterality Date     HERNIA REPAIR          Allergies   Allergen Reactions     Morphine Sulfate       There were no vitals taken for this visit.     Exam: Some noted swelling dorsum of the hand, patient denies pain, using the hand for ambulation with walker, skin remians intact. Staff have been icing.      X-rays show : oblique mid shaft fracture of the 5th metacarpal, right hand;    ASSESSMENT / PLAN:  After long discussion with patients wife, giving options of ORIF< vs casting, vs \"lucas taping\"> and his current medical situation : parkinson's . Wife would prefer at this time the least invasive treatment;  And we have decided on, colsed treatment with \"lucas taping, 4th 5th digits together for a period of 6 week, then obtain x-rays to determine healing;    Staff today informed and reviewed the lucas taping procedure;    Orders written per NP for Tyl , 1000 mg PO TID.          Flori REYNAGA-LUDA With Yang Martines NP  [phone]    "

## 2019-02-08 ENCOUNTER — ASSISTED LIVING VISIT (OUTPATIENT)
Dept: GERIATRICS | Facility: CLINIC | Age: 84
End: 2019-02-08
Payer: COMMERCIAL

## 2019-02-08 VITALS
OXYGEN SATURATION: 92 % | RESPIRATION RATE: 16 BRPM | DIASTOLIC BLOOD PRESSURE: 76 MMHG | HEART RATE: 77 BPM | SYSTOLIC BLOOD PRESSURE: 109 MMHG

## 2019-02-08 DIAGNOSIS — I95.9 HYPOTENSION, UNSPECIFIED HYPOTENSION TYPE: ICD-10-CM

## 2019-02-08 DIAGNOSIS — S62.306S: Primary | ICD-10-CM

## 2019-02-08 DIAGNOSIS — F02.80 LEWY BODY DEMENTIA WITHOUT BEHAVIORAL DISTURBANCE (H): ICD-10-CM

## 2019-02-08 DIAGNOSIS — G31.83 LEWY BODY DEMENTIA WITHOUT BEHAVIORAL DISTURBANCE (H): ICD-10-CM

## 2019-02-08 RX ORDER — ACETAMINOPHEN 500 MG
1000 TABLET ORAL 3 TIMES DAILY
COMMUNITY

## 2019-02-08 NOTE — ADDENDUM NOTE
Encounter addended by: Lee Puri, SLP on: 2/8/2019 4:33 PM   Actions taken: Charge Capture section accepted, Flowsheet accepted

## 2019-02-08 NOTE — PROGRESS NOTES
[facility]    Chief Complaint   Patient presents with     Pain       [place] Medical Record Number:  [id number]  Place of Service where encounter took place:  [facility] ([facility]) [[id number]]    HPI:    Carlos Neri is a 83 year old  (1935), who is being seen today for an episodic care visit.  HPI information obtained from: facility chart records, facility staff, patient report and [facility] chart review.Today's concern is: R hand pain. S/p fall with fx 5th metacarpal.  Tylenol sched. Pain appears controlled. Seen by [facility]. Conservative tx decided-has 4th, 5th fingers R hand lucas taped. No subsequent falls.  Has dementia with memory loss. No recent functional decline. No reports of further increased anxiety. Cont. On effexor, klonopin, neurontin. BPs stable. Has h/o low BPs. Cont. On midodrine. No recent reports of dizziness.       ALLERGIES: Morphine sulfate  Past Medical, Surgical, Family and Social History reviewed and updated in [facility].    Current Outpatient Medications   Medication Sig Dispense Refill     acetaminophen (TYLENOL) 500 MG tablet Take 1,000 mg by mouth 3 times daily       carbidopa-levodopa (SINEMET CR)  MG per tablet Take 1 tablet by mouth At Bedtime.       carbidopa-levodopa (SINEMET)  MG tablet Take 1 tablet by mouth 2 times daily       carbidopa-levodopa (SINEMET)  MG per tablet Take 1 tablet by mouth 4 times daily       clonazePAM (KLONOPIN) 0.5 MG tablet 2 TABLETS (1MG) BY MOUTH AT BEDTIME *OK TO CRUSH AND PUT IN YOGURT, PUDDING OR APPLESAUCE* 60 tablet 5     DONEPEZIL HCL PO Take 10 mg by mouth At Bedtime       entacapone (COMTAN) 200 MG tablet Take 100 mg by mouth 4 times daily        gabapentin (NEURONTIN) 300 MG capsule Take 300 mg by mouth At Bedtime       MIDODRINE HCL PO Take 5 mg by mouth 3 times daily (before meals)       polyethylene glycol (MIRALAX/GLYCOLAX) Packet Take 17 g by mouth daily as  needed for constipation       venlafaxine (EFFEXOR-XR) 37.5 MG 24 hr capsule Take 37.5 mg by mouth daily       rOPINIRole (REQUIP) 0.25 MG tablet Take 0.75 mg by mouth 3 times daily        rOPINIRole (REQUIP) 0.25 MG tablet Take 0.25 mg by mouth At Bedtime       Medications reviewed:  Medications reconciled to facility chart and changes were made to reflect current medications as identified as above med list. Below are the changes that were made:   Medications stopped since last EPIC medication reconciliation:   There are no discontinued medications.    Medications started since last [facility] medication reconciliation:  Orders Placed This Encounter   Medications     acetaminophen (TYLENOL) 500 MG tablet     Sig: Take 1,000 mg by mouth 3 times daily         REVIEW OF SYSTEMS:  Limited secondary to cognitive impairment but today pt reports some R hand pain at times    Physical Exam:  /76   Pulse 77   Resp 16   SpO2 92%   GENERAL APPEARANCE:  Alert, in no distress, thin, cooperative  ENT:  Mouth and posterior oropharynx normal, moist mucous membranes, [place]  EYES:  EOM, conjunctivae, lids, pupils and irises normal, PERRL  NECK:  No adenopathy,masses or thyromegaly, FROM  RESP:  respiratory effort and palpation of chest normal, lungs clear to auscultation , no respiratory distress  CV:  Palpation and auscultation of heart done , regular rate and rhythm, no murmur, rub, or gallop, no edema  ABDOMEN:  normal bowel sounds, soft, nontender, no hepatosplenomegaly or other masses, no guarding or rebound  M/S:   muscle strength 5/5 all 4 ext., shane gen. LE weakness. good grasp strenth hands  SKIN:  some bruising to R hand  NEURO:   Cranial nerves 2-12 are normal tested and grossly at patient's baseline, alert, speech soft, clear  PSYCH:  insight and judgement impaired, memory impaired , affect abnormal -flat    Recent Labs:     CBC RESULTS:   Recent Labs   Lab Test 07/27/18  1245 05/11/18  1549   WBC 4.7 4.6   RBC 4.52 4.45    HGB 13.8 13.7   HCT 43.1 42.2   MCV 95 95   MCH 30.5 30.8   MCHC 32.0 32.5   RDW 14.5 14.0   * 127*       Last Basic Metabolic Panel:  Recent Labs   Lab Test 09/11/18 07/27/18  1245    139   POTASSIUM 3.9 3.7   CHLORIDE 108* 106   SADIA 9.0 8.7   CO2 28 30   BUN 21 18   CR 0.90 0.88   GLC 84 88         Assessment/Plan:  Closed displaced fracture of fifth metacarpal bone of right hand, sequela  Pain gen. Controlled  1. Cont. James tape to 4th, 5th fingers, monitor for attempts to remove tape-was not in place this am  2. Cont. Tylenol  3. Monitor for increased pain, tim with w.b. Using walker  4. F/u XR in 6 weeks per [facility]    Hypotension, unspecified hypotension type  Currently stable  1. Cont. Midodrine  2. Encourage fluids  3. Follow BPs, HRs  4. Cbc, bmp in next 1-3 mos    Lewy body dementia without behavioral disturbance  Memory loss. Anxiety at times  1. Cont. Effexor, klonopin, neurontin  2. Monitor for changes in mod, behavior  3. Monitor for changes in gait, further falls        Electronically signed by  DONY Clark CNP

## 2019-02-08 NOTE — ADDENDUM NOTE
Encounter addended by: Lee Puri, SLP on: 2/8/2019 3:07 PM   Actions taken: Charge Capture section accepted

## 2019-02-08 NOTE — LETTER
2/8/2019        RE: Carlos Neri  [address]  [place] [address]        [facility]    Chief Complaint   Patient presents with     Pain       [place] Medical Record Number:  [id number]  Place of Service where encounter took place:  [facility] ([facility]) [[id number]]    HPI:    Carlos Neri is a 83 year old  (1935), who is being seen today for an episodic care visit.  HPI information obtained from: facility chart records, facility staff, patient report and [facility] chart review.Today's concern is: R hand pain. S/p fall with fx 5th metacarpal.  Tylenol sched. Pain appears controlled. Seen by [facility]. Conservative tx decided-has 4th, 5th fingers R hand lucas taped. No subsequent falls.  Has dementia with memory loss. No recent functional decline. No reports of further increased anxiety. Cont. On effexor, klonopin, neurontin. BPs stable. Has h/o low BPs. Cont. On midodrine. No recent reports of dizziness.       ALLERGIES: Morphine sulfate  Past Medical, Surgical, Family and Social History reviewed and updated in [facility].    Current Outpatient Medications   Medication Sig Dispense Refill     acetaminophen (TYLENOL) 500 MG tablet Take 1,000 mg by mouth 3 times daily       carbidopa-levodopa (SINEMET CR)  MG per tablet Take 1 tablet by mouth At Bedtime.       carbidopa-levodopa (SINEMET)  MG tablet Take 1 tablet by mouth 2 times daily       carbidopa-levodopa (SINEMET)  MG per tablet Take 1 tablet by mouth 4 times daily       clonazePAM (KLONOPIN) 0.5 MG tablet 2 TABLETS (1MG) BY MOUTH AT BEDTIME *OK TO CRUSH AND PUT IN YOGURT, PUDDING OR APPLESAUCE* 60 tablet 5     DONEPEZIL HCL PO Take 10 mg by mouth At Bedtime       entacapone (COMTAN) 200 MG tablet Take 100 mg by mouth 4 times daily        gabapentin (NEURONTIN) 300 MG capsule Take 300 mg by mouth At Bedtime       MIDODRINE HCL PO Take 5 mg by mouth 3 times daily (before  meals)       polyethylene glycol (MIRALAX/GLYCOLAX) Packet Take 17 g by mouth daily as needed for constipation       venlafaxine (EFFEXOR-XR) 37.5 MG 24 hr capsule Take 37.5 mg by mouth daily       rOPINIRole (REQUIP) 0.25 MG tablet Take 0.75 mg by mouth 3 times daily        rOPINIRole (REQUIP) 0.25 MG tablet Take 0.25 mg by mouth At Bedtime       Medications reviewed:  Medications reconciled to facility chart and changes were made to reflect current medications as identified as above med list. Below are the changes that were made:   Medications stopped since last EPIC medication reconciliation:   There are no discontinued medications.    Medications started since last [facility] medication reconciliation:  Orders Placed This Encounter   Medications     acetaminophen (TYLENOL) 500 MG tablet     Sig: Take 1,000 mg by mouth 3 times daily         REVIEW OF SYSTEMS:  Limited secondary to cognitive impairment but today pt reports some R hand pain at times    Physical Exam:  /76   Pulse 77   Resp 16   SpO2 92%   GENERAL APPEARANCE:  Alert, in no distress, thin, cooperative  ENT:  Mouth and posterior oropharynx normal, moist mucous membranes, [place]  EYES:  EOM, conjunctivae, lids, pupils and irises normal, PERRL  NECK:  No adenopathy,masses or thyromegaly, FROM  RESP:  respiratory effort and palpation of chest normal, lungs clear to auscultation , no respiratory distress  CV:  Palpation and auscultation of heart done , regular rate and rhythm, no murmur, rub, or gallop, no edema  ABDOMEN:  normal bowel sounds, soft, nontender, no hepatosplenomegaly or other masses, no guarding or rebound  M/S:   muscle strength 5/5 all 4 ext., shane gen. LE weakness. good grasp strenth hands  SKIN:  some bruising to R hand  NEURO:   Cranial nerves 2-12 are normal tested and grossly at patient's baseline, alert, speech soft, clear  PSYCH:  insight and judgement impaired, memory impaired , affect abnormal -flat    Recent Labs:     CBC  RESULTS:   Recent Labs   Lab Test 07/27/18  1245 05/11/18  1549   WBC 4.7 4.6   RBC 4.52 4.45   HGB 13.8 13.7   HCT 43.1 42.2   MCV 95 95   MCH 30.5 30.8   MCHC 32.0 32.5   RDW 14.5 14.0   * 127*       Last Basic Metabolic Panel:  Recent Labs   Lab Test 09/11/18 07/27/18  1245    139   POTASSIUM 3.9 3.7   CHLORIDE 108* 106   SADIA 9.0 8.7   CO2 28 30   BUN 21 18   CR 0.90 0.88   GLC 84 88         Assessment/Plan:  Closed displaced fracture of fifth metacarpal bone of right hand, sequela  Pain gen. Controlled  1. Cont. James tape to 4th, 5th fingers, monitor for attempts to remove tape-was not in place this am  2. Cont. Tylenol  3. Monitor for increased pain, tim with w.b. Using walker  4. F/u XR in 6 weeks per [facility]    Hypotension, unspecified hypotension type  Currently stable  1. Cont. Midodrine  2. Encourage fluids  3. Follow BPs, HRs  4. Cbc, bmp in next 1-3 mos    Lewy body dementia without behavioral disturbance  Memory loss. Anxiety at times  1. Cont. Effexor, klonopin, neurontin  2. Monitor for changes in mod, behavior  3. Monitor for changes in gait, further falls        Electronically signed by  DONY Clark CNP                      Sincerely,        DONY Clark CNP

## 2019-02-12 NOTE — ADDENDUM NOTE
Encounter addended by: Taylor Power, SLP on: 2/12/2019 10:29 AM   Actions taken: Charge Capture section accepted

## 2019-02-19 ENCOUNTER — TRANSFERRED RECORDS (OUTPATIENT)
Dept: HEALTH INFORMATION MANAGEMENT | Facility: CLINIC | Age: 84
End: 2019-02-19

## 2019-02-19 ENCOUNTER — ASSISTED LIVING VISIT (OUTPATIENT)
Dept: GERIATRICS | Facility: CLINIC | Age: 84
End: 2019-02-19
Payer: COMMERCIAL

## 2019-02-19 ENCOUNTER — HOSPITAL ENCOUNTER (EMERGENCY)
Facility: CLINIC | Age: 84
Discharge: HOME | End: 2019-02-20
Attending: EMERGENCY MEDICINE | Admitting: EMERGENCY MEDICINE
Payer: COMMERCIAL

## 2019-02-19 ENCOUNTER — APPOINTMENT (OUTPATIENT)
Dept: CT IMAGING | Facility: CLINIC | Age: 84
End: 2019-02-19
Attending: EMERGENCY MEDICINE
Payer: COMMERCIAL

## 2019-02-19 ENCOUNTER — RECORDS - HEALTHEAST (OUTPATIENT)
Dept: LAB | Facility: CLINIC | Age: 84
End: 2019-02-19

## 2019-02-19 VITALS
TEMPERATURE: 98.4 F | HEART RATE: 81 BPM | RESPIRATION RATE: 20 BRPM | OXYGEN SATURATION: 93 % | SYSTOLIC BLOOD PRESSURE: 122 MMHG | DIASTOLIC BLOOD PRESSURE: 73 MMHG

## 2019-02-19 DIAGNOSIS — R41.0 CONFUSION: ICD-10-CM

## 2019-02-19 DIAGNOSIS — I95.9 HYPOTENSION, UNSPECIFIED HYPOTENSION TYPE: ICD-10-CM

## 2019-02-19 DIAGNOSIS — G20.A1 PARKINSON'S DISEASE (H): ICD-10-CM

## 2019-02-19 DIAGNOSIS — F02.80 LEWY BODY DEMENTIA WITHOUT BEHAVIORAL DISTURBANCE (H): Primary | ICD-10-CM

## 2019-02-19 DIAGNOSIS — F03.91 DEMENTIA WITH BEHAVIORAL DISTURBANCE, UNSPECIFIED DEMENTIA TYPE: ICD-10-CM

## 2019-02-19 DIAGNOSIS — G31.83 LEWY BODY DEMENTIA WITHOUT BEHAVIORAL DISTURBANCE (H): Primary | ICD-10-CM

## 2019-02-19 LAB
ALBUMIN SERPL-MCNC: 3.7 G/DL (ref 3.4–5)
ALBUMIN UR-MCNC: 30 MG/DL
ALBUMIN UR-MCNC: ABNORMAL MG/DL
ALP SERPL-CCNC: 60 U/L (ref 40–150)
ALT SERPL W P-5'-P-CCNC: 10 U/L (ref 0–70)
AMMONIA PLAS-SCNC: 24 UMOL/L (ref 10–50)
ANION GAP SERPL CALCULATED.3IONS-SCNC: 7 MMOL/L (ref 3–14)
APPEARANCE UR: CLEAR
APPEARANCE UR: CLEAR
AST SERPL W P-5'-P-CCNC: 18 U/L (ref 0–45)
BACTERIA #/AREA URNS HPF: ABNORMAL HPF
BASE EXCESS BLDV CALC-SCNC: 1.6 MMOL/L
BASOPHILS # BLD AUTO: 0 10E9/L (ref 0–0.2)
BASOPHILS NFR BLD AUTO: 0.4 %
BILIRUB SERPL-MCNC: 0.6 MG/DL (ref 0.2–1.3)
BILIRUB UR QL STRIP: ABNORMAL
BILIRUB UR QL STRIP: NEGATIVE
BUN SERPL-MCNC: 31 MG/DL (ref 7–30)
CALCIUM SERPL-MCNC: 8.6 MG/DL (ref 8.5–10.1)
CHLORIDE SERPL-SCNC: 111 MMOL/L (ref 94–109)
CO2 SERPL-SCNC: 26 MMOL/L (ref 20–32)
COLOR UR AUTO: ABNORMAL
COLOR UR AUTO: YELLOW
CREAT SERPL-MCNC: 0.98 MG/DL (ref 0.66–1.25)
DIFFERENTIAL METHOD BLD: ABNORMAL
EOSINOPHIL # BLD AUTO: 0.1 10E9/L (ref 0–0.7)
EOSINOPHIL NFR BLD AUTO: 1.2 %
ERYTHROCYTE [DISTWIDTH] IN BLOOD BY AUTOMATED COUNT: 14.1 % (ref 10–15)
ETHANOL SERPL-MCNC: <0.01 G/DL
GFR SERPL CREATININE-BSD FRML MDRD: 71 ML/MIN/{1.73_M2}
GLUCOSE SERPL-MCNC: 102 MG/DL (ref 70–99)
GLUCOSE UR STRIP-MCNC: NEGATIVE MG/DL
GLUCOSE UR STRIP-MCNC: NEGATIVE MG/DL
HCO3 BLDV-SCNC: 27 MMOL/L (ref 21–28)
HCT VFR BLD AUTO: 42.5 % (ref 40–53)
HGB BLD-MCNC: 13.6 G/DL (ref 13.3–17.7)
HGB UR QL STRIP: NEGATIVE
HGB UR QL STRIP: NEGATIVE
HYALINE CASTS #/AREA URNS LPF: 5 /LPF (ref 0–2)
HYALINE CASTS #/AREA URNS LPF: ABNORMAL LPF
IMM GRANULOCYTES # BLD: 0 10E9/L (ref 0–0.4)
IMM GRANULOCYTES NFR BLD: 0.2 %
KETONES UR STRIP-MCNC: 5 MG/DL
KETONES UR STRIP-MCNC: ABNORMAL MG/DL
LEUKOCYTE ESTERASE UR QL STRIP: ABNORMAL
LEUKOCYTE ESTERASE UR QL STRIP: NEGATIVE
LYMPHOCYTES # BLD AUTO: 1.1 10E9/L (ref 0.8–5.3)
LYMPHOCYTES NFR BLD AUTO: 21.6 %
MCH RBC QN AUTO: 30.7 PG (ref 26.5–33)
MCHC RBC AUTO-ENTMCNC: 32 G/DL (ref 31.5–36.5)
MCV RBC AUTO: 96 FL (ref 78–100)
MONOCYTES # BLD AUTO: 0.5 10E9/L (ref 0–1.3)
MONOCYTES NFR BLD AUTO: 10.2 %
MUCOUS THREADS #/AREA URNS LPF: ABNORMAL LPF
MUCOUS THREADS #/AREA URNS LPF: PRESENT /LPF
NEUTROPHILS # BLD AUTO: 3.3 10E9/L (ref 1.6–8.3)
NEUTROPHILS NFR BLD AUTO: 66.4 %
NITRATE UR QL: NEGATIVE
NITRATE UR QL: NEGATIVE
NRBC # BLD AUTO: 0 10*3/UL
NRBC BLD AUTO-RTO: 0 /100
O2/TOTAL GAS SETTING VFR VENT: ABNORMAL %
OXYHGB MFR BLDV: 98 %
PCO2 BLDV: 43 MM HG (ref 40–50)
PH BLDV: 7.4 PH (ref 7.32–7.43)
PH UR STRIP: 5 PH (ref 5–7)
PH UR STRIP: 6 [PH] (ref 4.5–8)
PLATELET # BLD AUTO: 144 10E9/L (ref 150–450)
PO2 BLDV: 94 MM HG (ref 25–47)
POTASSIUM SERPL-SCNC: 4.2 MMOL/L (ref 3.4–5.3)
PROT SERPL-MCNC: 6.7 G/DL (ref 6.8–8.8)
RBC # BLD AUTO: 4.43 10E12/L (ref 4.4–5.9)
RBC #/AREA URNS AUTO: 2 /HPF (ref 0–2)
RBC #/AREA URNS AUTO: ABNORMAL HPF
SODIUM SERPL-SCNC: 144 MMOL/L (ref 133–144)
SOURCE: ABNORMAL
SP GR UR STRIP: 1.03 (ref 1–1.03)
SP GR UR STRIP: 1.03 (ref 1–1.03)
SQUAMOUS #/AREA URNS AUTO: <1 /HPF (ref 0–1)
SQUAMOUS #/AREA URNS AUTO: ABNORMAL LPF
TROPONIN I SERPL-MCNC: <0.015 UG/L (ref 0–0.04)
UROBILINOGEN UR STRIP-ACNC: ABNORMAL
UROBILINOGEN UR STRIP-MCNC: NEGATIVE MG/DL (ref 0–2)
WBC # BLD AUTO: 4.9 10E9/L (ref 4–11)
WBC #/AREA URNS AUTO: 4 /HPF (ref 0–5)
WBC #/AREA URNS AUTO: ABNORMAL HPF

## 2019-02-19 PROCEDURE — 82805 BLOOD GASES W/O2 SATURATION: CPT | Performed by: EMERGENCY MEDICINE

## 2019-02-19 PROCEDURE — 80320 DRUG SCREEN QUANTALCOHOLS: CPT | Performed by: EMERGENCY MEDICINE

## 2019-02-19 PROCEDURE — 93005 ELECTROCARDIOGRAM TRACING: CPT

## 2019-02-19 PROCEDURE — 85025 COMPLETE CBC W/AUTO DIFF WBC: CPT | Performed by: EMERGENCY MEDICINE

## 2019-02-19 PROCEDURE — 80053 COMPREHEN METABOLIC PANEL: CPT | Performed by: EMERGENCY MEDICINE

## 2019-02-19 PROCEDURE — 84484 ASSAY OF TROPONIN QUANT: CPT | Performed by: EMERGENCY MEDICINE

## 2019-02-19 PROCEDURE — 81001 URINALYSIS AUTO W/SCOPE: CPT | Mod: XU | Performed by: EMERGENCY MEDICINE

## 2019-02-19 PROCEDURE — 70450 CT HEAD/BRAIN W/O DYE: CPT

## 2019-02-19 PROCEDURE — 82140 ASSAY OF AMMONIA: CPT | Performed by: EMERGENCY MEDICINE

## 2019-02-19 PROCEDURE — 99285 EMERGENCY DEPT VISIT HI MDM: CPT | Mod: 25

## 2019-02-19 RX ORDER — CARBIDOPA AND LEVODOPA 50; 200 MG/1; MG/1
1 TABLET, EXTENDED RELEASE ORAL AT BEDTIME
COMMUNITY

## 2019-02-19 RX ORDER — CARBIDOPA AND LEVODOPA 25; 100 MG/1; MG/1
1 TABLET, EXTENDED RELEASE ORAL 2 TIMES DAILY
COMMUNITY
End: 2019-03-12

## 2019-02-19 ASSESSMENT — ENCOUNTER SYMPTOMS: SHORTNESS OF BREATH: 0

## 2019-02-19 NOTE — LETTER
2/19/2019        RE: Carlos Neri  [address]  [place] [address]        [place] [facility]    Chief Complaint   Patient presents with     Confusion       [place] Medical Record Number:  [id number]  Place of Service where encounter took place:  [facility] ([facility]) [[id number]]    HPI:    Carlos Neri is a 83 year old  (1935), who is being seen today for an episodic care visit.  HPI information obtained from: facility chart records, facility staff, patient report and [facility] chart review.Today's concern is: dementia, PD, hypotension. Seen by Neurology 1/30/19. aricept dc'd. Sinemet dose increased, effexor started.  S/p fall 2/4/19, fx of L hand-seen by [facility]. Per staff has had increased confusion. Has dementia with memory loss. At Neuro, MOCA 17/30.  Has dysphagia, followed by ST. Uses walker for amb. No falls since 2/4/19.  BPs stable. Cont. On midodrine for hypotension.       ALLERGIES: Morphine sulfate  Past Medical, Surgical, Family and Social History reviewed and updated in [facility].    Current Outpatient Medications   Medication Sig Dispense Refill     carbidopa-levodopa (SINEMET CR)  MG CR tablet Take 1 tablet by mouth 2 times daily       carbidopa-levodopa (SINEMET CR)  MG CR tablet Take 1 tablet by mouth At Bedtime       acetaminophen (TYLENOL) 500 MG tablet Take 1,000 mg by mouth 3 times daily       carbidopa-levodopa (SINEMET)  MG per tablet Take 1 tablet by mouth 4 times daily       clonazePAM (KLONOPIN) 0.5 MG tablet 2 TABLETS (1MG) BY MOUTH AT BEDTIME *OK TO CRUSH AND PUT IN YOGURT, PUDDING OR APPLESAUCE* 60 tablet 5     entacapone (COMTAN) 200 MG tablet Take 100 mg by mouth 4 times daily        gabapentin (NEURONTIN) 300 MG capsule Take 300 mg by mouth At Bedtime       MIDODRINE HCL PO Take 5 mg by mouth 3 times daily (before meals)       polyethylene glycol (MIRALAX/GLYCOLAX) Packet Take 17 g by mouth daily  as needed for constipation       rOPINIRole (REQUIP) 0.25 MG tablet Take 0.75 mg by mouth 3 times daily        rOPINIRole (REQUIP) 0.25 MG tablet Take 0.25 mg by mouth At Bedtime       venlafaxine (EFFEXOR-XR) 37.5 MG 24 hr capsule Take 37.5 mg by mouth daily       Medications reviewed:  Medications reconciled to facility chart and changes were made to reflect current medications as identified as above med list. Below are the changes that were made:   Medications stopped since last EPIC medication reconciliation:   There are no discontinued medications.    Medications started since last [facility] medication reconciliation:  No orders of the defined types were placed in this encounter.        REVIEW OF SYSTEMS:  Limited secondary to cognitive impairment but today pt reports feeling ok    Physical Exam:  /73   Pulse 81   Temp 98.4  F (36.9  C)   Resp 20   SpO2 93%   GENERAL APPEARANCE:  Alert, in no distress, cooperative  ENT:  Mouth and posterior oropharynx normal, moist mucous membranes, [place]  EYES:  EOM, conjunctivae, lids, pupils and irises normal, PERRL  NECK:  No adenopathy,masses or thyromegaly, no carotid bruit  RESP:  respiratory effort and palpation of chest normal, lungs clear to auscultation , no respiratory distress  CV:  Palpation and auscultation of heart done , regular rate and rhythm, no murmur, rub, or gallop, no edema  ABDOMEN:  normal bowel sounds, soft, nontender, no hepatosplenomegaly or other masses, no guarding or rebound  M/S:   gait steady with walker. no R hand pain with palp. muscle strength 5/5 all 4 ext  NEURO:   Cranial nerves 2-12 are normal tested and grossly at patient's baseline, alert, speech soft, clear  PSYCH:  insight and judgement impaired, memory impaired , affect abnormal -flat    Recent Labs:     CBC RESULTS:   Recent Labs   Lab Test 07/27/18  1245 05/11/18  1549   WBC 4.7 4.6   RBC 4.52 4.45   HGB 13.8 13.7   HCT 43.1 42.2   MCV 95 95   MCH 30.5 30.8   MCHC 32.0 32.5    RDW 14.5 14.0   * 127*       Last Basic Metabolic Panel:  Recent Labs   Lab Test 09/11/18 07/27/18  1245    139   POTASSIUM 3.9 3.7   CHLORIDE 108* 106   SADIA 9.0 8.7   CO2 28 30   BUN 21 18   CR 0.90 0.88   GLC 84 88       Assessment/Plan:  Lewy body dementia without behavioral disturbance  Increased confusion. aricept recently dc'd. effexor started  1. Ua/uc r/o UTI  2. Cont. effexor  3. Monitor for increased anxiety, psychosis  4. Reassess over next couple weeks    Parkinson's disease (H)  S/p fall 2/4/19. recnet Neuro appt. Increased simemet dose  1. Cont. Sinemet, entacapone, klonopin, neurontin  2. Monitor for changes in gait, balance  3. Monitor for falls  4. Monitor for increased rigidity. F/u with Neurology prn    Hypotension, unspecified hypotension type  Currently stable BPs  1. Cont. Midodrine  2. Encourage fluids  3. Cbc, bmp tomorrow          Electronically signed by  DONY Clark CNP                      Sincerely,        DONY Clark CNP

## 2019-02-19 NOTE — PROGRESS NOTES
[place] [facility]    Chief Complaint   Patient presents with     Confusion       [place] Medical Record Number:  [id number]  Place of Service where encounter took place:  [facility] ([facility]) [[id number]]    HPI:    Carlos Neri is a 83 year old  (1935), who is being seen today for an episodic care visit.  HPI information obtained from: facility chart records, facility staff, patient report and [facility] chart review.Today's concern is: dementia, PD, hypotension. Seen by Neurology 1/30/19. aricept dc'd. Sinemet dose increased, effexor started.  S/p fall 2/4/19, fx of L hand-seen by [facility]. Per staff has had increased confusion. Has dementia with memory loss. At Neuro, MOCA 17/30.  Has dysphagia, followed by ST. Uses walker for amb. No falls since 2/4/19.  BPs stable. Cont. On midodrine for hypotension.       ALLERGIES: Morphine sulfate  Past Medical, Surgical, Family and Social History reviewed and updated in [facility]    Current Outpatient Medications   Medication Sig Dispense Refill     carbidopa-levodopa (SINEMET CR)  MG CR tablet Take 1 tablet by mouth 2 times daily       carbidopa-levodopa (SINEMET CR)  MG CR tablet Take 1 tablet by mouth At Bedtime       acetaminophen (TYLENOL) 500 MG tablet Take 1,000 mg by mouth 3 times daily       carbidopa-levodopa (SINEMET)  MG per tablet Take 1 tablet by mouth 4 times daily       clonazePAM (KLONOPIN) 0.5 MG tablet 2 TABLETS (1MG) BY MOUTH AT BEDTIME *OK TO CRUSH AND PUT IN YOGURT, PUDDING OR APPLESAUCE* 60 tablet 5     entacapone (COMTAN) 200 MG tablet Take 100 mg by mouth 4 times daily        gabapentin (NEURONTIN) 300 MG capsule Take 300 mg by mouth At Bedtime       MIDODRINE HCL PO Take 5 mg by mouth 3 times daily (before meals)       polyethylene glycol (MIRALAX/GLYCOLAX) Packet Take 17 g by mouth daily as needed for constipation       rOPINIRole (REQUIP) 0.25 MG tablet Take 0.75 mg by mouth 3  times daily        rOPINIRole (REQUIP) 0.25 MG tablet Take 0.25 mg by mouth At Bedtime       venlafaxine (EFFEXOR-XR) 37.5 MG 24 hr capsule Take 37.5 mg by mouth daily       Medications reviewed:  Medications reconciled to facility chart and changes were made to reflect current medications as identified as above med list. Below are the changes that were made:   Medications stopped since last EPIC medication reconciliation:   There are no discontinued medications.    Medications started since last [facility] medication reconciliation:  No orders of the defined types were placed in this encounter.        REVIEW OF SYSTEMS:  Limited secondary to cognitive impairment but today pt reports feeling ok    Physical Exam:  /73   Pulse 81   Temp 98.4  F (36.9  C)   Resp 20   SpO2 93%   GENERAL APPEARANCE:  Alert, in no distress, cooperative  ENT:  Mouth and posterior oropharynx normal, moist mucous membranes, [place]  EYES:  EOM, conjunctivae, lids, pupils and irises normal, PERRL  NECK:  No adenopathy,masses or thyromegaly, no carotid bruit  RESP:  respiratory effort and palpation of chest normal, lungs clear to auscultation , no respiratory distress  CV:  Palpation and auscultation of heart done , regular rate and rhythm, no murmur, rub, or gallop, no edema  ABDOMEN:  normal bowel sounds, soft, nontender, no hepatosplenomegaly or other masses, no guarding or rebound  M/S:   gait steady with walker. no R hand pain with palp. muscle strength 5/5 all 4 ext  NEURO:   Cranial nerves 2-12 are normal tested and grossly at patient's baseline, alert, speech soft, clear  PSYCH:  insight and judgement impaired, memory impaired , affect abnormal -flat    Recent Labs:     CBC RESULTS:   Recent Labs   Lab Test 07/27/18  1245 05/11/18  1549   WBC 4.7 4.6   RBC 4.52 4.45   HGB 13.8 13.7   HCT 43.1 42.2   MCV 95 95   MCH 30.5 30.8   MCHC 32.0 32.5   RDW 14.5 14.0   * 127*       Last Basic Metabolic Panel:  Recent Labs   Lab Test  09/11/18 07/27/18  1245    139   POTASSIUM 3.9 3.7   CHLORIDE 108* 106   SADIA 9.0 8.7   CO2 28 30   BUN 21 18   CR 0.90 0.88   GLC 84 88       Assessment/Plan:  Lewy body dementia without behavioral disturbance  Increased confusion. aricept recently dc'd. effexor started  1. Ua/uc r/o UTI  2. Cont. effexor  3. Monitor for increased anxiety, psychosis  4. Reassess over next couple weeks    Parkinson's disease (H)  S/p fall 2/4/19. recnet Neuro appt. Increased simemet dose  1. Cont. Sinemet, entacapone, klonopin, neurontin  2. Monitor for changes in gait, balance  3. Monitor for falls  4. Monitor for increased rigidity. F/u with Neurology prn    Hypotension, unspecified hypotension type  Currently stable BPs  1. Cont. Midodrine  2. Encourage fluids  3. Cbc, bmp tomorrow          Electronically signed by  DONY Clark CNP

## 2019-02-19 NOTE — ED AVS SNAPSHOT
[facility]  [address]  [facility] [address]  Phone:  [phone]  Fax:  [phone]                                    Carlos Neri   MRN: [id number]    Department:  [facility]   Date of Visit:  2/19/2019           After Visit Summary Signature Page    I have received my discharge instructions, and my questions have been answered. I have discussed any challenges I see with this plan with the nurse or doctor.    ..........................................................................................................................................  Patient/Patient Representative Signature      ..........................................................................................................................................  Patient Representative Print Name and Relationship to Patient    ..................................................               ................................................  Date                                   Time    ..........................................................................................................................................  Reviewed by Signature/Title    ...................................................              ..............................................  Date                                               Time          22EPIC Rev 08/18

## 2019-02-20 VITALS
TEMPERATURE: 98.4 F | SYSTOLIC BLOOD PRESSURE: 134 MMHG | RESPIRATION RATE: 9 BRPM | DIASTOLIC BLOOD PRESSURE: 82 MMHG | OXYGEN SATURATION: 95 % | HEART RATE: 68 BPM

## 2019-02-20 LAB
BACTERIA SPEC CULT: NO GROWTH
INTERPRETATION ECG - MUSE: NORMAL

## 2019-02-20 NOTE — ED NOTES
Bed: ED02  Expected date: 2/19/19  Expected time: 9:43 PM  Means of arrival: Ambulance  Comments:  ROULA

## 2019-02-20 NOTE — ED TRIAGE NOTES
Pt ffrom memory care with EMS D/T possible confusion. Pt was fojun d wondering trying to get off the facilty. Per EMS staff reported pt showing increased confusion. Pt oriented to self only. Denies any pain
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
## 2019-02-27 DIAGNOSIS — F41.9 ANXIETY: ICD-10-CM

## 2019-03-01 ENCOUNTER — ASSISTED LIVING VISIT (OUTPATIENT)
Dept: GERIATRICS | Facility: CLINIC | Age: 84
End: 2019-03-01
Payer: COMMERCIAL

## 2019-03-01 VITALS
HEART RATE: 61 BPM | BODY MASS INDEX: 20.83 KG/M2 | WEIGHT: 153.6 LBS | DIASTOLIC BLOOD PRESSURE: 70 MMHG | TEMPERATURE: 97.3 F | RESPIRATION RATE: 21 BRPM | SYSTOLIC BLOOD PRESSURE: 109 MMHG

## 2019-03-01 DIAGNOSIS — R29.6 FALLS FREQUENTLY: ICD-10-CM

## 2019-03-01 DIAGNOSIS — G20.A1 PARKINSON'S DISEASE (H): ICD-10-CM

## 2019-03-01 DIAGNOSIS — F41.9 ANXIETY: Primary | ICD-10-CM

## 2019-03-01 DIAGNOSIS — G31.83 LEWY BODY DEMENTIA WITHOUT BEHAVIORAL DISTURBANCE (H): ICD-10-CM

## 2019-03-01 DIAGNOSIS — F02.80 LEWY BODY DEMENTIA WITHOUT BEHAVIORAL DISTURBANCE (H): ICD-10-CM

## 2019-03-01 DIAGNOSIS — I95.1 ORTHOSTATIC HYPOTENSION: ICD-10-CM

## 2019-03-01 DIAGNOSIS — G40.909 SEIZURE DISORDER (H): ICD-10-CM

## 2019-03-01 RX ORDER — CLONAZEPAM 0.5 MG/1
TABLET ORAL
Qty: 60 TABLET | Refills: 5 | Status: SHIPPED | OUTPATIENT
Start: 2019-03-01 | End: 2019-03-12

## 2019-03-01 RX ORDER — GLYCERIN/PROPYLENE GLYCOL 0.6 %-0.6%
1 DROPPERETTE, SINGLE-USE DROP DISPENSER OPHTHALMIC (EYE) 2 TIMES DAILY
COMMUNITY

## 2019-03-01 NOTE — LETTER
3/1/2019        RE: Carlos Neri  [id number]  [place] [id number]         Carlos Neri is a 83 year old male seen March 1, 2019 at [facility] where he was admitted last month, seen for initial visit.      Patient is seen in his apartment up to chair.  He has now moved down to the secure Memory Care unit because 2 weeks ago he was found outside, not dressed for the weather.    He was seen in the ED with unremarkable labs and head imaging.     Patient had been seen by Neurology 1/30/19 and noted more confusion,less mobility.   His Requip was stopped, Aricept stopped and Effexor started.   Sinemet dose increased.   Patient did not tolerate these changes, had a fall with right hand fracture.    Sinemet dose returned to prior dose and Requip restarted.     Nursing staff reports patient apraxic in the bathroom this morning, clothes all wet, putting items in the toilet.    Patient was diagnosed with essential tremor in 2000, then with worsening symptoms was seen at [place] in 2004 and diagnosed with Parkinson's disease.   Tx'd with Sinemet since that time, with gradual decline in cognition and mobility     Patient had a swallow study in July, since admit to [place] has been given pureed diet and thickened liquids because of those results.     E  Sleeping okay at night.   Needs midodrine for bp support, was seen at [facility] ER in July 2018 for hypotension.        Past Medical History:   Diagnosis Date     Dysphagia      Lewy body dementia      Parkinson disease (H)     With dementia.   Seizure disorder, 2017  S/p nephrectomy for renal cell CA, 2003  Dysphagia    SH:  Previously lived with his wife Haylee in [place]  They have 2 sons    ROS:    SLUMS 9/30   CPT 4.1    Ambulatory with FWW    Frequent falls   Sleep variable, likes to go to bed early.  Wt Readings from Last 5 Encounters:   03/01/19 69.7 kg (153 lb 9.6 oz)   09/26/18 70.4 kg (155 lb 3.2 oz)   05/11/18 75.3 kg (166 lb)   05/26/17 78  kg (172 lb)        EXAM:  Pleasant, NAD  /70   Pulse 61   Temp 97.3  F (36.3  C)   Resp 21   Wt 69.7 kg (153 lb 9.6 oz)   BMI 20.83 kg/m      Hypophonic  Neck supple without adenopathy  Lungs clear bilaterally with fair air movement  Heart RRR s1s2  Abd soft, NT, no distention, +BS  Ext without edema, +kyphosis  Neuro: bilateral hand resting tremor, +cogwheeling, +dyskinesia, +freezing  Psych: affect anxious    Last Comprehensive Metabolic Panel:  Sodium   Date Value Ref Range Status   02/19/2019 144 133 - 144 mmol/L Final     Potassium   Date Value Ref Range Status   02/19/2019 4.2 3.4 - 5.3 mmol/L Final     Chloride   Date Value Ref Range Status   02/19/2019 111 (H) 94 - 109 mmol/L Final     Carbon Dioxide   Date Value Ref Range Status   02/19/2019 26 20 - 32 mmol/L Final     Anion Gap   Date Value Ref Range Status   02/19/2019 7 3 - 14 mmol/L Final     Glucose   Date Value Ref Range Status   02/19/2019 102 (H) 70 - 99 mg/dL Final     Urea Nitrogen   Date Value Ref Range Status   02/19/2019 31 (H) 7 - 30 mg/dL Final     Creatinine   Date Value Ref Range Status   02/19/2019 0.98 0.66 - 1.25 mg/dL Final     GFR Estimate   Date Value Ref Range Status   02/19/2019 71 >60 mL/min/[1.73_m2] Final     Comment:     Non  GFR Calc  Starting 12/18/2018, serum creatinine based estimated GFR (eGFR) will be   calculated using the Chronic Kidney Disease Epidemiology Collaboration   (CKD-EPI) equation.       Calcium   Date Value Ref Range Status   02/19/2019 8.6 8.5 - 10.1 mg/dL Final     Lab Results   Component Value Date    WBC 4.9 02/19/2019      HGB 13.6 02/19/2019      MCV 96 02/19/2019       02/19/2019       IMP/PLAN:    (F41.9) Anxiety    Comment: increased agitation recently  Plan: started on venlafaxine one month ago, consider dose increase  Also on HS clonazepam for REM sleep disorder     (G20) Parkinson's disease (H)  Comment: longstanding, gradually progressive   Med changes as  above  Plan: continue Sinemet, ropinirole, entacapone, and Neurology follow up        (R29.6) Falls frequently  Comment: recent hand fracture  Plan:  Scheduled acetaminophen for pain, local measures.     (I95.1) Orthostatic hypotension  Comment: autonomic dysfunction     BP Readings from Last 3 Encounters:   03/01/19 109/70   02/20/19 134/82   02/19/19 122/73      Plan: midodrine 10 mg tid         (G31.83,  F02.80) Lewy body dementia without behavioral disturbance  Comment: cognitive decline, low functional status   Donepezil d/c'd per Neurology.      Plan: [facility] Memory Care unit for assist with meds, meals, activity, safety    (G40.909) Seizure disorder (H)  Comment: no recent sz   Plan: remains on gabapentin and clonazepam, per Neurology        Bety Rowe MD       Sincerely,        Bety Rowe MD

## 2019-03-01 NOTE — PROGRESS NOTES
Carlos Neri is a 83 year old male seen March 1, 2019 at [facility] where he was admitted last month, seen for initial visit.      Patient is seen in his apartment up to chair.  He has now moved down to the [facility] Memory Care unit because 2 weeks ago he was found outside, not dressed for the weather.    He was seen in the ED with unremarkable labs and head imaging.     Patient had been seen by Neurology 1/30/19 and noted more confusion,less mobility.   His Requip was stopped, Aricept stopped and Effexor started.   Sinemet dose increased.   Patient did not tolerate these changes, had a fall with right hand fracture.    Sinemet dose returned to prior dose and Requip restarted.     Nursing staff reports patient apraxic in the bathroom this morning, clothes all wet, putting items in the toilet.    Patient was diagnosed with essential tremor in 2000, then with worsening symptoms was seen at [place] in 2004 and diagnosed with Parkinson's disease.   Tx'd with Sinemet since that time, with gradual decline in cognition and mobility     Patient had a swallow study in July, since admit to [place] has been given pureed diet and thickened liquids because of those results.     E  Sleeping okay at night.   Needs midodrine for bp support, was seen at [facility] ER in July 2018 for hypotension.        Past Medical History:   Diagnosis Date     Dysphagia      Lewy body dementia      Parkinson disease (H)     With dementia.   Seizure disorder, 2017  S/p nephrectomy for renal cell CA, 2003  Dysphagia    SH:  Previously lived with his wife Haylee in [place]  They have 2 sons    ROS:    SLUMS 9/30   CPT 4.1    Ambulatory with FWW    Frequent falls   Sleep variable, likes to go to bed early.  Wt Readings from Last 5 Encounters:   03/01/19 69.7 kg (153 lb 9.6 oz)   09/26/18 70.4 kg (155 lb 3.2 oz)   05/11/18 75.3 kg (166 lb)   05/26/17 78 kg (172 lb)        EXAM:  Pleasant, NAD  /70   Pulse 61   Temp 97.3  F (36.3  C)   Resp  21   Wt 69.7 kg (153 lb 9.6 oz)   BMI 20.83 kg/m     Hypophonic  Neck supple without adenopathy  Lungs clear bilaterally with fair air movement  Heart RRR s1s2  Abd soft, NT, no distention, +BS  Ext without edema, +kyphosis  Neuro: bilateral hand resting tremor, +cogwheeling, +dyskinesia, +freezing  Psych: affect anxious    Last Comprehensive Metabolic Panel:  Sodium   Date Value Ref Range Status   02/19/2019 144 133 - 144 mmol/L Final     Potassium   Date Value Ref Range Status   02/19/2019 4.2 3.4 - 5.3 mmol/L Final     Chloride   Date Value Ref Range Status   02/19/2019 111 (H) 94 - 109 mmol/L Final     Carbon Dioxide   Date Value Ref Range Status   02/19/2019 26 20 - 32 mmol/L Final     Anion Gap   Date Value Ref Range Status   02/19/2019 7 3 - 14 mmol/L Final     Glucose   Date Value Ref Range Status   02/19/2019 102 (H) 70 - 99 mg/dL Final     Urea Nitrogen   Date Value Ref Range Status   02/19/2019 31 (H) 7 - 30 mg/dL Final     Creatinine   Date Value Ref Range Status   02/19/2019 0.98 0.66 - 1.25 mg/dL Final     GFR Estimate   Date Value Ref Range Status   02/19/2019 71 >60 mL/min/[1.73_m2] Final     Comment:     Non  GFR Calc  Starting 12/18/2018, serum creatinine based estimated GFR (eGFR) will be   calculated using the Chronic Kidney Disease Epidemiology Collaboration   (CKD-EPI) equation.       Calcium   Date Value Ref Range Status   02/19/2019 8.6 8.5 - 10.1 mg/dL Final     Lab Results   Component Value Date    WBC 4.9 02/19/2019      HGB 13.6 02/19/2019      MCV 96 02/19/2019       02/19/2019       IMP/PLAN:    (F41.9) Anxiety    Comment: increased agitation recently  Plan: started on venlafaxine one month ago, consider dose increase  Also on HS clonazepam for REM sleep disorder     (G20) Parkinson's disease (H)  Comment: longstanding, gradually progressive   Med changes as above  Plan: continue Sinemet, ropinirole, entacapone, and Neurology follow up        (R29.6) Falls  frequently  Comment: recent hand fracture  Plan:  Scheduled acetaminophen for pain, local measures.     (I95.1) Orthostatic hypotension  Comment: autonomic dysfunction     BP Readings from Last 3 Encounters:   03/01/19 109/70   02/20/19 134/82   02/19/19 122/73      Plan: midodrine 10 mg tid         (G31.83,  F02.80) Lewy body dementia without behavioral disturbance  Comment: cognitive decline, low functional status   Donepezil d/c'd per Neurology.      Plan: AL Memory Care unit for assist with meds, meals, activity, safety    (G40.909) Seizure disorder (H)  Comment: no recent sz   Plan: remains on gabapentin and clonazepam, per Neurology        Bety Rowe MD

## 2019-03-05 ENCOUNTER — ASSISTED LIVING VISIT (OUTPATIENT)
Dept: GERIATRICS | Facility: CLINIC | Age: 84
End: 2019-03-05
Payer: COMMERCIAL

## 2019-03-05 DIAGNOSIS — G20.A1 PARKINSON'S DISEASE (H): Primary | ICD-10-CM

## 2019-03-05 DIAGNOSIS — S62.306S: ICD-10-CM

## 2019-03-05 DIAGNOSIS — G31.83 LEWY BODY DEMENTIA WITHOUT BEHAVIORAL DISTURBANCE (H): ICD-10-CM

## 2019-03-05 DIAGNOSIS — F02.80 LEWY BODY DEMENTIA WITHOUT BEHAVIORAL DISTURBANCE (H): ICD-10-CM

## 2019-03-05 NOTE — PROGRESS NOTES
[facility]    Chief Complaint   Patient presents with     Parkinson       [facility] Medical Record Number:  [id number]  Place of Service where encounter took place:  [facility] (Critical access hospital) [[id number]]    HPI:    Carlos Neri is a 83 year old  (1935), who is being seen today for an episodic care visit.  HPI information obtained from: facility chart records, facility staff, patient report and [facility] chart review.Today's concern is: PD, dementia, R hand pain. Seen by Neurology 1/30/19 due to increased freezing episodes. Returned with added sinemet dose, requip dc'd, aricept dc'd, started on effexor for increased anxiety.  Since has had increased anxiety at times, more easily agitated, wandering into peers rooms at times.  Gait unchanged-cont. Freq. Freezing. Cont. With PT/OT. Neurology restarted requip. Has had 2 recent falls. fx R metacarpal. Seen by [facility]-has repeatedly removed lucas tape. Edema, pain stable.       ALLERGIES: Morphine sulfate  Past Medical, Surgical, Family and Social History reviewed and updated in [facility].    Current Outpatient Medications   Medication Sig Dispense Refill     acetaminophen (TYLENOL) 500 MG tablet Take 1,000 mg by mouth 3 times daily       Artificial Tear Solution (SOOTHE XP) SOLN Place 1 drop into both eyes 2 times daily       carbidopa-levodopa (SINEMET CR)  MG CR tablet Take 1 tablet by mouth 2 times daily       carbidopa-levodopa (SINEMET CR)  MG CR tablet Take 1 tablet by mouth At Bedtime       carbidopa-levodopa (SINEMET)  MG per tablet Take 1 tablet by mouth 4 times daily       clonazePAM (KLONOPIN) 0.5 MG tablet 2 TABLETS (1MG) BY MOUTH AT BEDTIME 60 tablet 5     entacapone (COMTAN) 200 MG tablet Take 100 mg by mouth 4 times daily        gabapentin (NEURONTIN) 300 MG capsule Take 300 mg by mouth At Bedtime       MIDODRINE HCL PO Take 5 mg by mouth 3 times daily (before meals)       polyethylene  glycol (MIRALAX/GLYCOLAX) Packet Take 17 g by mouth daily as needed for constipation       rOPINIRole (REQUIP) 0.25 MG tablet Take 0.75 mg by mouth 3 times daily        rOPINIRole (REQUIP) 0.25 MG tablet Take 0.25 mg by mouth At Bedtime       clonazePAM (KLONOPIN) 0.5 MG tablet Take 2 tablets (1 mg) by mouth At Bedtime May also have 1 tab (0.5 mg) once daily PRN 90 tablet 5     Medications reviewed:  Medications reconciled to facility chart and changes were made to reflect current medications as identified as above med list. Below are the changes that were made:   Medications stopped since last EPIC medication reconciliation:   There are no discontinued medications.    Medications started since last [facility] medication reconciliation:  No orders of the defined types were placed in this encounter.        REVIEW OF SYSTEMS:  Limited secondary to cognitive impairment but today pt reports feeling ok    Physical Exam:  /80   Pulse 82   Resp 18   SpO2 96%   GENERAL APPEARANCE:  Alert, anxious, cooperative  ENT:  Mouth and posterior oropharynx normal, moist mucous membranes, [place]  EYES:  EOM, conjunctivae, lids, pupils and irises normal, PERRL  NECK:  No adenopathy,masses or thyromegaly, FROM  RESP:  respiratory effort and palpation of chest normal, lungs clear to auscultation , no respiratory distress  CV:  Palpation and auscultation of heart done , regular rate and rhythm, no murmur, rub, or gallop, no edema  ABDOMEN:  normal bowel sounds, soft, nontender, no hepatosplenomegaly or other masses, no guarding or rebound  M/S:   muscle strength 5/5 all 4 ext. no pain with ROM R hand. gait slowed, shuffles, intermittent freezing.  UE tremors  NEURO:   Cranial nerves 2-12 are normal tested and grossly at patient's baseline, alert, restless at times  PSYCH:  insight and judgement impaired, memory impaired , affect abnormal -flat    Recent Labs:     CBC RESULTS:   Recent Labs   Lab Test 02/19/19  2227 07/27/18  1245   WBC  4.9 4.7   RBC 4.43 4.52   HGB 13.6 13.8   HCT 42.5 43.1   MCV 96 95   MCH 30.7 30.5   MCHC 32.0 32.0   RDW 14.1 14.5   * 120*       Last Basic Metabolic Panel:  Recent Labs   Lab Test 02/19/19 2227 09/11/18    143   POTASSIUM 4.2 3.9   CHLORIDE 111* 108*   SADIA 8.6 9.0   CO2 26 28   BUN 31* 21   CR 0.98 0.90   * 84       Liver Function Studies -   Recent Labs   Lab Test 02/19/19 2227 05/27/17  0535   PROTTOTAL 6.7* 6.1*   ALBUMIN 3.7 3.2*   BILITOTAL 0.6 0.8   ALKPHOS 60 57   AST 18 11   ALT 10 9       Assessment/Plan:  Parkinson's disease (H)  Ongoing freezing, decreased amb. Newly started extra sinemet doses dc'd. requip restarted  1. Cont. Sinemet  2. Cont. Requip, entacapone, neurontin  3. Spouse to sched. F/u neurology appt  4. Cont. PT, encourage amb as anupam    Lewy body dementia without behavioral disturbance  Increased labile mood, wanders at times, 2 falls past month  1. Discontinue effexor  2. Cont. Hs klonopin  3. For ongoing increased anxiety, may add prn klonopin  4. Spoke with spouse who agrees with plan    Closed displaced fracture of fifth metacarpal bone of right hand, sequela  Pain stable, no edema, refuses lucas tape. Scab on lat. R 5th finger, possibly due to tape  1. Cont. Tylenol  2. Monitor skin for further breakdown  3. F/u with [facility] prn          Electronically signed by  DONY Clark CNP

## 2019-03-05 NOTE — LETTER
3/5/2019        RE: Carlos Neri  [address]  [place] [address]        [facility]    Chief Complaint   Patient presents with     Parkinson       [facility] Medical Record Number:  [id number]  Place of Service where encounter took place:  [facility] ([facility]) [[id number]]    HPI:    Carlos Neri is a 83 year old  (1935), who is being seen today for an episodic care visit.  HPI information obtained from: facility chart records, facility staff, patient report and [facility] chart review.Today's concern is: PD, dementia, R hand pain. Seen by Neurology 1/30/19 due to increased freezing episodes. Returned with added sinemet dose, requip dc'd, aricept dc'd, started on effexor for increased anxiety.  Since has had increased anxiety at times, more easily agitated, wandering into peers rooms at times.  Gait unchanged-cont. Freq. Freezing. Cont. With PT/OT. Neurology restarted requip. Has had 2 recent falls. fx R metacarpal. Seen by [facility]-has repeatedly removed lucas tape. Edema, pain stable.       ALLERGIES: Morphine sulfate  Past Medical, Surgical, Family and Social History reviewed and updated in [facility].    Current Outpatient Medications   Medication Sig Dispense Refill     acetaminophen (TYLENOL) 500 MG tablet Take 1,000 mg by mouth 3 times daily       Artificial Tear Solution (SOOTHE XP) SOLN Place 1 drop into both eyes 2 times daily       carbidopa-levodopa (SINEMET CR)  MG CR tablet Take 1 tablet by mouth 2 times daily       carbidopa-levodopa (SINEMET CR)  MG CR tablet Take 1 tablet by mouth At Bedtime       carbidopa-levodopa (SINEMET)  MG per tablet Take 1 tablet by mouth 4 times daily       clonazePAM (KLONOPIN) 0.5 MG tablet 2 TABLETS (1MG) BY MOUTH AT BEDTIME 60 tablet 5     entacapone (COMTAN) 200 MG tablet Take 100 mg by mouth 4 times daily        gabapentin (NEURONTIN) 300 MG capsule Take 300 mg by mouth At  Bedtime       MIDODRINE HCL PO Take 5 mg by mouth 3 times daily (before meals)       polyethylene glycol (MIRALAX/GLYCOLAX) Packet Take 17 g by mouth daily as needed for constipation       rOPINIRole (REQUIP) 0.25 MG tablet Take 0.75 mg by mouth 3 times daily        rOPINIRole (REQUIP) 0.25 MG tablet Take 0.25 mg by mouth At Bedtime       clonazePAM (KLONOPIN) 0.5 MG tablet Take 2 tablets (1 mg) by mouth At Bedtime May also have 1 tab (0.5 mg) once daily PRN 90 tablet 5     Medications reviewed:  Medications reconciled to facility chart and changes were made to reflect current medications as identified as above med list. Below are the changes that were made:   Medications stopped since last EPIC medication reconciliation:   There are no discontinued medications.    Medications started since last [facility] medication reconciliation:  No orders of the defined types were placed in this encounter.        REVIEW OF SYSTEMS:  Limited secondary to cognitive impairment but today pt reports feeling ok    Physical Exam:  /80   Pulse 82   Resp 18   SpO2 96%   GENERAL APPEARANCE:  Alert, anxious, cooperative  ENT:  Mouth and posterior oropharynx normal, moist mucous membranes, [place]  EYES:  EOM, conjunctivae, lids, pupils and irises normal, PERRL  NECK:  No adenopathy,masses or thyromegaly, FROM  RESP:  respiratory effort and palpation of chest normal, lungs clear to auscultation , no respiratory distress  CV:  Palpation and auscultation of heart done , regular rate and rhythm, no murmur, rub, or gallop, no edema  ABDOMEN:  normal bowel sounds, soft, nontender, no hepatosplenomegaly or other masses, no guarding or rebound  M/S:   muscle strength 5/5 all 4 ext. no pain with ROM R hand. gait slowed, shuffles, intermittent freezing.  UE tremors  NEURO:   Cranial nerves 2-12 are normal tested and grossly at patient's baseline, alert, restless at times  PSYCH:  insight and judgement impaired, memory impaired , affect abnormal  -flat    Recent Labs:     CBC RESULTS:   Recent Labs   Lab Test 02/19/19 2227 07/27/18  1245   WBC 4.9 4.7   RBC 4.43 4.52   HGB 13.6 13.8   HCT 42.5 43.1   MCV 96 95   MCH 30.7 30.5   MCHC 32.0 32.0   RDW 14.1 14.5   * 120*       Last Basic Metabolic Panel:  Recent Labs   Lab Test 02/19/19 2227 09/11/18    143   POTASSIUM 4.2 3.9   CHLORIDE 111* 108*   SADIA 8.6 9.0   CO2 26 28   BUN 31* 21   CR 0.98 0.90   * 84       Liver Function Studies -   Recent Labs   Lab Test 02/19/19 2227 05/27/17  0535   PROTTOTAL 6.7* 6.1*   ALBUMIN 3.7 3.2*   BILITOTAL 0.6 0.8   ALKPHOS 60 57   AST 18 11   ALT 10 9       Assessment/Plan:  Parkinson's disease (H)  Ongoing freezing, decreased amb. Newly started extra sinemet doses dc'd. requip restarted  1. Cont. Sinemet  2. Cont. Requip, entacapone, neurontin  3. Spouse to sched. F/u neurology appt  4. Cont. PT, encourage amb as anupam    Lewy body dementia without behavioral disturbance  Increased labile mood, wanders at times, 2 falls past month  1. Discontinue effexor  2. Cont. Hs klonopin  3. For ongoing increased anxiety, may add prn klonopin  4. Spoke with spouse who agrees with plan    Closed displaced fracture of fifth metacarpal bone of right hand, sequela  Pain stable, no edema, refuses lucas tape. Scab on lat. R 5th finger, possibly due to tape  1. Cont. Tylenol  2. Monitor skin for further breakdown  3. F/u with [facility] prn          Electronically signed by  DONY Clark CNP                      Sincerely,        DONY Clark CNP

## 2019-03-07 ENCOUNTER — TELEPHONE (OUTPATIENT)
Dept: GERIATRICS | Facility: CLINIC | Age: 84
End: 2019-03-07

## 2019-03-07 VITALS
SYSTOLIC BLOOD PRESSURE: 118 MMHG | HEART RATE: 82 BPM | OXYGEN SATURATION: 96 % | DIASTOLIC BLOOD PRESSURE: 80 MMHG | RESPIRATION RATE: 18 BRPM

## 2019-03-07 DIAGNOSIS — F41.9 ANXIETY: Primary | ICD-10-CM

## 2019-03-07 RX ORDER — CLONAZEPAM 0.5 MG/1
1 TABLET ORAL AT BEDTIME
Qty: 90 TABLET | Refills: 5 | Status: SHIPPED | OUTPATIENT
Start: 2019-03-07 | End: 2019-12-03

## 2019-03-07 NOTE — TELEPHONE ENCOUNTER
Clonazepam 0.5 mg tabs: Take 2 tabs (1 mg) PO at bedtime. May also have 1 tab (0.5 mg) PO once daily PRN.

## 2019-03-12 ENCOUNTER — ASSISTED LIVING VISIT (OUTPATIENT)
Dept: GERIATRICS | Facility: CLINIC | Age: 84
End: 2019-03-12
Payer: COMMERCIAL

## 2019-03-12 VITALS
DIASTOLIC BLOOD PRESSURE: 53 MMHG | RESPIRATION RATE: 18 BRPM | SYSTOLIC BLOOD PRESSURE: 102 MMHG | OXYGEN SATURATION: 93 % | HEART RATE: 58 BPM

## 2019-03-12 DIAGNOSIS — G20.A1 PARKINSON'S DISEASE (H): ICD-10-CM

## 2019-03-12 DIAGNOSIS — R13.12 OROPHARYNGEAL DYSPHAGIA: ICD-10-CM

## 2019-03-12 DIAGNOSIS — F41.9 ANXIETY: Primary | ICD-10-CM

## 2019-03-12 RX ORDER — VENLAFAXINE HYDROCHLORIDE 37.5 MG/1
37.5 CAPSULE, EXTENDED RELEASE ORAL DAILY
COMMUNITY
End: 2019-04-18 | Stop reason: DRUGHIGH

## 2019-03-12 NOTE — PROGRESS NOTES
[facility]  [facility] Medical Record Number:  [id number]  Place of Service where encounter took place:  [facility] ([facility]) [[id number]]  Chief Complaint   Patient presents with     Anxiety     Parkinson       HPI:    Carlos Neri  is a 83 year old (1935), who is being seen today for an episodic care visit.  HPI information obtained from: facility chart records, facility staff, patient report, [facility] chart review and family/first contact spouse report. Today's concern is: anxiety, PD, dysphagia. Recently transferred to [facility] memory care unit. Has had increased anxiety, agitation. Per last Neurology visit, aricept dc'd, effexor started, requip dc'd.  Has had ongoing s/s. requip restarted. effexor was to be dc'd 3/5/19, however order not yet processed.  Has had improved anxiety, less agitation over past few days. Had prn klonopin added-not yet used. Cont. On sinemet, comtan, ropinerol, neurontin for PD.  Cont. To have episodes of freezing, however has maintained amb. Cont. On HTL for dysphagia. Working with ST-may upgrade to NTL. Recent VSS.       Past Medical and Surgical History reviewed in Epic today.    MEDICATIONS:  Current Outpatient Medications   Medication Sig Dispense Refill     acetaminophen (TYLENOL) 500 MG tablet Take 1,000 mg by mouth 3 times daily       Artificial Tear Solution (SOOTHE XP) SOLN Place 1 drop into both eyes 2 times daily       carbidopa-levodopa (SINEMET CR)  MG CR tablet Take 1 tablet by mouth At Bedtime       carbidopa-levodopa (SINEMET)  MG per tablet Take 1 tablet by mouth 4 times daily       clonazePAM (KLONOPIN) 0.5 MG tablet Take 2 tablets (1 mg) by mouth At Bedtime May also have 1 tab (0.5 mg) once daily PRN 90 tablet 5     entacapone (COMTAN) 200 MG tablet Take 100 mg by mouth 4 times daily        gabapentin (NEURONTIN) 300 MG capsule Take 300 mg by mouth At Bedtime       MIDODRINE HCL PO Take 5 mg by mouth 3  times daily (before meals)       polyethylene glycol (MIRALAX/GLYCOLAX) Packet Take 17 g by mouth daily as needed for constipation       rOPINIRole (REQUIP) 0.25 MG tablet Take 0.75 mg by mouth 3 times daily        rOPINIRole (REQUIP) 0.25 MG tablet Take 0.25 mg by mouth At Bedtime       venlafaxine (EFFEXOR-XR) 37.5 MG 24 hr capsule Take 37.5 mg by mouth daily           REVIEW OF SYSTEMS:  Unobtainable secondary to cognitive impairment.     Objective:  /53   Pulse 58   Resp 18   SpO2 93%   Exam:  GENERAL APPEARANCE:  Alert, in no distress, cooperative  ENT:  Mouth and posterior oropharynx normal, moist mucous membranes, [place]  EYES:  EOM, conjunctivae, lids, pupils and irises normal, PERRL  NECK:  No adenopathy,masses or thyromegaly, no carotid bruit  RESP:  respiratory effort and palpation of chest normal, lungs clear to auscultation , no respiratory distress  CV:  Palpation and auscultation of heart done , regular rate and rhythm, no murmur, rub, or gallop, no edema  ABDOMEN:  normal bowel sounds, soft, nontender, no hepatosplenomegaly or other masses, no guarding or rebound  M/S:   muscle strength 5/5 all 4 ext. minimal increase rigidity LEs  NEURO:   Cranial nerves 2-12 are normal tested and grossly at patient's baseline, speech soft, clear, no cogwheel  PSYCH:  insight and judgement impaired, memory impaired , affect abnormal -flat    Labs:     Most Recent 3 CBC's:  Recent Labs   Lab Test 02/19/19  2227 07/27/18  1245 05/11/18  1549   WBC 4.9 4.7 4.6   HGB 13.6 13.8 13.7   MCV 96 95 95   * 120* 127*     Most Recent 3 BMP's:  Recent Labs   Lab Test 02/19/19  2227 09/11/18 07/27/18  1245    143 139   POTASSIUM 4.2 3.9 3.7   CHLORIDE 111* 108* 106   CO2 26 28 30   BUN 31* 21 18   CR 0.98 0.90 0.88   ANIONGAP 7 7 3   SADIA 8.6 9.0 8.7   * 84 88       ASSESSMENT/PLAN:  Anxiety  Improved over past few days  1. Cont. effexor  2. Cont. Hs, every day prn klonopin  3. Reassess over next couple  weeks  4. For increased s/s, may increase effexor vs alt. Med, remeron    Parkinson's disease (H)  Freezing episodes, recurrent falls-none over past week  1. Cont. Current PD meds-requip restarted  2. Spouse to make f/u Neuro appt at [facility] clinic of Neurology  3. Cont. To amb in hallway  4. Monitor for further falls    Oropharyngeal dysphagia  No current coughing with meals, silent asp. Per last VSS/ ST  1. Cont. HTL  2. Cont. ST-may consider upgrade to NTL  3. Monitor for coughing with meals  4. Cont. Ensure, other drinks provided by spouse, follow wt.s      Electronically signed by:  DONY Clark CNP

## 2019-03-12 NOTE — LETTER
3/12/2019        RE: Carlos Neri  [address]  [place] [address]        [facility]  [facility] Medical Record Number:  [id number]  Place of Service where encounter took place:  [facility] ([facility]) [[id number]]  Chief Complaint   Patient presents with     Anxiety     Parkinson       HPI:    Carlos Neri  is a 83 year old (1935), who is being seen today for an episodic care visit.  HPI information obtained from: facility chart records, facility staff, patient report, [facility] chart review and family/first contact spouse report. Today's concern is: anxiety, PD, dysphagia. Recently transferred to [facility] memory care unit. Has had increased anxiety, agitation. Per last Neurology visit, aricept dc'd, effexor started, requip dc'd.  Has had ongoing s/s. requip restarted. effexor was to be dc'd 3/5/19, however order not yet processed.  Has had improved anxiety, less agitation over past few days. Had prn klonopin added-not yet used. Cont. On sinemet, comtan, ropinerol, neurontin for PD.  Cont. To have episodes of freezing, however has maintained amb. Cont. On HTL for dysphagia. Working with ST-may upgrade to NTL. Recent VSS.       Past Medical and Surgical History reviewed in Epic today.    MEDICATIONS:  Current Outpatient Medications   Medication Sig Dispense Refill     acetaminophen (TYLENOL) 500 MG tablet Take 1,000 mg by mouth 3 times daily       Artificial Tear Solution (SOOTHE XP) SOLN Place 1 drop into both eyes 2 times daily       carbidopa-levodopa (SINEMET CR)  MG CR tablet Take 1 tablet by mouth At Bedtime       carbidopa-levodopa (SINEMET)  MG per tablet Take 1 tablet by mouth 4 times daily       clonazePAM (KLONOPIN) 0.5 MG tablet Take 2 tablets (1 mg) by mouth At Bedtime May also have 1 tab (0.5 mg) once daily PRN 90 tablet 5     entacapone (COMTAN) 200 MG tablet Take 100 mg by mouth 4 times daily        gabapentin  (NEURONTIN) 300 MG capsule Take 300 mg by mouth At Bedtime       MIDODRINE HCL PO Take 5 mg by mouth 3 times daily (before meals)       polyethylene glycol (MIRALAX/GLYCOLAX) Packet Take 17 g by mouth daily as needed for constipation       rOPINIRole (REQUIP) 0.25 MG tablet Take 0.75 mg by mouth 3 times daily        rOPINIRole (REQUIP) 0.25 MG tablet Take 0.25 mg by mouth At Bedtime       venlafaxine (EFFEXOR-XR) 37.5 MG 24 hr capsule Take 37.5 mg by mouth daily           REVIEW OF SYSTEMS:  Unobtainable secondary to cognitive impairment.     Objective:  /53   Pulse 58   Resp 18   SpO2 93%   Exam:  GENERAL APPEARANCE:  Alert, in no distress, cooperative  ENT:  Mouth and posterior oropharynx normal, moist mucous membranes, [place]  EYES:  EOM, conjunctivae, lids, pupils and irises normal, PERRL  NECK:  No adenopathy,masses or thyromegaly, no carotid bruit  RESP:  respiratory effort and palpation of chest normal, lungs clear to auscultation , no respiratory distress  CV:  Palpation and auscultation of heart done , regular rate and rhythm, no murmur, rub, or gallop, no edema  ABDOMEN:  normal bowel sounds, soft, nontender, no hepatosplenomegaly or other masses, no guarding or rebound  M/S:   muscle strength 5/5 all 4 ext. minimal increase rigidity LEs  NEURO:   Cranial nerves 2-12 are normal tested and grossly at patient's baseline, speech soft, clear, no cogwheel  PSYCH:  insight and judgement impaired, memory impaired , affect abnormal -flat    Labs:     Most Recent 3 CBC's:  Recent Labs   Lab Test 02/19/19 2227 07/27/18  1245 05/11/18  1549   WBC 4.9 4.7 4.6   HGB 13.6 13.8 13.7   MCV 96 95 95   * 120* 127*     Most Recent 3 BMP's:  Recent Labs   Lab Test 02/19/19 2227 09/11/18 07/27/18  1245    143 139   POTASSIUM 4.2 3.9 3.7   CHLORIDE 111* 108* 106   CO2 26 28 30   BUN 31* 21 18   CR 0.98 0.90 0.88   ANIONGAP 7 7 3   SADIA 8.6 9.0 8.7   * 84 88       ASSESSMENT/PLAN:  Anxiety  Improved  over past few days  1. Cont. effexor  2. Cont. Hs, every day prn klonopin  3. Reassess over next couple weeks  4. For increased s/s, may increase effexor vs alt. Med, remeron    Parkinson's disease (H)  Freezing episodes, recurrent falls-none over past week  1. Cont. Current PD meds-requip restarted  2. Spouse to make f/u Neuro appt at [facility] clinic of Neurology  3. Cont. To amb in hallway  4. Monitor for further falls    Oropharyngeal dysphagia  No current coughing with meals, silent asp. Per last VSS/ ST  1. Cont. HTL  2. Cont. ST-may consider upgrade to NTL  3. Monitor for coughing with meals  4. Cont. Ensure, other drinks provided by spouse, follow wt.s      Electronically signed by:  DONY Clark CNP           Sincerely,        DONY Clark CNP

## 2019-04-02 ENCOUNTER — ASSISTED LIVING VISIT (OUTPATIENT)
Dept: GERIATRICS | Facility: CLINIC | Age: 84
End: 2019-04-02
Payer: COMMERCIAL

## 2019-04-02 VITALS
RESPIRATION RATE: 18 BRPM | DIASTOLIC BLOOD PRESSURE: 73 MMHG | OXYGEN SATURATION: 93 % | HEART RATE: 61 BPM | SYSTOLIC BLOOD PRESSURE: 114 MMHG

## 2019-04-02 DIAGNOSIS — G31.83 LEWY BODY DEMENTIA WITHOUT BEHAVIORAL DISTURBANCE (H): ICD-10-CM

## 2019-04-02 DIAGNOSIS — G20.A1 PARKINSON'S DISEASE (H): ICD-10-CM

## 2019-04-02 DIAGNOSIS — R29.6 RECURRENT FALLS: Primary | ICD-10-CM

## 2019-04-02 DIAGNOSIS — F02.80 LEWY BODY DEMENTIA WITHOUT BEHAVIORAL DISTURBANCE (H): ICD-10-CM

## 2019-04-02 NOTE — LETTER
4/2/2019        RE: Carlos Neri  [address]  [place] [address]        [facility]  PRIMARY CARE PROVIDER AND CLINIC:  Yang Martines, DONY CNP, [address] / [place] [address]  Chief Complaint   Patient presents with     ER F/U     [facility] Medical Record Number:  [id number]  Place of Service where encounter took place:  [facility] ([facility]) [[id number]]    Carlos Neri  is a 83 year old  (1935), returned to the above facility from  [facility] . Hospital stay 3/27/19 - 3/27/19. .  Admitted to this facility for  rehab, medical management and nursing care.    HPI:    HPI information obtained from: facility chart records, facility staff, patient report and [facility] chart review.   Brief Summary of Hospital Course: 3/27/19 had fall with hitting L forehead on door jamb. Sent to [facility] hosp. ED for eval. Head CT neg for acute changes. ECG showed NSR. Cervical spine CT showed acute non-displace fx R aspect C4 with no extension into lamina. Labs done baseline. Cbc, bmp done baseline. Had another fall 3/28/19, no further apparent inj. Per staff has had wandering in has with increased anxiety at times. With increased anxiety has increased tremors, more diff. Amb.  Cont. On sinemet, entacapone, requip.  Has dementia, recent functional decline. reuires increased cues from staff, more difficulty following directions. Cont. On effexor, klonopin. Does sleep ok at hs per staff.   Updates on Status Since Skilled nursing Admission: ongoing hs wandering. Amb. In hallway well with cues from staff to look up.    CODE STATUS/ADVANCE DIRECTIVES DISCUSSION:   CPR/Full code   Patient's living condition: lives in an assisted living facility  ALLERGIES: Morphine sulfate  PAST MEDICAL HISTORY:  has a past medical history of Dysphagia, Lewy body dementia, and Parkinson disease (H).  PAST SURGICAL HISTORY:   has a past surgical history that  includes hernia repair.  FAMILY HISTORY: family history is not on file.  SOCIAL HISTORY:   reports that  has never smoked. he has never used smokeless tobacco. He reports that he does not drink alcohol or use drugs.    Post Discharge Medication Reconciliation Status: discharge medications reconciled, continue medications without change    Current Outpatient Medications   Medication Sig Dispense Refill     acetaminophen (TYLENOL) 500 MG tablet Take 1,000 mg by mouth 3 times daily       Artificial Tear Solution (SOOTHE XP) SOLN Place 1 drop into both eyes 2 times daily       carbidopa-levodopa (SINEMET CR)  MG CR tablet Take 1 tablet by mouth At Bedtime       carbidopa-levodopa (SINEMET)  MG per tablet Take 1 tablet by mouth 4 times daily       clonazePAM (KLONOPIN) 0.5 MG tablet Take 2 tablets (1 mg) by mouth At Bedtime May also have 1 tab (0.5 mg) once daily PRN 90 tablet 5     entacapone (COMTAN) 200 MG tablet Take 100 mg by mouth 4 times daily        gabapentin (NEURONTIN) 300 MG capsule Take 300 mg by mouth At Bedtime       MIDODRINE HCL PO Take 5 mg by mouth 3 times daily (before meals)       polyethylene glycol (MIRALAX/GLYCOLAX) Packet Take 17 g by mouth daily as needed for constipation (AND every other day scheduled)        rOPINIRole (REQUIP) 0.25 MG tablet Take 0.75 mg by mouth 3 times daily        rOPINIRole (REQUIP) 0.25 MG tablet Take 0.25 mg by mouth At Bedtime       venlafaxine (EFFEXOR-XR) 37.5 MG 24 hr capsule Take 37.5 mg by mouth daily         ROS:  Unobtainable secondary to cognitive impairment. No pains today.    Vitals:  /73   Pulse 61   Resp 18   SpO2 93%   Exam:  GENERAL APPEARANCE:  Alert, in no distress, cooperative  ENT:  Mouth and posterior oropharynx normal, moist mucous membranes, [place]  EYES:  EOM, conjunctivae, lids, pupils and irises normal, PERRL  NECK:  No adenopathy,masses or thyromegaly, FROM  RESP:  respiratory effort and palpation of chest normal, lungs clear  to auscultation , no respiratory distress  CV:  Palpation and auscultation of heart done , regular rate and rhythm, no murmur, rub, or gallop, no edema  ABDOMEN:  normal bowel sounds, soft, nontender, no hepatosplenomegaly or other masses, no guarding or rebound  M/S:   gait steady, no sig. freezing during exam  SKIN:  faint bruise L forehead  NEURO:   Cranial nerves 2-12 are normal tested and grossly at patient's baseline, speech soft, one word responses, sl. restless  PSYCH:  insight and judgement impaired, affect abnormal -flat    Lab/Diagnostic data:  Recent labs in [facility] reviewed by me today.     ASSESSMENT/PLAN:  Current [place] scheduled appointments:   none  Recurrent falls  S/p 2 falls past week. Ed visit showed C4 fx. No current pain  1. Remind to use walker at all times  2. Cont. To amb in hallway as anupam  3. Cont. PT  4. Monitor for further changes in gait, falls    Parkinson's disease (H)  occ freezing, falls as above  1. Cont. Current PD meds  2. F/u Neuro appt next week  3. Monitor for increased tremors  4. Cont. To cue to look up during amb, not down    Lewy body dementia without behavioral disturbance  Memory loss, increased need of cues by staff, increased wandering, anxiety at times-tim in pms  1. Cont. effexor  2. Cont. Hs, prn klonopin  3. Cont. To redirect, re-approach for target behaviors  4. F/u with Neuro. As above       Electronically signed by:  DONY Clark CNP                       Sincerely,        DONY Clark CNP

## 2019-04-02 NOTE — PROGRESS NOTES
[facility]  PRIMARY CARE PROVIDER AND CLINIC:  Yang Martines, APRN CNP, [address] / [place] [address]  Chief Complaint   Patient presents with     ER F/U     [place] Medical Record Number:  [id number]  Place of Service where encounter took place:  [facility] [[id number]]    Carlos Neri  is a 83 year old  (1935), returned to the above facility from  [facility] . Hospital stay 3/27/19 - 3/27/19. .  Admitted to this facility for  rehab, medical management and nursing care.    HPI:    HPI information obtained from: facility chart records, facility staff, patient report and [place] Epic chart review.   Brief Summary of Hospital Course: 3/27/19 had fall with hitting L forehead on door jamb. Sent to [facility] hosp. ED for eval. Head CT neg for acute changes. ECG showed NSR. Cervical spine CT showed acute non-displace fx R aspect C4 with no extension into lamina. Labs done baseline. Cbc, bmp done baseline. Had another fall 3/28/19, no further apparent inj. Per staff has had wandering in has with increased anxiety at times. With increased anxiety has increased tremors, more diff. Amb.  Cont. On sinemet, entacapone, requip.  Has dementia, recent functional decline. reuires increased cues from staff, more difficulty following directions. Cont. On effexor, klonopin. Does sleep ok at hs per staff.   Updates on Status Since Skilled nursing Admission: ongoing hs wandering. Amb. In hallway well with cues from staff to look up.    CODE STATUS/ADVANCE DIRECTIVES DISCUSSION:   CPR/Full code   Patient's living condition: lives in an assisted living facility  ALLERGIES: Morphine sulfate  PAST MEDICAL HISTORY:  has a past medical history of Dysphagia, Lewy body dementia, and Parkinson disease (H).  PAST SURGICAL HISTORY:   has a past surgical history that includes hernia repair.  FAMILY HISTORY: family history is not on file.  SOCIAL HISTORY:    reports that  has never smoked. he has never used smokeless tobacco. He reports that he does not drink alcohol or use drugs.    Post Discharge Medication Reconciliation Status: discharge medications reconciled, continue medications without change    Current Outpatient Medications   Medication Sig Dispense Refill     acetaminophen (TYLENOL) 500 MG tablet Take 1,000 mg by mouth 3 times daily       Artificial Tear Solution (SOOTHE XP) SOLN Place 1 drop into both eyes 2 times daily       carbidopa-levodopa (SINEMET CR)  MG CR tablet Take 1 tablet by mouth At Bedtime       carbidopa-levodopa (SINEMET)  MG per tablet Take 1 tablet by mouth 4 times daily       clonazePAM (KLONOPIN) 0.5 MG tablet Take 2 tablets (1 mg) by mouth At Bedtime May also have 1 tab (0.5 mg) once daily PRN 90 tablet 5     entacapone (COMTAN) 200 MG tablet Take 100 mg by mouth 4 times daily        gabapentin (NEURONTIN) 300 MG capsule Take 300 mg by mouth At Bedtime       MIDODRINE HCL PO Take 5 mg by mouth 3 times daily (before meals)       polyethylene glycol (MIRALAX/GLYCOLAX) Packet Take 17 g by mouth daily as needed for constipation (AND every other day scheduled)        rOPINIRole (REQUIP) 0.25 MG tablet Take 0.75 mg by mouth 3 times daily        rOPINIRole (REQUIP) 0.25 MG tablet Take 0.25 mg by mouth At Bedtime       venlafaxine (EFFEXOR-XR) 37.5 MG 24 hr capsule Take 37.5 mg by mouth daily         ROS:  Unobtainable secondary to cognitive impairment. No pains today.    Vitals:  /73   Pulse 61   Resp 18   SpO2 93%   Exam:  GENERAL APPEARANCE:  Alert, in no distress, cooperative  ENT:  Mouth and posterior oropharynx normal, moist mucous membranes, [place]  EYES:  EOM, conjunctivae, lids, pupils and irises normal, PERRL  NECK:  No adenopathy,masses or thyromegaly, FROM  RESP:  respiratory effort and palpation of chest normal, lungs clear to auscultation , no respiratory distress  CV:  Palpation and auscultation of heart done ,  regular rate and rhythm, no murmur, rub, or gallop, no edema  ABDOMEN:  normal bowel sounds, soft, nontender, no hepatosplenomegaly or other masses, no guarding or rebound  M/S:   gait steady, no sig. freezing during exam  SKIN:  faint bruise L forehead  NEURO:   Cranial nerves 2-12 are normal tested and grossly at patient's baseline, speech soft, one word responses, sl. restless  PSYCH:  insight and judgement impaired, affect abnormal -flat    Lab/Diagnostic data:  Recent labs in [facility] reviewed by me today.     ASSESSMENT/PLAN:  Current [place] scheduled appointments:   none  Recurrent falls  S/p 2 falls past week. Ed visit showed C4 fx. No current pain  1. Remind to use walker at all times  2. Cont. To amb in hallway as anupam  3. Cont. PT  4. Monitor for further changes in gait, falls    Parkinson's disease (H)  occ freezing, falls as above  1. Cont. Current PD meds  2. F/u Neuro appt next week  3. Monitor for increased tremors  4. Cont. To cue to look up during amb, not down    Lewy body dementia without behavioral disturbance  Memory loss, increased need of cues by staff, increased wandering, anxiety at times-tim in pms  1. Cont. effexor  2. Cont. Hs, prn klonopin  3. Cont. To redirect, re-approach for target behaviors  4. F/u with Neuro. As above       Electronically signed by:  DONY Clark CNP

## 2019-04-12 ENCOUNTER — ASSISTED LIVING VISIT (OUTPATIENT)
Dept: GERIATRICS | Facility: CLINIC | Age: 84
End: 2019-04-12
Payer: COMMERCIAL

## 2019-04-12 VITALS
BODY MASS INDEX: 19.12 KG/M2 | SYSTOLIC BLOOD PRESSURE: 100 MMHG | DIASTOLIC BLOOD PRESSURE: 59 MMHG | WEIGHT: 141 LBS | HEART RATE: 59 BPM | TEMPERATURE: 95.8 F | RESPIRATION RATE: 18 BRPM

## 2019-04-12 DIAGNOSIS — G31.83 LEWY BODY DEMENTIA WITHOUT BEHAVIORAL DISTURBANCE (H): ICD-10-CM

## 2019-04-12 DIAGNOSIS — F02.80 LEWY BODY DEMENTIA WITHOUT BEHAVIORAL DISTURBANCE (H): ICD-10-CM

## 2019-04-12 DIAGNOSIS — R63.4 WEIGHT LOSS: Primary | ICD-10-CM

## 2019-04-12 DIAGNOSIS — G20.A1 PARKINSON'S DISEASE (H): ICD-10-CM

## 2019-04-12 DIAGNOSIS — I95.1 ORTHOSTATIC HYPOTENSION: ICD-10-CM

## 2019-04-12 DIAGNOSIS — F41.9 ANXIETY: ICD-10-CM

## 2019-04-12 NOTE — PROGRESS NOTES
Carlos Neri is a 83 year old male seen April 12, 2019 at [facility] unit where he has resided for 2 months (admit 2/2019) seen to follow up Parkinsonism and dementia.     He is seen in his apartment with his wife present, and she helps with history.       Nursing staff reports a functional decline and frequent falls.   He has hallucinations noted by Physical Therapist.   Slid out of his chair in the TV room last night, no injuries and able to get himself up.    Denies any pain today.    He is followed for Parkinson's with dementia vs Lewy body dementia, with continual medication adjustment recently.      Patient had been seen by Neurology 1/30/19 and noted more confusion,less mobility.   His Requip was stopped, Aricept stopped and Effexor started.   Sinemet dose increased.   Patient did not tolerate these changes, had a fall with right hand fracture.    Sinemet dose returned to prior dose and Requip restarted.    Seemed to better initially, but more agitated and falling again over past week.     Patient was diagnosed with essential tremor in 2000, then with worsening symptoms was seen at [place] in 2004 and diagnosed with Parkinson's disease.   Tx'd with Sinemet since that time, with gradual decline in cognition and mobility     Patient had a swallow study in July 2018, with dysphagia noted, eating regular diet at patient/family request. Needs midodrine for bp support, was seen at [facility] ER in July 2018 for hypotension.        Past Medical History:   Diagnosis Date     Dysphagia      Lewy body dementia      Parkinson disease (H)     With dementia.   Seizure disorder, 2017  S/p nephrectomy for renal cell CA, 2003  Dysphagia    SH:  Previously lived with his wife Haylee in [place]  They have 2 sons    ROS:    SLUMS 9/30   CPT 4.1    Ambulatory with FWW    Frequent falls   Sleep variable, likes to go to bed early.  +weight loss ongoing  Wt Readings from Last 5 Encounters:   04/11/19 64 kg (141 lb)    03/01/19 69.7 kg (153 lb 9.6 oz)   09/26/18 70.4 kg (155 lb 3.2 oz)   05/11/18 75.3 kg (166 lb)   05/26/17 78 kg (172 lb)        EXAM:  Pleasant, NAD  /59   Pulse 59   Temp 95.8  F (35.4  C)   Resp 18   Wt 64 kg (141 lb)   BMI 19.12 kg/m     Hypophonic  Neck supple without adenopathy  Lungs clear bilaterally with fair air movement  Heart RRR s1s2  Abd soft, NT, no distention, +BS  Ext without edema, +kyphosis  Neuro: bilateral hand resting tremor, +cogwheeling, +dyskinesia, +freezing  Psych: affect anxious    Last Comprehensive Metabolic Panel:  Sodium   Date Value Ref Range Status   02/19/2019 144 133 - 144 mmol/L Final     Potassium   Date Value Ref Range Status   02/19/2019 4.2 3.4 - 5.3 mmol/L Final     Chloride   Date Value Ref Range Status   02/19/2019 111 (H) 94 - 109 mmol/L Final     Carbon Dioxide   Date Value Ref Range Status   02/19/2019 26 20 - 32 mmol/L Final     Anion Gap   Date Value Ref Range Status   02/19/2019 7 3 - 14 mmol/L Final     Glucose   Date Value Ref Range Status   02/19/2019 102 (H) 70 - 99 mg/dL Final     Urea Nitrogen   Date Value Ref Range Status   02/19/2019 31 (H) 7 - 30 mg/dL Final     Creatinine   Date Value Ref Range Status   02/19/2019 0.98 0.66 - 1.25 mg/dL Final     GFR Estimate   Date Value Ref Range Status   02/19/2019 71 >60 mL/min/[1.73_m2] Final     Comment:     Non  GFR Calc  Starting 12/18/2018, serum creatinine based estimated GFR (eGFR) will be   calculated using the Chronic Kidney Disease Epidemiology Collaboration   (CKD-EPI) equation.       Calcium   Date Value Ref Range Status   02/19/2019 8.6 8.5 - 10.1 mg/dL Final     Lab Results   Component Value Date    WBC 4.9 02/19/2019      HGB 13.6 02/19/2019      MCV 96 02/19/2019       02/19/2019       IMP/PLAN:    (R63.4) Weight loss  (primary encounter diagnosis)  Comment: likely related to dysphagia, agitation  Plan: add Ensure scheduled, preferences as tolerated.        (F41.9)  Anxiety    Comment: increased agitation recently  Plan: started on venlafaxine one month ago, consider dose increase given above.     Also on HS clonazepam for REM sleep disorder     (G20) Parkinson's disease (H)  Comment: longstanding, gradually progressive   Med changes as above  Plan: continue Sinemet, ropinirole, entacapone, and Neurology follow up to help with his tx     Talked with wife today who is looking to find a Neurologist closer to [place]; recommend Neurology at [facility] and she will call for an appointment.        (R29.6) Falls frequently  Comment: recent hand fracture  Plan:  Scheduled acetaminophen for pain, local measures.     (I95.1) Orthostatic hypotension  Comment: autonomic dysfunction     BP Readings from Last 3 Encounters:   04/11/19 100/59   04/02/19 114/73   03/12/19 102/53      Plan: midodrine 10 mg tid         (G31.83,  F02.80) Lewy body dementia without behavioral disturbance  Comment: cognitive decline, low functional status   Donepezil d/c'd per Neurology.      Plan: [facility] unit for assist with meds, meals, activity, safety    (G40.909) Seizure disorder (H)  Comment: no recent sz   Plan: remains on gabapentin and clonazepam, per Neurology        Bety Rowe MD

## 2019-04-12 NOTE — LETTER
4/12/2019        RE: Carlos Neri  [address]  [facility] [address]         Carlos Neri is a 83 year old male seen April 12, 2019 at [facility] unit where he has resided for 2 months (admit 2/2019) seen to follow up Parkinsonism and dementia.     He is seen in his apartment with his wife present, and she helps with history.       Nursing staff reports a functional decline and frequent falls.   He has hallucinations noted by Physical Therapist.   Slid out of his chair in the TV room last night, no injuries and able to get himself up.    Denies any pain today.    He is followed for Parkinson's with dementia vs Lewy body dementia, with continual medication adjustment recently.      Patient had been seen by Neurology 1/30/19 and noted more confusion,less mobility.   His Requip was stopped, Aricept stopped and Effexor started.   Sinemet dose increased.   Patient did not tolerate these changes, had a fall with right hand fracture.    Sinemet dose returned to prior dose and Requip restarted.    Seemed to better initially, but more agitated and falling again over past week.     Patient was diagnosed with essential tremor in 2000, then with worsening symptoms was seen at [place] in 2004 and diagnosed with Parkinson's disease.   Tx'd with Sinemet since that time, with gradual decline in cognition and mobility     Patient had a swallow study in July 2018, with dysphagia noted, eating regular diet at patient/family request. Needs midodrine for bp support, was seen at [facility] ER in July 2018 for hypotension.        Past Medical History:   Diagnosis Date     Dysphagia      Lewy body dementia      Parkinson disease (H)     With dementia.   Seizure disorder, 2017  S/p nephrectomy for renal cell CA, 2003  Dysphagia    SH:  Previously lived with his wife Haylee in [place]  They have 2 sons    ROS:    SLUMS 9/30   CPT 4.1    Ambulatory with FWW    Frequent falls   Sleep variable, likes to go to  bed early.  +weight loss ongoing  Wt Readings from Last 5 Encounters:   04/11/19 64 kg (141 lb)   03/01/19 69.7 kg (153 lb 9.6 oz)   09/26/18 70.4 kg (155 lb 3.2 oz)   05/11/18 75.3 kg (166 lb)   05/26/17 78 kg (172 lb)        EXAM:  Pleasant, NAD  /59   Pulse 59   Temp 95.8  F (35.4  C)   Resp 18   Wt 64 kg (141 lb)   BMI 19.12 kg/m      Hypophonic  Neck supple without adenopathy  Lungs clear bilaterally with fair air movement  Heart RRR s1s2  Abd soft, NT, no distention, +BS  Ext without edema, +kyphosis  Neuro: bilateral hand resting tremor, +cogwheeling, +dyskinesia, +freezing  Psych: affect anxious    Last Comprehensive Metabolic Panel:  Sodium   Date Value Ref Range Status   02/19/2019 144 133 - 144 mmol/L Final     Potassium   Date Value Ref Range Status   02/19/2019 4.2 3.4 - 5.3 mmol/L Final     Chloride   Date Value Ref Range Status   02/19/2019 111 (H) 94 - 109 mmol/L Final     Carbon Dioxide   Date Value Ref Range Status   02/19/2019 26 20 - 32 mmol/L Final     Anion Gap   Date Value Ref Range Status   02/19/2019 7 3 - 14 mmol/L Final     Glucose   Date Value Ref Range Status   02/19/2019 102 (H) 70 - 99 mg/dL Final     Urea Nitrogen   Date Value Ref Range Status   02/19/2019 31 (H) 7 - 30 mg/dL Final     Creatinine   Date Value Ref Range Status   02/19/2019 0.98 0.66 - 1.25 mg/dL Final     GFR Estimate   Date Value Ref Range Status   02/19/2019 71 >60 mL/min/[1.73_m2] Final     Comment:     Non  GFR Calc  Starting 12/18/2018, serum creatinine based estimated GFR (eGFR) will be   calculated using the Chronic Kidney Disease Epidemiology Collaboration   (CKD-EPI) equation.       Calcium   Date Value Ref Range Status   02/19/2019 8.6 8.5 - 10.1 mg/dL Final     Lab Results   Component Value Date    WBC 4.9 02/19/2019      HGB 13.6 02/19/2019      MCV 96 02/19/2019       02/19/2019       IMP/PLAN:    (R63.4) Weight loss  (primary encounter diagnosis)  Comment: likely related  to dysphagia, agitation  Plan: add Ensure scheduled, preferences as tolerated.        (F41.9) Anxiety    Comment: increased agitation recently  Plan: started on venlafaxine one month ago, consider dose increase given above.     Also on HS clonazepam for REM sleep disorder     (G20) Parkinson's disease (H)  Comment: longstanding, gradually progressive   Med changes as above  Plan: continue Sinemet, ropinirole, entacapone, and Neurology follow up to help with his tx     Talked with wife today who is looking to find a Neurologist closer to [place]; recommend Neurology at [facility] and she will call for an appointment.        (R29.6) Falls frequently  Comment: recent hand fracture  Plan:  Scheduled acetaminophen for pain, local measures.     (I95.1) Orthostatic hypotension  Comment: autonomic dysfunction     BP Readings from Last 3 Encounters:   04/11/19 100/59   04/02/19 114/73   03/12/19 102/53      Plan: midodrine 10 mg tid         (G31.83,  F02.80) Lewy body dementia without behavioral disturbance  Comment: cognitive decline, low functional status   Donepezil d/c'd per Neurology.      Plan: [facility] unit for assist with meds, meals, activity, safety    (G40.909) Seizure disorder (H)  Comment: no recent sz   Plan: remains on gabapentin and clonazepam, per Neurology        Bety Rowe MD       Sincerely,        Bety Rowe MD

## 2019-04-18 ENCOUNTER — ASSISTED LIVING VISIT (OUTPATIENT)
Dept: GERIATRICS | Facility: CLINIC | Age: 84
End: 2019-04-18
Payer: COMMERCIAL

## 2019-04-18 VITALS
SYSTOLIC BLOOD PRESSURE: 122 MMHG | TEMPERATURE: 96.7 F | RESPIRATION RATE: 18 BRPM | HEART RATE: 55 BPM | OXYGEN SATURATION: 92 % | DIASTOLIC BLOOD PRESSURE: 70 MMHG

## 2019-04-18 DIAGNOSIS — F02.80 LEWY BODY DEMENTIA WITHOUT BEHAVIORAL DISTURBANCE (H): ICD-10-CM

## 2019-04-18 DIAGNOSIS — F41.9 ANXIETY: Primary | ICD-10-CM

## 2019-04-18 DIAGNOSIS — K59.00 CONSTIPATION, UNSPECIFIED CONSTIPATION TYPE: ICD-10-CM

## 2019-04-18 DIAGNOSIS — G31.83 LEWY BODY DEMENTIA WITHOUT BEHAVIORAL DISTURBANCE (H): ICD-10-CM

## 2019-04-18 RX ORDER — GABAPENTIN 300 MG/1
300 CAPSULE ORAL AT BEDTIME
COMMUNITY
End: 2020-01-01

## 2019-04-18 RX ORDER — VENLAFAXINE 25 MG/1
25 TABLET ORAL 2 TIMES DAILY
COMMUNITY

## 2019-04-18 RX ORDER — AMOXICILLIN 250 MG
2 CAPSULE ORAL DAILY
COMMUNITY

## 2019-04-18 NOTE — PROGRESS NOTES
[facility]  [place] Medical Record Number:  [id number]  Place of Service where encounter took place:  [facility] [[id number]]  Chief Complaint   Patient presents with     Anxiety       HPI:    Carlos Neri  is a 83 year old (1935), who is being seen today for an episodic care visit.  HPI information obtained from: facility chart records, facility staff, patient report, [facility] chart review and family/first contact spouse report. Today's concern is: anxiety, dementia, constipation. Per staff has had increased anxiety over past couple days.  Has had behavior of undressing, wearing clothes inappropriately, throwing items in room, increased agitation at times. Prn klonopin not always effective. Also has sched. Klonopin, effexor. Has dementia with memory loss. Increased confusion at times. aricept dc'd. Recently seen by neurology for PD.  Per staff has had constipation, hard stools, BM every 2-3 days. Not currently taking bowel meds.       Past Medical and Surgical History reviewed in Epic today.    MEDICATIONS:  Current Outpatient Medications   Medication Sig Dispense Refill     gabapentin (NEURONTIN) 300 MG capsule Take 300 mg by mouth At Bedtime And 100 mg every day prn       senna-docusate (SENOKOT-S/PERICOLACE) 8.6-50 MG tablet Take 2 tablets by mouth daily       venlafaxine (EFFEXOR) 25 MG tablet Take 25 mg by mouth 2 times daily       acetaminophen (TYLENOL) 500 MG tablet Take 1,000 mg by mouth 3 times daily       Artificial Tear Solution (SOOTHE XP) SOLN Place 1 drop into both eyes 2 times daily       carbidopa-levodopa (SINEMET CR)  MG CR tablet Take 1 tablet by mouth At Bedtime       carbidopa-levodopa (SINEMET)  MG per tablet Take 1 tablet by mouth 4 times daily       clonazePAM (KLONOPIN) 0.5 MG tablet Take 2 tablets (1 mg) by mouth At Bedtime May also have 1 tab (0.5 mg) once daily PRN 90 tablet 5     entacapone (COMTAN) 200 MG  tablet Take 100 mg by mouth 4 times daily        MIDODRINE HCL PO Take 5 mg by mouth 3 times daily (before meals)       polyethylene glycol (MIRALAX/GLYCOLAX) Packet Take 17 g by mouth daily as needed for constipation (AND every other day scheduled)        rOPINIRole (REQUIP) 0.25 MG tablet Take 0.75 mg by mouth 3 times daily        rOPINIRole (REQUIP) 0.25 MG tablet Take 0.25 mg by mouth At Bedtime           REVIEW OF SYSTEMS:  Unobtainable secondary to cognitive impairment. Reports feeling ok today    Objective:  /70   Pulse 55   Temp 96.7  F (35.9  C)   Resp 18   SpO2 92%   Exam:  GENERAL APPEARANCE:  Alert, cooperative, restless  ENT:  Mouth and posterior oropharynx normal, moist mucous membranes, [place]  EYES:  EOM, conjunctivae, lids, pupils and irises normal, PERRL  NECK:  No adenopathy,masses or thyromegaly, FROM  RESP:  respiratory effort and palpation of chest normal, lungs clear to auscultation , no respiratory distress  CV:  Palpation and auscultation of heart done , regular rate and rhythm, no murmur, rub, or gallop, no edema  ABDOMEN:  normal bowel sounds, soft, nontender, no hepatosplenomegaly or other masses, no guarding or rebound  M/S:   muscle strength 5/5 all 4 ext., no rigidity  NEURO:   Cranial nerves 2-12 are normal tested and grossly at patient's baseline, speech soft, clear, some diff. with word finding  PSYCH:  insight and judgement impaired, memory impaired , affect abnormal -flat    Labs:     Most Recent 3 CBC's:  Recent Labs   Lab Test 02/19/19 2227 07/27/18  1245 05/11/18  1549   WBC 4.9 4.7 4.6   HGB 13.6 13.8 13.7   MCV 96 95 95   * 120* 127*     Most Recent 3 BMP's:  Recent Labs   Lab Test 02/19/19 2227 09/11/18 07/27/18  1245    143 139   POTASSIUM 4.2 3.9 3.7   CHLORIDE 111* 108* 106   CO2 26 28 30   BUN 31* 21 18   CR 0.98 0.90 0.88   ANIONGAP 7 7 3   SADIA 8.6 9.0 8.7   * 84 88       ASSESSMENT/PLAN:  Anxiety  Increased s/s  1. Increase effexor to 25 mg  bid  2. Cont. Klonopin  3. Cont. Hs neurontin. Add prn neurontin 100 mg every day for increased anxiety  4. Reassess over next week    Lewy body dementia without behavioral disturbance  Memory loss, some functional decline, increased confusion at times  1. Cont. Current PD meds  2. Spoke with spouse who is making Neurology appt  3. Monitor for further increased agitation, aggressive behaviors  4. Reassess over next couple weeks, if above increased effexor ineffective, may consider seroquel    Constipation, unspecified constipation type  Increased s/s  1. Start senna plus 2 tabs every day  2. Encourage fluids  3. Reassess over next couple weeks  4. For ongoing s/s, may add miralax        Electronically signed by:  DONY Clark CNP

## 2019-04-18 NOTE — LETTER
4/18/2019        RE: Carlos Neri  [address]  [place] [address]        [facility]  [place] Medical Record Number:  [id number]  Place of Service where encounter took place:  [facility] ([facility]) [[id number]]  Chief Complaint   Patient presents with     Anxiety       HPI:    Carlos Neri  is a 83 year old (1935), who is being seen today for an episodic care visit.  HPI information obtained from: facility chart records, facility staff, patient report, [facility] chart review and family/first contact spouse report. Today's concern is: anxiety, dementia, constipation. Per staff has had increased anxiety over past couple days.  Has had behavior of undressing, wearing clothes inappropriately, throwing items in room, increased agitation at times. Prn klonopin not always effective. Also has sched. Klonopin, effexor. Has dementia with memory loss. Increased confusion at times. aricept dc'd. Recently seen by neurology for PD.  Per staff has had constipation, hard stools, BM every 2-3 days. Not currently taking bowel meds.       Past Medical and Surgical History reviewed in Epic today.    MEDICATIONS:  Current Outpatient Medications   Medication Sig Dispense Refill     gabapentin (NEURONTIN) 300 MG capsule Take 300 mg by mouth At Bedtime And 100 mg every day prn       senna-docusate (SENOKOT-S/PERICOLACE) 8.6-50 MG tablet Take 2 tablets by mouth daily       venlafaxine (EFFEXOR) 25 MG tablet Take 25 mg by mouth 2 times daily       acetaminophen (TYLENOL) 500 MG tablet Take 1,000 mg by mouth 3 times daily       Artificial Tear Solution (SOOTHE XP) SOLN Place 1 drop into both eyes 2 times daily       carbidopa-levodopa (SINEMET CR)  MG CR tablet Take 1 tablet by mouth At Bedtime       carbidopa-levodopa (SINEMET)  MG per tablet Take 1 tablet by mouth 4 times daily       clonazePAM (KLONOPIN) 0.5 MG tablet Take 2 tablets (1 mg) by mouth At  Bedtime May also have 1 tab (0.5 mg) once daily PRN 90 tablet 5     entacapone (COMTAN) 200 MG tablet Take 100 mg by mouth 4 times daily        MIDODRINE HCL PO Take 5 mg by mouth 3 times daily (before meals)       polyethylene glycol (MIRALAX/GLYCOLAX) Packet Take 17 g by mouth daily as needed for constipation (AND every other day scheduled)        rOPINIRole (REQUIP) 0.25 MG tablet Take 0.75 mg by mouth 3 times daily        rOPINIRole (REQUIP) 0.25 MG tablet Take 0.25 mg by mouth At Bedtime           REVIEW OF SYSTEMS:  Unobtainable secondary to cognitive impairment. Reports feeling ok today    Objective:  /70   Pulse 55   Temp 96.7  F (35.9  C)   Resp 18   SpO2 92%   Exam:  GENERAL APPEARANCE:  Alert, cooperative, restless  ENT:  Mouth and posterior oropharynx normal, moist mucous membranes, [place]  EYES:  EOM, conjunctivae, lids, pupils and irises normal, PERRL  NECK:  No adenopathy,masses or thyromegaly, FROM  RESP:  respiratory effort and palpation of chest normal, lungs clear to auscultation , no respiratory distress  CV:  Palpation and auscultation of heart done , regular rate and rhythm, no murmur, rub, or gallop, no edema  ABDOMEN:  normal bowel sounds, soft, nontender, no hepatosplenomegaly or other masses, no guarding or rebound  M/S:   muscle strength 5/5 all 4 ext., no rigidity  NEURO:   Cranial nerves 2-12 are normal tested and grossly at patient's baseline, speech soft, clear, some diff. with word finding  PSYCH:  insight and judgement impaired, memory impaired , affect abnormal -flat    Labs:     Most Recent 3 CBC's:  Recent Labs   Lab Test 02/19/19 2227 07/27/18  1245 05/11/18  1549   WBC 4.9 4.7 4.6   HGB 13.6 13.8 13.7   MCV 96 95 95   * 120* 127*     Most Recent 3 BMP's:  Recent Labs   Lab Test 02/19/19 2227 09/11/18 07/27/18  1245    143 139   POTASSIUM 4.2 3.9 3.7   CHLORIDE 111* 108* 106   CO2 26 28 30   BUN 31* 21 18   CR 0.98 0.90 0.88   ANIONGAP 7 7 3   SADIA 8.6 9.0  8.7   * 84 88       ASSESSMENT/PLAN:  Anxiety  Increased s/s  1. Increase effexor to 25 mg bid  2. Cont. Klonopin  3. Cont. Hs neurontin. Add prn neurontin 100 mg every day for increased anxiety  4. Reassess over next week    Lewy body dementia without behavioral disturbance  Memory loss, some functional decline, increased confusion at times  1. Cont. Current PD meds  2. Spoke with spouse who is making Neurology appt  3. Monitor for further increased agitation, aggressive behaviors  4. Reassess over next couple weeks, if above increased effexor ineffective, may consider seroquel    Constipation, unspecified constipation type  Increased s/s  1. Start senna plus 2 tabs every day  2. Encourage fluids  3. Reassess over next couple weeks  4. For ongoing s/s, may add miralax        Electronically signed by:  DONY Clark CNP             Sincerely,        DONY Clark CNP

## 2019-04-23 ENCOUNTER — ASSISTED LIVING VISIT (OUTPATIENT)
Dept: GERIATRICS | Facility: CLINIC | Age: 84
End: 2019-04-23
Payer: COMMERCIAL

## 2019-04-23 VITALS
DIASTOLIC BLOOD PRESSURE: 60 MMHG | OXYGEN SATURATION: 92 % | TEMPERATURE: 98.6 F | HEART RATE: 79 BPM | RESPIRATION RATE: 18 BRPM | SYSTOLIC BLOOD PRESSURE: 100 MMHG

## 2019-04-23 DIAGNOSIS — R29.6 RECURRENT FALLS: Primary | ICD-10-CM

## 2019-04-23 DIAGNOSIS — F02.80 LEWY BODY DEMENTIA WITHOUT BEHAVIORAL DISTURBANCE (H): ICD-10-CM

## 2019-04-23 DIAGNOSIS — G31.83 LEWY BODY DEMENTIA WITHOUT BEHAVIORAL DISTURBANCE (H): ICD-10-CM

## 2019-04-23 DIAGNOSIS — G20.A1 PARKINSON'S DISEASE (H): ICD-10-CM

## 2019-04-23 NOTE — LETTER
4/23/2019        RE: Carlos Neri  [address]  [place] [address]        [facility]  [place] Medical Record Number:  [id number]  Place of Service where encounter took place:  [facility] ([facility]) [[id number]]  Chief Complaint   Patient presents with     Fall       HPI:    Carlos Neri  is a 83 year old (1935), who is being seen today for an episodic care visit.  HPI information obtained from: facility chart records, facility staff, patient report, [facility] chart review and family/first contact spouse report. Today's concern is: falls, PD, dementia.  S/p fall 4/19/19. No apparent inj. Last fall 4/12/19. Self transfers in room, uses walker. Balance issue at times. Also picks items up off floor at times. Has h/o hypotension-cont. On midodrine. BPs, HRs stable. Has PD. Cont. On sinemet. Ropinirole  restarted. Cont. On sinemet, comtan. Had aricept dc'd during last Neurology visit. Gait gen. Stable with walker. No recent increased freezing. No recent increased tremor. Has LBD, cont. On effexor, klonopin, neurontin. Recent increased effexor dose increase due to increased anxiety, agitation.  Mood, behaviors more stable past few days per staff.       Past Medical and Surgical History reviewed in Epic today.    MEDICATIONS:  Current Outpatient Medications   Medication Sig Dispense Refill     acetaminophen (TYLENOL) 500 MG tablet Take 1,000 mg by mouth 3 times daily       Artificial Tear Solution (SOOTHE XP) SOLN Place 1 drop into both eyes 2 times daily       carbidopa-levodopa (SINEMET CR)  MG CR tablet Take 1 tablet by mouth At Bedtime       carbidopa-levodopa (SINEMET)  MG per tablet Take 1 tablet by mouth 4 times daily       clonazePAM (KLONOPIN) 0.5 MG tablet Take 2 tablets (1 mg) by mouth At Bedtime May also have 1 tab (0.5 mg) once daily PRN 90 tablet 5     entacapone (COMTAN) 200 MG tablet Take 100 mg by mouth 4 times daily         gabapentin (NEURONTIN) 300 MG capsule Take 300 mg by mouth At Bedtime And 100 mg every day prn       MIDODRINE HCL PO Take 5 mg by mouth 3 times daily (before meals)       polyethylene glycol (MIRALAX/GLYCOLAX) Packet Take 17 g by mouth daily as needed for constipation (AND every other day scheduled)        rOPINIRole (REQUIP) 0.25 MG tablet Take 0.75 mg by mouth 3 times daily        rOPINIRole (REQUIP) 0.25 MG tablet Take 0.25 mg by mouth At Bedtime       senna-docusate (SENOKOT-S/PERICOLACE) 8.6-50 MG tablet Take 2 tablets by mouth daily       venlafaxine (EFFEXOR) 25 MG tablet Take 25 mg by mouth 2 times daily           REVIEW OF SYSTEMS:  Unobtainable secondary to cognitive impairment. Reports feeling fine today    Objective:  /60   Pulse 79   Temp 98.6  F (37  C)   Resp 18   SpO2 92%   Exam:  GENERAL APPEARANCE:  Alert, in no distress, cooperative  ENT:  Mouth and posterior oropharynx normal, moist mucous membranes, [place]  EYES:  EOM, conjunctivae, lids, pupils and irises normal, PERRL  NECK:  No adenopathy,masses or thyromegaly, no carotid bruit  RESP:  respiratory effort and palpation of chest normal, lungs clear to auscultation , no respiratory distress  CV:  Palpation and auscultation of heart done , regular rate and rhythm, no murmur, rub, or gallop, no edema  ABDOMEN:  normal bowel sounds, soft, nontender, no hepatosplenomegaly or other masses, no guarding or rebound  M/S:   muscle strength 5/5 all 4 ext., no increased rigidity  NEURO:   Cranial nerves 2-12 are normal tested and grossly at patient's baseline, alert, speech soft  PSYCH:  insight and judgement impaired, memory impaired , affect abnormal -flat    Labs:     Most Recent 3 CBC's:  Recent Labs   Lab Test 02/19/19 2227 07/27/18  1245 05/11/18  1549   WBC 4.9 4.7 4.6   HGB 13.6 13.8 13.7   MCV 96 95 95   * 120* 127*     Most Recent 3 BMP's:  Recent Labs   Lab Test 02/19/19 2227 09/11/18 07/27/18  1245    143 139    POTASSIUM 4.2 3.9 3.7   CHLORIDE 111* 108* 106   CO2 26 28 30   BUN 31* 21 18   CR 0.98 0.90 0.88   ANIONGAP 7 7 3   SADIA 8.6 9.0 8.7   * 84 88       ASSESSMENT/PLAN:  Recurrent falls  Ongoing, appear mechanical in nature. BPs stable  1. Cont. To remind to use walker at all times  2. Monitor for further unsafe behaviors such as picking up items off floor  3. Follow BPs, HRs. Cont. Midodrine   4. Cbc, bmp in next 1-3 mos    Parkinson's disease (H)  Gait gen. Stable. No recent reports of increased festination, freezing  1. Cont. Sinemet  2. Cont. Comptan, ropinirole  3. Spoke with spouse who made appt with Neuro for 5/1/19    Lewy body dementia without behavioral disturbance  Memory loss, recent functional decline. Recent h/o increased agitation, anxiety. Decreased past few days per staff  1. Cont. Increased effexor dose  2. Cont. Klonopin, neurontin  3. For stable mood over next couple weeks, may trial GDR of klonopin  4. For further increased anxiety may consider alt. Antidepressant, remeron      Electronically signed by:  DONY Clark CNP             Sincerely,        DONY Clark CNP

## 2019-04-23 NOTE — PROGRESS NOTES
[facility]  [place] Medical Record Number:  [id number]  Place of Service where encounter took place:  [facility] ([facility]) [[id number]]  Chief Complaint   Patient presents with     Fall       HPI:    Carlos Neri  is a 83 year old (1935), who is being seen today for an episodic care visit.  HPI information obtained from: facility chart records, facility staff, patient report, [facility] chart review and family/first contact spouse report. Today's concern is: falls, PD, dementia.  S/p fall 4/19/19. No apparent inj. Last fall 4/12/19. Self transfers in room, uses walker. Balance issue at times. Also picks items up off floor at times. Has h/o hypotension-cont. On midodrine. BPs, HRs stable. Has PD. Cont. On sinemet. Ropinirole  restarted. Cont. On sinemet, comtan. Had aricept dc'd during last Neurology visit. Gait gen. Stable with walker. No recent increased freezing. No recent increased tremor. Has LBD, cont. On effexor, klonopin, neurontin. Recent increased effexor dose increase due to increased anxiety, agitation.  Mood, behaviors more stable past few days per staff.       Past Medical and Surgical History reviewed in Epic today.    MEDICATIONS:  Current Outpatient Medications   Medication Sig Dispense Refill     acetaminophen (TYLENOL) 500 MG tablet Take 1,000 mg by mouth 3 times daily       Artificial Tear Solution (SOOTHE XP) SOLN Place 1 drop into both eyes 2 times daily       carbidopa-levodopa (SINEMET CR)  MG CR tablet Take 1 tablet by mouth At Bedtime       carbidopa-levodopa (SINEMET)  MG per tablet Take 1 tablet by mouth 4 times daily       clonazePAM (KLONOPIN) 0.5 MG tablet Take 2 tablets (1 mg) by mouth At Bedtime May also have 1 tab (0.5 mg) once daily PRN 90 tablet 5     entacapone (COMTAN) 200 MG tablet Take 100 mg by mouth 4 times daily        gabapentin (NEURONTIN) 300 MG capsule Take 300 mg by mouth At Bedtime And 100 mg every day  prn       MIDODRINE HCL PO Take 5 mg by mouth 3 times daily (before meals)       polyethylene glycol (MIRALAX/GLYCOLAX) Packet Take 17 g by mouth daily as needed for constipation (AND every other day scheduled)        rOPINIRole (REQUIP) 0.25 MG tablet Take 0.75 mg by mouth 3 times daily        rOPINIRole (REQUIP) 0.25 MG tablet Take 0.25 mg by mouth At Bedtime       senna-docusate (SENOKOT-S/PERICOLACE) 8.6-50 MG tablet Take 2 tablets by mouth daily       venlafaxine (EFFEXOR) 25 MG tablet Take 25 mg by mouth 2 times daily           REVIEW OF SYSTEMS:  Unobtainable secondary to cognitive impairment. Reports feeling fine today    Objective:  /60   Pulse 79   Temp 98.6  F (37  C)   Resp 18   SpO2 92%   Exam:  GENERAL APPEARANCE:  Alert, in no distress, cooperative  ENT:  Mouth and posterior oropharynx normal, moist mucous membranes, [place]  EYES:  EOM, conjunctivae, lids, pupils and irises normal, PERRL  NECK:  No adenopathy,masses or thyromegaly, no carotid bruit  RESP:  respiratory effort and palpation of chest normal, lungs clear to auscultation , no respiratory distress  CV:  Palpation and auscultation of heart done , regular rate and rhythm, no murmur, rub, or gallop, no edema  ABDOMEN:  normal bowel sounds, soft, nontender, no hepatosplenomegaly or other masses, no guarding or rebound  M/S:   muscle strength 5/5 all 4 ext., no increased rigidity  NEURO:   Cranial nerves 2-12 are normal tested and grossly at patient's baseline, alert, speech soft  PSYCH:  insight and judgement impaired, memory impaired , affect abnormal -flat    Labs:     Most Recent 3 CBC's:  Recent Labs   Lab Test 02/19/19 2227 07/27/18  1245 05/11/18  1549   WBC 4.9 4.7 4.6   HGB 13.6 13.8 13.7   MCV 96 95 95   * 120* 127*     Most Recent 3 BMP's:  Recent Labs   Lab Test 02/19/19 2227 09/11/18 07/27/18  1245    143 139   POTASSIUM 4.2 3.9 3.7   CHLORIDE 111* 108* 106   CO2 26 28 30   BUN 31* 21 18   CR 0.98 0.90 0.88    ANIONGAP 7 7 3   SADIA 8.6 9.0 8.7   * 84 88       ASSESSMENT/PLAN:  Recurrent falls  Ongoing, appear mechanical in nature. BPs stable  1. Cont. To remind to use walker at all times  2. Monitor for further unsafe behaviors such as picking up items off floor  3. Follow BPs, HRs. Cont. Midodrine   4. Cbc, bmp in next 1-3 mos    Parkinson's disease (H)  Gait gen. Stable. No recent reports of increased festination, freezing  1. Cont. Sinemet  2. Cont. Comptan, ropinirole  3. Spoke with spouse who made appt with Neuro for 5/1/19    Lewy body dementia without behavioral disturbance  Memory loss, recent functional decline. Recent h/o increased agitation, anxiety. Decreased past few days per staff  1. Cont. Increased effexor dose  2. Cont. Klonopin, neurontin  3. For stable mood over next couple weeks, may trial GDR of klonopin  4. For further increased anxiety may consider alt. Antidepressant, remeron      Electronically signed by:  DONY Clark CNP

## 2019-05-21 ENCOUNTER — ASSISTED LIVING VISIT (OUTPATIENT)
Dept: GERIATRICS | Facility: CLINIC | Age: 84
End: 2019-05-21
Payer: COMMERCIAL

## 2019-05-21 VITALS
RESPIRATION RATE: 16 BRPM | OXYGEN SATURATION: 93 % | SYSTOLIC BLOOD PRESSURE: 119 MMHG | DIASTOLIC BLOOD PRESSURE: 76 MMHG | HEART RATE: 74 BPM

## 2019-05-21 DIAGNOSIS — G20.A1 PARKINSON'S DISEASE (H): Primary | ICD-10-CM

## 2019-05-21 DIAGNOSIS — F02.818 LEWY BODY DEMENTIA WITH BEHAVIORAL DISTURBANCE (H): ICD-10-CM

## 2019-05-21 DIAGNOSIS — G31.83 LEWY BODY DEMENTIA WITH BEHAVIORAL DISTURBANCE (H): ICD-10-CM

## 2019-05-21 DIAGNOSIS — K59.00 CONSTIPATION, UNSPECIFIED CONSTIPATION TYPE: ICD-10-CM

## 2019-05-21 NOTE — LETTER
5/21/2019        RE: Carlos Neri  [address]  [place] [address]        [facility]  [place] Medical Record Number:  [id number]  Place of Service where encounter took place:  [facility] ([facility]) [[id number]]  Chief Complaint   Patient presents with     Musculoskeletal Problem       HPI:    Carlos Neri  is a 83 year old (1935), who is being seen today for an episodic care visit.  HPI information obtained from: facility chart records, facility staff, patient report and [facility] chart review. Today's concern is: PD, LBD, constipation.  For PD cont. On sinemet, comtan, ropinirole, neurontin. No recent falls. No increased freezing episodes. Wide stance gait, steady with walker. Amb. Full length of bundy. For LBD cont. On effexor, klonopin. Mood, behaviors more stable over past 1-2 mos. No recent reports of aggressive behaviors, wandering into other's rooms. Anxiety gen. Stable.  For constipation cont. On miralax, senna. No recent increased s/s. Staff reports fluid intake adequate.       Past Medical and Surgical History reviewed in Epic today.    MEDICATIONS:  Current Outpatient Medications   Medication Sig Dispense Refill     acetaminophen (TYLENOL) 500 MG tablet Take 1,000 mg by mouth 3 times daily       Artificial Tear Solution (SOOTHE XP) SOLN Place 1 drop into both eyes 2 times daily       carbidopa-levodopa (SINEMET CR)  MG CR tablet Take 1 tablet by mouth At Bedtime       carbidopa-levodopa (SINEMET)  MG per tablet Take 1 tablet by mouth 4 times daily       clonazePAM (KLONOPIN) 0.5 MG tablet Take 2 tablets (1 mg) by mouth At Bedtime May also have 1 tab (0.5 mg) once daily PRN 90 tablet 5     entacapone (COMTAN) 200 MG tablet Take 100 mg by mouth 4 times daily        gabapentin (NEURONTIN) 300 MG capsule Take 300 mg by mouth At Bedtime And 100 mg every day prn       MIDODRINE HCL PO Take 5 mg by mouth 3 times daily  (before meals)       polyethylene glycol (MIRALAX/GLYCOLAX) Packet Take 17 g by mouth daily as needed for constipation (AND every other day scheduled)        rOPINIRole (REQUIP) 0.25 MG tablet Take 0.75 mg by mouth 3 times daily        rOPINIRole (REQUIP) 0.25 MG tablet Take 0.25 mg by mouth At Bedtime       senna-docusate (SENOKOT-S/PERICOLACE) 8.6-50 MG tablet Take 2 tablets by mouth daily       venlafaxine (EFFEXOR) 25 MG tablet Take 25 mg by mouth 2 times daily           REVIEW OF SYSTEMS:  Unobtainable secondary to cognitive impairment. Reports feeling ok today    Objective:  /76   Pulse 74   Resp 16   SpO2 93%   Exam:  GENERAL APPEARANCE:  Alert, in no distress, cooperative  ENT:  Mouth and posterior oropharynx normal, moist mucous membranes, [place]  EYES:  EOM, conjunctivae, lids, pupils and irises normal, [place]  NECK:  No adenopathy,masses or thyromegaly, no carotid bruit  RESP:  respiratory effort and palpation of chest normal, lungs clear to auscultation , no respiratory distress  CV:  Palpation and auscultation of heart done , regular rate and rhythm, no murmur, rub, or gallop, no edema  ABDOMEN:  normal bowel sounds, soft, nontender, no hepatosplenomegaly or other masses, no guarding or rebound  M/S:   gait stable, wide stance. muscle strength 5/5 all 4 ext., normal tone  NEURO:   Cranial nerves 2-12 are normal tested and grossly at patient's baseline, speech soft, no tremor seen  PSYCH:  insight and judgement impaired, memory impaired , affect abnormal -flat    Labs:     Most Recent 3 CBC's:  Recent Labs   Lab Test 02/19/19 2227 07/27/18  1245 05/11/18  1549   WBC 4.9 4.7 4.6   HGB 13.6 13.8 13.7   MCV 96 95 95   * 120* 127*     Most Recent 3 BMP's:  Recent Labs   Lab Test 02/19/19 2227 09/11/18 07/27/18  1245    143 139   POTASSIUM 4.2 3.9 3.7   CHLORIDE 111* 108* 106   CO2 26 28 30   BUN 31* 21 18   CR 0.98 0.90 0.88   ANIONGAP 7 7 3   SADIA 8.6 9.0 8.7   * 84 88        ASSESSMENT/PLAN:  Parkinson's disease (H)  Gait unchanged, no decreased act. Level. No recent falls  1. Cont. Current PD meds  2. Monitor for increased freezing, festination  3. Walker at all times-remind to use  4. Spouse to make f/u Neurology appt    Lewy body dementia with behavioral disturbance  Mood, behaviors more stable over past month.   1. Cont. Effexor, klonopin  2. Monitor for increased hallucinations  3. Monitor for agitation, aggressive behaviors, increased anxiety-use prn klonopin  4. Monitor for sleep disturbance    Constipation, unspecified constipation type  Currently stable  1. Cont. Current bowel meds  2. Encourage fluids  3. For increased s/s, may increase miralax from every other day to every day  4. Bmp in next 1-3 mos      Electronically signed by:  DONY Clark CNP             Sincerely,        DONY Clark CNP

## 2019-05-21 NOTE — PROGRESS NOTES
[place] [facility]  [place] Medical Record Number:  [id number]  Place of Service where encounter took place:  [facility] ([facility]) [[id number]]  Chief Complaint   Patient presents with     Musculoskeletal Problem       HPI:    Carlos Neri  is a 83 year old (1935), who is being seen today for an episodic care visit.  HPI information obtained from: facility chart records, facility staff, patient report and [facility] chart review. Today's concern is: PD, LBD, constipation.  For PD cont. On sinemet, comtan, ropinirole, neurontin. No recent falls. No increased freezing episodes. Wide stance gait, steady with walker. Amb. Full length of bnudy. For LBD cont. On effexor, klonopin. Mood, behaviors more stable over past 1-2 mos. No recent reports of aggressive behaviors, wandering into other's rooms. Anxiety gen. Stable.  For constipation cont. On miralax, senna. No recent increased s/s. Staff reports fluid intake adequate.       Past Medical and Surgical History reviewed in Epic today.    MEDICATIONS:  Current Outpatient Medications   Medication Sig Dispense Refill     acetaminophen (TYLENOL) 500 MG tablet Take 1,000 mg by mouth 3 times daily       Artificial Tear Solution (SOOTHE XP) SOLN Place 1 drop into both eyes 2 times daily       carbidopa-levodopa (SINEMET CR)  MG CR tablet Take 1 tablet by mouth At Bedtime       carbidopa-levodopa (SINEMET)  MG per tablet Take 1 tablet by mouth 4 times daily       clonazePAM (KLONOPIN) 0.5 MG tablet Take 2 tablets (1 mg) by mouth At Bedtime May also have 1 tab (0.5 mg) once daily PRN 90 tablet 5     entacapone (COMTAN) 200 MG tablet Take 100 mg by mouth 4 times daily        gabapentin (NEURONTIN) 300 MG capsule Take 300 mg by mouth At Bedtime And 100 mg every day prn       MIDODRINE HCL PO Take 5 mg by mouth 3 times daily (before meals)       polyethylene glycol (MIRALAX/GLYCOLAX) Packet Take 17 g by mouth daily as needed  for constipation (AND every other day scheduled)        rOPINIRole (REQUIP) 0.25 MG tablet Take 0.75 mg by mouth 3 times daily        rOPINIRole (REQUIP) 0.25 MG tablet Take 0.25 mg by mouth At Bedtime       senna-docusate (SENOKOT-S/PERICOLACE) 8.6-50 MG tablet Take 2 tablets by mouth daily       venlafaxine (EFFEXOR) 25 MG tablet Take 25 mg by mouth 2 times daily           REVIEW OF SYSTEMS:  Unobtainable secondary to cognitive impairment. Reports feeling ok today    Objective:  /76   Pulse 74   Resp 16   SpO2 93%   Exam:  GENERAL APPEARANCE:  Alert, in no distress, cooperative  ENT:  Mouth and posterior oropharynx normal, moist mucous membranes, [place]  EYES:  EOM, conjunctivae, lids, pupils and irises normal, [place]  NECK:  No adenopathy,masses or thyromegaly, no carotid bruit  RESP:  respiratory effort and palpation of chest normal, lungs clear to auscultation , no respiratory distress  CV:  Palpation and auscultation of heart done , regular rate and rhythm, no murmur, rub, or gallop, no edema  ABDOMEN:  normal bowel sounds, soft, nontender, no hepatosplenomegaly or other masses, no guarding or rebound  M/S:   gait stable, wide stance. muscle strength 5/5 all 4 ext., normal tone  NEURO:   Cranial nerves 2-12 are normal tested and grossly at patient's baseline, speech soft, no tremor seen  PSYCH:  insight and judgement impaired, memory impaired , affect abnormal -flat    Labs:     Most Recent 3 CBC's:  Recent Labs   Lab Test 02/19/19 2227 07/27/18  1245 05/11/18  1549   WBC 4.9 4.7 4.6   HGB 13.6 13.8 13.7   MCV 96 95 95   * 120* 127*     Most Recent 3 BMP's:  Recent Labs   Lab Test 02/19/19 2227 09/11/18 07/27/18  1245    143 139   POTASSIUM 4.2 3.9 3.7   CHLORIDE 111* 108* 106   CO2 26 28 30   BUN 31* 21 18   CR 0.98 0.90 0.88   ANIONGAP 7 7 3   SADIA 8.6 9.0 8.7   * 84 88       ASSESSMENT/PLAN:  Parkinson's disease (H)  Gait unchanged, no decreased act. Level. No recent falls  1.  Cont. Current PD meds  2. Monitor for increased freezing, festination  3. Walker at all times-remind to use  4. Spouse to make f/u Neurology appt    Lewy body dementia with behavioral disturbance  Mood, behaviors more stable over past month.   1. Cont. Effexor, klonopin  2. Monitor for increased hallucinations  3. Monitor for agitation, aggressive behaviors, increased anxiety-use prn klonopin  4. Monitor for sleep disturbance    Constipation, unspecified constipation type  Currently stable  1. Cont. Current bowel meds  2. Encourage fluids  3. For increased s/s, may increase miralax from every other day to every day  4. Bmp in next 1-3 mos      Electronically signed by:  DONY Clark CNP

## 2019-06-11 ENCOUNTER — ASSISTED LIVING VISIT (OUTPATIENT)
Dept: GERIATRICS | Facility: CLINIC | Age: 84
End: 2019-06-11
Payer: COMMERCIAL

## 2019-06-11 DIAGNOSIS — F41.9 ANXIETY: Primary | ICD-10-CM

## 2019-06-11 DIAGNOSIS — I95.1 ORTHOSTATIC HYPOTENSION: ICD-10-CM

## 2019-06-11 DIAGNOSIS — G20.A1 PARKINSON'S DISEASE (H): ICD-10-CM

## 2019-06-11 NOTE — PROGRESS NOTES
[place] [facility]  [place] Medical Record Number:  [id number]  Place of Service where encounter took place:  [facility] ([facility]) [[id number]]  Chief Complaint   Patient presents with     Anxiety     Hypotension       HPI:    Carlos Neri  is a 83 year old (1935), who is being seen today for an episodic care visit.  HPI information obtained from: facility chart records, facility staff, patient report, [facility] chart review and family/first contact spouse report. Today's concern is: anxiety, hypotension, PD.  Has LBD with memory loss, occ increased anxiety. Resistive with staff redirection at times. Cont. On effexor, klonopin. No recent increase in freq. Of target behaviors. Has hypotension. Cont on midodrine. Staff reports adequate fluid intake. BP sl. Lower today. No reports of increased dizziness, no recent falls. For PD cont. On sinemet, comtan, ropinirole. Gait gen. Stable with walker. Has occ freezing, however no increased freq. No recent increased rigidity.       Past Medical and Surgical History reviewed in Epic today.    MEDICATIONS:  Current Outpatient Medications   Medication Sig Dispense Refill     acetaminophen (TYLENOL) 500 MG tablet Take 1,000 mg by mouth 3 times daily AND 1,000 mg TID PRN       Artificial Tear Solution (SOOTHE XP) SOLN Place 1 drop into both eyes 2 times daily       carbidopa-levodopa (SINEMET CR)  MG CR tablet Take 1 tablet by mouth At Bedtime       carbidopa-levodopa (SINEMET)  MG per tablet Take 1 tablet by mouth 4 times daily       clonazePAM (KLONOPIN) 0.5 MG tablet Take 2 tablets (1 mg) by mouth At Bedtime May also have 1 tab (0.5 mg) once daily PRN 90 tablet 5     entacapone (COMTAN) 200 MG tablet Take 100 mg by mouth 4 times daily        gabapentin (NEURONTIN) 300 MG capsule Take 300 mg by mouth At Bedtime And 100 mg every day prn       MIDODRINE HCL PO Take 5 mg by mouth 3 times daily (before meals)        polyethylene glycol (MIRALAX/GLYCOLAX) Packet Take 17 g by mouth daily as needed for constipation (AND every other day scheduled)        rOPINIRole (REQUIP) 0.25 MG tablet Take 0.75 mg by mouth 3 times daily        rOPINIRole (REQUIP) 0.25 MG tablet Take 0.25 mg by mouth At Bedtime       senna-docusate (SENOKOT-S/PERICOLACE) 8.6-50 MG tablet Take 2 tablets by mouth daily       venlafaxine (EFFEXOR) 25 MG tablet Take 25 mg by mouth 2 times daily           REVIEW OF SYSTEMS:  Unobtainable secondary to cognitive impairment. Reports feeling ok today    Objective:  BP 90/60   Pulse 87   Resp 16   SpO2 92%   Exam:  GENERAL APPEARANCE:  Alert, in no distress, cooperative  ENT:  Mouth and posterior oropharynx normal, moist mucous membranes, [place]  EYES:  EOM, conjunctivae, lids, pupils and irises normal, PERRL  NECK:  No adenopathy,masses or thyromegaly, no carotid bruit  RESP:  respiratory effort and palpation of chest normal, lungs clear to auscultation , no respiratory distress  CV:  Palpation and auscultation of heart done , regular rate and rhythm, no murmur, rub, or gallop, no edema  ABDOMEN:  normal bowel sounds, soft, nontender, no hepatosplenomegaly or other masses, no guarding or rebound  M/S:   muscle strength 5/5 all 4 ext, normal tone  NEURO:   Cranial nerves 2-12 are normal tested and grossly at patient's baseline, alert, speech soft, clear  PSYCH:  insight and judgement impaired, memory impaired , affect abnormal -flat    Labs:     Most Recent 3 CBC's:  Recent Labs   Lab Test 02/19/19 2227 07/27/18  1245 05/11/18  1549   WBC 4.9 4.7 4.6   HGB 13.6 13.8 13.7   MCV 96 95 95   * 120* 127*     Most Recent 3 BMP's:  Recent Labs   Lab Test 02/19/19 2227 09/11/18 07/27/18  1245    143 139   POTASSIUM 4.2 3.9 3.7   CHLORIDE 111* 108* 106   CO2 26 28 30   BUN 31* 21 18   CR 0.98 0.90 0.88   ANIONGAP 7 7 3   SADIA 8.6 9.0 8.7   * 84 88       ASSESSMENT/PLAN:  Anxiety  occ increased s/s  1. Cont.  Effexor, klonopin  2. Cont. Hs neurontin  3. Cont. To use re-approach technique when resistive towards staff  4. Monitor for insomnia    Orthostatic hypotension  Gen. Stable, sl. Lower BP today  1. Cont. Midodrine  2. Follow BPs, HRs  3. For further low BPs, may increase midodrine  4. Encourage fluids    Parkinson's disease (H)  occ freezing with amb. No increased rigidity  1. Cont. Current PD meds  2. Monitor for changes in gait, falls  3. Spoke with spouse who is making Neurology appt      Electronically signed by:  DONY Clark CNP

## 2019-06-11 NOTE — LETTER
6/11/2019        RE: Carlos Neri  [address]  [place] [address]        [place] [facility]  [place] Medical Record Number:  [id number]  Place of Service where encounter took place:  [facility] ([facility]) [[id number]]  Chief Complaint   Patient presents with     Anxiety     Hypotension       HPI:    Carlos Neri  is a 83 year old (1935), who is being seen today for an episodic care visit.  HPI information obtained from: facility chart records, facility staff, patient report, [facility] chart review and family/first contact spouse report. Today's concern is: anxiety, hypotension, PD.  Has LBD with memory loss, occ increased anxiety. Resistive with staff redirection at times. Cont. On effexor, klonopin. No recent increase in freq. Of target behaviors. Has hypotension. Cont on midodrine. Staff reports adequate fluid intake. BP sl. Lower today. No reports of increased dizziness, no recent falls. For PD cont. On sinemet, comtan, ropinirole. Gait gen. Stable with walker. Has occ freezing, however no increased freq. No recent increased rigidity.       Past Medical and Surgical History reviewed in Epic today.    MEDICATIONS:  Current Outpatient Medications   Medication Sig Dispense Refill     acetaminophen (TYLENOL) 500 MG tablet Take 1,000 mg by mouth 3 times daily AND 1,000 mg TID PRN       Artificial Tear Solution (SOOTHE XP) SOLN Place 1 drop into both eyes 2 times daily       carbidopa-levodopa (SINEMET CR)  MG CR tablet Take 1 tablet by mouth At Bedtime       carbidopa-levodopa (SINEMET)  MG per tablet Take 1 tablet by mouth 4 times daily       clonazePAM (KLONOPIN) 0.5 MG tablet Take 2 tablets (1 mg) by mouth At Bedtime May also have 1 tab (0.5 mg) once daily PRN 90 tablet 5     entacapone (COMTAN) 200 MG tablet Take 100 mg by mouth 4 times daily        gabapentin (NEURONTIN) 300 MG capsule Take 300 mg by mouth At Bedtime And 100 mg  every day prn       MIDODRINE HCL PO Take 5 mg by mouth 3 times daily (before meals)       polyethylene glycol (MIRALAX/GLYCOLAX) Packet Take 17 g by mouth daily as needed for constipation (AND every other day scheduled)        rOPINIRole (REQUIP) 0.25 MG tablet Take 0.75 mg by mouth 3 times daily        rOPINIRole (REQUIP) 0.25 MG tablet Take 0.25 mg by mouth At Bedtime       senna-docusate (SENOKOT-S/PERICOLACE) 8.6-50 MG tablet Take 2 tablets by mouth daily       venlafaxine (EFFEXOR) 25 MG tablet Take 25 mg by mouth 2 times daily           REVIEW OF SYSTEMS:  Unobtainable secondary to cognitive impairment. Reports feeling ok today    Objective:  BP 90/60   Pulse 87   Resp 16   SpO2 92%   Exam:  GENERAL APPEARANCE:  Alert, in no distress, cooperative  ENT:  Mouth and posterior oropharynx normal, moist mucous membranes, [place]  EYES:  EOM, conjunctivae, lids, pupils and irises normal, PERRL  NECK:  No adenopathy,masses or thyromegaly, no carotid bruit  RESP:  respiratory effort and palpation of chest normal, lungs clear to auscultation , no respiratory distress  CV:  Palpation and auscultation of heart done , regular rate and rhythm, no murmur, rub, or gallop, no edema  ABDOMEN:  normal bowel sounds, soft, nontender, no hepatosplenomegaly or other masses, no guarding or rebound  M/S:   muscle strength 5/5 all 4 ext, normal tone  NEURO:   Cranial nerves 2-12 are normal tested and grossly at patient's baseline, alert, speech soft, clear  PSYCH:  insight and judgement impaired, memory impaired , affect abnormal -flat    Labs:     Most Recent 3 CBC's:  Recent Labs   Lab Test 02/19/19 2227 07/27/18  1245 05/11/18  1549   WBC 4.9 4.7 4.6   HGB 13.6 13.8 13.7   MCV 96 95 95   * 120* 127*     Most Recent 3 BMP's:  Recent Labs   Lab Test 02/19/19 2227 09/11/18 07/27/18  1245    143 139   POTASSIUM 4.2 3.9 3.7   CHLORIDE 111* 108* 106   CO2 26 28 30   BUN 31* 21 18   CR 0.98 0.90 0.88   ANIONGAP 7 7 3   SADIA  8.6 9.0 8.7   * 84 88       ASSESSMENT/PLAN:  Anxiety  occ increased s/s  1. Cont. Effexor, klonopin  2. Cont. Hs neurontin  3. Cont. To use re-approach technique when resistive towards staff  4. Monitor for insomnia    Orthostatic hypotension  Gen. Stable, sl. Lower BP today  1. Cont. Midodrine  2. Follow BPs, HRs  3. For further low BPs, may increase midodrine  4. Encourage fluids    Parkinson's disease (H)  occ freezing with amb. No increased rigidity  1. Cont. Current PD meds  2. Monitor for changes in gait, falls  3. Spoke with spouse who is making Neurology appt      Electronically signed by:  DONY Clark CNP             Sincerely,        DONY Clark CNP

## 2019-06-12 VITALS
HEART RATE: 87 BPM | RESPIRATION RATE: 16 BRPM | OXYGEN SATURATION: 92 % | DIASTOLIC BLOOD PRESSURE: 60 MMHG | SYSTOLIC BLOOD PRESSURE: 90 MMHG

## 2019-06-18 ENCOUNTER — ASSISTED LIVING VISIT (OUTPATIENT)
Dept: GERIATRICS | Facility: CLINIC | Age: 84
End: 2019-06-18
Payer: COMMERCIAL

## 2019-06-18 DIAGNOSIS — F41.9 ANXIETY: ICD-10-CM

## 2019-06-18 DIAGNOSIS — R29.6 RECURRENT FALLS: Primary | ICD-10-CM

## 2019-06-18 DIAGNOSIS — I95.9 HYPOTENSION, UNSPECIFIED HYPOTENSION TYPE: ICD-10-CM

## 2019-06-18 NOTE — LETTER
6/18/2019        RE: Carlos Neri  [address]  [place] [address]        [place] [facility]  [place] Medical Record Number:  [id number]  Place of Service where encounter took place:  [facility] ([facility]) [[id number]]  Chief Complaint   Patient presents with     Fall       HPI:    Carlos Neri  is a 83 year old (1935), who is being seen today for an episodic care visit.  HPI information obtained from: facility chart records, facility staff, patient report, [facility] chart review and family/first contact spouse report. Today's concern is: falls, hypotension, anxiety. S/p falls x 2 over weekend-amb. To bathroom. Recently moved to different room. Denies dizziness. Has h/o hypotension. Cont. On midodrine. Po intake, fluid intake good per staff. Has dementia with increased anxiety at times. Cont on effexor, klonopin, neurontin. Per staff, has episodes of increased anxiety at times, however no increase in frequency. typically reponds to staff redirection.       Past Medical and Surgical History reviewed in Epic today.    MEDICATIONS:  Current Outpatient Medications   Medication Sig Dispense Refill     acetaminophen (TYLENOL) 500 MG tablet Take 1,000 mg by mouth 3 times daily AND 1,000 mg TID PRN       Artificial Tear Solution (SOOTHE XP) SOLN Place 1 drop into both eyes 2 times daily       carbidopa-levodopa (SINEMET CR)  MG CR tablet Take 1 tablet by mouth At Bedtime       carbidopa-levodopa (SINEMET)  MG per tablet Take 1 tablet by mouth 4 times daily       clonazePAM (KLONOPIN) 0.5 MG tablet Take 2 tablets (1 mg) by mouth At Bedtime May also have 1 tab (0.5 mg) once daily PRN 90 tablet 5     entacapone (COMTAN) 200 MG tablet Take 100 mg by mouth 4 times daily        gabapentin (NEURONTIN) 300 MG capsule Take 300 mg by mouth At Bedtime And 100 mg every day prn       MIDODRINE HCL PO Take 5 mg by mouth 3 times daily (before meals)        polyethylene glycol (MIRALAX/GLYCOLAX) Packet Take 17 g by mouth daily as needed for constipation (AND every other day scheduled)        rOPINIRole (REQUIP) 0.25 MG tablet Take 0.75 mg by mouth 3 times daily        rOPINIRole (REQUIP) 0.25 MG tablet Take 0.25 mg by mouth At Bedtime       senna-docusate (SENOKOT-S/PERICOLACE) 8.6-50 MG tablet Take 2 tablets by mouth daily       venlafaxine (EFFEXOR) 25 MG tablet Take 25 mg by mouth 2 times daily           REVIEW OF SYSTEMS:  Unobtainable secondary to cognitive impairment. Denies any dizziness    Objective:  /67   Pulse 58   Resp 16   Wt 62.1 kg (137 lb)   BMI 18.58 kg/m     Exam:  GENERAL APPEARANCE:  Alert, in no distress, thin, cooperative  ENT:  Mouth and posterior oropharynx normal, moist mucous membranes, [place]  EYES:  EOM, conjunctivae, lids, pupils and irises normal, PERRL  NECK:  No adenopathy,masses or thyromegaly, FROM  RESP:  respiratory effort and palpation of chest normal, lungs clear to auscultation , no respiratory distress  CV:  Palpation and auscultation of heart done , regular rate and rhythm, no murmur, rub, or gallop, no edema  ABDOMEN:  normal bowel sounds, soft, nontender, no hepatosplenomegaly or other masses, no guarding or rebound  M/S:   Gait and station normal  muscle strength 5/5 all 4 ext., normal tone. no curernt tremor  NEURO:   Cranial nerves 2-12 are normal tested and grossly at patient's baseline, speech soft, clear  PSYCH:  insight and judgement impaired, memory impaired , affect abnormal -flat    Labs:     Most Recent 3 CBC's:  Recent Labs   Lab Test 02/19/19 2227 07/27/18  1245 05/11/18  1549   WBC 4.9 4.7 4.6   HGB 13.6 13.8 13.7   MCV 96 95 95   * 120* 127*     Most Recent 3 BMP's:  Recent Labs   Lab Test 02/19/19 2227 09/11/18 07/27/18  1245    143 139   POTASSIUM 4.2 3.9 3.7   CHLORIDE 111* 108* 106   CO2 26 28 30   BUN 31* 21 18   CR 0.98 0.90 0.88   ANIONGAP 7 7 3   SADIA 8.6 9.0 8.7   * 84 88        ASSESSMENT/PLAN:  Recurrent falls  S/p x 2 recent falls, no apparent inj. New room, possible disorientation at hs when amb. To bathroom  1. Monitor for changes in gait  2. Cont. PD meds  3. Monitor for increased rigidity, freezing episodes  4. Encourage amb. As anupam    Hypotension, unspecified hypotension type  Gen. Stable. occ lower BPs  1. Cont. Midodrine  2. Follow BPs, HRs  3. Monitor for reports of dizziness  4. Cbc, bmp tomorrow    Anxiety  occ episodes  1. Cont. Effexor, klonopin, neurontin  2. For further increased s/s, use prn klonopin, neurontin  3. Cont. Re-approach technique when resistive with cares      Electronically signed by:  DONY Clark CNP             Sincerely,        DONY Clark CNP

## 2019-06-18 NOTE — PROGRESS NOTES
[place] [facility]  [place] Medical Record Number:  [id number]  Place of Service where encounter took place:  [facility] ([facility]) [[id number]]  Chief Complaint   Patient presents with     Fall       HPI:    Carlos Neri  is a 83 year old (1935), who is being seen today for an episodic care visit.  HPI information obtained from: facility chart records, facility staff, patient report, [facility] chart review and family/first contact spouse report. Today's concern is: falls, hypotension, anxiety. S/p falls x 2 over weekend-amb. To bathroom. Recently moved to different room. Denies dizziness. Has h/o hypotension. Cont. On midodrine. Po intake, fluid intake good per staff. Has dementia with increased anxiety at times. Cont on effexor, klonopin, neurontin. Per staff, has episodes of increased anxiety at times, however no increase in frequency. typically reponds to staff redirection.       Past Medical and Surgical History reviewed in Epic today.    MEDICATIONS:  Current Outpatient Medications   Medication Sig Dispense Refill     acetaminophen (TYLENOL) 500 MG tablet Take 1,000 mg by mouth 3 times daily AND 1,000 mg TID PRN       Artificial Tear Solution (SOOTHE XP) SOLN Place 1 drop into both eyes 2 times daily       carbidopa-levodopa (SINEMET CR)  MG CR tablet Take 1 tablet by mouth At Bedtime       carbidopa-levodopa (SINEMET)  MG per tablet Take 1 tablet by mouth 4 times daily       clonazePAM (KLONOPIN) 0.5 MG tablet Take 2 tablets (1 mg) by mouth At Bedtime May also have 1 tab (0.5 mg) once daily PRN 90 tablet 5     entacapone (COMTAN) 200 MG tablet Take 100 mg by mouth 4 times daily        gabapentin (NEURONTIN) 300 MG capsule Take 300 mg by mouth At Bedtime And 100 mg every day prn       MIDODRINE HCL PO Take 5 mg by mouth 3 times daily (before meals)       polyethylene glycol (MIRALAX/GLYCOLAX) Packet Take 17 g by mouth daily as needed for constipation  (AND every other day scheduled)        rOPINIRole (REQUIP) 0.25 MG tablet Take 0.75 mg by mouth 3 times daily        rOPINIRole (REQUIP) 0.25 MG tablet Take 0.25 mg by mouth At Bedtime       senna-docusate (SENOKOT-S/PERICOLACE) 8.6-50 MG tablet Take 2 tablets by mouth daily       venlafaxine (EFFEXOR) 25 MG tablet Take 25 mg by mouth 2 times daily           REVIEW OF SYSTEMS:  Unobtainable secondary to cognitive impairment. Denies any dizziness    Objective:  /67   Pulse 58   Resp 16   Wt 62.1 kg (137 lb)   BMI 18.58 kg/m    Exam:  GENERAL APPEARANCE:  Alert, in no distress, thin, cooperative  ENT:  Mouth and posterior oropharynx normal, moist mucous membranes, [place]  EYES:  EOM, conjunctivae, lids, pupils and irises normal, PERRL  NECK:  No adenopathy,masses or thyromegaly, FROM  RESP:  respiratory effort and palpation of chest normal, lungs clear to auscultation , no respiratory distress  CV:  Palpation and auscultation of heart done , regular rate and rhythm, no murmur, rub, or gallop, no edema  ABDOMEN:  normal bowel sounds, soft, nontender, no hepatosplenomegaly or other masses, no guarding or rebound  M/S:   Gait and station normal  muscle strength 5/5 all 4 ext., normal tone. no curernt tremor  NEURO:   Cranial nerves 2-12 are normal tested and grossly at patient's baseline, speech soft, clear  PSYCH:  insight and judgement impaired, memory impaired , affect abnormal -flat    Labs:     Most Recent 3 CBC's:  Recent Labs   Lab Test 02/19/19 2227 07/27/18  1245 05/11/18  1549   WBC 4.9 4.7 4.6   HGB 13.6 13.8 13.7   MCV 96 95 95   * 120* 127*     Most Recent 3 BMP's:  Recent Labs   Lab Test 02/19/19 2227 09/11/18 07/27/18  1245    143 139   POTASSIUM 4.2 3.9 3.7   CHLORIDE 111* 108* 106   CO2 26 28 30   BUN 31* 21 18   CR 0.98 0.90 0.88   ANIONGAP 7 7 3   SADIA 8.6 9.0 8.7   * 84 88       ASSESSMENT/PLAN:  Recurrent falls  S/p x 2 recent falls, no apparent inj. New room, possible  disorientation at hs when amb. To bathroom  1. Monitor for changes in gait  2. Cont. PD meds  3. Monitor for increased rigidity, freezing episodes  4. Encourage amb. As anupam    Hypotension, unspecified hypotension type  Gen. Stable. occ lower BPs  1. Cont. Midodrine  2. Follow BPs, HRs  3. Monitor for reports of dizziness  4. Cbc, bmp tomorrow    Anxiety  occ episodes  1. Cont. Effexor, klonopin, neurontin  2. For further increased s/s, use prn klonopin, neurontin  3. Cont. Re-approach technique when resistive with cares      Electronically signed by:  DONY Clark CNP

## 2019-06-19 ENCOUNTER — TRANSFERRED RECORDS (OUTPATIENT)
Dept: HEALTH INFORMATION MANAGEMENT | Facility: CLINIC | Age: 84
End: 2019-06-19

## 2019-06-19 ENCOUNTER — RECORDS - HEALTHEAST (OUTPATIENT)
Dept: LAB | Facility: CLINIC | Age: 84
End: 2019-06-19

## 2019-06-19 VITALS
DIASTOLIC BLOOD PRESSURE: 67 MMHG | RESPIRATION RATE: 16 BRPM | BODY MASS INDEX: 18.58 KG/M2 | HEART RATE: 58 BPM | WEIGHT: 137 LBS | SYSTOLIC BLOOD PRESSURE: 105 MMHG

## 2019-06-19 LAB
ANION GAP SERPL CALCULATED.3IONS-SCNC: 5 MMOL/L (ref 5–18)
ANION GAP SERPL CALCULATED.3IONS-SCNC: 5 MMOL/L (ref 5–18)
BASOPHILS # BLD AUTO: 0 THOU/UL (ref 0–0.2)
BASOPHILS NFR BLD AUTO: 0 % (ref 0–2)
BUN SERPL-MCNC: 25 MG/DL (ref 8–28)
BUN SERPL-MCNC: 25 MG/DL (ref 8–28)
CALCIUM SERPL-MCNC: 9.6 MG/DL (ref 8.5–10.5)
CALCIUM SERPL-MCNC: 9.6 MG/DL (ref 8.5–10.5)
CHLORIDE BLD-SCNC: 106 MMOL/L (ref 98–107)
CHLORIDE SERPLBLD-SCNC: 106 MMOL/L (ref 98–107)
CO2 SERPL-SCNC: 31 MMOL/L (ref 22–31)
CO2 SERPL-SCNC: 31 MMOL/L (ref 22–31)
CREAT SERPL-MCNC: 0.79 MG/DL (ref 0.7–1.3)
CREAT SERPL-MCNC: 0.79 MG/DL (ref 0.7–1.3)
DIFFERENTIAL: ABNORMAL
EOSINOPHIL # BLD AUTO: 0.1 THOU/UL (ref 0–0.4)
EOSINOPHIL NFR BLD AUTO: 1 % (ref 0–6)
ERYTHROCYTE [DISTWIDTH] IN BLOOD BY AUTOMATED COUNT: 13.8 % (ref 11–14.5)
ERYTHROCYTE [DISTWIDTH] IN BLOOD BY AUTOMATED COUNT: 13.8 % (ref 11–14.5)
GFR SERPL CREATININE-BSD FRML MDRD: >60 ML/MIN/1.73M2
GFR SERPL CREATININE-BSD FRML MDRD: >60 ML/MIN/1.73M2
GLUCOSE BLD-MCNC: 71 MG/DL (ref 70–125)
GLUCOSE SERPL-MCNC: 71 MG/DL (ref 70–125)
HCT VFR BLD AUTO: 44 % (ref 40–54)
HCT VFR BLD AUTO: 44 % (ref 40–54)
HEMOGLOBIN: 13.6 G/DL (ref 14–18)
HGB BLD-MCNC: 13.6 G/DL (ref 14–18)
LYMPHOCYTES # BLD AUTO: 1.6 THOU/UL (ref 0.8–4.4)
LYMPHOCYTES NFR BLD AUTO: 25 % (ref 20–40)
MCH RBC QN AUTO: 31.3 PG (ref 27–34)
MCH RBC QN AUTO: 31.3 PG (ref 27–34)
MCHC RBC AUTO-ENTMCNC: 30.9 G/DL (ref 32–36)
MCHC RBC AUTO-ENTMCNC: 30.9 G/DL (ref 32–36)
MCV RBC AUTO: 101 FL (ref 80–100)
MCV RBC AUTO: 101 FL (ref 80–100)
MONOCYTES # BLD AUTO: 0.5 THOU/UL (ref 0–0.9)
MONOCYTES NFR BLD AUTO: 8 % (ref 2–10)
NEUTROPHILS # BLD AUTO: 4.3 THOU/UL (ref 2–7.7)
NEUTROPHILS NFR BLD AUTO: 66 % (ref 50–70)
PLATELET # BLD AUTO: 140 THOU/UL (ref 140–440)
PLATELET # BLD AUTO: 140 THOU/UL (ref 140–440)
PMV BLD AUTO: 11.6 FL (ref 8.5–12.5)
POTASSIUM BLD-SCNC: 4.7 MMOL/L (ref 3.5–5)
POTASSIUM SERPL-SCNC: 4.7 MMOL/L (ref 3.5–5)
RBC # BLD AUTO: 4.34 MILL/UL (ref 4.4–6.2)
RBC # BLD AUTO: 4.34 MILL/UL (ref 4.4–6.2)
SODIUM SERPL-SCNC: 142 MMOL/L (ref 136–145)
SODIUM SERPL-SCNC: 142 MMOL/L (ref 136–145)
WBC # BLD AUTO: 6.5 THOU/UL (ref 4–11)
WBC: 6.5 THOU/UL (ref 4–11)

## 2019-06-25 ENCOUNTER — APPOINTMENT (OUTPATIENT)
Dept: CT IMAGING | Facility: CLINIC | Age: 84
End: 2019-06-25
Attending: EMERGENCY MEDICINE
Payer: COMMERCIAL

## 2019-06-25 ENCOUNTER — HOSPITAL ENCOUNTER (EMERGENCY)
Facility: CLINIC | Age: 84
Discharge: HOME | End: 2019-06-25
Attending: EMERGENCY MEDICINE | Admitting: EMERGENCY MEDICINE
Payer: COMMERCIAL

## 2019-06-25 ENCOUNTER — ASSISTED LIVING VISIT (OUTPATIENT)
Dept: GERIATRICS | Facility: CLINIC | Age: 84
End: 2019-06-25
Payer: COMMERCIAL

## 2019-06-25 VITALS
DIASTOLIC BLOOD PRESSURE: 77 MMHG | RESPIRATION RATE: 20 BRPM | OXYGEN SATURATION: 97 % | TEMPERATURE: 97 F | SYSTOLIC BLOOD PRESSURE: 135 MMHG

## 2019-06-25 DIAGNOSIS — F41.9 ANXIETY: ICD-10-CM

## 2019-06-25 DIAGNOSIS — I95.9 HYPOTENSION, UNSPECIFIED HYPOTENSION TYPE: ICD-10-CM

## 2019-06-25 DIAGNOSIS — R29.6 RECURRENT FALLS: Primary | ICD-10-CM

## 2019-06-25 DIAGNOSIS — S00.411A ABRASION OF RIGHT EAR, INITIAL ENCOUNTER: ICD-10-CM

## 2019-06-25 DIAGNOSIS — S09.90XA CLOSED HEAD INJURY, INITIAL ENCOUNTER: ICD-10-CM

## 2019-06-25 PROCEDURE — 70450 CT HEAD/BRAIN W/O DYE: CPT

## 2019-06-25 PROCEDURE — 25000128 H RX IP 250 OP 636: Performed by: EMERGENCY MEDICINE

## 2019-06-25 PROCEDURE — 99284 EMERGENCY DEPT VISIT MOD MDM: CPT | Mod: 25

## 2019-06-25 PROCEDURE — 90471 IMMUNIZATION ADMIN: CPT

## 2019-06-25 PROCEDURE — 90715 TDAP VACCINE 7 YRS/> IM: CPT | Performed by: EMERGENCY MEDICINE

## 2019-06-25 RX ADMIN — CLOSTRIDIUM TETANI TOXOID ANTIGEN (FORMALDEHYDE INACTIVATED), CORYNEBACTERIUM DIPHTHERIAE TOXOID ANTIGEN (FORMALDEHYDE INACTIVATED), BORDETELLA PERTUSSIS TOXOID ANTIGEN (GLUTARALDEHYDE INACTIVATED), BORDETELLA PERTUSSIS FILAMENTOUS HEMAGGLUTININ ANTIGEN (FORMALDEHYDE INACTIVATED), BORDETELLA PERTUSSIS PERTACTIN ANTIGEN, AND BORDETELLA PERTUSSIS FIMBRIAE 2/3 ANTIGEN 0.5 ML: 5; 2; 2.5; 5; 3; 5 INJECTION, SUSPENSION INTRAMUSCULAR at 01:39

## 2019-06-25 ASSESSMENT — ENCOUNTER SYMPTOMS: WOUND: 1

## 2019-06-25 NOTE — ED TRIAGE NOTES
Pt lives in memory care assisted living. Unwitnessed fall, staff in next room. Pt was on side with arm behind his back, EMS did not specify which hand. R knee abrasion. R posterior ear scant laceration no active bleeding. Denies LOC or pain. ABC in tact. A/Ox1, baseline. Pt ambulating on EMS arrival with cane, baseline

## 2019-06-25 NOTE — PROGRESS NOTES
[facility]  PRIMARY CARE PROVIDER AND CLINIC:  Yang Martines, DONY CNP, [address] / [place] [address]  Chief Complaint   Patient presents with     ER F/U     [facility] Medical Record Number:  [id number]  Place of Service where encounter took place:  [facility] ([facility] [[id number]]    Carlos Neri  is a 83 year old  (1935), returned to the above facility from  [facility]. Hospital stay 6/25/19 - 6/15/19. .  Admitted to this facility for  rehab, medical management and nursing care.    HPI:    HPI information obtained from: facility chart records, facility staff, patient report and [facility] chart review.   Brief Summary of Hospital Course:   Recurrent falls: s/p fall 6/25/19.  Had laceration to R ear. Sent to ED. Head CT scan neg.  Did report some LUE pain at times. No bruising to LUE. ROM good, pain appears resolved today    Hypotension: cont. On midodrine. Fluid intake adequate per staff. BPs remain stable.    Anxiety: has LBD. Mood, behaviors gen stable. Cont on klonopin, effexor. No reports of increased restlessness, impulsive behaviors.         Updates on Status Since Skilled nursing Admission: amb per usual. Festination at times.     CODE STATUS/ADVANCE DIRECTIVES DISCUSSION:   CPR/Full code   Patient's living condition: lives in an assisted living facility  ALLERGIES: Morphine sulfate  PAST MEDICAL HISTORY:  has a past medical history of Dysphagia, Lewy body dementia, and Parkinson disease (H).  PAST SURGICAL HISTORY:   has a past surgical history that includes hernia repair.  FAMILY HISTORY: family history is not on file.  SOCIAL HISTORY:   reports that he has never smoked. He has never used smokeless tobacco. He reports that he does not drink alcohol or use drugs.    Post Discharge Medication Reconciliation Status: discharge medications reconciled, continue medications without change    Current Outpatient Medications    Medication Sig Dispense Refill     acetaminophen (TYLENOL) 500 MG tablet Take 1,000 mg by mouth 3 times daily AND 1,000 mg TID PRN       Artificial Tear Solution (SOOTHE XP) SOLN Place 1 drop into both eyes 2 times daily       carbidopa-levodopa (SINEMET CR)  MG CR tablet Take 1 tablet by mouth At Bedtime       carbidopa-levodopa (SINEMET)  MG per tablet Take 1 tablet by mouth 4 times daily       clonazePAM (KLONOPIN) 0.5 MG tablet Take 2 tablets (1 mg) by mouth At Bedtime May also have 1 tab (0.5 mg) once daily PRN 90 tablet 5     entacapone (COMTAN) 200 MG tablet Take 100 mg by mouth 4 times daily        gabapentin (NEURONTIN) 300 MG capsule Take 300 mg by mouth At Bedtime And 100 mg every day prn       MIDODRINE HCL PO Take 5 mg by mouth 3 times daily (before meals)       polyethylene glycol (MIRALAX/GLYCOLAX) Packet Take 17 g by mouth daily as needed for constipation (AND every other day scheduled)        rOPINIRole (REQUIP) 0.25 MG tablet Take 0.75 mg by mouth 3 times daily        rOPINIRole (REQUIP) 0.25 MG tablet Take 0.25 mg by mouth At Bedtime       senna-docusate (SENOKOT-S/PERICOLACE) 8.6-50 MG tablet Take 2 tablets by mouth daily       venlafaxine (EFFEXOR) 25 MG tablet Take 25 mg by mouth 2 times daily         ROS:  Unobtainable secondary to cognitive impairment. Reports no pain.    Vitals:  /75   Pulse 77   Resp 18   SpO2 92%   Exam:  GENERAL APPEARANCE:  Alert, in no distress, cooperative  ENT:  Mouth and posterior oropharynx normal, moist mucous membranes, [place]  EYES:  EOM, conjunctivae, lids, pupils and irises normal, PERRL  NECK:  No adenopathy,masses or thyromegaly, FROM  RESP:  respiratory effort and palpation of chest normal, lungs clear to auscultation , no respiratory distress  CV:  Palpation and auscultation of heart done , regular rate and rhythm, no murmur, rub, or gallop, no edema  ABDOMEN:  normal bowel sounds, soft, nontender, no hepatosplenomegaly or other masses,  no guarding or rebound  M/S:   gait slowed, festination at times, no increased rigidity of extremities  SKIN:  skin tear R ear, intact  NEURO:   Cranial nerves 2-12 are normal tested and grossly at patient's baseline, alert, speech soft, clear  PSYCH:  insight and judgement impaired, memory impaired , affect abnormal -flat    Lab/Diagnostic data:    Most Recent 3 CBC's:  Recent Labs   Lab Test 06/19/19 02/19/19 2227 07/27/18  1245   WBC 6.5 4.9 4.7   HGB 13.6* 13.6 13.8   * 96 95    144* 120*     Most Recent 3 BMP's:  Recent Labs   Lab Test 06/19/19 02/19/19 2227 09/11/18    144 143   POTASSIUM 4.7 4.2 3.9   CHLORIDE 106 111* 108*   CO2 31 26 28   BUN 25 31* 21   CR 0.79 0.98 0.90   ANIONGAP 5 7 7   SADIA 9.6 8.6 9.0   GLC 71 102* 84       ASSESSMENT/PLAN:  Recurrent falls  S/p fall 6/25/19. Skin tear R ear intact. Head CT scan neg  1. Refer to PT  2. Encourage amb. As anupam  3. Spouse to make f/u apt with neurology  4. Cont PD meds    Hypotension, unspecified hypotension type  BPs stable. No reports of dizziness  1. Cont. Midodrine  2. Encourage fluids  3. Follow BPs/HRs  4. Cbc, bmp in next 1-3 mos    Anxiety  Gen. Stable  1. Cont. Sched, prn klonopin  2. Cont. effexor  3. For increased s/s, use prn klonopin  4. Monitor for changes in mood, behaviors       Electronically signed by:  DONY Clark CNP           Documentation of Face-to-Face and Certification for Home Health Services     Patient: Carlos Neri   YOB: 1935  MR Number: [id number]  Today's Date: 6/26/2019    I certify that patient: Carlos Neri is under my care and that I, or a nurse practitioner or physician's assistant working with me, had a face-to-face encounter that meets the physician face-to-face encounter requirements with this patient on: 6/26/2019.    This encounter with the patient was in whole, or in part, for the following medical condition, which is the primary reason for home health  care: LE weakness.    I certify that, based on my findings, the following services are medically necessary home health services: Physical Therapy.    My clinical findings support the need for the above services because: Physical Therapy Services are needed to assess and treat the following functional impairments: LE weakness.    Further, I certify that my clinical findings support that this patient is homebound (i.e. absences from home require considerable and taxing effort and are for medical reasons or Caodaism services or infrequently or of short duration when for other reasons) because: Requires assistance of another person or specialized equipment to access medical services because patient:  memory loss, LE weakness..    Based on the above findings. I certify that this patient is confined to the home and needs intermittent skilled nursing care, physical therapy and/or speech therapy.  The patient is under my care, and I have initiated the establishment of the plan of care.  This patient will be followed by a physician who will periodically review the plan of care.  Physician/Provider to provide follow up care: Yang Martines    Responsible Medicare certified [place] Physician: Dr. Bety Rowe  Physician Signature: See electronic signature associated with these discharge orders.  Date: 6/26/2019

## 2019-06-25 NOTE — LETTER
6/25/2019        RE: Carlos Neri  [address]  [place] [address]        [facility]  PRIMARY CARE PROVIDER AND CLINIC:  Yang Martines, DONY CNP, [address] / [place] [address]  Chief Complaint   Patient presents with     ER F/U     [facility] Record Number:  [id number]  Place of Service where encounter took place:  [facility] ([facility] [[id number]]    Carlos Neri  is a 83 year old  (1935), returned to the above facility from  [facility]. Hospital stay 6/25/19 - 6/15/19. .  Admitted to this facility for  rehab, medical management and nursing care.    HPI:    HPI information obtained from: facility chart records, facility staff, patient report and [facility] chart review.   Brief Summary of Hospital Course:   Recurrent falls: s/p fall 6/25/19.  Had laceration to R ear. Sent to ED. Head CT scan neg.  Did report some LUE pain at times. No bruising to LUE. ROM good, pain appears resolved today    Hypotension: cont. On midodrine. Fluid intake adequate per staff. BPs remain stable.    Anxiety: has LBD. Mood, behaviors gen stable. Cont on klonopin, effexor. No reports of increased restlessness, impulsive behaviors.         Updates on Status Since Skilled nursing Admission: amb per usual. Festination at times.     CODE STATUS/ADVANCE DIRECTIVES DISCUSSION:   CPR/Full code   Patient's living condition: lives in an assisted living facility  ALLERGIES: Morphine sulfate  PAST MEDICAL HISTORY:  has a past medical history of Dysphagia, Lewy body dementia, and Parkinson disease (H).  PAST SURGICAL HISTORY:   has a past surgical history that includes hernia repair.  FAMILY HISTORY: family history is not on file.  SOCIAL HISTORY:   reports that he has never smoked. He has never used smokeless tobacco. He reports that he does not drink alcohol or use drugs.    Post Discharge Medication Reconciliation Status: discharge  medications reconciled, continue medications without change    Current Outpatient Medications   Medication Sig Dispense Refill     acetaminophen (TYLENOL) 500 MG tablet Take 1,000 mg by mouth 3 times daily AND 1,000 mg TID PRN       Artificial Tear Solution (SOOTHE XP) SOLN Place 1 drop into both eyes 2 times daily       carbidopa-levodopa (SINEMET CR)  MG CR tablet Take 1 tablet by mouth At Bedtime       carbidopa-levodopa (SINEMET)  MG per tablet Take 1 tablet by mouth 4 times daily       clonazePAM (KLONOPIN) 0.5 MG tablet Take 2 tablets (1 mg) by mouth At Bedtime May also have 1 tab (0.5 mg) once daily PRN 90 tablet 5     entacapone (COMTAN) 200 MG tablet Take 100 mg by mouth 4 times daily        gabapentin (NEURONTIN) 300 MG capsule Take 300 mg by mouth At Bedtime And 100 mg every day prn       MIDODRINE HCL PO Take 5 mg by mouth 3 times daily (before meals)       polyethylene glycol (MIRALAX/GLYCOLAX) Packet Take 17 g by mouth daily as needed for constipation (AND every other day scheduled)        rOPINIRole (REQUIP) 0.25 MG tablet Take 0.75 mg by mouth 3 times daily        rOPINIRole (REQUIP) 0.25 MG tablet Take 0.25 mg by mouth At Bedtime       senna-docusate (SENOKOT-S/PERICOLACE) 8.6-50 MG tablet Take 2 tablets by mouth daily       venlafaxine (EFFEXOR) 25 MG tablet Take 25 mg by mouth 2 times daily         ROS:  Unobtainable secondary to cognitive impairment. Reports no pain.    Vitals:  /75   Pulse 77   Resp 18   SpO2 92%   Exam:  GENERAL APPEARANCE:  Alert, in no distress, cooperative  ENT:  Mouth and posterior oropharynx normal, moist mucous membranes, [place]  EYES:  EOM, conjunctivae, lids, pupils and irises normal, PERRL  NECK:  No adenopathy,masses or thyromegaly, FROM  RESP:  respiratory effort and palpation of chest normal, lungs clear to auscultation , no respiratory distress  CV:  Palpation and auscultation of heart done , regular rate and rhythm, no murmur, rub, or gallop, no  edema  ABDOMEN:  normal bowel sounds, soft, nontender, no hepatosplenomegaly or other masses, no guarding or rebound  M/S:   gait slowed, festination at times, no increased rigidity of extremities  SKIN:  skin tear R ear, intact  NEURO:   Cranial nerves 2-12 are normal tested and grossly at patient's baseline, alert, speech soft, clear  PSYCH:  insight and judgement impaired, memory impaired , affect abnormal -flat    Lab/Diagnostic data:    Most Recent 3 CBC's:  Recent Labs   Lab Test 06/19/19 02/19/19 2227 07/27/18  1245   WBC 6.5 4.9 4.7   HGB 13.6* 13.6 13.8   * 96 95    144* 120*     Most Recent 3 BMP's:  Recent Labs   Lab Test 06/19/19 02/19/19 2227 09/11/18    144 143   POTASSIUM 4.7 4.2 3.9   CHLORIDE 106 111* 108*   CO2 31 26 28   BUN 25 31* 21   CR 0.79 0.98 0.90   ANIONGAP 5 7 7   SADIA 9.6 8.6 9.0   GLC 71 102* 84       ASSESSMENT/PLAN:  Recurrent falls  S/p fall 6/25/19. Skin tear R ear intact. Head CT scan neg  1. Refer to PT  2. Encourage amb. As anupam  3. Spouse to make f/u apt with neurology  4. Cont PD meds    Hypotension, unspecified hypotension type  BPs stable. No reports of dizziness  1. Cont. Midodrine  2. Encourage fluids  3. Follow BPs/HRs  4. Cbc, bmp in next 1-3 mos    Anxiety  Gen. Stable  1. Cont. Sched, prn klonopin  2. Cont. effexor  3. For increased s/s, use prn klonopin  4. Monitor for changes in mood, behaviors       Electronically signed by:  DONY Clark CNP           Documentation of Face-to-Face and Certification for Home Health Services     Patient: Carlos Neri   YOB: 1935  MR Number: [id number]  Today's Date: 6/26/2019    I certify that patient: Carlos Neri is under my care and that I, or a nurse practitioner or physician's assistant working with me, had a face-to-face encounter that meets the physician face-to-face encounter requirements with this patient on: 6/26/2019.    This encounter with the patient was in whole,  or in part, for the following medical condition, which is the primary reason for home health care: LE weakness.    I certify that, based on my findings, the following services are medically necessary home health services: Physical Therapy.    My clinical findings support the need for the above services because: Physical Therapy Services are needed to assess and treat the following functional impairments: LE weakness.    Further, I certify that my clinical findings support that this patient is homebound (i.e. absences from home require considerable and taxing effort and are for medical reasons or Mu-ism services or infrequently or of short duration when for other reasons) because: Requires assistance of another person or specialized equipment to access medical services because patient:  memory loss, LE weakness..    Based on the above findings. I certify that this patient is confined to the home and needs intermittent skilled nursing care, physical therapy and/or speech therapy.  The patient is under my care, and I have initiated the establishment of the plan of care.  This patient will be followed by a physician who will periodically review the plan of care.  Physician/Provider to provide follow up care: Yang Martines    Responsible Medicare certified [place] Physician: Dr. Bety Rowe  Physician Signature: See electronic signature associated with these discharge orders.  Date: 6/26/2019                Sincerely,        DONY Clark CNP

## 2019-06-25 NOTE — ED PROVIDER NOTES
History     Chief Complaint:  Fall    HPI   Carlos Neri is a 83 year old male with a history of Parkinson's disease who presents to the emergency department today for evaluation after fall. The patient had an unwitnessed fall. Staff was just around the corner when he fell and heard him fall. They were at his side within a few seconds in which he was awake on the floor with no loss of consciousness. One of his arms was twisted behind him, but afterwards he denies arm pain. Patient denies loss of consciousness. Patient was able to ambulate after fall. Patient has an abrasion/ laceration behind his right ear. Last tetanus is 1989.    Allergies:  Morphine Sulfate    Medications:    Acetaminophen (Tylenol) 500 Mg Tablet  Artificial Tear Solution (Soothe Xp) Soln  Carbidopa-levodopa (Sinemet)  Mg Per Tablet  Clonazepam (Klonopin) 0.5 Mg Tablet  Entacapone (Comtan) 200 Mg Tablet  Gabapentin (Neurontin) 300 Mg Capsule  Midodrine Hcl Po  Polyethylene Glycol (Miralax/glycolax) Packet  Ropinirole (Requip) 0.25 Mg Tablet  Senna-docusate (Senokot-s/pericolace) 8.6-50 Mg Tablet  Venlafaxine (Effexor) 25 Mg Tablet    Past Medical History:    Dysphagia  Lewy body dementia  Parkinson disease   Syncope  Frequent falls   Orthostatic hypertension  Orthopedic dysphagia     Past Surgical History:    Hernia repair     Family History:    History reviewed. No pertinent family history.     Social History:  The patient was accompanied to the ED by wife.  Smoking Status: Never  Smokeless Tobacco: Never  Alcohol Use: No   Marital Status:      Review of Systems   Skin: Positive for wound (behind right ear and knee abrasions ).   All other systems reviewed and are negative.      Physical Exam     Patient Vitals for the past 24 hrs:   BP Temp Temp src Heart Rate Resp SpO2   06/25/19 0020 -- -- -- -- -- 97 %   06/25/19 0011 135/77 97  F (36.1  C) Oral 59 20 99 %      Physical Exam    GENERAL:  Pleasant, age appropriate.   HEENT:    No scalp hematoma or defect to the bony calvarium.      Deleon's and Racoon's sign negative.      Oropharynx is moist, without lesions or trismus.  EYES:  Conjunctiva normal, PERRL    EOMs intact.  NECK:   C-spine non-tender with full ROM.      No bony step-off to cervical spine.   CV:    Regular rate and rhythm.     No murmurs, rubs or gallops.      No peripheral edema.  PULM:  Clear to auscultation bilateral.      No respiratory distress.      No subcutaneous emphysema or crepitus.  ABD:   Soft, non-tender, non-distended.      No pulsatile masses.  No rebound or guarding.  MSK:    No focal bony tenderness to the extremities.      Upper and lower extremities taken through full ROM without significant pain or limited ROM.  LYMPH:  No cervical lymphadenopathy.  NEURO:  Alert; oriented to person and place.     GCS: E4V4M6 = 14    Speech is garbled; mild-moderate dysarthria (baseline)    CN II-XII intact    Strength is 5/5 in all 4 extremities.  Sensation is intact.      Normal muscular tone, no tremor.  SKIN:   Warm, dry    Superficial abrasion to posterior right ear  PSYCH:   Mood is good and affect is appropriate.    Emergency Department Course   Imaging:  Radiology findings were communicated with the patient and family who voiced understanding of the findings.  Head CT w/o contrast   Preliminary Result   IMPRESSION: No evidence of acute intracranial abnormality.      Report per radiology      Interventions:  0139: Tdap 0.5 mL Intramuscular     Emergency Department Course:  Nursing notes and vitals reviewed.  0006: I performed an exam of the patient as documented above.   The patient was sent for a Head CT while in the emergency department, results above.   0140: Findings and plan explained to the Patient. Patient discharged home with instructions regarding supportive care, medications, and reasons to return. The importance of close follow-up was reviewed.    I personally reviewed the imaging results with the  Patient and spouse and answered all related questions prior to discharge.        Impression & Plan      Medical Decision Makin-year-old male presented to the ED with a fall and subsequent closed head injury with right ear abrasion.  Given his advanced age, patient underwent head CT which reveals no evidence of skull fracture or intracerebral hemorrhage.  He is not on any oral anticoagulants to draw concern for delayed intracerebral hemorrhage.  He has a superficial abrasion to the right ear with mild venous oozing.  No indication for surgical closure.  Gelfoam applied to assist with hemostasis.  He has no other associated traumatic findings on exam.  Patient safe for discharge back to [facility].    Diagnosis:    ICD-10-CM    1. Closed head injury, initial encounter S09.90XA    2. Abrasion of right ear, initial encounter S00.411A        Disposition:  discharged to home    Scribe Disclosure:  Gege CARBALLO, am serving as a scribe at 12:11 AM on 2019 to document services personally performed by Edmond Menjivar MD based on my observations and the provider's statements to me.    [facility] EMERGENCY DEPARTMENT       Edmond Menjivar MD  [phone]

## 2019-06-25 NOTE — ED AVS SNAPSHOT
[facility] Emergency Department  [address]  [facility] [address]  Phone:  [phone]  Fax:  [phone]                                    Carlos Neri   MRN: [id number]    Department:  [facility] Emergency Department   Date of Visit:  6/25/2019           After Visit Summary Signature Page    I have received my discharge instructions, and my questions have been answered. I have discussed any challenges I see with this plan with the nurse or doctor.    ..........................................................................................................................................  Patient/Patient Representative Signature      ..........................................................................................................................................  Patient Representative Print Name and Relationship to Patient    ..................................................               ................................................  Date                                   Time    ..........................................................................................................................................  Reviewed by Signature/Title    ...................................................              ..............................................  Date                                               Time          22EPIC Rev 08/18

## 2019-06-25 NOTE — ED NOTES
Bed: ED07  Expected date:   Expected time:   Means of arrival:   Comments:  He 82yo fall no loc ear lac

## 2019-06-26 VITALS
RESPIRATION RATE: 18 BRPM | SYSTOLIC BLOOD PRESSURE: 135 MMHG | DIASTOLIC BLOOD PRESSURE: 75 MMHG | HEART RATE: 77 BPM | OXYGEN SATURATION: 92 %

## 2019-07-26 ENCOUNTER — RECORDS - HEALTHEAST (OUTPATIENT)
Dept: LAB | Facility: CLINIC | Age: 84
End: 2019-07-26

## 2019-07-26 ENCOUNTER — TRANSFERRED RECORDS (OUTPATIENT)
Dept: HEALTH INFORMATION MANAGEMENT | Facility: CLINIC | Age: 84
End: 2019-07-26

## 2019-07-26 LAB
BASOPHILS # BLD AUTO: 0 THOU/UL (ref 0–0.2)
BASOPHILS NFR BLD AUTO: 0 % (ref 0–2)
BNP SERPL-MCNC: 124 PG/ML (ref 0–93)
DIFFERENTIAL: ABNORMAL
EOSINOPHIL # BLD AUTO: 0.1 THOU/UL (ref 0–0.4)
EOSINOPHIL NFR BLD AUTO: 1 % (ref 0–6)
ERYTHROCYTE [DISTWIDTH] IN BLOOD BY AUTOMATED COUNT: 13.4 % (ref 11–14.5)
ERYTHROCYTE [DISTWIDTH] IN BLOOD BY AUTOMATED COUNT: 13.4 % (ref 11–14.5)
HCT VFR BLD AUTO: 44.1 % (ref 40–54)
HCT VFR BLD AUTO: 44.1 % (ref 40–54)
HEMOGLOBIN: 14 G/DL (ref 14–18)
HGB BLD-MCNC: 14 G/DL (ref 14–18)
LYMPHOCYTES # BLD AUTO: 2.2 THOU/UL (ref 0.8–4.4)
LYMPHOCYTES NFR BLD AUTO: 29 % (ref 20–40)
MCH RBC QN AUTO: 31.5 PG (ref 27–34)
MCH RBC QN AUTO: 31.5 PG (ref 27–34)
MCHC RBC AUTO-ENTMCNC: 31.7 G/DL (ref 32–36)
MCHC RBC AUTO-ENTMCNC: 31.7 G/DL (ref 32–36)
MCV RBC AUTO: 99 FL (ref 80–100)
MCV RBC AUTO: 99 FL (ref 80–100)
MONOCYTES # BLD AUTO: 0.6 THOU/UL (ref 0–0.9)
MONOCYTES NFR BLD AUTO: 8 % (ref 2–10)
NEUTROPHILS # BLD AUTO: 4.8 THOU/UL (ref 2–7.7)
NEUTROPHILS NFR BLD AUTO: 62 % (ref 50–70)
PLATELET # BLD AUTO: 144 THOU/UL (ref 140–140)
PLATELET # BLD AUTO: 144 THOU/UL (ref 140–440)
PMV BLD AUTO: 10.7 FL (ref 8.5–12.5)
RBC # BLD AUTO: 4.44 MILL/UL (ref 4.4–6.2)
RBC # BLD AUTO: 4.44 MILL/UL (ref 4.4–6.2)
WBC # BLD AUTO: 7.8 THOU/UL (ref 4.4–11)
WBC: 7.8 THOU/UL (ref 4–11)

## 2019-07-30 ENCOUNTER — ASSISTED LIVING VISIT (OUTPATIENT)
Dept: GERIATRICS | Facility: CLINIC | Age: 84
End: 2019-07-30
Payer: COMMERCIAL

## 2019-07-30 ENCOUNTER — RECORDS - HEALTHEAST (OUTPATIENT)
Dept: LAB | Facility: CLINIC | Age: 84
End: 2019-07-30

## 2019-07-30 ENCOUNTER — TRANSFERRED RECORDS (OUTPATIENT)
Dept: HEALTH INFORMATION MANAGEMENT | Facility: CLINIC | Age: 84
End: 2019-07-30

## 2019-07-30 VITALS
DIASTOLIC BLOOD PRESSURE: 48 MMHG | OXYGEN SATURATION: 93 % | HEART RATE: 77 BPM | RESPIRATION RATE: 20 BRPM | SYSTOLIC BLOOD PRESSURE: 116 MMHG

## 2019-07-30 DIAGNOSIS — F02.818 LEWY BODY DEMENTIA WITH BEHAVIORAL DISTURBANCE (H): ICD-10-CM

## 2019-07-30 DIAGNOSIS — I95.9 HYPOTENSION, UNSPECIFIED HYPOTENSION TYPE: ICD-10-CM

## 2019-07-30 DIAGNOSIS — G31.83 LEWY BODY DEMENTIA WITH BEHAVIORAL DISTURBANCE (H): ICD-10-CM

## 2019-07-30 DIAGNOSIS — R29.6 RECURRENT FALLS: Primary | ICD-10-CM

## 2019-07-30 LAB
ANION GAP SERPL CALCULATED.3IONS-SCNC: 6 MMOL/L (ref 5–18)
ANION GAP SERPL CALCULATED.3IONS-SCNC: 6 MMOL/L (ref 5–18)
BUN SERPL-MCNC: 22 MG/DL (ref 8–28)
BUN SERPL-MCNC: 22 MG/DL (ref 8–28)
CALCIUM SERPL-MCNC: 9.3 MG/DL (ref 8.5–10.5)
CALCIUM SERPL-MCNC: 9.3 MG/DL (ref 8.5–10.5)
CHLORIDE BLD-SCNC: 107 MMOL/L (ref 98–107)
CHLORIDE SERPLBLD-SCNC: 107 MMOL/L (ref 98–107)
CO2 SERPL-SCNC: 30 MMOL/L (ref 22–31)
CO2 SERPL-SCNC: 30 MMOL/L (ref 22–31)
CREAT SERPL-MCNC: 0.79 MG/DL (ref 0.7–1.3)
CREAT SERPL-MCNC: 0.79 MG/DL (ref 0.7–1.3)
GFR SERPL CREATININE-BSD FRML MDRD: >60 ML/MIN/1.73M2
GFR SERPL CREATININE-BSD FRML MDRD: >60 ML/MIN/1.73M2
GLUCOSE BLD-MCNC: 89 MG/DL (ref 70–125)
GLUCOSE SERPL-MCNC: 89 MG/DL (ref 70–125)
POTASSIUM BLD-SCNC: 4.1 MMOL/L (ref 3.5–5)
POTASSIUM SERPL-SCNC: 4.1 MMOL/L (ref 3.5–5)
SODIUM SERPL-SCNC: 143 MMOL/L (ref 136–145)
SODIUM SERPL-SCNC: 143 MMOL/L (ref 136–145)

## 2019-07-30 NOTE — PROGRESS NOTES
[place] [facility]  [place] Medical Record Number:  [id number]  Place of Service where encounter took place:  [facility] ([facility]) [[id number]]  Chief Complaint   Patient presents with     Fall       HPI:    Carlos Neri  is a 83 year old (1935), who is being seen today for an episodic care visit.  HPI information obtained from: facility chart records, facility staff, patient report and [facility] chart review. Today's concern is:  Falls, hypotension, dementia.  Had h/[place] falls. [place]. Per staff has increased freezing a times. Uses walker. Gait gen. Stable. Had 3 fall over 2 day period 7/24/19-7/25/19. No apparent inj.  BPs gen. Stable. Cont on midodrine for low BPs.  No recent reports of dizziness. Has memory loss due to lewy-body dementia.  Cont on effexor.  Per staff, occ episodes of increased anxiety, o/w mood, behaviors gen. Stable.  Has dysphagia.  Cont. On HTL, mech soft diet.  Has had ongoing gradual wt. Loss despite good po intake.       Past Medical and Surgical History reviewed in Epic today.    MEDICATIONS:  Current Outpatient Medications   Medication Sig Dispense Refill     acetaminophen (TYLENOL) 500 MG tablet Take 1,000 mg by mouth 3 times daily as needed        Artificial Tear Solution (SOOTHE XP) SOLN Place 1 drop into both eyes 2 times daily       carbidopa-levodopa (SINEMET CR)  MG CR tablet Take 1 tablet by mouth At Bedtime       carbidopa-levodopa (SINEMET)  MG per tablet Take 1 tablet by mouth 4 times daily       clonazePAM (KLONOPIN) 0.5 MG tablet Take 2 tablets (1 mg) by mouth At Bedtime May also have 1 tab (0.5 mg) once daily PRN 90 tablet 5     entacapone (COMTAN) 200 MG tablet Take 100 mg by mouth 4 times daily        gabapentin (NEURONTIN) 300 MG capsule Take 300 mg by mouth At Bedtime And 100 mg every day prn       MIDODRINE HCL PO Take 5 mg by mouth 3 times daily (before meals)       polyethylene glycol (MIRALAX/GLYCOLAX) Packet  Take 17 g by mouth daily as needed for constipation (AND every other day scheduled)        rOPINIRole (REQUIP) 0.25 MG tablet Take 0.75 mg by mouth 3 times daily        rOPINIRole (REQUIP) 0.25 MG tablet Take 0.25 mg by mouth At Bedtime       senna-docusate (SENOKOT-S/PERICOLACE) 8.6-50 MG tablet Take 2 tablets by mouth daily       venlafaxine (EFFEXOR) 25 MG tablet Take 25 mg by mouth 2 times daily           REVIEW OF SYSTEMS:  Unobtainable secondary to cognitive impairment. Reports feeling ok today.  No dizziness    Objective:  /48   Pulse 77   Resp 20   SpO2 93%   Exam:  GENERAL APPEARANCE:  Alert, in no distress, thin, cooperative  ENT:  Mouth and posterior oropharynx normal, moist mucous membranes, [place]  EYES:  EOM, conjunctivae, lids, pupils and irises normal, PERRL  NECK:  No adenopathy,masses or thyromegaly, some decreased ROM with extension of head/neck  RESP:  respiratory effort and palpation of chest normal, lungs clear to auscultation , no respiratory distress  CV:  Palpation and auscultation of heart done , regular rate and rhythm, no murmur, rub, or gallop, no edema  ABDOMEN:  normal bowel sounds, soft, nontender, no hepatosplenomegaly or other masses, no guarding or rebound  M/S:   gait slowed, steady with walker. no festination or sig. shuffling during exam.  muscle strength 5/5 all 4 ext.. sl. rigidity with extension of LEs  NEURO:   Cranial nerves 2-12 are normal tested and grossly at patient's baseline, alert, speech soft, clear  PSYCH:  insight and judgement impaired, memory impaired , affect abnormal -flat    Labs:     Most Recent 3 CBC's:  Recent Labs   Lab Test 07/26/19 06/19/19 02/19/19 2227   WBC 7.8 6.5 4.9   HGB 14.0 13.6* 13.6   MCV 99 101* 96   * 140 144*     Most Recent 3 BMP's:  Recent Labs   Lab Test 06/19/19 02/19/19 2227 09/11/18    144 143   POTASSIUM 4.7 4.2 3.9   CHLORIDE 106 111* 108*   CO2 31 26 28   BUN 25 31* 21   CR 0.79 0.98 0.90   ANIONGAP 5 7 7    SADIA 9.6 8.6 9.0   GLC 71 102* 84       ASSESSMENT/PLAN:  Recurrent falls  S/p falls x 3 last week.  No further falls since 7/25/19. PT started  1. Cont. PT  2. Remind to use walker at all times-does not use in room at times  3. Monitor for further changes in gait, increased freezing episodes  4. Spouse to make f/u Neurology appt  5. Cont. Current PD meds    Hypotension, unspecified hypotension type  BPs gen. Stable. Recurrent falls as above  1. Cont. Midodrine  2. Encourage increased fluid intake  3. Follow BPs, HRs  4. Bmp today    Lewy body dementia with behavioral disturbance  Memory loss, dysphagia, wt. Loss.  Mood, behaviors gen stable  1. Cont. effexor  2. Cont. Klonopin, neurontin  3. Monitor for episodes of increased anxiety, agitation  4. Follow wt.s      Electronically signed by:  DONY Clark CNP

## 2019-07-30 NOTE — LETTER
7/30/2019        RE: Carlos Neri  [address]  [place] [address]        [place] [facility]  [place] Medical Record Number:  [id number]  Place of Service where encounter took place:  [facility] ([facility]) [[id number]]  Chief Complaint   Patient presents with     Fall       HPI:    Carlos Neri  is a 83 year old (1935), who is being seen today for an episodic care visit.  HPI information obtained from: facility chart records, facility staff, patient report and [facility] chart review. Today's concern is:  Falls, hypotension, dementia.  [place] Per staff has increased freezing a times. Uses walker. Gait gen. Stable. Had 3 fall over 2 day period 7/24/19-7/25/19. No apparent inj.  BPs gen. Stable. Cont on midodrine for low BPs.  No recent reports of dizziness. Has memory loss due to lewy-body dementia.  Cont on effexor.  Per staff, occ episodes of increased anxiety, o/w mood, behaviors gen. Stable.  Has dysphagia.  Cont. On HTL, mech soft diet.  Has had ongoing gradual wt. Loss despite good po intake.       Past Medical and Surgical History reviewed in Epic today.    MEDICATIONS:  Current Outpatient Medications   Medication Sig Dispense Refill     acetaminophen (TYLENOL) 500 MG tablet Take 1,000 mg by mouth 3 times daily as needed        Artificial Tear Solution (SOOTHE XP) SOLN Place 1 drop into both eyes 2 times daily       carbidopa-levodopa (SINEMET CR)  MG CR tablet Take 1 tablet by mouth At Bedtime       carbidopa-levodopa (SINEMET)  MG per tablet Take 1 tablet by mouth 4 times daily       clonazePAM (KLONOPIN) 0.5 MG tablet Take 2 tablets (1 mg) by mouth At Bedtime May also have 1 tab (0.5 mg) once daily PRN 90 tablet 5     entacapone (COMTAN) 200 MG tablet Take 100 mg by mouth 4 times daily        gabapentin (NEURONTIN) 300 MG capsule Take 300 mg by mouth At Bedtime And 100 mg every day prn       MIDODRINE HCL PO Take  5 mg by mouth 3 times daily (before meals)       polyethylene glycol (MIRALAX/GLYCOLAX) Packet Take 17 g by mouth daily as needed for constipation (AND every other day scheduled)        rOPINIRole (REQUIP) 0.25 MG tablet Take 0.75 mg by mouth 3 times daily        rOPINIRole (REQUIP) 0.25 MG tablet Take 0.25 mg by mouth At Bedtime       senna-docusate (SENOKOT-S/PERICOLACE) 8.6-50 MG tablet Take 2 tablets by mouth daily       venlafaxine (EFFEXOR) 25 MG tablet Take 25 mg by mouth 2 times daily           REVIEW OF SYSTEMS:  Unobtainable secondary to cognitive impairment. Reports feeling ok today.  No dizziness    Objective:  /48   Pulse 77   Resp 20   SpO2 93%   Exam:  GENERAL APPEARANCE:  Alert, in no distress, thin, cooperative  ENT:  Mouth and posterior oropharynx normal, moist mucous membranes, [place]  EYES:  EOM, conjunctivae, lids, pupils and irises normal, PERRL  NECK:  No adenopathy,masses or thyromegaly, some decreased ROM with extension of head/neck  RESP:  respiratory effort and palpation of chest normal, lungs clear to auscultation , no respiratory distress  CV:  Palpation and auscultation of heart done , regular rate and rhythm, no murmur, rub, or gallop, no edema  ABDOMEN:  normal bowel sounds, soft, nontender, no hepatosplenomegaly or other masses, no guarding or rebound  M/S:   gait slowed, steady with walker. no festination or sig. shuffling during exam.  muscle strength 5/5 all 4 ext.. sl. rigidity with extension of LEs  NEURO:   Cranial nerves 2-12 are normal tested and grossly at patient's baseline, alert, speech soft, clear  PSYCH:  insight and judgement impaired, memory impaired , affect abnormal -flat    Labs:     Most Recent 3 CBC's:  Recent Labs   Lab Test 07/26/19 06/19/19 02/19/19 2227   WBC 7.8 6.5 4.9   HGB 14.0 13.6* 13.6   MCV 99 101* 96   * 140 144*     Most Recent 3 BMP's:  Recent Labs   Lab Test 06/19/19 02/19/19 2227 09/11/18    144 143   POTASSIUM 4.7 4.2 3.9    CHLORIDE 106 111* 108*   CO2 31 26 28   BUN 25 31* 21   CR 0.79 0.98 0.90   ANIONGAP 5 7 7   SADIA 9.6 8.6 9.0   GLC 71 102* 84       ASSESSMENT/PLAN:  Recurrent falls  S/p falls x 3 last week.  No further falls since 7/25/19. PT started  1. Cont. PT  2. Remind to use walker at all times-does not use in room at times  3. Monitor for further changes in gait, increased freezing episodes  4. Spouse to make f/u Neurology appt  5. Cont. Current PD meds    Hypotension, unspecified hypotension type  BPs gen. Stable. Recurrent falls as above  1. Cont. Midodrine  2. Encourage increased fluid intake  3. Follow BPs, HRs  4. Bmp today    Lewy body dementia with behavioral disturbance  Memory loss, dysphagia, wt. Loss.  Mood, behaviors gen stable  1. Cont. effexor  2. Cont. Klonopin, neurontin  3. Monitor for episodes of increased anxiety, agitation  4. Follow wt.s      Electronically signed by:  DONY Clark CNP             Sincerely,        DONY Clark CNP

## 2019-08-04 ENCOUNTER — TRANSFERRED RECORDS (OUTPATIENT)
Dept: HEALTH INFORMATION MANAGEMENT | Facility: CLINIC | Age: 84
End: 2019-08-04

## 2019-08-04 ENCOUNTER — TELEPHONE (OUTPATIENT)
Dept: GERIATRICS | Facility: CLINIC | Age: 84
End: 2019-08-04

## 2019-08-04 NOTE — TELEPHONE ENCOUNTER
Patient fell, now has pelvis pain and lower back pain. Ordered xray pelvis, sacral and lumbar spine.     Electronically signed by DONY Houston, GNP

## 2019-08-06 ENCOUNTER — ASSISTED LIVING VISIT (OUTPATIENT)
Dept: GERIATRICS | Facility: CLINIC | Age: 84
End: 2019-08-06
Payer: COMMERCIAL

## 2019-08-06 VITALS
RESPIRATION RATE: 16 BRPM | DIASTOLIC BLOOD PRESSURE: 61 MMHG | HEART RATE: 60 BPM | SYSTOLIC BLOOD PRESSURE: 99 MMHG | OXYGEN SATURATION: 92 %

## 2019-08-06 DIAGNOSIS — F41.9 ANXIETY: ICD-10-CM

## 2019-08-06 DIAGNOSIS — M54.50 MIDLINE LOW BACK PAIN WITHOUT SCIATICA, UNSPECIFIED CHRONICITY: Primary | ICD-10-CM

## 2019-08-06 DIAGNOSIS — G20.A1 PARKINSON'S DISEASE (H): ICD-10-CM

## 2019-08-06 NOTE — LETTER
8/6/2019        RE: Carlos Neri  [address]  [place] [address]        [facility]  [facility] Medical Record Number:  [id number]  Place of Service where encounter took place:  [facility] ([facility]) [[id number]]  Chief Complaint   Patient presents with     Back Pain       HPI:    Carlos Neri  is a 83 year old (1935), who is being seen today for an episodic care visit.  HPI information obtained from: facility chart records, facility staff, patient report and [facility] chart review. Today's concern is: back pain, PD, anxiety.  Recurrent falls. Last fall 8/4/19 with c/o increased low back pain. XR done showed age-indeterminate L2 compression deformity.  Cont. On prn tylenol for pain. Gait unchanged. Episodes of freezing at times. Gait gen. Stable with walker.  At times has diff. Maneuvering walker, transferring self to/from chair/bed in room. Cont. With PT. For PD taking sinemet, entacapone, ropinirole. For anxiety cont. On effexor, klonopin.  Cont. To have episodes of increased anxiety, agitation.       Past Medical and Surgical History reviewed in Epic today.    MEDICATIONS:  Current Outpatient Medications   Medication Sig Dispense Refill     acetaminophen (TYLENOL) 500 MG tablet Take 1,000 mg by mouth 3 times daily as needed        Artificial Tear Solution (SOOTHE XP) SOLN Place 1 drop into both eyes 2 times daily       carbidopa-levodopa (SINEMET CR)  MG CR tablet Take 1 tablet by mouth At Bedtime       carbidopa-levodopa (SINEMET)  MG per tablet Take 1 tablet by mouth 4 times daily       clonazePAM (KLONOPIN) 0.5 MG tablet Take 2 tablets (1 mg) by mouth At Bedtime May also have 1 tab (0.5 mg) once daily PRN 90 tablet 5     entacapone (COMTAN) 200 MG tablet Take 100 mg by mouth 4 times daily        gabapentin (NEURONTIN) 300 MG capsule Take 300 mg by mouth At Bedtime And 100 mg every day prn       MIDODRINE HCL PO Take 5 mg by  mouth 3 times daily (before meals)       polyethylene glycol (MIRALAX/GLYCOLAX) Packet Take 17 g by mouth daily as needed for constipation (AND every other day scheduled)        rOPINIRole (REQUIP) 0.25 MG tablet Take 0.75 mg by mouth 3 times daily        rOPINIRole (REQUIP) 0.25 MG tablet Take 0.25 mg by mouth At Bedtime       senna-docusate (SENOKOT-S/PERICOLACE) 8.6-50 MG tablet Take 2 tablets by mouth daily       venlafaxine (EFFEXOR) 25 MG tablet Take 25 mg by mouth 2 times daily           REVIEW OF SYSTEMS:  Unobtainable secondary to cognitive impairment. Reports some low back pain today    Objective:  BP 99/61   Pulse 60   Resp 16   SpO2 92%   Exam:  GENERAL APPEARANCE:  Alert, in no distress, cooperative  ENT:  Mouth and posterior oropharynx normal, moist mucous membranes, [place]  EYES:  EOM, conjunctivae, lids, pupils and irises normal, PERRL  NECK:  No adenopathy,masses or thyromegaly, FROM. no carotid bruit  RESP:  respiratory effort and palpation of chest normal, lungs clear to auscultation , no respiratory distress  CV:  Palpation and auscultation of heart done , regular rate and rhythm, no murmur, rub, or gallop, no edema  ABDOMEN:  normal bowel sounds, soft, nontender, no hepatosplenomegaly or other masses, no guarding or rebound  M/S:   gait steady with walker. muscle strength 5/5 all 4 ext., no apparent pain with palp. of low back. no pain with PROM LEs  NEURO:   Cranial nerves 2-12 are normal tested and grossly at patient's baseline, alert, speech soft, some diff. with word finding  PSYCH:  insight and judgement impaired, memory impaired , affect abnormal -flat    Labs:     Most Recent 3 CBC's:  Recent Labs   Lab Test 07/26/19 06/19/19 02/19/19  2227   WBC 7.8 6.5 4.9   HGB 14.0 13.6* 13.6   MCV 99 101* 96   * 140 144*     Most Recent 3 BMP's:  Recent Labs   Lab Test 07/30/19 06/19/19 02/19/19  2227    142 144   POTASSIUM 4.1 4.7 4.2   CHLORIDE 107 106 111*   CO2 30 31 26   BUN 22 25  31*   CR 0.79 0.79 0.98   ANIONGAP 6 5 7   SADIA 9.3 9.6 8.6   GLC 89 71 102*       ASSESSMENT/PLAN:  Midline low back pain without sciatica, unspecified chronicity  Increased s/s s/p fall. Compression deformity :L2 age indeterminate  1. Start tid sched. Tylenol  2. Monitor for ongoing pain  3. For further pain may try voltaren gel  4. Monitor decreased mobility    Parkinson's disease (H)  freezing at times. Recurrent falls. Appear to  Be mostly mechanical in nature, diff. With transfers, poor judgement  1. Cont. Current PD meds  2. Cont. PT  3. F/u Neurology appt 8/20/19  4. Monitor for increased rigidity, ongoing falls    Anxiety  Ongoing s/s. No recent increased freq. Of target behaviors  1. Cont. Klonopin, neurontin  2. Cont. effexor  3. Reassess s/s over next couple mos  4. If stable, may trial GDR klonopin       Electronically signed by:  DONY Clark CNP             Sincerely,        DONY Clark CNP

## 2019-08-06 NOTE — PROGRESS NOTES
[place] [facility]  [place] [facility] Record Number:  [id number]  Place of Service where encounter took place:  [facility] ([facility] [[id number]]  Chief Complaint   Patient presents with     Back Pain       HPI:    Carlos Neri  is a 83 year old (1935), who is being seen today for an episodic care visit.  HPI information obtained from: facility chart records, facility staff, patient report and [facility] chart review. Today's concern is: back pain, PD, anxiety.  Recurrent falls. Last fall 8/4/19 with c/o increased low back pain. XR done showed age-indeterminate L2 compression deformity.  Cont. On prn tylenol for pain. Gait unchanged. Episodes of freezing at times. Gait gen. Stable with walker.  At times has diff. Maneuvering walker, transferring self to/from chair/bed in room. Cont. With PT. For PD taking sinemet, entacapone, ropinirole. For anxiety cont. On effexor, klonopin.  Cont. To have episodes of increased anxiety, agitation.       Past Medical and Surgical History reviewed in Epic today.    MEDICATIONS:  Current Outpatient Medications   Medication Sig Dispense Refill     acetaminophen (TYLENOL) 500 MG tablet Take 1,000 mg by mouth 3 times daily as needed        Artificial Tear Solution (SOOTHE XP) SOLN Place 1 drop into both eyes 2 times daily       carbidopa-levodopa (SINEMET CR)  MG CR tablet Take 1 tablet by mouth At Bedtime       carbidopa-levodopa (SINEMET)  MG per tablet Take 1 tablet by mouth 4 times daily       clonazePAM (KLONOPIN) 0.5 MG tablet Take 2 tablets (1 mg) by mouth At Bedtime May also have 1 tab (0.5 mg) once daily PRN 90 tablet 5     entacapone (COMTAN) 200 MG tablet Take 100 mg by mouth 4 times daily        gabapentin (NEURONTIN) 300 MG capsule Take 300 mg by mouth At Bedtime And 100 mg every day prn       MIDODRINE HCL PO Take 5 mg by mouth 3 times daily (before meals)       polyethylene glycol (MIRALAX/GLYCOLAX) Packet Take 17  g by mouth daily as needed for constipation (AND every other day scheduled)        rOPINIRole (REQUIP) 0.25 MG tablet Take 0.75 mg by mouth 3 times daily        rOPINIRole (REQUIP) 0.25 MG tablet Take 0.25 mg by mouth At Bedtime       senna-docusate (SENOKOT-S/PERICOLACE) 8.6-50 MG tablet Take 2 tablets by mouth daily       venlafaxine (EFFEXOR) 25 MG tablet Take 25 mg by mouth 2 times daily           REVIEW OF SYSTEMS:  Unobtainable secondary to cognitive impairment. Reports some low back pain today    Objective:  BP 99/61   Pulse 60   Resp 16   SpO2 92%   Exam:  GENERAL APPEARANCE:  Alert, in no distress, cooperative  ENT:  Mouth and posterior oropharynx normal, moist mucous membranes, [place]  EYES:  EOM, conjunctivae, lids, pupils and irises normal, PERRL  NECK:  No adenopathy,masses or thyromegaly, FROM. no carotid bruit  RESP:  respiratory effort and palpation of chest normal, lungs clear to auscultation , no respiratory distress  CV:  Palpation and auscultation of heart done , regular rate and rhythm, no murmur, rub, or gallop, no edema  ABDOMEN:  normal bowel sounds, soft, nontender, no hepatosplenomegaly or other masses, no guarding or rebound  M/S:   gait steady with walker. muscle strength 5/5 all 4 ext., no apparent pain with palp. of low back. no pain with PROM LEs  NEURO:   Cranial nerves 2-12 are normal tested and grossly at patient's baseline, alert, speech soft, some diff. with word finding  PSYCH:  insight and judgement impaired, memory impaired , affect abnormal -flat    Labs:     Most Recent 3 CBC's:  Recent Labs   Lab Test 07/26/19 06/19/19 02/19/19  2227   WBC 7.8 6.5 4.9   HGB 14.0 13.6* 13.6   MCV 99 101* 96   * 140 144*     Most Recent 3 BMP's:  Recent Labs   Lab Test 07/30/19 06/19/19 02/19/19  2227    142 144   POTASSIUM 4.1 4.7 4.2   CHLORIDE 107 106 111*   CO2 30 31 26   BUN 22 25 31*   CR 0.79 0.79 0.98   ANIONGAP 6 5 7   SADIA 9.3 9.6 8.6   GLC 89 71 102*        ASSESSMENT/PLAN:  Midline low back pain without sciatica, unspecified chronicity  Increased s/s s/p fall. Compression deformity :L2 age indeterminate  1. Start tid sched. Tylenol  2. Monitor for ongoing pain  3. For further pain may try voltaren gel  4. Monitor decreased mobility    Parkinson's disease (H)  freezing at times. Recurrent falls. Appear to  Be mostly mechanical in nature, diff. With transfers, poor judgement  1. Cont. Current PD meds  2. Cont. PT  3. F/u Neurology appt 8/20/19  4. Monitor for increased rigidity, ongoing falls    Anxiety  Ongoing s/s. No recent increased freq. Of target behaviors  1. Cont. Klonopin, neurontin  2. Cont. effexor  3. Reassess s/s over next couple mos  4. If stable, may trial CORDELLR klonopin       Electronically signed by:  DONY Clark CNP

## 2019-08-20 ENCOUNTER — TRANSFERRED RECORDS (OUTPATIENT)
Dept: HEALTH INFORMATION MANAGEMENT | Facility: CLINIC | Age: 84
End: 2019-08-20

## 2019-09-10 ENCOUNTER — ASSISTED LIVING VISIT (OUTPATIENT)
Dept: GERIATRICS | Facility: CLINIC | Age: 84
End: 2019-09-10
Payer: COMMERCIAL

## 2019-09-10 VITALS
SYSTOLIC BLOOD PRESSURE: 117 MMHG | HEART RATE: 75 BPM | OXYGEN SATURATION: 93 % | DIASTOLIC BLOOD PRESSURE: 65 MMHG | RESPIRATION RATE: 18 BRPM

## 2019-09-10 DIAGNOSIS — G20.A1 PARKINSON'S DISEASE (H): Primary | ICD-10-CM

## 2019-09-10 DIAGNOSIS — I95.9 HYPOTENSION, UNSPECIFIED HYPOTENSION TYPE: ICD-10-CM

## 2019-09-10 DIAGNOSIS — G31.83 LEWY BODY DEMENTIA WITH BEHAVIORAL DISTURBANCE (H): ICD-10-CM

## 2019-09-10 DIAGNOSIS — F02.818 LEWY BODY DEMENTIA WITH BEHAVIORAL DISTURBANCE (H): ICD-10-CM

## 2019-09-10 RX ORDER — DONEPEZIL HYDROCHLORIDE 10 MG/1
10 TABLET, FILM COATED ORAL AT BEDTIME
COMMUNITY
End: 2020-01-01

## 2019-09-10 NOTE — PROGRESS NOTES
[facility]  [place] Medical Record Number:  [id number]  Place of Service where encounter took place:  [facility] ([facility]) [[id number]]  Chief Complaint   Patient presents with     Fall       HPI:    Carlos Neri  is a 84 year old (1935), who is being seen today for an episodic care visit.  HPI information obtained from: facility chart records, facility staff, patient report and [facility] chart review. Today's concern is: PD, LBD, hypotension.  Has h/o frequent falls.  Has had PT. Had f/u neurology visit 8/20/19. Falls thought to r/t dementia, ataxia as well as PD.  aricept restarted.  Had been dc'd 1/19 per previous Neurologist.  No falls past 2 weeks.  No reports of increased rigidity. For PD cont on sinemet, comtan, ropinirole, neurontin.  For dementia cont. On effexor, klonopin.  Taking aricept 10 mg at bedtime. No reports of further increased anxiety, agitation.  BPs stable. Has h/o low BPs, cont. On midodrine.       Past Medical and Surgical History reviewed in Epic today.    MEDICATIONS:  Current Outpatient Medications   Medication Sig Dispense Refill     donepezil (ARICEPT) 10 MG tablet Take 10 mg by mouth At Bedtime       acetaminophen (TYLENOL) 500 MG tablet Take 1,000 mg by mouth 3 times daily as needed        Artificial Tear Solution (SOOTHE XP) SOLN Place 1 drop into both eyes 2 times daily       carbidopa-levodopa (SINEMET CR)  MG CR tablet Take 1 tablet by mouth At Bedtime       carbidopa-levodopa (SINEMET)  MG per tablet Take 1 tablet by mouth 4 times daily       clonazePAM (KLONOPIN) 0.5 MG tablet Take 2 tablets (1 mg) by mouth At Bedtime May also have 1 tab (0.5 mg) once daily PRN 90 tablet 5     entacapone (COMTAN) 200 MG tablet Take 100 mg by mouth 4 times daily        gabapentin (NEURONTIN) 300 MG capsule Take 300 mg by mouth At Bedtime And 100 mg every day prn       MIDODRINE HCL PO Take 5 mg by mouth 3 times daily (before  meals)       polyethylene glycol (MIRALAX/GLYCOLAX) Packet Take 17 g by mouth daily as needed for constipation (AND every other day scheduled)        rOPINIRole (REQUIP) 0.25 MG tablet Take 0.75 mg by mouth 3 times daily        rOPINIRole (REQUIP) 0.25 MG tablet Take 0.25 mg by mouth At Bedtime       senna-docusate (SENOKOT-S/PERICOLACE) 8.6-50 MG tablet Take 2 tablets by mouth daily       venlafaxine (EFFEXOR) 25 MG tablet Take 25 mg by mouth 2 times daily           REVIEW OF SYSTEMS:  Unobtainable secondary to cognitive impairment. Reports feeling ok, no pains today    Objective:  /65   Pulse 75   Resp 18   SpO2 93%   Exam:  GENERAL APPEARANCE:  Alert, in no distress, cooperative  ENT:  Mouth and posterior oropharynx normal, moist mucous membranes, [place]  EYES:  EOM, conjunctivae, lids, pupils and irises normal, PERRL  NECK:  No adenopathy,masses or thyromegaly, FROM  RESP:  respiratory effort and palpation of chest normal, lungs clear to auscultation , no respiratory distress  CV:  Palpation and auscultation of heart done , regular rate and rhythm, no murmur, rub, or gallop, no edema  ABDOMEN:  normal bowel sounds, soft, nontender, no hepatosplenomegaly or other masses, no guarding or rebound  M/S:   muscle strength 5/5 all 4 ext. gait steady with walker  NEURO:   Cranial nerves 2-12 are normal tested and grossly at patient's baseline, some restlessness. no increased rigidity of extremities  PSYCH:  insight and judgement impaired, memory impaired , affect abnormal -flat    Labs:     Most Recent 3 CBC's:  Recent Labs   Lab Test 07/26/19 06/19/19 02/19/19  2227   WBC 7.8 6.5 4.9   HGB 14.0 13.6* 13.6   MCV 99 101* 96   * 140 144*     Most Recent 3 BMP's:  Recent Labs   Lab Test 07/30/19 06/19/19 02/19/19  2227    142 144   POTASSIUM 4.1 4.7 4.2   CHLORIDE 107 106 111*   CO2 30 31 26   BUN 22 25 31*   CR 0.79 0.79 0.98   ANIONGAP 6 5 7   SADIA 9.3 9.6 8.6   GLC 89 71 102*        ASSESSMENT/PLAN:  Parkinson's disease (H)  No falls past 2 weeks. Gait steady with walker. Ataxia  1. Cont. Current PD med regimen  2. Refer to PT  3. Remind to use walker at all times  4. F/u with neurology in next 6 mos    Lewy body dementia with behavioral disturbance  Memory loss. No increased anxiety from baseline. aricept restarted  1. Cont. Aricept, effexor, klonopin  2. Monitor for increased anxiety, agitation  3. Monitor po intake, wt., changes in functional status    Hypotension, unspecified hypotension type  Currently stable  1. Cont. Midodrine  2. Encourage fluids  3. Follow BPs, HRs, monitor for dizziness  4. Hgb, bmp in next 1-3 mos      Electronically signed by:  DONY Clark CNP         Documentation of Face-to-Face and Certification for Home Health Services     Patient: Carlos Neri   YOB: 1935  MR Number: [id number]  Today's Date: 9/10/2019    I certify that patient: Carlos Neri is under my care and that I, or a nurse practitioner or physician's assistant working with me, had a face-to-face encounter that meets the physician face-to-face encounter requirements with this patient on: 9/10/2019.    This encounter with the patient was in whole, or in part, for the following medical condition, which is the primary reason for home health care: LE weakness.  I certify that, based on my findings, the following services are medically necessary home health services: Physical Therapy.    My clinical findings support the need for the above services because: Physical Therapy Services are needed to assess and treat the following functional impairments: LE weakness.    Further, I certify that my clinical findings support that this patient is homebound (i.e. absences from home require considerable and taxing effort and are for medical reasons or [facility] services or infrequently or of short duration when for other reasons) because: Requires assistance of another person  or specialized equipment to access medical services because patient:  LE weakness, dementia..    Based on the above findings. I certify that this patient is confined to the home and needs intermittent skilled nursing care, physical therapy and/or speech therapy.  The patient is under my care, and I have initiated the establishment of the plan of care.  This patient will be followed by a physician who will periodically review the plan of care.  Physician/Provider to provide follow up care: Yang Martines    Responsible Medicare certified [employer] Physician: Bety Rowe  Physician Signature: See electronic signature associated with these discharge orders.  Date: 9/10/2019

## 2019-09-10 NOTE — LETTER
9/10/2019        RE: Carlos Neri  [address]  [place] [address]        [facility]  [place] Medical Record Number:  [id number]  Place of Service where encounter took place:  [facility] ([facility]) [[id number]]  Chief Complaint   Patient presents with     Fall       HPI:    Carlos Neri  is a 84 year old (1935), who is being seen today for an episodic care visit.  HPI information obtained from: facility chart records, facility staff, patient report and [facility] chart review. Today's concern is: PD, LBD, hypotension.  Has h/o frequent falls.  Has had PT. Had f/u neurology visit 8/20/19. Falls thought to r/t dementia, ataxia as well as PD.  aricept restarted.  Had been dc'd 1/19 per previous Neurologist.  No falls past 2 weeks.  No reports of increased rigidity. For PD cont on sinemet, comtan, ropinirole, neurontin.  For dementia cont. On effexor, klonopin.  Taking aricept 10 mg at bedtime. No reports of further increased anxiety, agitation.  BPs stable. Has h/o low BPs, cont. On midodrine.       Past Medical and Surgical History reviewed in Epic today.    MEDICATIONS:  Current Outpatient Medications   Medication Sig Dispense Refill     donepezil (ARICEPT) 10 MG tablet Take 10 mg by mouth At Bedtime       acetaminophen (TYLENOL) 500 MG tablet Take 1,000 mg by mouth 3 times daily as needed        Artificial Tear Solution (SOOTHE XP) SOLN Place 1 drop into both eyes 2 times daily       carbidopa-levodopa (SINEMET CR)  MG CR tablet Take 1 tablet by mouth At Bedtime       carbidopa-levodopa (SINEMET)  MG per tablet Take 1 tablet by mouth 4 times daily       clonazePAM (KLONOPIN) 0.5 MG tablet Take 2 tablets (1 mg) by mouth At Bedtime May also have 1 tab (0.5 mg) once daily PRN 90 tablet 5     entacapone (COMTAN) 200 MG tablet Take 100 mg by mouth 4 times daily        gabapentin (NEURONTIN) 300 MG capsule Take 300 mg by mouth At  Bedtime And 100 mg every day prn       MIDODRINE HCL PO Take 5 mg by mouth 3 times daily (before meals)       polyethylene glycol (MIRALAX/GLYCOLAX) Packet Take 17 g by mouth daily as needed for constipation (AND every other day scheduled)        rOPINIRole (REQUIP) 0.25 MG tablet Take 0.75 mg by mouth 3 times daily        rOPINIRole (REQUIP) 0.25 MG tablet Take 0.25 mg by mouth At Bedtime       senna-docusate (SENOKOT-S/PERICOLACE) 8.6-50 MG tablet Take 2 tablets by mouth daily       venlafaxine (EFFEXOR) 25 MG tablet Take 25 mg by mouth 2 times daily           REVIEW OF SYSTEMS:  Unobtainable secondary to cognitive impairment. Reports feeling ok, no pains today    Objective:  /65   Pulse 75   Resp 18   SpO2 93%   Exam:  GENERAL APPEARANCE:  Alert, in no distress, cooperative  ENT:  Mouth and posterior oropharynx normal, moist mucous membranes, [place]  EYES:  EOM, conjunctivae, lids, pupils and irises normal, PERRL  NECK:  No adenopathy,masses or thyromegaly, FROM  RESP:  respiratory effort and palpation of chest normal, lungs clear to auscultation , no respiratory distress  CV:  Palpation and auscultation of heart done , regular rate and rhythm, no murmur, rub, or gallop, no edema  ABDOMEN:  normal bowel sounds, soft, nontender, no hepatosplenomegaly or other masses, no guarding or rebound  M/S:   muscle strength 5/5 all 4 ext. gait steady with walker  NEURO:   Cranial nerves 2-12 are normal tested and grossly at patient's baseline, some restlessness. no increased rigidity of extremities  PSYCH:  insight and judgement impaired, memory impaired , affect abnormal -flat    Labs:     Most Recent 3 CBC's:  Recent Labs   Lab Test 07/26/19 06/19/19 02/19/19  2227   WBC 7.8 6.5 4.9   HGB 14.0 13.6* 13.6   MCV 99 101* 96   * 140 144*     Most Recent 3 BMP's:  Recent Labs   Lab Test 07/30/19 06/19/19 02/19/19  2227    142 144   POTASSIUM 4.1 4.7 4.2   CHLORIDE 107 106 111*   CO2 30 31 26   BUN 22 25 31*    CR 0.79 0.79 0.98   ANIONGAP 6 5 7   SADIA 9.3 9.6 8.6   GLC 89 71 102*       ASSESSMENT/PLAN:  Parkinson's disease (H)  No falls past 2 weeks. Gait steady with walker. Ataxia  1. Cont. Current PD med regimen  2. Refer to PT  3. Remind to use walker at all times  4. F/u with neurology in next 6 mos    Lewy body dementia with behavioral disturbance  Memory loss. No increased anxiety from baseline. aricept restarted  1. Cont. Aricept, effexor, klonopin  2. Monitor for increased anxiety, agitation  3. Monitor po intake, wt., changes in functional status    Hypotension, unspecified hypotension type  Currently stable  1. Cont. Midodrine  2. Encourage fluids  3. Follow BPs, HRs, monitor for dizziness  4. Hgb, bmp in next 1-3 mos      Electronically signed by:  DONY Clark CNP         Documentation of Face-to-Face and Certification for Home Health Services     Patient: Carlos Neri   YOB: 1935  MR Number: [id number]  Today's Date: 9/10/2019    I certify that patient: Carlos Neri is under my care and that I, or a nurse practitioner or physician's assistant working with me, had a face-to-face encounter that meets the physician face-to-face encounter requirements with this patient on: 9/10/2019.    This encounter with the patient was in whole, or in part, for the following medical condition, which is the primary reason for home health care: LE weakness.  I certify that, based on my findings, the following services are medically necessary home health services: Physical Therapy.    My clinical findings support the need for the above services because: Physical Therapy Services are needed to assess and treat the following functional impairments: LE weakness.    Further, I certify that my clinical findings support that this patient is homebound (i.e. absences from home require considerable and taxing effort and are for medical reasons or [employer] services or infrequently or of short duration  when for other reasons) because: Requires assistance of another person or specialized equipment to access medical services because patient:  LE weakness, dementia..    Based on the above findings. I certify that this patient is confined to the home and needs intermittent skilled nursing care, physical therapy and/or speech therapy.  The patient is under my care, and I have initiated the establishment of the plan of care.  This patient will be followed by a physician who will periodically review the plan of care.  Physician/Provider to provide follow up care: Yang Martines    Responsible Medicare certified PECOS Physician: Bety Rowe  Physician Signature: See electronic signature associated with these discharge orders.  Date: 9/10/2019          Sincerely,        Yang Martines, DONY CNP

## 2019-09-29 ENCOUNTER — HEALTH MAINTENANCE LETTER (OUTPATIENT)
Age: 84
End: 2019-09-29

## 2019-10-03 ENCOUNTER — ASSISTED LIVING VISIT (OUTPATIENT)
Dept: GERIATRICS | Facility: CLINIC | Age: 84
End: 2019-10-03
Payer: COMMERCIAL

## 2019-10-03 DIAGNOSIS — R13.10 DYSPHAGIA, UNSPECIFIED TYPE: Primary | ICD-10-CM

## 2019-10-03 DIAGNOSIS — I95.1 ORTHOSTATIC HYPOTENSION: ICD-10-CM

## 2019-10-03 DIAGNOSIS — G20.A1 PARKINSON'S DISEASE (H): ICD-10-CM

## 2019-10-03 NOTE — LETTER
10/3/2019        RE: Carlos Neri  [address]  [place] [address]        [facility]  [place] Medical Record Number:  [id number]  Place of Service where encounter took place:  [facility] ([facility]) [[id number]]  Chief Complaint   Patient presents with     Throat Problem       HPI:    Carlos Neri  is a 84 year old (1935), who is being seen today for an episodic care visit.  HPI information obtained from: facility chart records, facility staff, patient report and [facility] chart review. Today's concern is: dysphagia, PD, hypotension.  Has dysphagia, cont. On HTL, mech soft diet. Recently seen by Neurology. Started on aricept.  Since this point, has not had a fall. Speech improved. No increased s/s dysphagia. Requesting speech eval to see if he would be safe with eating salads. Also wishes to take pills whole. Gait gen. steasy with walker. Cont. On sinemet, entacapone, ropinirole for PD. No increased rigidity of extremities.  BPs remain stable. Cont on midodrine for low BPs. Fluid intake stable.       Past Medical and Surgical History reviewed in Epic today.    MEDICATIONS:  Current Outpatient Medications   Medication Sig Dispense Refill     acetaminophen (TYLENOL) 500 MG tablet Take 1,000 mg by mouth 3 times daily as needed        Artificial Tear Solution (SOOTHE XP) SOLN Place 1 drop into both eyes 2 times daily       carbidopa-levodopa (SINEMET CR)  MG CR tablet Take 1 tablet by mouth At Bedtime       carbidopa-levodopa (SINEMET)  MG per tablet Take 1 tablet by mouth 4 times daily       clonazePAM (KLONOPIN) 0.5 MG tablet Take 2 tablets (1 mg) by mouth At Bedtime May also have 1 tab (0.5 mg) once daily PRN 90 tablet 5     donepezil (ARICEPT) 10 MG tablet Take 10 mg by mouth At Bedtime       entacapone (COMTAN) 200 MG tablet Take 100 mg by mouth 4 times daily        gabapentin (NEURONTIN) 300 MG capsule Take 300 mg by mouth At  Bedtime And 100 mg every day prn       MIDODRINE HCL PO Take 5 mg by mouth 3 times daily (before meals)       polyethylene glycol (MIRALAX/GLYCOLAX) Packet Take 17 g by mouth daily as needed for constipation (AND every other day scheduled)        rOPINIRole (REQUIP) 0.25 MG tablet Take 0.75 mg by mouth 3 times daily        rOPINIRole (REQUIP) 0.25 MG tablet Take 0.25 mg by mouth At Bedtime       senna-docusate (SENOKOT-S/PERICOLACE) 8.6-50 MG tablet Take 2 tablets by mouth daily       venlafaxine (EFFEXOR) 25 MG tablet Take 25 mg by mouth 2 times daily           REVIEW OF SYSTEMS:  No chest pain, shortness of breath, fevers, chills, headache, nausea, vomiting, dysuria or bowel abnormalities.  Appetite is  normal.  No pain except occ aches.    Objective:  /59   Pulse 78   Resp 18   SpO2 93%   Exam:  GENERAL APPEARANCE:  Alert, in no distress, thin, cooperative  ENT:  Mouth and posterior oropharynx normal, moist mucous membranes, [place]  EYES:  EOM, conjunctivae, lids, pupils and irises normal, PERRL  NECK:  No adenopathy,masses or thyromegaly, FROM  RESP:  respiratory effort and palpation of chest normal, lungs clear to auscultation , no respiratory distress  CV:  Palpation and auscultation of heart done , regular rate and rhythm, no murmur, rub, or gallop, no edema  ABDOMEN:  normal bowel sounds, soft, nontender, no hepatosplenomegaly or other masses, no guarding or rebound  M/S:   gait slowed. some shuffling when turning  NEURO:   Cranial nerves 2-12 are normal tested and grossly at patient's baseline, alert, speech clear, soft.   PSYCH:  insight and judgement impaired, memory impaired , affect abnormal -flat    Labs:     Most Recent 3 CBC's:  Recent Labs   Lab Test 07/26/19 06/19/19 02/19/19  2227   WBC 7.8 6.5 4.9   HGB 14.0 13.6* 13.6   MCV 99 101* 96   * 140 144*     Most Recent 3 BMP's:  Recent Labs   Lab Test 07/30/19 06/19/19 02/19/19  2227    142 144   POTASSIUM 4.1 4.7 4.2   CHLORIDE 107  106 111*   CO2 30 31 26   BUN 22 25 31*   CR 0.79 0.79 0.98   ANIONGAP 6 5 7   SADIA 9.3 9.6 8.6   GLC 89 71 102*       ASSESSMENT/PLAN:  Dysphagia, unspecified type  No recent increased s/s  1. Refer to ST for eval  2. Liberalize diet as indicated per ST eval  3. Cont. To crush meds for now  4. Encourage fluid intake    Parkinson's disease (H)  No recent falls. Improved speech  1. Cont. aricept  2. Cont. All other PD meds  3. Remind to use walker at all times  4. Monitor for changes in gait, falls    Orthostatic hypotension  BPs currently stable  1. Cont. Midodrine  2. Follow BPs, HRs  3. Encourage fluids intake as above  4. Bmp in next 1-3 mos      Electronically signed by:  DONY Clark CNP           Documentation of Face-to-Face and Certification for Home Health Services     Patient: Carlos Neri   YOB: 1935  MR Number: [id number]  Today's Date: 10/4/2019    I certify that patient: Carlos Neri is under my care and that I, or a nurse practitioner or physician's assistant working with me, had a face-to-face encounter that meets the physician face-to-face encounter requirements with this patient on: 10/4/2019.    This encounter with the patient was in whole, or in part, for the following medical condition, which is the primary reason for home health care: dysphagia.    I certify that, based on my findings, the following services are medically necessary home health services: Speech Language Therapy.    My clinical findings support the need for the above services because: Speech Therapy Services are needed to assess and treat impairments in language and/or swallow functions due to dysphagia.    Further, I certify that my clinical findings support that this patient is homebound (i.e. absences from home require considerable and taxing effort and are for medical reasons or [facility] services or infrequently or of short duration when for other reasons) because: Requires assistance of  another person or specialized equipment to access medical services because patient:  LE weakness, falls, memory loss..    Based on the above findings. I certify that this patient is confined to the home and needs intermittent skilled nursing care, physical therapy and/or speech therapy.  The patient is under my care, and I have initiated the establishment of the plan of care.  This patient will be followed by a physician who will periodically review the plan of care.  Physician/Provider to provide follow up care: Yang Martines    Responsible Medicare certified [place] Physician: Bety Rowe  Physician Signature: See electronic signature associated with these discharge orders.  Date: 10/4/2019      Sincerely,        DONY Clark CNP

## 2019-10-03 NOTE — PROGRESS NOTES
[place] [facility]  [place] Medical Record Number:  [id number]  Place of Service where encounter took place:  [facility] ([facility]) [[id number]]  Chief Complaint   Patient presents with     Throat Problem       HPI:    Carlos Neri  is a 84 year old (1935), who is being seen today for an episodic care visit.  HPI information obtained from: facility chart records, facility staff, patient report and [facility] chart review. Today's concern is: dysphagia, PD, hypotension.  Has dysphagia, cont. On HTL, mech soft diet. Recently seen by Neurology. Started on aricept.  Since this point, has not had a fall. Speech improved. No increased s/s dysphagia. Requesting speech eval to see if he would be safe with eating salads. Also wishes to take pills whole. Gait gen. steasy with walker. Cont. On sinemet, entacapone, ropinirole for PD. No increased rigidity of extremities.  BPs remain stable. Cont on midodrine for low BPs. Fluid intake stable.       Past Medical and Surgical History reviewed in Epic today.    MEDICATIONS:  Current Outpatient Medications   Medication Sig Dispense Refill     acetaminophen (TYLENOL) 500 MG tablet Take 1,000 mg by mouth 3 times daily as needed        Artificial Tear Solution (SOOTHE XP) SOLN Place 1 drop into both eyes 2 times daily       carbidopa-levodopa (SINEMET CR)  MG CR tablet Take 1 tablet by mouth At Bedtime       carbidopa-levodopa (SINEMET)  MG per tablet Take 1 tablet by mouth 4 times daily       clonazePAM (KLONOPIN) 0.5 MG tablet Take 2 tablets (1 mg) by mouth At Bedtime May also have 1 tab (0.5 mg) once daily PRN 90 tablet 5     donepezil (ARICEPT) 10 MG tablet Take 10 mg by mouth At Bedtime       entacapone (COMTAN) 200 MG tablet Take 100 mg by mouth 4 times daily        gabapentin (NEURONTIN) 300 MG capsule Take 300 mg by mouth At Bedtime And 100 mg every day prn       MIDODRINE HCL PO Take 5 mg by mouth 3 times daily (before  meals)       polyethylene glycol (MIRALAX/GLYCOLAX) Packet Take 17 g by mouth daily as needed for constipation (AND every other day scheduled)        rOPINIRole (REQUIP) 0.25 MG tablet Take 0.75 mg by mouth 3 times daily        rOPINIRole (REQUIP) 0.25 MG tablet Take 0.25 mg by mouth At Bedtime       senna-docusate (SENOKOT-S/PERICOLACE) 8.6-50 MG tablet Take 2 tablets by mouth daily       venlafaxine (EFFEXOR) 25 MG tablet Take 25 mg by mouth 2 times daily           REVIEW OF SYSTEMS:  No chest pain, shortness of breath, fevers, chills, headache, nausea, vomiting, dysuria or bowel abnormalities.  Appetite is  normal.  No pain except occ aches.    Objective:  /59   Pulse 78   Resp 18   SpO2 93%   Exam:  GENERAL APPEARANCE:  Alert, in no distress, thin, cooperative  ENT:  Mouth and posterior oropharynx normal, moist mucous membranes, [place]  EYES:  EOM, conjunctivae, lids, pupils and irises normal, PERRL  NECK:  No adenopathy,masses or thyromegaly, FROM  RESP:  respiratory effort and palpation of chest normal, lungs clear to auscultation , no respiratory distress  CV:  Palpation and auscultation of heart done , regular rate and rhythm, no murmur, rub, or gallop, no edema  ABDOMEN:  normal bowel sounds, soft, nontender, no hepatosplenomegaly or other masses, no guarding or rebound  M/S:   gait slowed. some shuffling when turning  NEURO:   Cranial nerves 2-12 are normal tested and grossly at patient's baseline, alert, speech clear, soft.   PSYCH:  insight and judgement impaired, memory impaired , affect abnormal -flat    Labs:     Most Recent 3 CBC's:  Recent Labs   Lab Test 07/26/19 06/19/19 02/19/19  2227   WBC 7.8 6.5 4.9   HGB 14.0 13.6* 13.6   MCV 99 101* 96   * 140 144*     Most Recent 3 BMP's:  Recent Labs   Lab Test 07/30/19 06/19/19 02/19/19  2227    142 144   POTASSIUM 4.1 4.7 4.2   CHLORIDE 107 106 111*   CO2 30 31 26   BUN 22 25 31*   CR 0.79 0.79 0.98   ANIONGAP 6 5 7   SADIA 9.3 9.6 8.6    [facility] 89 71 102*       ASSESSMENT/PLAN:  Dysphagia, unspecified type  No recent increased s/s  1. Refer to ST for eval  2. Liberalize diet as indicated per ST eval  3. Cont. To crush meds for now  4. Encourage fluid intake    Parkinson's disease (H)  No recent falls. Improved speech  1. Cont. aricept  2. Cont. All other PD meds  3. Remind to use walker at all times  4. Monitor for changes in gait, falls    Orthostatic hypotension  BPs currently stable  1. Cont. Midodrine  2. Follow BPs, HRs  3. Encourage fluids intake as above  4. Bmp in next 1-3 mos      Electronically signed by:  DONY Clark CNP           Documentation of Face-to-Face and Certification for Home Health Services     Patient: Carlos Neri   YOB: 1935  MR Number: [id number]  Today's Date: 10/4/2019    I certify that patient: Carlos Neri is under my care and that I, or a nurse practitioner or physician's assistant working with me, had a face-to-face encounter that meets the physician face-to-face encounter requirements with this patient on: 10/4/2019.    This encounter with the patient was in whole, or in part, for the following medical condition, which is the primary reason for home health care: dysphagia.    I certify that, based on my findings, the following services are medically necessary home health services: Speech Language Therapy.    My clinical findings support the need for the above services because: Speech Therapy Services are needed to assess and treat impairments in language and/or swallow functions due to dysphagia.    Further, I certify that my clinical findings support that this patient is homebound (i.e. absences from home require considerable and taxing effort and are for medical reasons or [employer] orth[employer] services or infrequently or of short duration when for other reasons) because: Requires assistance of another person or specialized equipment to access medical services because patient:  NING  weakness, falls, memory loss..    Based on the above findings. I certify that this patient is confined to the home and needs intermittent skilled nursing care, physical therapy and/or speech therapy.  The patient is under my care, and I have initiated the establishment of the plan of care.  This patient will be followed by a physician who will periodically review the plan of care.  Physician/Provider to provide follow up care: Yang Martines    Responsible Medicare certified PECOS Physician: Bety Rowe  Physician Signature: See electronic signature associated with these discharge orders.  Date: 10/4/2019

## 2019-10-04 VITALS
DIASTOLIC BLOOD PRESSURE: 59 MMHG | OXYGEN SATURATION: 93 % | RESPIRATION RATE: 18 BRPM | SYSTOLIC BLOOD PRESSURE: 106 MMHG | HEART RATE: 78 BPM

## 2019-10-17 DIAGNOSIS — Z53.9 DIAGNOSIS NOT YET DEFINED: Primary | ICD-10-CM

## 2019-10-17 PROCEDURE — G0180 MD CERTIFICATION HHA PATIENT: HCPCS | Performed by: INTERNAL MEDICINE

## 2019-11-05 ENCOUNTER — ASSISTED LIVING VISIT (OUTPATIENT)
Dept: GERIATRICS | Facility: CLINIC | Age: 84
End: 2019-11-05
Payer: COMMERCIAL

## 2019-11-05 VITALS
RESPIRATION RATE: 18 BRPM | OXYGEN SATURATION: 95 % | HEART RATE: 58 BPM | DIASTOLIC BLOOD PRESSURE: 66 MMHG | SYSTOLIC BLOOD PRESSURE: 117 MMHG

## 2019-11-05 DIAGNOSIS — I95.1 ORTHOSTATIC HYPOTENSION: ICD-10-CM

## 2019-11-05 DIAGNOSIS — R13.10 DYSPHAGIA, UNSPECIFIED TYPE: Primary | ICD-10-CM

## 2019-11-05 DIAGNOSIS — F41.9 ANXIETY: ICD-10-CM

## 2019-11-05 NOTE — LETTER
11/5/2019        RE: Carlos Neri  [address]  [place] [address]        [place] [facility]  [place] Medical Record Number:  [id number]  Place of Service where encounter took place:  [facility] ([facility]) [[id number]]  Chief Complaint   Patient presents with     Throat Problem       HPI:    Carlos Neri  is a 84 year old (1935), who is being seen today for an episodic care visit.  HPI information obtained from: facility chart records, facility staff, patient report and [facility] chart review. Today's concern is: dysphagia, orthostatic hypotension, anxiety. Per resident request had recent ST eval to possibly liberalize diet.  Has PD, dysphagia. Per ST, ok for reg. Diet with NTL.  Had taken HTL.  Fluid intake gen. Stable. BPs have been stable. Cont. Midodrine for low BPs.  No recent c/o dizziness. No recent falls.  Mood, behaviors more stable since starting aricept per neurology 8/19.  Also taking effexor, clonopin, neurontin, sinemet, gabapentin, ropinirole. No recent reports of anxiety. No recent prn klonopin use.       Past Medical and Surgical History reviewed in Epic today.    MEDICATIONS:  Current Outpatient Medications   Medication Sig Dispense Refill     acetaminophen (TYLENOL) 500 MG tablet Take 1,000 mg by mouth 3 times daily        Artificial Tear Solution (SOOTHE XP) SOLN Place 1 drop into both eyes 2 times daily       carbidopa-levodopa (SINEMET CR)  MG CR tablet Take 1 tablet by mouth At Bedtime       carbidopa-levodopa (SINEMET)  MG per tablet Take 1 tablet by mouth 4 times daily       clonazePAM (KLONOPIN) 0.5 MG tablet Take 2 tablets (1 mg) by mouth At Bedtime May also have 1 tab (0.5 mg) once daily PRN 90 tablet 5     donepezil (ARICEPT) 10 MG tablet Take 10 mg by mouth At Bedtime       entacapone (COMTAN) 200 MG tablet Take 100 mg by mouth 4 times daily        gabapentin (NEURONTIN) 300 MG capsule Take 300 mg by mouth  At Bedtime And 100 mg every day prn       MIDODRINE HCL PO Take 5 mg by mouth 3 times daily (before meals)       polyethylene glycol (MIRALAX/GLYCOLAX) Packet Take 17 g by mouth daily as needed for constipation (AND every other day scheduled)        rOPINIRole (REQUIP) 0.25 MG tablet Take 0.75 mg by mouth 3 times daily        rOPINIRole (REQUIP) 0.25 MG tablet Take 0.25 mg by mouth At Bedtime       senna-docusate (SENOKOT-S/PERICOLACE) 8.6-50 MG tablet Take 2 tablets by mouth daily       venlafaxine (EFFEXOR) 25 MG tablet Take 25 mg by mouth 2 times daily           REVIEW OF SYSTEMS:  No chest pain, shortness of breath, fevers, chills, headache, nausea, vomiting, dysuria or bowel abnormalities.  Appetite is  fair.  No pain except occ aches.    Objective:  /66   Pulse 58   Resp 18   SpO2 95%   Exam:  GENERAL APPEARANCE:  Alert, in no distress, thin, cooperative  ENT:  Mouth and posterior oropharynx normal, moist mucous membranes, [place]  EYES:  EOM, conjunctivae, lids, pupils and irises normal, PERRL  NECK:  No adenopathy,masses or thyromegaly, no carotid bruit  RESP:  respiratory effort and palpation of chest normal, lungs clear to auscultation , no respiratory distress  CV:  Palpation and auscultation of heart done , no edema, rate-normal, grade 2/6 murmur  ABDOMEN:  normal bowel sounds, soft, nontender, no hepatosplenomegaly or other masses, no guarding or rebound  M/S:   gait steady with walker, no increased muscle rigidity  NEURO:   Cranial nerves 2-12 are normal tested and grossly at patient's baseline, alert, speech soft, clear  PSYCH:  insight and judgement impaired, memory impaired , affect abnormal -flat    Labs:     Most Recent 3 CBC's:  Recent Labs   Lab Test 07/26/19 06/19/19 02/19/19  2227   WBC 7.8 6.5 4.9   HGB 14.0 13.6* 13.6   MCV 99 101* 96   * 140 144*     Most Recent 3 BMP's:  Recent Labs   Lab Test 07/30/19 06/19/19 02/19/19  2227    142 144   POTASSIUM 4.1 4.7 4.2   CHLORIDE  107 106 111*   CO2 30 31 26   BUN 22 25 31*   CR 0.79 0.79 0.98   ANIONGAP 6 5 7   SADIA 9.3 9.6 8.6   GLC 89 71 102*       ASSESSMENT/PLAN:  Dysphagia, unspecified type  Currently stable. Diet upgraded to reg. With NTL  1. Monitor for coughing with meals  2. Cont. To give pills whole-has been tolerating  3. Monitor for changes in po intake, follow wt.s (has had gradual wt. Loss)  4. F/u with ST prn    Orthostatic hypotension  Gen. Stable. No recent falls  1. Encourage fluids-liberalized to NTL  2. Cont. Midodrine  3. Follow BPs, HRs  4. Hgb, bmp in next 1-3 mos  5. Monitor for dizziness    Anxiety  Gen. Stable, improved since start of aricept  1. Cont. Aricept, effexor, klonopin, PD meds  2. Monitor for increased anxiety, changes in mood  3. For increased anxiety, use prn klonopin  4. Cont. Secured memory care unit        Electronically signed by:  DONY Clark CNP             Sincerely,        DONY Clark CNP

## 2019-11-05 NOTE — PROGRESS NOTES
[place] [facility]  [place] Medical Record Number:  [id number]  Place of Service where encounter took place:  [facility] [[id number]]  Chief Complaint   Patient presents with     Throat Problem       HPI:    Carlos Neri  is a 84 year old (1935), who is being seen today for an episodic care visit.  HPI information obtained from: facility chart records, facility staff, patient report and [facility] chart review. Today's concern is: dysphagia, orthostatic hypotension, anxiety. Per resident request had recent ST eval to possibly liberalize diet.  Has PD, dysphagia. Per ST, ok for reg. Diet with NTL.  Had taken HTL.  Fluid intake gen. Stable. BPs have been stable. Cont. Midodrine for low BPs.  No recent c/o dizziness. No recent falls.  Mood, behaviors more stable since starting aricept per neurology 8/19.  Also taking effexor, clonopin, neurontin, sinemet, gabapentin, ropinirole. No recent reports of anxiety. No recent prn klonopin use.       Past Medical and Surgical History reviewed in Epic today.    MEDICATIONS:  Current Outpatient Medications   Medication Sig Dispense Refill     acetaminophen (TYLENOL) 500 MG tablet Take 1,000 mg by mouth 3 times daily        Artificial Tear Solution (SOOTHE XP) SOLN Place 1 drop into both eyes 2 times daily       carbidopa-levodopa (SINEMET CR)  MG CR tablet Take 1 tablet by mouth At Bedtime       carbidopa-levodopa (SINEMET)  MG per tablet Take 1 tablet by mouth 4 times daily       clonazePAM (KLONOPIN) 0.5 MG tablet Take 2 tablets (1 mg) by mouth At Bedtime May also have 1 tab (0.5 mg) once daily PRN 90 tablet 5     donepezil (ARICEPT) 10 MG tablet Take 10 mg by mouth At Bedtime       entacapone (COMTAN) 200 MG tablet Take 100 mg by mouth 4 times daily        gabapentin (NEURONTIN) 300 MG capsule Take 300 mg by mouth At Bedtime And 100 mg every day prn       MIDODRINE HCL PO Take 5 mg by mouth 3 times daily  (before meals)       polyethylene glycol (MIRALAX/GLYCOLAX) Packet Take 17 g by mouth daily as needed for constipation (AND every other day scheduled)        rOPINIRole (REQUIP) 0.25 MG tablet Take 0.75 mg by mouth 3 times daily        rOPINIRole (REQUIP) 0.25 MG tablet Take 0.25 mg by mouth At Bedtime       senna-docusate (SENOKOT-S/PERICOLACE) 8.6-50 MG tablet Take 2 tablets by mouth daily       venlafaxine (EFFEXOR) 25 MG tablet Take 25 mg by mouth 2 times daily           REVIEW OF SYSTEMS:  No chest pain, shortness of breath, fevers, chills, headache, nausea, vomiting, dysuria or bowel abnormalities.  Appetite is  fair.  No pain except occ aches.    Objective:  /66   Pulse 58   Resp 18   SpO2 95%   Exam:  GENERAL APPEARANCE:  Alert, in no distress, thin, cooperative  ENT:  Mouth and posterior oropharynx normal, moist mucous membranes, [place]  EYES:  EOM, conjunctivae, lids, pupils and irises normal, PERRL  NECK:  No adenopathy,masses or thyromegaly, no carotid bruit  RESP:  respiratory effort and palpation of chest normal, lungs clear to auscultation , no respiratory distress  CV:  Palpation and auscultation of heart done , no edema, rate-normal, grade 2/6 murmur  ABDOMEN:  normal bowel sounds, soft, nontender, no hepatosplenomegaly or other masses, no guarding or rebound  M/S:   gait steady with walker, no increased muscle rigidity  NEURO:   Cranial nerves 2-12 are normal tested and grossly at patient's baseline, alert, speech soft, clear  PSYCH:  insight and judgement impaired, memory impaired , affect abnormal -flat    Labs:     Most Recent 3 CBC's:  Recent Labs   Lab Test 07/26/19 06/19/19 02/19/19  2227   WBC 7.8 6.5 4.9   HGB 14.0 13.6* 13.6   MCV 99 101* 96   * 140 144*     Most Recent 3 BMP's:  Recent Labs   Lab Test 07/30/19 06/19/19 02/19/19  2227    142 144   POTASSIUM 4.1 4.7 4.2   CHLORIDE 107 106 111*   CO2 30 31 26   BUN 22 25 31*   CR 0.79 0.79 0.98   ANIONGAP 6 5 7   SADIA 9.3  9.6 8.6   GLC 89 71 102*       ASSESSMENT/PLAN:  Dysphagia, unspecified type  Currently stable. Diet upgraded to reg. With NTL  1. Monitor for coughing with meals  2. Cont. To give pills whole-has been tolerating  3. Monitor for changes in po intake, follow wt.s (has had gradual wt. Loss)  4. F/u with ST prn    Orthostatic hypotension  Gen. Stable. No recent falls  1. Encourage fluids-liberalized to NTL  2. Cont. Midodrine  3. Follow BPs, HRs  4. Hgb, bmp in next 1-3 mos  5. Monitor for dizziness    Anxiety  Gen. Stable, improved since start of aricept  1. Cont. Aricept, effexor, klonopin, PD meds  2. Monitor for increased anxiety, changes in mood  3. For increased anxiety, use prn klonopin  4. Cont. Secured memory care unit        Electronically signed by:  DONY Clark CNP

## 2019-11-14 ENCOUNTER — ASSISTED LIVING VISIT (OUTPATIENT)
Dept: GERIATRICS | Facility: CLINIC | Age: 84
End: 2019-11-14
Payer: COMMERCIAL

## 2019-11-14 VITALS
OXYGEN SATURATION: 93 % | WEIGHT: 148 LBS | HEART RATE: 70 BPM | DIASTOLIC BLOOD PRESSURE: 66 MMHG | BODY MASS INDEX: 20.07 KG/M2 | SYSTOLIC BLOOD PRESSURE: 120 MMHG | RESPIRATION RATE: 18 BRPM

## 2019-11-14 DIAGNOSIS — G31.83 LEWY BODY DEMENTIA WITH BEHAVIORAL DISTURBANCE (H): ICD-10-CM

## 2019-11-14 DIAGNOSIS — R13.10 DYSPHAGIA, UNSPECIFIED TYPE: ICD-10-CM

## 2019-11-14 DIAGNOSIS — F02.818 LEWY BODY DEMENTIA WITH BEHAVIORAL DISTURBANCE (H): ICD-10-CM

## 2019-11-14 DIAGNOSIS — R22.9 SKIN MASS: Primary | ICD-10-CM

## 2019-11-14 NOTE — LETTER
11/14/2019        RE: Carlos Neri  [address]  [place] [address]        [place] [facility]  [place] Medical Record Number:  [id number]  Place of Service where encounter took place:  [facility] ([facility]) [[id number]]  Chief Complaint   Patient presents with     Derm Problem       HPI:    Carlos Neri  is a 84 year old (1935), who is being seen today for an episodic care visit.  HPI information obtained from: facility chart records, facility staff, patient report and [facility] chart review. Today's concern is: skin mass, dysphagia, dementia. Per staff, noted to have newer skin mass low front neck.  No pain at site. No redness. Has dysphagia, diet recently upgraded. Worked with ST.  No reports of coughing with meals or changes in resp. Status, afebrile.  Po intake, wt. Stable past 2 mos,  Had prior gradual wt. Loss.  Has memory loss due to dementia.  Started on aricept earlier this year. Followed by Neurology.  Mood, behaviors gen. Stable. Cont. On klonopin, neurontin, effexor.  No reports of insomnia.        Past Medical and Surgical History reviewed in Epic today.    MEDICATIONS:  Current Outpatient Medications   Medication Sig Dispense Refill     acetaminophen (TYLENOL) 500 MG tablet Take 1,000 mg by mouth 3 times daily        Artificial Tear Solution (SOOTHE XP) SOLN Place 1 drop into both eyes 2 times daily       carbidopa-levodopa (SINEMET CR)  MG CR tablet Take 1 tablet by mouth At Bedtime       carbidopa-levodopa (SINEMET)  MG per tablet Take 1 tablet by mouth 4 times daily       clonazePAM (KLONOPIN) 0.5 MG tablet Take 2 tablets (1 mg) by mouth At Bedtime May also have 1 tab (0.5 mg) once daily PRN 90 tablet 5     donepezil (ARICEPT) 10 MG tablet Take 10 mg by mouth At Bedtime       entacapone (COMTAN) 200 MG tablet Take 100 mg by mouth 4 times daily        gabapentin (NEURONTIN) 300 MG capsule Take 300 mg by mouth At Bedtime  And 100 mg every day prn       MIDODRINE HCL PO Take 5 mg by mouth 3 times daily (before meals)       polyethylene glycol (MIRALAX/GLYCOLAX) Packet Take 17 g by mouth daily as needed for constipation (AND every other day scheduled)        rOPINIRole (REQUIP) 0.25 MG tablet Take 0.75 mg by mouth 3 times daily        rOPINIRole (REQUIP) 0.25 MG tablet Take 0.25 mg by mouth At Bedtime       senna-docusate (SENOKOT-S/PERICOLACE) 8.6-50 MG tablet Take 2 tablets by mouth daily       venlafaxine (EFFEXOR) 25 MG tablet Take 25 mg by mouth 2 times daily           REVIEW OF SYSTEMS:  Unobtainable secondary to cognitive impairment. Reports feeling ok, no neck pain    Objective:  /66   Pulse 70   Resp 18   Wt 67.1 kg (148 lb)   SpO2 93%   BMI 20.07 kg/m     Exam:  GENERAL APPEARANCE:  Alert, in no distress, thin, cooperative  ENT:  Mouth and posterior oropharynx normal, moist mucous membranes, [place]  EYES:  EOM, conjunctivae, lids, pupils and irises normal, PERRL  NECK:  No adenopathy,masses or thyromegaly, no carotid bruit  RESP:  respiratory effort and palpation of chest normal, lungs clear to auscultation , no respiratory distress  CV:  Palpation and auscultation of heart done , regular rate and rhythm, no murmur, rub, or gallop, no edema  ABDOMEN:  normal bowel sounds, soft, nontender, no hepatosplenomegaly or other masses, no guarding or rebound  LYMPHATICS:  No adenopathy in neck , No adenopathy in axillae  M/S:   gait slowed, steady with walker  SKIN:  lipoma over R medal clavical area. no redness, no pain with palp.   NEURO:   Cranial nerves 2-12 are normal tested and grossly at patient's baseline, alert, speech soft, clear  PSYCH:  insight and judgement impaired, memory impaired , affect and mood normal    Labs:     Most Recent 3 CBC's:  Recent Labs   Lab Test 07/26/19 06/19/19 02/19/19  2227   WBC 7.8 6.5 4.9   HGB 14.0 13.6* 13.6   MCV 99 101* 96   * 140 144*     Most Recent 3 BMP's:  Recent Labs    Lab Test 07/30/19 06/19/19 02/19/19  2227    142 144   POTASSIUM 4.1 4.7 4.2   CHLORIDE 107 106 111*   CO2 30 31 26   BUN 22 25 31*   CR 0.79 0.79 0.98   ANIONGAP 6 5 7   SADIA 9.3 9.6 8.6   GLC 89 71 102*       ASSESSMENT/PLAN:  Skin mass  R low neck area, lipoma  1. Monitor site for pain, changes in appearance  2. Reassess over next few weeks    Dysphagia, unspecified type  Gen. Stable. Diet recently upgraded. ST eval  1. Cont. Reg diet with NTL  2. Monitor for coughing with meals, difficulty taking pills  3. Cont. Monthly wt.s-stable past 2 mos  4. For further wt. Loss. May start ensure      Lewy body dementia with behavioral disturbance (H)  Memory loss. Mood, behaviors gen. Stable.  1. Cont. aricept  2. Cont. Neurontin, effexor  3. Reassess over next couple mos, if mood stable, may try GDR effexor  4. F/u with neurology prn      Electronically signed by:  DONY Clark CNP             Sincerely,        DONY Clark CNP

## 2019-11-14 NOTE — PROGRESS NOTES
[facility]  [place] Medical Record Number:  [id number]  Place of Service where encounter took place:  [facility] ([facility]) [[id number]]  Chief Complaint   Patient presents with     Derm Problem       HPI:    Carlos Neri  is a 84 year old (1935), who is being seen today for an episodic care visit.  HPI information obtained from: facility chart records, facility staff, patient report and [facility] chart review. Today's concern is: skin mass, dysphagia, dementia. Per staff, noted to have newer skin mass low front neck.  No pain at site. No redness. Has dysphagia, diet recently upgraded. Worked with ST.  No reports of coughing with meals or changes in resp. Status, afebrile.  Po intake, wt. Stable past 2 mos,  Had prior gradual wt. Loss.  Has memory loss due to dementia.  Started on aricept earlier this year. Followed by Neurology.  Mood, behaviors gen. Stable. Cont. On klonopin, neurontin, effexor.  No reports of insomnia.        Past Medical and Surgical History reviewed in Epic today.    MEDICATIONS:  Current Outpatient Medications   Medication Sig Dispense Refill     acetaminophen (TYLENOL) 500 MG tablet Take 1,000 mg by mouth 3 times daily        Artificial Tear Solution (SOOTHE XP) SOLN Place 1 drop into both eyes 2 times daily       carbidopa-levodopa (SINEMET CR)  MG CR tablet Take 1 tablet by mouth At Bedtime       carbidopa-levodopa (SINEMET)  MG per tablet Take 1 tablet by mouth 4 times daily       clonazePAM (KLONOPIN) 0.5 MG tablet Take 2 tablets (1 mg) by mouth At Bedtime May also have 1 tab (0.5 mg) once daily PRN 90 tablet 5     donepezil (ARICEPT) 10 MG tablet Take 10 mg by mouth At Bedtime       entacapone (COMTAN) 200 MG tablet Take 100 mg by mouth 4 times daily        gabapentin (NEURONTIN) 300 MG capsule Take 300 mg by mouth At Bedtime And 100 mg every day prn       MIDODRINE HCL PO Take 5 mg by mouth 3 times daily (before meals)        polyethylene glycol (MIRALAX/GLYCOLAX) Packet Take 17 g by mouth daily as needed for constipation (AND every other day scheduled)        rOPINIRole (REQUIP) 0.25 MG tablet Take 0.75 mg by mouth 3 times daily        rOPINIRole (REQUIP) 0.25 MG tablet Take 0.25 mg by mouth At Bedtime       senna-docusate (SENOKOT-S/PERICOLACE) 8.6-50 MG tablet Take 2 tablets by mouth daily       venlafaxine (EFFEXOR) 25 MG tablet Take 25 mg by mouth 2 times daily           REVIEW OF SYSTEMS:  Unobtainable secondary to cognitive impairment. Reports feeling ok, no neck pain    Objective:  /66   Pulse 70   Resp 18   Wt 67.1 kg (148 lb)   SpO2 93%   BMI 20.07 kg/m    Exam:  GENERAL APPEARANCE:  Alert, in no distress, thin, cooperative  ENT:  Mouth and posterior oropharynx normal, moist mucous membranes, [place]  EYES:  EOM, conjunctivae, lids, pupils and irises normal, PERRL  NECK:  No adenopathy,masses or thyromegaly, no carotid bruit  RESP:  respiratory effort and palpation of chest normal, lungs clear to auscultation , no respiratory distress  CV:  Palpation and auscultation of heart done , regular rate and rhythm, no murmur, rub, or gallop, no edema  ABDOMEN:  normal bowel sounds, soft, nontender, no hepatosplenomegaly or other masses, no guarding or rebound  LYMPHATICS:  No adenopathy in neck , No adenopathy in axillae  M/S:   gait slowed, steady with walker  SKIN:  lipoma over R medal clavical area. no redness, no pain with palp.   NEURO:   Cranial nerves 2-12 are normal tested and grossly at patient's baseline, alert, speech soft, clear  PSYCH:  insight and judgement impaired, memory impaired , affect and mood normal    Labs:     Most Recent 3 CBC's:  Recent Labs   Lab Test 07/26/19 06/19/19 02/19/19  2227   WBC 7.8 6.5 4.9   HGB 14.0 13.6* 13.6   MCV 99 101* 96   * 140 144*     Most Recent 3 BMP's:  Recent Labs   Lab Test 07/30/19 06/19/19 02/19/19  2227    142 144   POTASSIUM 4.1 4.7 4.2   CHLORIDE 107  106 111*   CO2 30 31 26   BUN 22 25 31*   CR 0.79 0.79 0.98   ANIONGAP 6 5 7   SADIA 9.3 9.6 8.6   GLC 89 71 102*       ASSESSMENT/PLAN:  Skin mass  R low neck area, lipoma  1. Monitor site for pain, changes in appearance  2. Reassess over next few weeks    Dysphagia, unspecified type  Gen. Stable. Diet recently upgraded. ST eval  1. Cont. Reg diet with NTL  2. Monitor for coughing with meals, difficulty taking pills  3. Cont. Monthly wt.s-stable past 2 mos  4. For further wt. Loss. May start ensure      Lewy body dementia with behavioral disturbance (H)  Memory loss. Mood, behaviors gen. Stable.  1. Cont. aricept  2. Cont. Neurontin, effexor  3. Reassess over next couple mos, if mood stable, may try GDR effexor  4. F/u with neurology prn      Electronically signed by:  DONY Clark CNP

## 2019-12-12 ENCOUNTER — ASSISTED LIVING VISIT (OUTPATIENT)
Dept: GERIATRICS | Facility: CLINIC | Age: 84
End: 2019-12-12
Payer: COMMERCIAL

## 2019-12-12 VITALS
RESPIRATION RATE: 18 BRPM | HEART RATE: 73 BPM | OXYGEN SATURATION: 93 % | DIASTOLIC BLOOD PRESSURE: 66 MMHG | SYSTOLIC BLOOD PRESSURE: 111 MMHG

## 2019-12-12 DIAGNOSIS — M54.50 MIDLINE LOW BACK PAIN WITHOUT SCIATICA, UNSPECIFIED CHRONICITY: ICD-10-CM

## 2019-12-12 DIAGNOSIS — G20.A1 PARKINSON'S DISEASE (H): Primary | ICD-10-CM

## 2019-12-12 DIAGNOSIS — F02.818 LEWY BODY DEMENTIA WITH BEHAVIORAL DISTURBANCE (H): ICD-10-CM

## 2019-12-12 DIAGNOSIS — G31.83 LEWY BODY DEMENTIA WITH BEHAVIORAL DISTURBANCE (H): ICD-10-CM

## 2019-12-12 NOTE — LETTER
12/12/2019        RE: Carlos Neri  [address]  [place] [address]        [facility]  [place] Medical Record Number:  [id number]  Place of Service where encounter took place:  [facility] ([facility]) [[id number]]  Chief Complaint   Patient presents with     Neurologic Problem       HPI:    Carlos Neri  is a 84 year old (1935), who is being seen today for an episodic care visit.  HPI information obtained from: facility chart records, facility staff, patient report and [facility] chart review. Today's concern is: PD, dementia, back pain.  Cont. On sinemet, entacpone, ropinirole, for PD.  Has not had recent falls.  Uses walker for amb.  Noted to have episodes of freezing.  Some slowed gait when crossing transitions.  For dementia cont. On effexor, clonazepam, aricept, neurontin.  Mood, behaviors gen stable.  Does reports increased low back pain at times.  Cont. On neurontin, tylenol.       Past Medical and Surgical History reviewed in Epic today.    MEDICATIONS:  Current Outpatient Medications   Medication Sig Dispense Refill     acetaminophen (TYLENOL) 500 MG tablet Take 1,000 mg by mouth 3 times daily        Artificial Tear Solution (SOOTHE XP) SOLN Place 1 drop into both eyes 2 times daily       carbidopa-levodopa (SINEMET CR)  MG CR tablet Take 1 tablet by mouth At Bedtime       carbidopa-levodopa (SINEMET)  MG per tablet Take 1 tablet by mouth 4 times daily       clonazePAM (KLONOPIN) 0.5 MG tablet 2 TABLETS (1MG) BY MOUTH EVERY NIGHT AT BEDTIME;1 TABLET BY MOUTH ONCE A DAY AS NEEDED 90 tablet 5     donepezil (ARICEPT) 10 MG tablet Take 10 mg by mouth At Bedtime       entacapone (COMTAN) 200 MG tablet Take 100 mg by mouth 4 times daily        gabapentin (NEURONTIN) 300 MG capsule Take 300 mg by mouth At Bedtime And 100 mg every day prn       MIDODRINE HCL PO Take 5 mg by mouth 3 times daily (before meals)       polyethylene  glycol (MIRALAX/GLYCOLAX) Packet Take 17 g by mouth daily as needed for constipation (AND every other day scheduled)        rOPINIRole (REQUIP) 0.25 MG tablet Take 0.75 mg by mouth 3 times daily        rOPINIRole (REQUIP) 0.25 MG tablet Take 0.25 mg by mouth At Bedtime       senna-docusate (SENOKOT-S/PERICOLACE) 8.6-50 MG tablet Take 2 tablets by mouth daily       venlafaxine (EFFEXOR) 25 MG tablet Take 25 mg by mouth 2 times daily           REVIEW OF SYSTEMS:  No chest pain, shortness of breath, fevers, chills, headache, nausea, vomiting, dysuria or bowel abnormalities.  Appetite is  fair.  No pain except occ low back.    Objective:  /66   Pulse 73   Resp 18   SpO2 93%   Exam:  GENERAL APPEARANCE:  Alert, in no distress, thin, cooperative  ENT:  Mouth and posterior oropharynx normal, moist mucous membranes, [place]  EYES:  EOM, conjunctivae, lids, pupils and irises normal, PERRL  NECK:  No adenopathy,masses or thyromegaly, no carotid bruit  RESP:  respiratory effort and palpation of chest normal, lungs clear to auscultation , no respiratory distress  CV:  Palpation and auscultation of heart done , regular rate and rhythm, no murmur, rub, or gallop, no edema  ABDOMEN:  normal bowel sounds, soft, nontender, no hepatosplenomegaly or other masses, no guarding or rebound  M/S:   muscle strength 5/5 all 4 ext., normal tone. gait slowed, uses walker.  no freezing with amb. during exam  NEURO:   Cranial nerves 2-12 are normal tested and grossly at patient's baseline, alert, occ UE restlessness  PSYCH:  insight and judgement impaired, memory impaired , affect abnormal -flat    Labs:     Most Recent 3 CBC's:  Recent Labs   Lab Test 07/26/19 06/19/19 02/19/19  2227   WBC 7.8 6.5 4.9   HGB 14.0 13.6* 13.6   MCV 99 101* 96   * 140 144*     Most Recent 3 BMP's:  Recent Labs   Lab Test 07/30/19 06/19/19 02/19/19  2227    142 144   POTASSIUM 4.1 4.7 4.2   CHLORIDE 107 106 111*   CO2 30 31 26   BUN 22 25 31*   CR  0.79 0.79 0.98   ANIONGAP 6 5 7   SADIA 9.3 9.6 8.6   GLC 89 71 102*       ASSESSMENT/PLAN:  Parkinson's disease (H)  occ freezing episodes. Some increased diff. With floor transitions  1. Cont. Walker at all times-remind to use  2. Cont. PD meds  3. Refer to PT  4. For ongoing freezing episodes, will discuss with spouse, f/u Neuro appt    Lewy body dementia with behavioral disturbance (H)  Memory loss. Mood, behaviors gen stable  1. Cont. Effexor, clonazepam, aricept  2. Monitor for changes in mood, behaviors, increased anxiety  3. Monitor for s/s dysphagia  4. For increased anxiety, cont. Prn clonazepam    Midline low back pain without sciatica, unspecified chronicity  occ increased s/s  1. Cont. Tylenol  2. Cont. neurontin-sched. And prn  3. For further s/s, may try topical pain med      Electronically signed by:  DONY Clark CNP         Documentation of Face-to-Face and Certification for Home Health Services     Patient: Carlos Neri   YOB: 1935  MR Number: [id number]  Today's Date: 12/12/2019    I certify that patient: Carlos Neri is under my care and that I, or a nurse practitioner or physician's assistant working with me, had a face-to-face encounter that meets the physician face-to-face encounter requirements with this patient on: 12/12/2019.    This encounter with the patient was in whole, or in part, for the following medical condition, which is the primary reason for home health care: LE weakness  .    I certify that, based on my findings, the following services are medically necessary home health services: Physical Therapy.    My clinical findings support the need for the above services because: Physical Therapy Services are needed to assess and treat the following functional impairments: LE weakness.    Further, I certify that my clinical findings support that this patient is homebound (i.e. absences from home require considerable and taxing effort and are for  medical reasons or [employer] services or infrequently or of short duration when for other reasons) because: dementia.    Based on the above findings. I certify that this patient is confined to the home and needs intermittent skilled nursing care, physical therapy and/or speech therapy.  The patient is under my care, and I have initiated the establishment of the plan of care.  This patient will be followed by a physician who will periodically review the plan of care.  Physician/Provider to provide follow up care: Yang Martines    Responsible Medicare certified [place] Physician: Beyt Rowe  Physician Signature: See electronic signature associated with these discharge orders.  Date: 12/12/2019        Sincerely,        DONY Clark CNP

## 2019-12-12 NOTE — PROGRESS NOTES
[facility]  [place] Medical Record Number:  [id number]  Place of Service where encounter took place:  [facility] ([facility]) [[id number]]  Chief Complaint   Patient presents with     Neurologic Problem       HPI:    Carlos Neri  is a 84 year old (1935), who is being seen today for an episodic care visit.  HPI information obtained from: facility chart records, facility staff, patient report and [facility] chart review. Today's concern is: PD, dementia, back pain.  Cont. On sinemet, entacpone, ropinirole, for PD.  Has not had recent falls.  Uses walker for amb.  Noted to have episodes of freezing.  Some slowed gait when crossing transitions.  For dementia cont. On effexor, clonazepam, aricept, neurontin.  Mood, behaviors gen stable.  Does reports increased low back pain at times.  Cont. On neurontin, tylenol.       Past Medical and Surgical History reviewed in Epic today.    MEDICATIONS:  Current Outpatient Medications   Medication Sig Dispense Refill     acetaminophen (TYLENOL) 500 MG tablet Take 1,000 mg by mouth 3 times daily        Artificial Tear Solution (SOOTHE XP) SOLN Place 1 drop into both eyes 2 times daily       carbidopa-levodopa (SINEMET CR)  MG CR tablet Take 1 tablet by mouth At Bedtime       carbidopa-levodopa (SINEMET)  MG per tablet Take 1 tablet by mouth 4 times daily       clonazePAM (KLONOPIN) 0.5 MG tablet 2 TABLETS (1MG) BY MOUTH EVERY NIGHT AT BEDTIME;1 TABLET BY MOUTH ONCE A DAY AS NEEDED 90 tablet 5     donepezil (ARICEPT) 10 MG tablet Take 10 mg by mouth At Bedtime       entacapone (COMTAN) 200 MG tablet Take 100 mg by mouth 4 times daily        gabapentin (NEURONTIN) 300 MG capsule Take 300 mg by mouth At Bedtime And 100 mg every day prn       MIDODRINE HCL PO Take 5 mg by mouth 3 times daily (before meals)       polyethylene glycol (MIRALAX/GLYCOLAX) Packet Take 17 g by mouth daily as needed for constipation (AND every  other day scheduled)        rOPINIRole (REQUIP) 0.25 MG tablet Take 0.75 mg by mouth 3 times daily        rOPINIRole (REQUIP) 0.25 MG tablet Take 0.25 mg by mouth At Bedtime       senna-docusate (SENOKOT-S/PERICOLACE) 8.6-50 MG tablet Take 2 tablets by mouth daily       venlafaxine (EFFEXOR) 25 MG tablet Take 25 mg by mouth 2 times daily           REVIEW OF SYSTEMS:  No chest pain, shortness of breath, fevers, chills, headache, nausea, vomiting, dysuria or bowel abnormalities.  Appetite is  fair.  No pain except occ low back.    Objective:  /66   Pulse 73   Resp 18   SpO2 93%   Exam:  GENERAL APPEARANCE:  Alert, in no distress, thin, cooperative  ENT:  Mouth and posterior oropharynx normal, moist mucous membranes, [place]  EYES:  EOM, conjunctivae, lids, pupils and irises normal, PERRL  NECK:  No adenopathy,masses or thyromegaly, no carotid bruit  RESP:  respiratory effort and palpation of chest normal, lungs clear to auscultation , no respiratory distress  CV:  Palpation and auscultation of heart done , regular rate and rhythm, no murmur, rub, or gallop, no edema  ABDOMEN:  normal bowel sounds, soft, nontender, no hepatosplenomegaly or other masses, no guarding or rebound  M/S:   muscle strength 5/5 all 4 ext., normal tone. gait slowed, uses walker.  no freezing with amb. during exam  NEURO:   Cranial nerves 2-12 are normal tested and grossly at patient's baseline, alert, occ UE restlessness  PSYCH:  insight and judgement impaired, memory impaired , affect abnormal -flat    Labs:     Most Recent 3 CBC's:  Recent Labs   Lab Test 07/26/19 06/19/19 02/19/19  2227   WBC 7.8 6.5 4.9   HGB 14.0 13.6* 13.6   MCV 99 101* 96   * 140 144*     Most Recent 3 BMP's:  Recent Labs   Lab Test 07/30/19 06/19/19 02/19/19  2227    142 144   POTASSIUM 4.1 4.7 4.2   CHLORIDE 107 106 111*   CO2 30 31 26   BUN 22 25 31*   CR 0.79 0.79 0.98   ANIONGAP 6 5 7   SADIA 9.3 9.6 8.6   GLC 89 71 102*        ASSESSMENT/PLAN:  Parkinson's disease (H)  occ freezing episodes. Some increased diff. With floor transitions  1. Cont. Walker at all times-remind to use  2. Cont. PD meds  3. Refer to PT  4. For ongoing freezing episodes, will discuss with spouse, f/u Neuro appt    Lewy body dementia with behavioral disturbance (H)  Memory loss. Mood, behaviors gen stable  1. Cont. Effexor, clonazepam, aricept  2. Monitor for changes in mood, behaviors, increased anxiety  3. Monitor for s/s dysphagia  4. For increased anxiety, cont. Prn clonazepam    Midline low back pain without sciatica, unspecified chronicity  occ increased s/s  1. Cont. Tylenol  2. Cont. neurontin-sched. And prn  3. For further s/s, may try topical pain med      Electronically signed by:  DONY Clark CNP         Documentation of Face-to-Face and Certification for Home Health Services     Patient: Carlos Neri   YOB: 1935  MR Number: [id number]  Today's Date: 12/12/2019    I certify that patient: Carlos Neri is under my care and that I, or a nurse practitioner or physician's assistant working with me, had a face-to-face encounter that meets the physician face-to-face encounter requirements with this patient on: 12/12/2019.    This encounter with the patient was in whole, or in part, for the following medical condition, which is the primary reason for home health care: LE weakness  .    I certify that, based on my findings, the following services are medically necessary home health services: Physical Therapy.    My clinical findings support the need for the above services because: Physical Therapy Services are needed to assess and treat the following functional impairments: LE weakness.    Further, I certify that my clinical findings support that this patient is homebound (i.e. absences from home require considerable and taxing effort and are for medical reasons or [employer] services or infrequently or of short  duration when for other reasons) because: dementia.    Based on the above findings. I certify that this patient is confined to the home and needs intermittent skilled nursing care, physical therapy and/or speech therapy.  The patient is under my care, and I have initiated the establishment of the plan of care.  This patient will be followed by a physician who will periodically review the plan of care.  Physician/Provider to provide follow up care: Yang Martines    Responsible Medicare certified [employer] Physician: Bety Rowe  Physician Signature: See electronic signature associated with these discharge orders.  Date: 12/12/2019

## 2019-12-23 ENCOUNTER — TRANSFERRED RECORDS (OUTPATIENT)
Dept: HEALTH INFORMATION MANAGEMENT | Facility: CLINIC | Age: 84
End: 2019-12-23

## 2020-01-01 ENCOUNTER — ASSISTED LIVING VISIT (OUTPATIENT)
Dept: GERIATRICS | Facility: CLINIC | Age: 85
End: 2020-01-01
Payer: COMMERCIAL

## 2020-01-01 ENCOUNTER — TRANSFERRED RECORDS (OUTPATIENT)
Dept: HEALTH INFORMATION MANAGEMENT | Facility: CLINIC | Age: 85
End: 2020-01-01

## 2020-01-01 ENCOUNTER — VIRTUAL VISIT (OUTPATIENT)
Dept: GERIATRICS | Facility: CLINIC | Age: 85
End: 2020-01-01
Payer: COMMERCIAL

## 2020-01-01 ENCOUNTER — TELEPHONE (OUTPATIENT)
Dept: GERIATRICS | Facility: CLINIC | Age: 85
End: 2020-01-01

## 2020-01-01 ENCOUNTER — RECORDS - HEALTHEAST (OUTPATIENT)
Dept: LAB | Facility: CLINIC | Age: 85
End: 2020-01-01

## 2020-01-01 ENCOUNTER — DOCUMENTATION ONLY (OUTPATIENT)
Dept: OTHER | Facility: CLINIC | Age: 85
End: 2020-01-01

## 2020-01-01 ENCOUNTER — MEDICAL CORRESPONDENCE (OUTPATIENT)
Dept: HEALTH INFORMATION MANAGEMENT | Facility: CLINIC | Age: 85
End: 2020-01-01

## 2020-01-01 VITALS
RESPIRATION RATE: 16 BRPM | HEART RATE: 64 BPM | DIASTOLIC BLOOD PRESSURE: 83 MMHG | SYSTOLIC BLOOD PRESSURE: 139 MMHG | OXYGEN SATURATION: 93 %

## 2020-01-01 VITALS
OXYGEN SATURATION: 99 % | BODY MASS INDEX: 18.04 KG/M2 | HEART RATE: 62 BPM | WEIGHT: 133 LBS | TEMPERATURE: 97.7 F | RESPIRATION RATE: 16 BRPM | SYSTOLIC BLOOD PRESSURE: 120 MMHG | DIASTOLIC BLOOD PRESSURE: 72 MMHG

## 2020-01-01 VITALS
RESPIRATION RATE: 18 BRPM | HEART RATE: 59 BPM | RESPIRATION RATE: 18 BRPM | OXYGEN SATURATION: 92 % | DIASTOLIC BLOOD PRESSURE: 69 MMHG | SYSTOLIC BLOOD PRESSURE: 110 MMHG | OXYGEN SATURATION: 94 % | DIASTOLIC BLOOD PRESSURE: 67 MMHG | HEART RATE: 68 BPM | SYSTOLIC BLOOD PRESSURE: 127 MMHG

## 2020-01-01 VITALS
SYSTOLIC BLOOD PRESSURE: 112 MMHG | RESPIRATION RATE: 16 BRPM | DIASTOLIC BLOOD PRESSURE: 64 MMHG | HEART RATE: 71 BPM | OXYGEN SATURATION: 94 %

## 2020-01-01 VITALS
HEART RATE: 58 BPM | RESPIRATION RATE: 23 BRPM | SYSTOLIC BLOOD PRESSURE: 104 MMHG | TEMPERATURE: 97.7 F | WEIGHT: 133.4 LBS | BODY MASS INDEX: 18.09 KG/M2 | DIASTOLIC BLOOD PRESSURE: 70 MMHG

## 2020-01-01 VITALS
SYSTOLIC BLOOD PRESSURE: 99 MMHG | TEMPERATURE: 97 F | HEART RATE: 64 BPM | DIASTOLIC BLOOD PRESSURE: 48 MMHG | RESPIRATION RATE: 16 BRPM

## 2020-01-01 VITALS
SYSTOLIC BLOOD PRESSURE: 107 MMHG | DIASTOLIC BLOOD PRESSURE: 50 MMHG | TEMPERATURE: 98.1 F | OXYGEN SATURATION: 99 % | RESPIRATION RATE: 18 BRPM | HEART RATE: 54 BPM

## 2020-01-01 VITALS
SYSTOLIC BLOOD PRESSURE: 108 MMHG | HEART RATE: 56 BPM | RESPIRATION RATE: 18 BRPM | DIASTOLIC BLOOD PRESSURE: 64 MMHG | OXYGEN SATURATION: 93 %

## 2020-01-01 VITALS
OXYGEN SATURATION: 96 % | RESPIRATION RATE: 16 BRPM | BODY MASS INDEX: 18.23 KG/M2 | TEMPERATURE: 97.2 F | SYSTOLIC BLOOD PRESSURE: 114 MMHG | WEIGHT: 134.4 LBS | DIASTOLIC BLOOD PRESSURE: 64 MMHG

## 2020-01-01 VITALS — RESPIRATION RATE: 18 BRPM

## 2020-01-01 DIAGNOSIS — G20.A1 PARKINSON'S DISEASE (H): ICD-10-CM

## 2020-01-01 DIAGNOSIS — F41.9 ANXIETY: Primary | ICD-10-CM

## 2020-01-01 DIAGNOSIS — F02.818 LEWY BODY DEMENTIA WITH BEHAVIORAL DISTURBANCE (H): ICD-10-CM

## 2020-01-01 DIAGNOSIS — F02.80 LEWY BODY DEMENTIA WITHOUT BEHAVIORAL DISTURBANCE (H): ICD-10-CM

## 2020-01-01 DIAGNOSIS — R29.6 FALLS FREQUENTLY: Primary | ICD-10-CM

## 2020-01-01 DIAGNOSIS — R63.4 WEIGHT LOSS: ICD-10-CM

## 2020-01-01 DIAGNOSIS — I95.1 ORTHOSTATIC HYPOTENSION: ICD-10-CM

## 2020-01-01 DIAGNOSIS — E44.0 MODERATE PROTEIN-CALORIE MALNUTRITION (H): ICD-10-CM

## 2020-01-01 DIAGNOSIS — G20.A1 PARKINSON'S DISEASE (H): Primary | ICD-10-CM

## 2020-01-01 DIAGNOSIS — I95.9 HYPOTENSION, UNSPECIFIED HYPOTENSION TYPE: ICD-10-CM

## 2020-01-01 DIAGNOSIS — G31.83 LEWY BODY DEMENTIA WITH BEHAVIORAL DISTURBANCE (H): ICD-10-CM

## 2020-01-01 DIAGNOSIS — F32.A DEPRESSION, UNSPECIFIED DEPRESSION TYPE: ICD-10-CM

## 2020-01-01 DIAGNOSIS — R29.6 RECURRENT FALLS: ICD-10-CM

## 2020-01-01 DIAGNOSIS — G31.83 LEWY BODY DEMENTIA WITHOUT BEHAVIORAL DISTURBANCE (H): ICD-10-CM

## 2020-01-01 DIAGNOSIS — R19.7 DIARRHEA, UNSPECIFIED TYPE: Primary | ICD-10-CM

## 2020-01-01 DIAGNOSIS — R29.6 FALLS FREQUENTLY: ICD-10-CM

## 2020-01-01 DIAGNOSIS — G40.909 SEIZURE DISORDER (H): ICD-10-CM

## 2020-01-01 DIAGNOSIS — M79.644 PAIN OF FINGER OF RIGHT HAND: Primary | ICD-10-CM

## 2020-01-01 DIAGNOSIS — Z71.89 ACP (ADVANCE CARE PLANNING): ICD-10-CM

## 2020-01-01 DIAGNOSIS — F41.9 ANXIETY: ICD-10-CM

## 2020-01-01 LAB
ANION GAP SERPL CALCULATED.3IONS-SCNC: 10 MMOL/L (ref 5–18)
ANION GAP SERPL CALCULATED.3IONS-SCNC: 10 MMOL/L (ref 5–18)
BUN SERPL-MCNC: 28 MG/DL (ref 8–28)
BUN SERPL-MCNC: 28 MG/DL (ref 8–28)
CALCIUM SERPL-MCNC: 9.3 MG/DL (ref 8.5–10.5)
CALCIUM SERPL-MCNC: 9.3 MG/DL (ref 8.5–10.5)
CHLORIDE BLD-SCNC: 103 MMOL/L (ref 98–107)
CHLORIDE SERPLBLD-SCNC: 103 MMOL/L (ref 98–107)
CO2 SERPL-SCNC: 28 MMOL/L (ref 22–31)
CO2 SERPL-SCNC: 28 MMOL/L (ref 22–31)
CREAT SERPL-MCNC: 1.01 MG/DL (ref 0.7–1.3)
CREAT SERPL-MCNC: 1.01 MG/DL (ref 0.7–1.3)
GFR SERPL CREATININE-BSD FRML MDRD: >60 ML/MIN/1.73M2
GFR SERPL CREATININE-BSD FRML MDRD: >60 ML/MIN/1.73M2
GLUCOSE BLD-MCNC: 136 MG/DL (ref 70–125)
GLUCOSE SERPL-MCNC: 136 MG/DL (ref 70–125)
HEMOGLOBIN: 14.7 G/DL (ref 14–18)
HGB BLD-MCNC: 14.7 G/DL (ref 14–18)
POTASSIUM BLD-SCNC: 4.1 MMOL/L (ref 3.5–5)
POTASSIUM SERPL-SCNC: 4.1 MMOL/L (ref 3.5–5)
SODIUM SERPL-SCNC: 141 MMOL/L (ref 136–145)
SODIUM SERPL-SCNC: 141 MMOL/L (ref 136–145)

## 2020-01-01 RX ORDER — GABAPENTIN 300 MG/1
300 CAPSULE ORAL AT BEDTIME
COMMUNITY
End: 2020-01-01

## 2020-01-01 RX ORDER — GABAPENTIN 100 MG/1
300 CAPSULE ORAL AT BEDTIME
COMMUNITY

## 2020-01-01 RX ORDER — CLONAZEPAM 0.5 MG/1
TABLET ORAL
Qty: 45 TABLET | Refills: 5 | Status: SHIPPED | OUTPATIENT
Start: 2020-01-01 | End: 2020-01-01

## 2020-01-01 RX ORDER — DONEPEZIL HYDROCHLORIDE 5 MG/1
5 TABLET, FILM COATED ORAL AT BEDTIME
COMMUNITY
End: 2020-01-01

## 2020-01-01 RX ORDER — CLONAZEPAM 0.5 MG/1
0.5 TABLET ORAL DAILY PRN
Qty: 30 TABLET | Refills: 5 | Status: SHIPPED | OUTPATIENT
Start: 2020-01-01

## 2020-01-20 DIAGNOSIS — Z53.9 DIAGNOSIS NOT YET DEFINED: Primary | ICD-10-CM

## 2020-01-20 PROCEDURE — G0180 MD CERTIFICATION HHA PATIENT: HCPCS | Performed by: INTERNAL MEDICINE

## 2020-01-21 ENCOUNTER — ASSISTED LIVING VISIT (OUTPATIENT)
Dept: GERIATRICS | Facility: CLINIC | Age: 85
End: 2020-01-21
Payer: COMMERCIAL

## 2020-01-21 VITALS
TEMPERATURE: 95.1 F | HEART RATE: 60 BPM | OXYGEN SATURATION: 91 % | SYSTOLIC BLOOD PRESSURE: 110 MMHG | DIASTOLIC BLOOD PRESSURE: 61 MMHG | RESPIRATION RATE: 18 BRPM

## 2020-01-21 DIAGNOSIS — G20.A1 PARKINSON'S DISEASE (H): ICD-10-CM

## 2020-01-21 DIAGNOSIS — M54.50 MIDLINE LOW BACK PAIN WITHOUT SCIATICA, UNSPECIFIED CHRONICITY: ICD-10-CM

## 2020-01-21 DIAGNOSIS — R29.6 RECURRENT FALLS: Primary | ICD-10-CM

## 2020-01-21 NOTE — PROGRESS NOTES
[place] [facility]  [place] [facility] Record Number:  [id number]  Place of Service where encounter took place:  [facility] ([facility]) [[id number]]  Chief Complaint   Patient presents with     Fall       HPI:    Carlos Neri  is a 84 year old (1935), who is being seen today for an episodic care visit.  HPI information obtained from: facility chart records, facility staff, patient report and [facility] chart review. Today's concern is: falls, PD, back pain.  S/p fall over weekend. Found on floor. No apparent inj.  No exac. Of back or neck pain.  Cont. On tylenol, aspercreme.  Has not had recurrent falls since starting aricept 8/19. Per staff, occ s/s depression. Cont. On effexor. No recent reports of increased anxiety.  Some increased confusion at times-per staff had episode of needing staff cue to use walker.  Afebrile. O2 sats stable.  For PD cont on sinemet, entacapone, ropinerole. For hypotension cont. On midodrine. BPs, HRs stable.       Past Medical and Surgical History reviewed in Epic today.    MEDICATIONS:  Current Outpatient Medications   Medication Sig Dispense Refill     acetaminophen (TYLENOL) 500 MG tablet Take 1,000 mg by mouth 3 times daily        Artificial Tear Solution (SOOTHE XP) SOLN Place 1 drop into both eyes 2 times daily       carbidopa-levodopa (SINEMET CR)  MG CR tablet Take 1 tablet by mouth At Bedtime       carbidopa-levodopa (SINEMET)  MG per tablet Take 1 tablet by mouth 4 times daily       clonazePAM (KLONOPIN) 0.5 MG tablet 2 TABLETS (1MG) BY MOUTH EVERY NIGHT AT BEDTIME;1 TABLET BY MOUTH ONCE A DAY AS NEEDED 90 tablet 5     donepezil (ARICEPT) 10 MG tablet Take 10 mg by mouth At Bedtime       entacapone (COMTAN) 200 MG tablet Take 100 mg by mouth 4 times daily        gabapentin (NEURONTIN) 300 MG capsule Take 300 mg by mouth At Bedtime And 100 mg every day prn       MIDODRINE HCL PO Take 5 mg by mouth 3 times daily (before  meals)       polyethylene glycol (MIRALAX/GLYCOLAX) Packet Take 17 g by mouth daily as needed for constipation (AND every other day scheduled)        rOPINIRole (REQUIP) 0.25 MG tablet Take 0.75 mg by mouth 3 times daily        rOPINIRole (REQUIP) 0.25 MG tablet Take 0.25 mg by mouth At Bedtime       senna-docusate (SENOKOT-S/PERICOLACE) 8.6-50 MG tablet Take 2 tablets by mouth daily       venlafaxine (EFFEXOR) 25 MG tablet Take 25 mg by mouth 2 times daily           REVIEW OF SYSTEMS:  Unobtainable secondary to cognitive impairment. Reports feeling ok today. No increased pains    Objective:  /61   Pulse 60   Temp 95.1  F (35.1  C)   Resp 18   SpO2 91%   Exam:  GENERAL APPEARANCE:  Alert, in no distress, cooperative  ENT:  Mouth and posterior oropharynx normal, moist mucous membranes, [place]  EYES:  EOM, conjunctivae, lids, pupils and irises normal, PERRL  NECK:  No adenopathy,masses or thyromegaly, FROM  RESP:  respiratory effort and palpation of chest normal, lungs clear to auscultation , no respiratory distress  CV:  Palpation and auscultation of heart done , regular rate and rhythm, no murmur, rub, or gallop, no edema  ABDOMEN:  normal bowel sounds, soft, nontender, no hepatosplenomegaly or other masses, no guarding or rebound  M/S:   muscle strength 5/5 all 4 ext., some rigidity of UEs.  NEURO:   Cranial nerves 2-12 are normal tested and grossly at patient's baseline, speech soft, clear  PSYCH:  insight and judgement impaired, memory impaired , affect abnormal -flat    Labs:     Most Recent 3 CBC's:  Recent Labs   Lab Test 07/26/19 06/19/19 02/19/19  2227   WBC 7.8 6.5 4.9   HGB 14.0 13.6* 13.6   MCV 99 101* 96   * 140 144*     Most Recent 3 BMP's:  Recent Labs   Lab Test 07/30/19 06/19/19 02/19/19  2227    142 144   POTASSIUM 4.1 4.7 4.2   CHLORIDE 107 106 111*   CO2 30 31 26   BUN 22 25 31*   CR 0.79 0.79 0.98   ANIONGAP 6 5 7   SADIA 9.3 9.6 8.6   GLC 89 71 102*        ASSESSMENT/PLAN:  Recurrent falls  S/p fall this weekend. Found on floor. Had been stable since start of aricept  1. Cont. aricept  2. Remind to use walker at all times  3. Monitor for LE weakness, changes in gait    Parkinson's disease (H)  Some increased rigidity.  No recent increased anxiety  1. Cont. Current PD meds  2. Monitor for further increased rigidity  3. Monitor for episodes of festination, freezing  4. For further changes in gait consider another course of PT    Midline low back pain without sciatica, unspecified chronicity  Currently stable  1. Cont. neurontin  2. Cont tylenol, aspercreme  3. Monitor for reports of increased pain  4. Encourage increased act. Level as anupam      Electronically signed by:  DONY Clark CNP

## 2020-01-21 NOTE — LETTER
1/21/2020        RE: Carlos Neri  [address]  [place] [address]        [facility]  [place] Medical Record Number:  [id number]  Place of Service where encounter took place:  [facility] ([facility]) [[id number]]  Chief Complaint   Patient presents with     Fall       HPI:    Carlos Neri  is a 84 year old (1935), who is being seen today for an episodic care visit.  HPI information obtained from: facility chart records, facility staff, patient report and [facility] chart review. Today's concern is: falls, PD, back pain.  S/p fall over weekend. Found on floor. No apparent inj.  No exac. Of back or neck pain.  Cont. On tylenol, aspercreme.  Has not had recurrent falls since starting aricept 8/19. Per staff, occ s/s depression. Cont. On effexor. No recent reports of increased anxiety.  Some increased confusion at times-per staff had episode of needing staff cue to use walker.  Afebrile. O2 sats stable.  For PD cont on sinemet, entacapone, ropinerole. For hypotension cont. On midodrine. BPs, HRs stable.       Past Medical and Surgical History reviewed in Epic today.    MEDICATIONS:  Current Outpatient Medications   Medication Sig Dispense Refill     acetaminophen (TYLENOL) 500 MG tablet Take 1,000 mg by mouth 3 times daily        Artificial Tear Solution (SOOTHE XP) SOLN Place 1 drop into both eyes 2 times daily       carbidopa-levodopa (SINEMET CR)  MG CR tablet Take 1 tablet by mouth At Bedtime       carbidopa-levodopa (SINEMET)  MG per tablet Take 1 tablet by mouth 4 times daily       clonazePAM (KLONOPIN) 0.5 MG tablet 2 TABLETS (1MG) BY MOUTH EVERY NIGHT AT BEDTIME;1 TABLET BY MOUTH ONCE A DAY AS NEEDED 90 tablet 5     donepezil (ARICEPT) 10 MG tablet Take 10 mg by mouth At Bedtime       entacapone (COMTAN) 200 MG tablet Take 100 mg by mouth 4 times daily        gabapentin (NEURONTIN) 300 MG capsule Take 300 mg by mouth At  Bedtime And 100 mg every day prn       MIDODRINE HCL PO Take 5 mg by mouth 3 times daily (before meals)       polyethylene glycol (MIRALAX/GLYCOLAX) Packet Take 17 g by mouth daily as needed for constipation (AND every other day scheduled)        rOPINIRole (REQUIP) 0.25 MG tablet Take 0.75 mg by mouth 3 times daily        rOPINIRole (REQUIP) 0.25 MG tablet Take 0.25 mg by mouth At Bedtime       senna-docusate (SENOKOT-S/PERICOLACE) 8.6-50 MG tablet Take 2 tablets by mouth daily       venlafaxine (EFFEXOR) 25 MG tablet Take 25 mg by mouth 2 times daily           REVIEW OF SYSTEMS:  Unobtainable secondary to cognitive impairment. Reports feeling ok today. No increased pains    Objective:  /61   Pulse 60   Temp 95.1  F (35.1  C)   Resp 18   SpO2 91%   Exam:  GENERAL APPEARANCE:  Alert, in no distress, cooperative  ENT:  Mouth and posterior oropharynx normal, moist mucous membranes, "[employer]  EYES:  EOM, conjunctivae, lids, pupils and irises normal, PERRL  NECK:  No adenopathy,masses or thyromegaly, FROM  RESP:  respiratory effort and palpation of chest normal, lungs clear to auscultation , no respiratory distress  CV:  Palpation and auscultation of heart done , regular rate and rhythm, no murmur, rub, or gallop, no edema  ABDOMEN:  normal bowel sounds, soft, nontender, no hepatosplenomegaly or other masses, no guarding or rebound  M/S:   muscle strength 5/5 all 4 ext., some rigidity of UEs.  NEURO:   Cranial nerves 2-12 are normal tested and grossly at patient's baseline, speech soft, clear  PSYCH:  insight and judgement impaired, memory impaired , affect abnormal -flat    Labs:     Most Recent 3 CBC's:  Recent Labs   Lab Test 07/26/19 06/19/19 02/19/19  2227   WBC 7.8 6.5 4.9   HGB 14.0 13.6* 13.6   MCV 99 101* 96   * 140 144*     Most Recent 3 BMP's:  Recent Labs   Lab Test 07/30/19 06/19/19 02/19/19  2227    142 144   POTASSIUM 4.1 4.7 4.2   CHLORIDE 107 106 111*   CO2 30 31 26   BUN 22 25 31*    CR 0.79 0.79 0.98   ANIONGAP 6 5 7   SADIA 9.3 9.6 8.6   GLC 89 71 102*       ASSESSMENT/PLAN:  Recurrent falls  S/p fall this weekend. Found on floor. Had been stable since start of aricept  1. Cont. aricept  2. Remind to use walker at all times  3. Monitor for LE weakness, changes in gait    Parkinson's disease (H)  Some increased rigidity.  No recent increased anxiety  1. Cont. Current PD meds  2. Monitor for further increased rigidity  3. Monitor for episodes of festination, freezing  4. For further changes in gait consider another course of PT    Midline low back pain without sciatica, unspecified chronicity  Currently stable  1. Cont. neurontin  2. Cont tylenol, aspercreme  3. Monitor for reports of increased pain  4. Encourage increased act. Level as anupam      Electronically signed by:  DONY Clark CNP               Sincerely,        DONY Clark CNP

## 2020-01-22 ENCOUNTER — TRANSFERRED RECORDS (OUTPATIENT)
Dept: HEALTH INFORMATION MANAGEMENT | Facility: CLINIC | Age: 85
End: 2020-01-22

## 2020-01-22 ENCOUNTER — RECORDS - HEALTHEAST (OUTPATIENT)
Dept: LAB | Facility: CLINIC | Age: 85
End: 2020-01-22

## 2020-01-22 LAB
ANION GAP SERPL CALCULATED.3IONS-SCNC: 6 MMOL/L (ref 5–18)
ANION GAP SERPL CALCULATED.3IONS-SCNC: 6 MMOL/L (ref 5–18)
BASOPHILS # BLD AUTO: 0 THOU/UL (ref 0–0.2)
BASOPHILS NFR BLD AUTO: 0 % (ref 0–2)
BUN SERPL-MCNC: 25 MG/DL (ref 8–28)
BUN SERPL-MCNC: 25 MG/DL (ref 8–28)
CALCIUM SERPL-MCNC: 9.4 MG/DL (ref 8.5–10.5)
CALCIUM SERPL-MCNC: 9.4 MG/DL (ref 8.5–10.5)
CHLORIDE BLD-SCNC: 106 MMOL/L (ref 98–107)
CHLORIDE SERPLBLD-SCNC: 106 MMOL/L (ref 98–107)
CO2 SERPL-SCNC: 31 MMOL/L (ref 22–31)
CO2 SERPL-SCNC: 31 MMOL/L (ref 22–31)
CREAT SERPL-MCNC: 0.84 MG/DL (ref 0.7–1.3)
CREAT SERPL-MCNC: 0.84 MG/DL (ref 0.7–1.3)
DIFFERENTIAL: ABNORMAL
EOSINOPHIL # BLD AUTO: 0.1 THOU/UL (ref 0–0.4)
EOSINOPHIL NFR BLD AUTO: 2 % (ref 0–6)
ERYTHROCYTE [DISTWIDTH] IN BLOOD BY AUTOMATED COUNT: 13.9 % (ref 11–14.5)
ERYTHROCYTE [DISTWIDTH] IN BLOOD BY AUTOMATED COUNT: 13.9 % (ref 11–14.5)
GFR SERPL CREATININE-BSD FRML MDRD: >60 ML/MIN/1.73M2
GFR SERPL CREATININE-BSD FRML MDRD: >60 ML/MIN/1.73M2
GLUCOSE BLD-MCNC: 108 MG/DL (ref 70–125)
GLUCOSE SERPL-MCNC: 108 MG/DL (ref 70–125)
HCT VFR BLD AUTO: 46.9 % (ref 40–54)
HCT VFR BLD AUTO: 46.9 % (ref 40–54)
HEMOGLOBIN: 14.4 G/DL (ref 14–18)
HGB BLD-MCNC: 14.4 G/DL (ref 14–18)
LYMPHOCYTES # BLD AUTO: 2.1 THOU/UL (ref 0.8–4.4)
LYMPHOCYTES NFR BLD AUTO: 36 % (ref 20–40)
MCH RBC QN AUTO: 31.5 PG (ref 27–34)
MCH RBC QN AUTO: 31.5 PG (ref 27–34)
MCHC RBC AUTO-ENTMCNC: 30.7 G/DL (ref 32–36)
MCHC RBC AUTO-ENTMCNC: 30.7 G/DL (ref 32–36)
MCV RBC AUTO: 103 FL (ref 80–100)
MCV RBC AUTO: 103 FL (ref 80–100)
MONOCYTES # BLD AUTO: 0.5 THOU/UL (ref 0–0.9)
MONOCYTES NFR BLD AUTO: 9 % (ref 2–10)
NEUTROPHILS # BLD AUTO: 3.1 THOU/UL (ref 2–7.7)
NEUTROPHILS NFR BLD AUTO: 53 % (ref 50–70)
PLATELET # BLD AUTO: 149 THOU/UL (ref 140–440)
PLATELET # BLD AUTO: 149 THOU/UL (ref 140–440)
PMV BLD AUTO: 11.2 FL (ref 8.5–12.5)
POTASSIUM BLD-SCNC: 4.5 MMOL/L (ref 3.5–5)
POTASSIUM SERPL-SCNC: 4.5 MMOL/L (ref 3.5–5)
RBC # BLD AUTO: 4.57 MILL/UL (ref 4.4–6.2)
RBC # BLD AUTO: 4.57 MILL/UL (ref 4.4–6.2)
SODIUM SERPL-SCNC: 143 MMOL/L (ref 136–145)
SODIUM SERPL-SCNC: 143 MMOL/L (ref 136–145)
WBC # BLD AUTO: 5.9 THOU/UL (ref 4–11)
WBC: 5.9 THOU/UL (ref 4–11)

## 2020-01-28 ENCOUNTER — TRANSFERRED RECORDS (OUTPATIENT)
Dept: HEALTH INFORMATION MANAGEMENT | Facility: CLINIC | Age: 85
End: 2020-01-28

## 2020-01-28 ENCOUNTER — ASSISTED LIVING VISIT (OUTPATIENT)
Dept: GERIATRICS | Facility: CLINIC | Age: 85
End: 2020-01-28
Payer: COMMERCIAL

## 2020-01-28 DIAGNOSIS — I95.1 ORTHOSTATIC HYPOTENSION: ICD-10-CM

## 2020-01-28 DIAGNOSIS — M79.644 PAIN OF FINGER OF RIGHT HAND: Primary | ICD-10-CM

## 2020-01-28 DIAGNOSIS — G20.A1 PARKINSON'S DISEASE (H): ICD-10-CM

## 2020-01-28 RX ORDER — TROLAMINE SALICYLATE 10 G/100G
CREAM TOPICAL 2 TIMES DAILY
COMMUNITY

## 2020-01-28 NOTE — PROGRESS NOTES
[facility]  [place] Medical Record Number:  [id number]  Place of Service where encounter took place:  [facility] ([facility]) [[id number]]  No chief complaint on file.      HPI:    Carlos Neri  is a 84 year old (1935), who is being seen today for an episodic care visit.  HPI information obtained from: facility chart records, facility staff, patient report and [facility] chart review. Today's concern is: R 5th finger pain.  PD, orthostatic hypotension.  Has h/o recurrent falls.  occ low back pain.  Staff notes today, bruising, edema to R 5th finger.  Resident denies fall. Reports getting room door closed on finger.  Has PD, cont. On sinemet, entacapone, ropinirole.  No recent changes in gait.  No c/o LE pain. BPs gen. Stable.  Has h/o low BPs, orthostasis.  Cont. On midodrine. Due to dysphagia, cont. On thickened liq.-staff reports fluid intake adequate.       Past Medical and Surgical History reviewed in Epic today.    MEDICATIONS:  Current Outpatient Medications   Medication Sig Dispense Refill     acetaminophen (TYLENOL) 500 MG tablet Take 1,000 mg by mouth 3 times daily        Artificial Tear Solution (SOOTHE XP) SOLN Place 1 drop into both eyes 2 times daily       carbidopa-levodopa (SINEMET CR)  MG CR tablet Take 1 tablet by mouth At Bedtime       carbidopa-levodopa (SINEMET)  MG per tablet Take 1 tablet by mouth 4 times daily       clonazePAM (KLONOPIN) 0.5 MG tablet 2 TABLETS (1MG) BY MOUTH EVERY NIGHT AT BEDTIME;1 TABLET BY MOUTH ONCE A DAY AS NEEDED 90 tablet 5     donepezil (ARICEPT) 10 MG tablet Take 10 mg by mouth At Bedtime       entacapone (COMTAN) 200 MG tablet Take 100 mg by mouth 4 times daily        gabapentin (NEURONTIN) 300 MG capsule Take 300 mg by mouth At Bedtime And 100 mg every day prn       MIDODRINE HCL PO Take 5 mg by mouth 3 times daily (before meals)       polyethylene glycol (MIRALAX/GLYCOLAX) Packet Take 17 g by mouth  daily as needed for constipation (AND every other day scheduled)        rOPINIRole (REQUIP) 0.25 MG tablet Take 0.75 mg by mouth 3 times daily        rOPINIRole (REQUIP) 0.25 MG tablet Take 0.25 mg by mouth At Bedtime       senna-docusate (SENOKOT-S/PERICOLACE) 8.6-50 MG tablet Take 2 tablets by mouth daily       trolamine salicylate (ASPERCREME) 10 % external cream Apply topically 2 times daily       venlafaxine (EFFEXOR) 25 MG tablet Take 25 mg by mouth 2 times daily           REVIEW OF SYSTEMS:  No chest pain, shortness of breath, fevers, chills, headache, nausea, vomiting, dysuria or bowel abnormalities.  Appetite is  normal.  No pain except R 5th finger.    Objective:  There were no vitals taken for this visit.  Exam:  GENERAL APPEARANCE:  Alert, in no distress, cooperative  ENT:  Mouth and posterior oropharynx normal, moist mucous membranes, [place]  EYES:  EOM, conjunctivae, lids, pupils and irises normal, PERRL  NECK:  No adenopathy,masses or thyromegaly, no carotid bruit  RESP:  respiratory effort and palpation of chest normal, lungs clear to auscultation , no respiratory distress  CV:  Palpation and auscultation of heart done , regular rate and rhythm, no murmur, rub, or gallop, no edema  ABDOMEN:  normal bowel sounds, soft, nontender, no hepatosplenomegaly or other masses, no guarding or rebound  M/S:   Gait and station normal  uses walker. bruising to R 5th finger, edema of finger. unable to flex R 5th finger  NEURO:   Cranial nerves 2-12 are normal tested and grossly at patient's baseline, alert, speech soft  PSYCH:  insight and judgement impaired, memory impaired , affect abnormal -flat    Labs:     Most Recent 3 CBC's:  Recent Labs   Lab Test 01/22/20 07/26/19 06/19/19   WBC 5.9 7.8 6.5   HGB 14.4 14.0 13.6*   * 99 101*    144* 140     Most Recent 3 BMP's:  Recent Labs   Lab Test 01/22/20 07/30/19 06/19/19    143 142   POTASSIUM 4.5 4.1 4.7   CHLORIDE 106 107 106   CO2 31 30 31   BUN  25 22 25   CR 0.84 0.79 0.79   ANIONGAP 6 6 5   SADIA 9.4 9.3 9.6    89 71       ASSESSMENT/PLAN:  Pain of finger of right hand  S/p trauma to R 5th finger, closed in door per resident report  1. Cont tylenol  2. Ice, elevate R hand  3. XR R hand  4. If +for fx, refer to [facility]    Parkinson's disease (H)  No reports of recent falls. No recent changes in gait  1. Cont. PD meds  2. Remind to use walker at all times, even when in apt  3. Monitor for increased freezing, decreased amb.  4. F/u with Neurology prn  5. Monitor for changes in mood, behaviors    Orthostatic hypotension  BPs currently stable. H/o lower BPs  1. Cont. Midodrine  2. Cont. Thickened liq. Encourage fluid intake  3. Follow BPs, HRs  4. Cbc, bmp in next 1-3 mos      Electronically signed by:  DONY Clark CNP

## 2020-01-28 NOTE — LETTER
1/28/2020        RE: Carlos Neri  [address]  [place] [address]        [facility]  [facility] Medical Record Number:  [id number]  Place of Service where encounter took place:  [facility] (Counts include 234 beds at the [facility]) [[id number]]  No chief complaint on file.      HPI:    Carlos Neri  is a 84 year old (1935), who is being seen today for an episodic care visit.  HPI information obtained from: facility chart records, facility staff, patient report and [facility] chart review. Today's concern is: R 5th finger pain.  PD, orthostatic hypotension.  Has h/o recurrent falls.  occ low back pain.  Staff notes today, bruising, edema to R 5th finger.  Resident denies fall. Reports getting room door closed on finger.  Has PD, cont. On sinemet, entacapone, ropinirole.  No recent changes in gait.  No c/o LE pain. BPs gen. Stable.  Has h/o low BPs, orthostasis.  Cont. On midodrine. Due to dysphagia, cont. On thickened liq.-staff reports fluid intake adequate.       Past Medical and Surgical History reviewed in Epic today.    MEDICATIONS:  Current Outpatient Medications   Medication Sig Dispense Refill     acetaminophen (TYLENOL) 500 MG tablet Take 1,000 mg by mouth 3 times daily        Artificial Tear Solution (SOOTHE XP) SOLN Place 1 drop into both eyes 2 times daily       carbidopa-levodopa (SINEMET CR)  MG CR tablet Take 1 tablet by mouth At Bedtime       carbidopa-levodopa (SINEMET)  MG per tablet Take 1 tablet by mouth 4 times daily       clonazePAM (KLONOPIN) 0.5 MG tablet 2 TABLETS (1MG) BY MOUTH EVERY NIGHT AT BEDTIME;1 TABLET BY MOUTH ONCE A DAY AS NEEDED 90 tablet 5     donepezil (ARICEPT) 10 MG tablet Take 10 mg by mouth At Bedtime       entacapone (COMTAN) 200 MG tablet Take 100 mg by mouth 4 times daily        gabapentin (NEURONTIN) 300 MG capsule Take 300 mg by mouth At Bedtime And 100 mg every day prn       MIDODRINE HCL PO Take 5 mg by mouth 3  times daily (before meals)       polyethylene glycol (MIRALAX/GLYCOLAX) Packet Take 17 g by mouth daily as needed for constipation (AND every other day scheduled)        rOPINIRole (REQUIP) 0.25 MG tablet Take 0.75 mg by mouth 3 times daily        rOPINIRole (REQUIP) 0.25 MG tablet Take 0.25 mg by mouth At Bedtime       senna-docusate (SENOKOT-S/PERICOLACE) 8.6-50 MG tablet Take 2 tablets by mouth daily       trolamine salicylate (ASPERCREME) 10 % external cream Apply topically 2 times daily       venlafaxine (EFFEXOR) 25 MG tablet Take 25 mg by mouth 2 times daily           REVIEW OF SYSTEMS:  No chest pain, shortness of breath, fevers, chills, headache, nausea, vomiting, dysuria or bowel abnormalities.  Appetite is  normal.  No pain except R 5th finger.    Objective:  There were no vitals taken for this visit.  Exam:  GENERAL APPEARANCE:  Alert, in no distress, cooperative  ENT:  Mouth and posterior oropharynx normal, moist mucous membranes, [place]  EYES:  EOM, conjunctivae, lids, pupils and irises normal, PERRL  NECK:  No adenopathy,masses or thyromegaly, no carotid bruit  RESP:  respiratory effort and palpation of chest normal, lungs clear to auscultation , no respiratory distress  CV:  Palpation and auscultation of heart done , regular rate and rhythm, no murmur, rub, or gallop, no edema  ABDOMEN:  normal bowel sounds, soft, nontender, no hepatosplenomegaly or other masses, no guarding or rebound  M/S:   Gait and station normal  uses walker. bruising to R 5th finger, edema of finger. unable to flex R 5th finger  NEURO:   Cranial nerves 2-12 are normal tested and grossly at patient's baseline, alert, speech soft  PSYCH:  insight and judgement impaired, memory impaired , affect abnormal -flat    Labs:     Most Recent 3 CBC's:  Recent Labs   Lab Test 01/22/20 07/26/19 06/19/19   WBC 5.9 7.8 6.5   HGB 14.4 14.0 13.6*   * 99 101*    144* 140     Most Recent 3 BMP's:  Recent Labs   Lab Test 01/22/20  07/30/19 06/19/19    143 142   POTASSIUM 4.5 4.1 4.7   CHLORIDE 106 107 106   CO2 31 30 31   BUN 25 22 25   CR 0.84 0.79 0.79   ANIONGAP 6 6 5   SADIA 9.4 9.3 9.6    89 71       ASSESSMENT/PLAN:  Pain of finger of right hand  S/p trauma to R 5th finger, closed in door per resident report  1. Cont tylenol  2. Ice, elevate R hand  3. XR R hand  4. If +for fx, refer to [facility]    Parkinson's disease (H)  No reports of recent falls. No recent changes in gait  1. Cont. PD meds  2. Remind to use walker at all times, even when in apt  3. Monitor for increased freezing, decreased amb.  4. F/u with Neurology prn  5. Monitor for changes in mood, behaviors    Orthostatic hypotension  BPs currently stable. H/o lower BPs  1. Cont. Midodrine  2. Cont. Thickened liq. Encourage fluid intake  3. Follow BPs, HRs  4. Cbc, bmp in next 1-3 mos      Electronically signed by:  DONY Clark CNP             Sincerely,        DONY Clark CNP

## 2020-01-30 ENCOUNTER — ALLIED HEALTH/NURSE VISIT (OUTPATIENT)
Dept: GERIATRICS | Facility: CLINIC | Age: 85
End: 2020-01-30
Payer: MEDICARE

## 2020-01-30 ENCOUNTER — TRANSFERRED RECORDS (OUTPATIENT)
Dept: HEALTH INFORMATION MANAGEMENT | Facility: CLINIC | Age: 85
End: 2020-01-30

## 2020-01-30 DIAGNOSIS — M79.644 PAIN OF FINGER OF RIGHT HAND: Primary | ICD-10-CM

## 2020-01-30 NOTE — PROGRESS NOTES
"Ortho Nursing home visit    Carlos Neri is a 84 year old male who resides at [facility].    Patient is seen today for Right 5th metacarpal pain, swelling noticed by staff; and positive results from portable x-ray.      Past Medical History:   Diagnosis Date     Dysphagia      Lewy body dementia (H)      Parkinson disease (H)       Past Surgical History:   Procedure Laterality Date     HERNIA REPAIR          Allergies   Allergen Reactions     Morphine Sulfate       There were no vitals taken for this visit.     Exam: Seated, with wife, has deformity of 5th metacarpal, MP joint;, pain with palpation, swollen finger.      X-rays show Subluxation, dislocation of digit; 5ht metacarpal.    ASSESSMENT / PLAN:  (M79.354) Pain of finger of right hand  (primary encounter diagnosis    Discussed with wife, patient is now 72 hours from injury , unable to flex digit;  Needs reduction of dislocation, gave options of ED/ vs closed reduction on-site today, with digital block,       Patients wife consented for attempted closed reduction of 5th finger, with digital block      Procedure: After prep;5 ml % lidocaine injected lateral, PIP, and Medial PIP joint for block;    Attempted Dorsal, tilt, with traction, and flexion, was done to reduce the dislocation;  4th/5th digits \"lucas taped\" with coban.    X-rays ordered for reduction; verification;    PLan; 7-10 days of splinting/lucas tpaing      [id number]      [facility]  [phone] Cell    "

## 2020-02-04 ENCOUNTER — ASSISTED LIVING VISIT (OUTPATIENT)
Dept: GERIATRICS | Facility: CLINIC | Age: 85
End: 2020-02-04
Payer: COMMERCIAL

## 2020-02-04 VITALS — RESPIRATION RATE: 18 BRPM | HEART RATE: 66 BPM

## 2020-02-04 DIAGNOSIS — M79.644 PAIN OF FINGER OF RIGHT HAND: Primary | ICD-10-CM

## 2020-02-04 NOTE — PROGRESS NOTES
[facility] [facility]  [facility] Medical Record Number:  [id number]  Place of Service where encounter took place:  [facility] ([facility]) [[id number]]  Chief Complaint   Patient presents with     Pain       HPI:    Carlos Neri  is a 84 year old (1935), who is being seen today for an episodic care visit.  HPI information obtained from: facility chart records, facility staff, patient report, [facility] chart review and family/first contact spouse report. Today's concern is: R finger pain.  S/p trauma to R 5th finger last week-reportedly hand was close in door.  XR done showed dislocation of 5 th finger, seen by [facility]-reduced dislocation. Finger splinted. Minimal pain today.  Able to flex 5th finger-sl. Decreased ROM due to ongoing edema.  Resident did remove tape.  Spouse states she will re-buddy tape 5th finger.       Past Medical and Surgical History reviewed in Epic today.    MEDICATIONS:  Current Outpatient Medications   Medication Sig Dispense Refill     acetaminophen (TYLENOL) 500 MG tablet Take 1,000 mg by mouth 3 times daily        Artificial Tear Solution (SOOTHE XP) SOLN Place 1 drop into both eyes 2 times daily       carbidopa-levodopa (SINEMET CR)  MG CR tablet Take 1 tablet by mouth At Bedtime       carbidopa-levodopa (SINEMET)  MG per tablet Take 1 tablet by mouth 4 times daily       clonazePAM (KLONOPIN) 0.5 MG tablet 2 TABLETS (1MG) BY MOUTH EVERY NIGHT AT BEDTIME;1 TABLET BY MOUTH ONCE A DAY AS NEEDED 90 tablet 5     donepezil (ARICEPT) 10 MG tablet Take 10 mg by mouth At Bedtime       entacapone (COMTAN) 200 MG tablet Take 100 mg by mouth 4 times daily        gabapentin (NEURONTIN) 300 MG capsule Take 300 mg by mouth At Bedtime And 100 mg every day prn       MIDODRINE HCL PO Take 5 mg by mouth 3 times daily (before meals)       polyethylene glycol (MIRALAX/GLYCOLAX) Packet Take 17 g by mouth daily as needed for constipation (AND every  other day scheduled)        rOPINIRole (REQUIP) 0.25 MG tablet Take 0.75 mg by mouth 3 times daily        rOPINIRole (REQUIP) 0.25 MG tablet Take 0.25 mg by mouth At Bedtime       senna-docusate (SENOKOT-S/PERICOLACE) 8.6-50 MG tablet Take 2 tablets by mouth daily       trolamine salicylate (ASPERCREME) 10 % external cream Apply topically 2 times daily       venlafaxine (EFFEXOR) 25 MG tablet Take 25 mg by mouth 2 times daily           REVIEW OF SYSTEMS:  Unobtainable secondary to cognitive impairment. No finger pain today    Objective:  Pulse 66   Resp 18   Exam:  GENERAL APPEARANCE:  Alert, in no distress, cooperative  ENT:  Mouth and posterior oropharynx normal, moist mucous membranes  EYES:  EOM, conjunctivae, lids, pupils and irises normal, PERRL  RESP:  respiratory effort and palpation of chest normal, lungs clear to auscultation , no respiratory distress  M/S:   sl decreased ROM flexion R 5th finger. no pain with flexion of R hand  SKIN:  bruising to R finger mostly resolved  NEURO:   Cranial nerves 2-12 are normal tested and grossly at patient's baseline  PSYCH:  insight and judgement impaired, memory impaired , affect abnormal -flat    Labs:     Most Recent 3 CBC's:  Recent Labs   Lab Test 01/22/20 07/26/19 06/19/19   WBC 5.9 7.8 6.5   HGB 14.4 14.0 13.6*   * 99 101*    144* 140     Most Recent 3 BMP's:  Recent Labs   Lab Test 01/22/20 07/30/19 06/19/19    143 142   POTASSIUM 4.5 4.1 4.7   CHLORIDE 106 107 106   CO2 31 30 31   BUN 25 22 25   CR 0.84 0.79 0.79   ANIONGAP 6 6 5   SADIA 9.4 9.3 9.6    89 71       ASSESSMENT/PLAN:  Pain of finger of right hand  S/p dislocation R th finger-reduced last week. Pain stable  1. Cont. James tape for next few days  2. Monitor for ongoing edema fo R fifth finger  3. F/u with [facility] on prn basis      Electronically signed by:  Yang Martines, DONY CNP

## 2020-02-04 NOTE — LETTER
2/4/2020        RE: Carlos Neri  [address]  [place] [address]        [facility]  [place] Medical Record Number:  [id number]  Place of Service where encounter took place:  [facility] ([facility]) [[id number]]  Chief Complaint   Patient presents with     Pain       HPI:    Carlos Neri  is a 84 year old (1935), who is being seen today for an episodic care visit.  HPI information obtained from: facility chart records, facility staff, patient report, [facility] chart review and family/first contact spouse report. Today's concern is: R finger pain.  S/p trauma to R 5th finger last week-reportedly hand was close in door.  XR done showed dislocation of 5 th finger, seen by [facility]-reduced dislocation. Finger splinted. Minimal pain today.  Able to flex 5th finger-sl. Decreased ROM due to ongoing edema.  Resident did remove tape.  Spouse states she will re-buddy tape 5th finger.       Past Medical and Surgical History reviewed in Epic today.    MEDICATIONS:  Current Outpatient Medications   Medication Sig Dispense Refill     acetaminophen (TYLENOL) 500 MG tablet Take 1,000 mg by mouth 3 times daily        Artificial Tear Solution (SOOTHE XP) SOLN Place 1 drop into both eyes 2 times daily       carbidopa-levodopa (SINEMET CR)  MG CR tablet Take 1 tablet by mouth At Bedtime       carbidopa-levodopa (SINEMET)  MG per tablet Take 1 tablet by mouth 4 times daily       clonazePAM (KLONOPIN) 0.5 MG tablet 2 TABLETS (1MG) BY MOUTH EVERY NIGHT AT BEDTIME;1 TABLET BY MOUTH ONCE A DAY AS NEEDED 90 tablet 5     donepezil (ARICEPT) 10 MG tablet Take 10 mg by mouth At Bedtime       entacapone (COMTAN) 200 MG tablet Take 100 mg by mouth 4 times daily        gabapentin (NEURONTIN) 300 MG capsule Take 300 mg by mouth At Bedtime And 100 mg every day prn       MIDODRINE HCL PO Take 5 mg by mouth 3 times daily (before meals)       polyethylene  glycol (MIRALAX/GLYCOLAX) Packet Take 17 g by mouth daily as needed for constipation (AND every other day scheduled)        rOPINIRole (REQUIP) 0.25 MG tablet Take 0.75 mg by mouth 3 times daily        rOPINIRole (REQUIP) 0.25 MG tablet Take 0.25 mg by mouth At Bedtime       senna-docusate (SENOKOT-S/PERICOLACE) 8.6-50 MG tablet Take 2 tablets by mouth daily       trolamine salicylate (ASPERCREME) 10 % external cream Apply topically 2 times daily       venlafaxine (EFFEXOR) 25 MG tablet Take 25 mg by mouth 2 times daily           REVIEW OF SYSTEMS:  Unobtainable secondary to cognitive impairment. No finger pain today    Objective:  Pulse 66   Resp 18   Exam:  GENERAL APPEARANCE:  Alert, in no distress, cooperative  ENT:  Mouth and posterior oropharynx normal, moist mucous membranes  EYES:  EOM, conjunctivae, lids, pupils and irises normal, PERRL  RESP:  respiratory effort and palpation of chest normal, lungs clear to auscultation , no respiratory distress  M/S:   sl decreased ROM flexion R 5th finger. no pain with flexion of R hand  SKIN:  bruising to R finger mostly resolved  NEURO:   Cranial nerves 2-12 are normal tested and grossly at patient's baseline  PSYCH:  insight and judgement impaired, memory impaired , affect abnormal -flat    Labs:     Most Recent 3 CBC's:  Recent Labs   Lab Test 01/22/20 07/26/19 06/19/19   WBC 5.9 7.8 6.5   HGB 14.4 14.0 13.6*   * 99 101*    144* 140     Most Recent 3 BMP's:  Recent Labs   Lab Test 01/22/20 07/30/19 06/19/19    143 142   POTASSIUM 4.5 4.1 4.7   CHLORIDE 106 107 106   CO2 31 30 31   BUN 25 22 25   CR 0.84 0.79 0.79   ANIONGAP 6 6 5   SADIA 9.4 9.3 9.6    89 71       ASSESSMENT/PLAN:  Pain of finger of right hand  S/p dislocation R th finger-reduced last week. Pain stable  1. Cont. Buddy tape for next few days  2. Monitor for ongoing edema fo R fifth finger  3. F/u with [facility] on prn basis      Electronically signed by:  Yang Landrum  DONY Martines CNP             Sincerely,        DONY Clark CNP

## 2020-03-15 ENCOUNTER — HEALTH MAINTENANCE LETTER (OUTPATIENT)
Age: 85
End: 2020-03-15

## 2020-04-02 NOTE — LETTER
"    4/2/2020        RE: Carlos Neri  [address] [facility] [address]        [place] [facility] E-VISIT   Carlos Neri is being evaluated via a billable video visit due to the restrictions of the Covid-19 pandemic.   The patient has been notified of following:  \"This video visit will be conducted via a call between you and your provider. We have found that certain health care needs can be provided without the need for an in-person physical exam.  This service lets us provide the care you need with a video conversation. If during the course of the call the provider feels a video visit is not appropriate, you will not be charged for this service.\"   The provider has received verbal consent for a Video Visit from the patient or first contact? Yes  Patient  or facility staff would like the video invitation sent by: Text to cell phone: [phone]  Video Start Time: 1012  [place] Medical Record Number:  [id number]  Place of Location at the time of visit: The [facility]  Chief Complaint   Patient presents with     Parkinson     HPI:  Carlos Neri  is a 84 year old (1935), who is being seen today for an E-visit.  HPI information obtained from: facility chart records, facility staff, patient report, [facility] chart review and family/first contact spouse report. Today's concern is: PD, hypotension, ACP.  For PD cont. On sinemet, entacapone, requip.  Per spouse has occ episodes of increased spasticity, o/w gait gen. Stable. No recent falls.  No recent reports of increased anxiety.  BP stable. Has h/o low BPs, cont. On midodrine.  Per staff has had stable fluid intake. Spouse reports she would like to change code status to DNR/DNI.       Past Medical and Surgical History reviewed in Epic today.  MEDICATIONS:    Current Outpatient Medications   Medication Sig Dispense Refill     acetaminophen (TYLENOL) 500 MG tablet Take 1,000 mg by mouth 3 times daily "        Artificial Tear Solution (SOOTHE XP) SOLN Place 1 drop into both eyes 2 times daily       carbidopa-levodopa (SINEMET CR)  MG CR tablet Take 1 tablet by mouth At Bedtime       carbidopa-levodopa (SINEMET)  MG per tablet Take 1 tablet by mouth 4 times daily       clonazePAM (KLONOPIN) 0.5 MG tablet 2 TABLETS (1MG) BY MOUTH EVERY NIGHT AT BEDTIME;1 TABLET BY MOUTH ONCE A DAY AS NEEDED 90 tablet 5     donepezil (ARICEPT) 10 MG tablet Take 10 mg by mouth At Bedtime       entacapone (COMTAN) 200 MG tablet Take 100 mg by mouth 4 times daily        gabapentin (NEURONTIN) 300 MG capsule Take 300 mg by mouth At Bedtime And 100 mg every day prn       MIDODRINE HCL PO Take 5 mg by mouth 3 times daily (before meals)       polyethylene glycol (MIRALAX/GLYCOLAX) Packet Take 17 g by mouth daily as needed for constipation (AND every other day scheduled)        rOPINIRole (REQUIP) 0.25 MG tablet Take 0.75 mg by mouth 3 times daily        rOPINIRole (REQUIP) 0.25 MG tablet Take 0.25 mg by mouth At Bedtime       senna-docusate (SENOKOT-S/PERICOLACE) 8.6-50 MG tablet Take 2 tablets by mouth daily       trolamine salicylate (ASPERCREME) 10 % external cream Apply topically 2 times daily       venlafaxine (EFFEXOR) 25 MG tablet Take 25 mg by mouth 2 times daily       REVIEW OF SYSTEMS: Unobtainable secondary to cognitive impairment. Reports feeling fine today  Objective: /64   Pulse 56   Resp 18   SpO2 93%    E-visit exam done given COVID-19 precautions.   GENERAL APPEARANCE:  Alert, in no distress, cooperative  EYES:  EOM normal, no drainage  NECK:  some decreased ROM with rotation. no apparent neck pain with ROM  RESP:  no respiratory distress, no cough heard  CV:  no edema, bradycardic HR 56  M/S:   gait steady with walker. sl. flexed at waist. no festination seen.  NEURO:   CNs appear gross intact-baseline. speech soft, clear  PSYCH:  insight and judgement impaired, memory impaired , affect abnormal  -flat  Labs:     Most Recent 3 BMP's:  Recent Labs   Lab Test 01/22/20 07/30/19 06/19/19    143 142   POTASSIUM 4.5 4.1 4.7   CHLORIDE 106 107 106   CO2 31 30 31   BUN 25 22 25   CR 0.84 0.79 0.79   ANIONGAP 6 6 5   SADIA 9.4 9.3 9.6    89 71       ASSESSMENT/PLAN:  Parkinson's disease (H)  occ increased spasticity. Gait gen. Stable. Mood, behaviors gen. Stable  1.cont. sinemet, requip, entacapone  2. Remind to use walker at all times  3. Monitor for changes in gait, falls  4. Monitor for increased s/s dysphagia    Hypotension, unspecified hypotension type  BPs currently stable  1. Cont. Midodrine  2. Encourage fluids  3. Monitor for further bradycardia, reports of dizziness  4. For above s/s may consider decreased aricept dose    ACP (advance care planning)  Spoke with spouse.  1. Change code status to DNR/DNI  2. Complete POLST      Electronically signed by:  DONY Clark CNP     Video-Visit Details  Type of service:  Video Visit  Video End Time (time video stopped): 1024  Distant Location (provider location):  [place] [facility]             Sincerely,        DONY Clark CNP

## 2020-04-02 NOTE — PROGRESS NOTES
"[place] [facility] E-VISIT   Carlos Neri is being evaluated via a billable video visit due to the restrictions of the Covid-19 pandemic.   The patient has been notified of following:  \"This video visit will be conducted via a call between you and your provider. We have found that certain health care needs can be provided without the need for an in-person physical exam.  This service lets us provide the care you need with a video conversation. If during the course of the call the provider feels a video visit is not appropriate, you will not be charged for this service.\"   The provider has received verbal consent for a Video Visit from the patient or first contact? Yes  Patient  or facility staff would like the video invitation sent by: Text to cell phone: [phone]  Video Start Time: 1012  [place] Medical Record Number:  [id number]  Place of Location at the time of visit: The [facility]  Chief Complaint   Patient presents with     Parkinson     HPI:  Carlos Neri  is a 84 year old (1935), who is being seen today for an E-visit.  HPI information obtained from: facility chart records, facility staff, patient report, [facility] chart review and family/first contact spouse report. Today's concern is: PD, hypotension, ACP.  For PD cont. On sinemet, entacapone, requip.  Per spouse has occ episodes of increased spasticity, o/w gait gen. Stable. No recent falls.  No recent reports of increased anxiety.  BP stable. Has h/o low BPs, cont. On midodrine.  Per staff has had stable fluid intake. Spouse reports she would like to change code status to DNR/DNI.       Past Medical and Surgical History reviewed in Epic today.  MEDICATIONS:    Current Outpatient Medications   Medication Sig Dispense Refill     acetaminophen (TYLENOL) 500 MG tablet Take 1,000 mg by mouth 3 times daily        Artificial Tear Solution (SOOTHE XP) SOLN Place 1 drop into both eyes 2 times daily   "     carbidopa-levodopa (SINEMET CR)  MG CR tablet Take 1 tablet by mouth At Bedtime       carbidopa-levodopa (SINEMET)  MG per tablet Take 1 tablet by mouth 4 times daily       clonazePAM (KLONOPIN) 0.5 MG tablet 2 TABLETS (1MG) BY MOUTH EVERY NIGHT AT BEDTIME;1 TABLET BY MOUTH ONCE A DAY AS NEEDED 90 tablet 5     donepezil (ARICEPT) 10 MG tablet Take 10 mg by mouth At Bedtime       entacapone (COMTAN) 200 MG tablet Take 100 mg by mouth 4 times daily        gabapentin (NEURONTIN) 300 MG capsule Take 300 mg by mouth At Bedtime And 100 mg every day prn       MIDODRINE HCL PO Take 5 mg by mouth 3 times daily (before meals)       polyethylene glycol (MIRALAX/GLYCOLAX) Packet Take 17 g by mouth daily as needed for constipation (AND every other day scheduled)        rOPINIRole (REQUIP) 0.25 MG tablet Take 0.75 mg by mouth 3 times daily        rOPINIRole (REQUIP) 0.25 MG tablet Take 0.25 mg by mouth At Bedtime       senna-docusate (SENOKOT-S/PERICOLACE) 8.6-50 MG tablet Take 2 tablets by mouth daily       trolamine salicylate (ASPERCREME) 10 % external cream Apply topically 2 times daily       venlafaxine (EFFEXOR) 25 MG tablet Take 25 mg by mouth 2 times daily       REVIEW OF SYSTEMS: Unobtainable secondary to cognitive impairment. Reports feeling fine today  Objective: /64   Pulse 56   Resp 18   SpO2 93%    E-visit exam done given COVID-19 precautions.   GENERAL APPEARANCE:  Alert, in no distress, cooperative  EYES:  EOM normal, no drainage  NECK:  some decreased ROM with rotation. no apparent neck pain with ROM  RESP:  no respiratory distress, no cough heard  CV:  no edema, bradycardic HR 56  M/S:   gait steady with walker. sl. flexed at waist. no festination seen.  NEURO:   CNs appear gross intact-baseline. speech soft, clear  PSYCH:  insight and judgement impaired, memory impaired , affect abnormal -flat  Labs:     Most Recent 3 BMP's:  Recent Labs   Lab Test 01/22/20 07/30/19 06/19/19     143 142   POTASSIUM 4.5 4.1 4.7   CHLORIDE 106 107 106   CO2 31 30 31   BUN 25 22 25   CR 0.84 0.79 0.79   ANIONGAP 6 6 5   SADIA 9.4 9.3 9.6    89 71       ASSESSMENT/PLAN:  Parkinson's disease (H)  occ increased spasticity. Gait gen. Stable. Mood, behaviors gen. Stable  1.cont. sinemet, requip, entacapone  2. Remind to use walker at all times  3. Monitor for changes in gait, falls  4. Monitor for increased s/s dysphagia    Hypotension, unspecified hypotension type  BPs currently stable  1. Cont. Midodrine  2. Encourage fluids  3. Monitor for further bradycardia, reports of dizziness  4. For above s/s may consider decreased aricept dose    ACP (advance care planning)  Spoke with spouse.  1. Change code status to DNR/DNI  2. Complete POLST      Electronically signed by:  DONY Clark CNP     Video-Visit Details  Type of service:  Video Visit  Video End Time (time video stopped): 1024  Distant Location (provider location):  [facility]

## 2020-04-27 NOTE — TELEPHONE ENCOUNTER
Per staff, had fall on weekend, did hit hit.  No apparent pain or changes in neuros. VSS. Spouse did not want sent to ED. Will cont. To monitor neuros.

## 2020-04-28 NOTE — LETTER
"    4/28/2020        RE: Carlos Neri  [address] [facility] [address]        [place] [facility]   Carlos Neri is being evaluated via a billable video visit due to the restrictions of the Covid-19 pandemic.   The patient has been notified of following:  \"This video visit will be conducted via a call between you and your provider. We have found that certain health care needs can be provided without the need for an in-person physical exam.  This service lets us provide the care you need with a video conversation. If during the course of the call the provider feels a video visit is not appropriate, you will not be charged for this service.\"   The provider has received verbal consent for a Video Visit from the patient or first contact? Yes  Patient  or facility staff would like the video invitation sent by: Text to cell phone: [phone]  Video Start Time: 0938    [place] Medical Record Number:  [id number]  Place of Location at the time of visit: [facility]  Chief Complaint   Patient presents with     Parkinson     Video Visit     HPI:  Carlos Neri  is a 84 year old (1935), who is being seen today for a visit.  HPI information obtained from: facility chart records, facility staff, patient report and [facility] chart review. Today's concern is: PD, falls, hypotension.  S/p fall 4/24/20. No apparent inj.  Over weekend, staff noted occ episodes of freezing.  For PD cont. On sinemet, comtan, requip.  Has ahd sig decrease in fall freq. Since starting aricept.  Today gait baseline. No freezing. BP lower. Has h/o hypotension, cont. On midodrine.  Denies any dizziness today.       Past Medical and Surgical History reviewed in Epic today.  MEDICATIONS:    Current Outpatient Medications   Medication Sig Dispense Refill     acetaminophen (TYLENOL) 500 MG tablet Take 1,000 mg by mouth 3 times daily        Artificial Tear Solution (SOOTHE XP) SOLN " Place 1 drop into both eyes 2 times daily       carbidopa-levodopa (SINEMET CR)  MG CR tablet Take 1 tablet by mouth At Bedtime       carbidopa-levodopa (SINEMET)  MG per tablet Take 1 tablet by mouth 4 times daily       clonazePAM (KLONOPIN) 0.5 MG tablet 2 TABLETS (1MG) BY MOUTH EVERY NIGHT AT BEDTIME;1 TABLET BY MOUTH ONCE A DAY AS NEEDED 90 tablet 5     donepezil (ARICEPT) 10 MG tablet Take 10 mg by mouth At Bedtime       entacapone (COMTAN) 200 MG tablet Take 100 mg by mouth 4 times daily        gabapentin (NEURONTIN) 300 MG capsule Take 300 mg by mouth At Bedtime And 100 mg every day prn       MIDODRINE HCL PO Take 5 mg by mouth 3 times daily (before meals)       polyethylene glycol (MIRALAX/GLYCOLAX) Packet Take 17 g by mouth daily as needed for constipation (AND every other day scheduled)        rOPINIRole (REQUIP) 0.25 MG tablet Take 0.75 mg by mouth 3 times daily        rOPINIRole (REQUIP) 0.25 MG tablet Take 0.25 mg by mouth At Bedtime       senna-docusate (SENOKOT-S/PERICOLACE) 8.6-50 MG tablet Take 2 tablets by mouth daily       trolamine salicylate (ASPERCREME) 10 % external cream Apply topically 2 times daily       venlafaxine (EFFEXOR) 25 MG tablet Take 25 mg by mouth 2 times daily       REVIEW OF SYSTEMS: No chest pain, shortness of breath, fevers, chills, headache, nausea, vomiting, dysuria or bowel abnormalities.  Appetite is  fair.  No pain except occ low back.  Objective: BP 99/48   Pulse 64   Temp 97  F (36.1  C)   Resp 16   Limited visit exam done given COVID-19 precautions.   GENERAL APPEARANCE:  Alert, in no distress, cooperative  ENT:  [place], no rhinitis  EYES:  EOM normal, Conjunctiva and lids normal  NECK:  FROM. no apparent pain with rotation of head  RESP:  no respiratory distress, no apparent cough  CV:  no edema  M/S:   Gait and station normal  no diff. with turining while amb. with walker. no observed festination of freezing  NEURO:   Cranial nerves 2-12 are normal  tested and grossly at patient's baseline, speech soft, clear  PSYCH:  insight and judgement impaired, memory impaired , affect abnormal -flat  Labs:     Most Recent 3 BMP's:  Recent Labs   Lab Test 01/22/20 07/30/19 06/19/19    143 142   POTASSIUM 4.5 4.1 4.7   CHLORIDE 106 107 106   CO2 31 30 31   BUN 25 22 25   CR 0.84 0.79 0.79   ANIONGAP 6 6 5   SADIA 9.4 9.3 9.6    89 71       ASSESSMENT/PLAN:  Parkinson's disease (H)  Recent episodes of freezing over weekend.  None seen today  1. Cont. Current PD meds  2. Cont. To monitor for episodes of freezing  3. Monitor for decreased mobility    Recurrent falls  S/p fall 4/24/20  1. Remind to use walker at all times  2. Monitor for increased PD s/s as above  3. Encourage amb. As anupam    Hypotension, unspecified hypotension type  Lower BP today. No reports of dizziness  1. Cont. Midodrine  2. Cont. To encourage fluids  3. Follow BPs, HRs  4. Monitor for reports of dizziness      Electronically signed by:  DONY Clark CNP     Video-Visit Details  Type of service:  Video Visit  Video End Time (time video stopped): 0954  Distant Location (provider location):  [place] [facility]             Sincerely,        DONY Clark CNP

## 2020-04-28 NOTE — PROGRESS NOTES
"[place] [facility]   Carlos Neri is being evaluated via a billable video visit due to the restrictions of the Covid-19 pandemic.   The patient has been notified of following:  \"This video visit will be conducted via a call between you and your provider. We have found that certain health care needs can be provided without the need for an in-person physical exam.  This service lets us provide the care you need with a video conversation. If during the course of the call the provider feels a video visit is not appropriate, you will not be charged for this service.\"   The provider has received verbal consent for a Video Visit from the patient or first contact? Yes  Patient  or facility staff would like the video invitation sent by: Text to cell phone: [phone]  Video Start Time: 0938    [place] Medical Record Number:  [id number]  Place of Location at the time of visit: [facility]  Chief Complaint   Patient presents with     Parkinson     Video Visit     HPI:  Carlos Neri  is a 84 year old (1935), who is being seen today for a visit.  HPI information obtained from: facility chart records, facility staff, patient report and [facility] chart review. Today's concern is: PD, falls, hypotension.  S/p fall 4/24/20. No apparent inj.  Over weekend, staff noted occ episodes of freezing.  For PD cont. On sinemet, comtan, requip.  Has ahd sig decrease in fall freq. Since starting aricept.  Today gait baseline. No freezing. BP lower. Has h/o hypotension, cont. On midodrine.  Denies any dizziness today.       Past Medical and Surgical History reviewed in Epic today.  MEDICATIONS:    Current Outpatient Medications   Medication Sig Dispense Refill     acetaminophen (TYLENOL) 500 MG tablet Take 1,000 mg by mouth 3 times daily        Artificial Tear Solution (SOOTHE XP) SOLN Place 1 drop into both eyes 2 times daily       carbidopa-levodopa (SINEMET CR)  MG CR tablet " Take 1 tablet by mouth At Bedtime       carbidopa-levodopa (SINEMET)  MG per tablet Take 1 tablet by mouth 4 times daily       clonazePAM (KLONOPIN) 0.5 MG tablet 2 TABLETS (1MG) BY MOUTH EVERY NIGHT AT BEDTIME;1 TABLET BY MOUTH ONCE A DAY AS NEEDED 90 tablet 5     donepezil (ARICEPT) 10 MG tablet Take 10 mg by mouth At Bedtime       entacapone (COMTAN) 200 MG tablet Take 100 mg by mouth 4 times daily        gabapentin (NEURONTIN) 300 MG capsule Take 300 mg by mouth At Bedtime And 100 mg every day prn       MIDODRINE HCL PO Take 5 mg by mouth 3 times daily (before meals)       polyethylene glycol (MIRALAX/GLYCOLAX) Packet Take 17 g by mouth daily as needed for constipation (AND every other day scheduled)        rOPINIRole (REQUIP) 0.25 MG tablet Take 0.75 mg by mouth 3 times daily        rOPINIRole (REQUIP) 0.25 MG tablet Take 0.25 mg by mouth At Bedtime       senna-docusate (SENOKOT-S/PERICOLACE) 8.6-50 MG tablet Take 2 tablets by mouth daily       trolamine salicylate (ASPERCREME) 10 % external cream Apply topically 2 times daily       venlafaxine (EFFEXOR) 25 MG tablet Take 25 mg by mouth 2 times daily       REVIEW OF SYSTEMS: No chest pain, shortness of breath, fevers, chills, headache, nausea, vomiting, dysuria or bowel abnormalities.  Appetite is  fair.  No pain except occ low back.  Objective: BP 99/48   Pulse 64   Temp 97  F (36.1  C)   Resp 16   Limited visit exam done given COVID-19 precautions.   GENERAL APPEARANCE:  Alert, in no distress, cooperative  ENT:  [place], no rhinitis  EYES:  EOM normal, Conjunctiva and lids normal  NECK:  FROM. no apparent pain with rotation of head  RESP:  no respiratory distress, no apparent cough  CV:  no edema  M/S:   Gait and station normal  no diff. with turining while amb. with walker. no observed festination of freezing  NEURO:   Cranial nerves 2-12 are normal tested and grossly at patient's baseline, speech soft, clear  PSYCH:  insight and judgement impaired,  memory impaired , affect abnormal -flat  Labs:     Most Recent 3 BMP's:  Recent Labs   Lab Test 01/22/20 07/30/19 06/19/19    143 142   POTASSIUM 4.5 4.1 4.7   CHLORIDE 106 107 106   CO2 31 30 31   BUN 25 22 25   CR 0.84 0.79 0.79   ANIONGAP 6 6 5   SADIA 9.4 9.3 9.6    89 71       ASSESSMENT/PLAN:  Parkinson's disease (H)  Recent episodes of freezing over weekend.  None seen today  1. Cont. Current PD meds  2. Cont. To monitor for episodes of freezing  3. Monitor for decreased mobility    Recurrent falls  S/p fall 4/24/20  1. Remind to use walker at all times  2. Monitor for increased PD s/s as above  3. Encourage amb. As anupam    Hypotension, unspecified hypotension type  Lower BP today. No reports of dizziness  1. Cont. Midodrine  2. Cont. To encourage fluids  3. Follow BPs, HRs  4. Monitor for reports of dizziness      Electronically signed by:  DONY Clark CNP     Video-Visit Details  Type of service:  Video Visit  Video End Time (time video stopped): 0954  Distant Location (provider location):  [place] [facility]

## 2020-06-04 NOTE — LETTER
"    6/4/2020        RE: Carlos Neri  [address]  [facility] [address]        [facility]   Carlos Neri is being evaluated via a billable video visit due to the restrictions of the Covid-19 pandemic.   The patient has been notified of following:  \"This video visit will be conducted via a call between you and your provider. We have found that certain health care needs can be provided without the need for an in-person physical exam.  This service lets us provide the care you need with a video conversation. If during the course of the call the provider feels a video visit is not appropriate, you will not be charged for this service.\"   The provider has received verbal consent for a Video Visit from the patient or first contact? Yes  Patient or facility staff would like the video invitation sent by: N/A   Video Start Time: 2:30 pm  [place] Medical Record Number:  [id number]  Place of Location at the time of visit: [facility]  Chief Complaint   Patient presents with     half-way Regulatory     HPI:  Carlos Neri  is a 84 year old (1935), who is being seen today for a Regulatory visit.  HPI information obtained from: facility staff, patient report and [facility] chart review. Pt is seen June 4, 2020  Formerly [facility] unit where he has resided for one year (admit 2/2019) in his apartment up to [facility].  He is followed for Parkinson's with dementia vs Lewy body dementia.        He remains ambulatory with FWW independently    Nursing staff reports patient has been found sitting on the floor a few times recently, once in his bathroom and once in the bundy.   Pt tells them he didn't fall but just sat down.   He denies any current pain or injuries.   He usually needs assist with cares, but likes to try on his own initially.   Nurse also reports his dyskinesia seems to worsen when he is anxious, and pt does settle over the " course of the visit and become more calm.    States he feels well, no respiratory or GI symptoms.     Patient was diagnosed with essential tremor in 2000, then with worsening symptoms was seen at [place] in 2004 and diagnosed with Parkinson's disease.   Tx'd with Sinemet since that time, with gradual decline in cognition and mobility and ropinirole, entacapone added.   He has recently changed Neurologists, and donepezil added back to his regimen as well.        Patient had a swallow study in July 2018, with dysphagia noted, eating regular diet at patient/family request. Needs midodrine for bp support now at tid dosing; he was seen at [facility] in July 2018 for hypotension.        Past Medical History:   Diagnosis Date     Dysphagia      Lewy body dementia      Parkinson disease (H)     With dementia.   Seizure disorder, 2017  S/p nephrectomy for renal cell CA, 2003  Dysphagia    SH:  Previously lived with his wife Haylee in [place]  They have 2 sons    MEDICATIONS:  Current Outpatient Medications   Medication Sig Dispense Refill     acetaminophen (TYLENOL) 500 MG tablet Take 1,000 mg by mouth 3 times daily        Artificial Tear Solution (SOOTHE XP) SOLN Place 1 drop into both eyes 2 times daily       carbidopa-levodopa (SINEMET CR)  MG CR tablet Take 1 tablet by mouth At Bedtime       carbidopa-levodopa (SINEMET)  MG per tablet Take 1 tablet by mouth 4 times daily       clonazePAM (KLONOPIN) 0.5 MG tablet 2 TABLETS (1MG) BY MOUTH EVERY NIGHT AT BEDTIME;1 TABLET BY MOUTH ONCE A DAY AS NEEDED 90 tablet 5     donepezil (ARICEPT) 10 MG tablet Take 10 mg by mouth At Bedtime       entacapone (COMTAN) 200 MG tablet Take 100 mg by mouth 4 times daily        gabapentin (NEURONTIN) 300 MG capsule Take 300 mg by mouth At Bedtime And 100 mg every day prn       MIDODRINE HCL PO Take 5 mg by mouth 3 times daily (before meals)       polyethylene glycol (MIRALAX/GLYCOLAX) Packet Take 17 g by mouth daily as needed for  constipation (AND every other day scheduled)        rOPINIRole (REQUIP) 0.25 MG tablet Take 0.75 mg by mouth 3 times daily        rOPINIRole (REQUIP) 0.25 MG tablet Take 0.25 mg by mouth At Bedtime       senna-docusate (SENOKOT-S/PERICOLACE) 8.6-50 MG tablet Take 2 tablets by mouth daily       trolamine salicylate (ASPERCREME) 10 % external cream Apply topically 2 times daily       venlafaxine (EFFEXOR) 25 MG tablet Take 25 mg by mouth 2 times daily       REVIEW OF SYSTEMS: 4 point ROS including Respiratory, CV, GI and , other than that noted in the HPI,  is negative   Weight 134 lb today    Wt Readings from Last 5 Encounters:   11/14/19 67.1 kg (148 lb)   06/18/19 62.1 kg (137 lb)   04/11/19 64 kg (141 lb)   03/01/19 69.7 kg (153 lb 9.6 oz)   09/26/18 70.4 kg (155 lb 3.2 oz)      Objective: /64   Temp 97.2  F (36.2  C)   Resp 16   Wt 61 kg (134 lb 6.4 oz)   SpO2 96%   BMI 18.23 kg/m     Limited visit exam done given COVID-19 precautions.   GENERAL APPEARANCE:  Alert, in no distress   +dyskinesia, some back and forth rocking motion  A little anxious, significant difficulty articulating his thoughts.    Soft speech and hard to understand at times.   Lungs clear per nurse.    No extremity edema    Recent Labs   Lab Test 01/22/20 07/26/19 06/19/19   WBC 5.9 7.8 6.5   HGB 14.4 14.0 13.6*   * 99 101*    144* 140     Most Recent 3 BMP's:  Recent Labs   Lab Test 01/22/20 07/30/19 06/19/19    143 142   POTASSIUM 4.5 4.1 4.7   CHLORIDE 106 107 106   CO2 31 30 31   BUN 25 22 25   CR 0.84 0.79 0.79   ANIONGAP 6 6 5   SADIA 9.4 9.3 9.6    89 71       ASSESSMENT/PLAN:  (R63.4) Weight loss   Comment: likely related to dysphagia, constant movement and donepezil.    Plan: Ensure, preferences as tolerated.      Can recheck albumin, consider dose reduction of the latter if weight continues to drop.     (F41.9) Anxiety    Comment: stable  Plan: on venlafaxine 25 mg bid   Also on HS clonazepam for  REM sleep disorder     (G20) Parkinson's disease (H)  Comment: longstanding, gradually progressive     Plan: continue Sinemet, ropinirole, entacapone, and Neurology follow up to help with his tx          (R29.6) Falls frequently  Comment: due to Parkinsonism, autonomic dysfunction, poor safety awareness; he has had several courses of [facility] PHYSICAL THERAPY   Plan:  Scheduled acetaminophen for pain, local measures.   Ambulation with FWW and assist with cares.       (I95.1) Orthostatic hypotension  Comment: autonomic dysfunction     BP Readings from Last 3 Encounters:   06/04/20 114/64   04/28/20 99/48   04/02/20 108/64      Plan: midodrine 5 mg tid         (G31.83,  F02.80) Lewy body dementia without behavioral disturbance  Comment: cognitive decline, low functional status   Donepezil restarted per Neurology.      Plan: [facility] unit for assist with meds, meals, activity, safety    (G40.909) Seizure disorder (H)  Comment: no recent sz   Plan: remains on gabapentin and clonazepam, per Neurology       Electronically signed by:  Bety Rowe MD     Video-Visit Details  Type of service:  Video Visit  Video End Time (time video stopped): 2:40 pm  Distant Location (provider location):  [facility]             Sincerely,        Bety Rowe MD

## 2020-06-04 NOTE — PROGRESS NOTES
"[facility]   Carlos Neri is being evaluated via a billable video visit due to the restrictions of the Covid-19 pandemic.   The patient has been notified of following:  \"This video visit will be conducted via a call between you and your provider. We have found that certain health care needs can be provided without the need for an in-person physical exam.  This service lets us provide the care you need with a video conversation. If during the course of the call the provider feels a video visit is not appropriate, you will not be charged for this service.\"   The provider has received verbal consent for a Video Visit from the patient or first contact? Yes  Patient or facility staff would like the video invitation sent by: N/A   Video Start Time: 2:30 pm  [place] Medical Record Number:  [id number]  Place of Location at the time of visit: [facility]  Chief Complaint   Patient presents with     care home Regulatory     HPI:  Carlos Neri  is a 84 year old (1935), who is being seen today for a Regulatory visit.  HPI information obtained from: facility staff, patient report and [facility] chart review. Pt is seen June 4, 2020  [facility] unit where he has resided for one year (admit 2/2019) in his apartment up to chair.  He is followed for Parkinson's with dementia vs Lewy body dementia.        He remains ambulatory with FWW independently    Nursing staff reports patient has been found sitting on the floor a few times recently, once in his bathroom and once in the bundy.   Pt tells them he didn't fall but just sat down.   He denies any current pain or injuries.   He usually needs assist with cares, but likes to try on his own initially.   Nurse also reports his dyskinesia seems to worsen when he is anxious, and pt does settle over the course of the visit and become more calm.    States he feels well, no respiratory or GI " symptoms.     Patient was diagnosed with essential tremor in 2000, then with worsening symptoms was seen at [place] in 2004 and diagnosed with Parkinson's disease.   Tx'd with Sinemet since that time, with gradual decline in cognition and mobility and ropinirole, entacapone added.   He has recently changed Neurologists, and donepezil added back to his regimen as well.        Patient had a swallow study in July 2018, with dysphagia noted, eating regular diet at patient/family request. Needs midodrine for bp support now at tid dosing; he was seen at [facility] in July 2018 for hypotension.        Past Medical History:   Diagnosis Date     Dysphagia      Lewy body dementia      Parkinson disease (H)     With dementia.   Seizure disorder, 2017  S/p nephrectomy for renal cell CA, 2003  Dysphagia    SH:  Previously lived with his wife Haylee in [place]  They have 2 sons    MEDICATIONS:  Current Outpatient Medications   Medication Sig Dispense Refill     acetaminophen (TYLENOL) 500 MG tablet Take 1,000 mg by mouth 3 times daily        Artificial Tear Solution (SOOTHE XP) SOLN Place 1 drop into both eyes 2 times daily       carbidopa-levodopa (SINEMET CR)  MG CR tablet Take 1 tablet by mouth At Bedtime       carbidopa-levodopa (SINEMET)  MG per tablet Take 1 tablet by mouth 4 times daily       clonazePAM (KLONOPIN) 0.5 MG tablet 2 TABLETS (1MG) BY MOUTH EVERY NIGHT AT BEDTIME;1 TABLET BY MOUTH ONCE A DAY AS NEEDED 90 tablet 5     donepezil (ARICEPT) 10 MG tablet Take 10 mg by mouth At Bedtime       entacapone (COMTAN) 200 MG tablet Take 100 mg by mouth 4 times daily        gabapentin (NEURONTIN) 300 MG capsule Take 300 mg by mouth At Bedtime And 100 mg every day prn       MIDODRINE HCL PO Take 5 mg by mouth 3 times daily (before meals)       polyethylene glycol (MIRALAX/GLYCOLAX) Packet Take 17 g by mouth daily as needed for constipation (AND every other day scheduled)        rOPINIRole (REQUIP) 0.25 MG tablet  Take 0.75 mg by mouth 3 times daily        rOPINIRole (REQUIP) 0.25 MG tablet Take 0.25 mg by mouth At Bedtime       senna-docusate (SENOKOT-S/PERICOLACE) 8.6-50 MG tablet Take 2 tablets by mouth daily       trolamine salicylate (ASPERCREME) 10 % external cream Apply topically 2 times daily       venlafaxine (EFFEXOR) 25 MG tablet Take 25 mg by mouth 2 times daily       REVIEW OF SYSTEMS: 4 point ROS including Respiratory, CV, GI and , other than that noted in the HPI,  is negative   Weight 134 lb today    Wt Readings from Last 5 Encounters:   11/14/19 67.1 kg (148 lb)   06/18/19 62.1 kg (137 lb)   04/11/19 64 kg (141 lb)   03/01/19 69.7 kg (153 lb 9.6 oz)   09/26/18 70.4 kg (155 lb 3.2 oz)      Objective: /64   Temp 97.2  F (36.2  C)   Resp 16   Wt 61 kg (134 lb 6.4 oz)   SpO2 96%   BMI 18.23 kg/m     Limited visit exam done given COVID-19 precautions.   GENERAL APPEARANCE:  Alert, in no distress   +dyskinesia, some back and forth rocking motion  A little anxious, significant difficulty articulating his thoughts.    Soft speech and hard to understand at times.   Lungs clear per nurse.    No extremity edema    Recent Labs   Lab Test 01/22/20 07/26/19 06/19/19   WBC 5.9 7.8 6.5   HGB 14.4 14.0 13.6*   * 99 101*    144* 140     Most Recent 3 BMP's:  Recent Labs   Lab Test 01/22/20 07/30/19 06/19/19    143 142   POTASSIUM 4.5 4.1 4.7   CHLORIDE 106 107 106   CO2 31 30 31   BUN 25 22 25   CR 0.84 0.79 0.79   ANIONGAP 6 6 5   SADIA 9.4 9.3 9.6    89 71       ASSESSMENT/PLAN:  (R63.4) Weight loss   Comment: likely related to dysphagia, constant movement and donepezil.    Plan: Ensure, preferences as tolerated.      Can recheck albumin, consider dose reduction of the latter if weight continues to drop.     (F41.9) Anxiety    Comment: stable  Plan: on venlafaxine 25 mg bid   Also on HS clonazepam for REM sleep disorder     (G20) Parkinson's disease (H)  Comment: longstanding, gradually  progressive     Plan: continue Sinemet, ropinirole, entacapone, and Neurology follow up to help with his tx          (R29.6) Falls frequently  Comment: due to Parkinsonism, autonomic dysfunction, poor safety awareness; he has had several courses of [facility] PHYSICAL THERAPY   Plan:  Scheduled acetaminophen for pain, local measures.   Ambulation with FWW and assist with cares.       (I95.1) Orthostatic hypotension  Comment: autonomic dysfunction     BP Readings from Last 3 Encounters:   06/04/20 114/64   04/28/20 99/48   04/02/20 108/64      Plan: midodrine 5 mg tid         (G31.83,  F02.80) Lewy body dementia without behavioral disturbance  Comment: cognitive decline, low functional status   Donepezil restarted per Neurology.      Plan: [facility] unit for assist with meds, meals, activity, safety    (G40.909) Seizure disorder (H)  Comment: no recent sz   Plan: remains on gabapentin and clonazepam, per Neurology       Electronically signed by:  Bety Rowe MD     Video-Visit Details  Type of service:  Video Visit  Video End Time (time video stopped): 2:40 pm  Distant Location (provider location):  [place] [facility]

## 2020-06-07 PROBLEM — F02.818 LEWY BODY DEMENTIA WITH BEHAVIORAL DISTURBANCE (H): Status: RESOLVED | Noted: 2020-01-01 | Resolved: 2020-01-01

## 2020-06-07 PROBLEM — G40.909 SEIZURE DISORDER (H): Status: ACTIVE | Noted: 2020-01-01

## 2020-06-07 PROBLEM — G31.83 LEWY BODY DEMENTIA WITH BEHAVIORAL DISTURBANCE (H): Status: RESOLVED | Noted: 2020-01-01 | Resolved: 2020-01-01

## 2020-08-12 PROBLEM — F02.80 LEWY BODY DEMENTIA WITHOUT BEHAVIORAL DISTURBANCE (H): Status: ACTIVE | Noted: 2018-05-16

## 2020-08-12 PROBLEM — G25.81 RESTLESS LEG SYNDROME: Status: ACTIVE | Noted: 2018-05-14

## 2020-08-12 PROBLEM — R29.6 REPEATED FALLS: Status: ACTIVE | Noted: 2018-05-14

## 2020-08-12 PROBLEM — R52 PAIN: Status: ACTIVE | Noted: 2018-05-14

## 2020-08-12 PROBLEM — G31.83 LEWY BODY DEMENTIA WITHOUT BEHAVIORAL DISTURBANCE (H): Status: ACTIVE | Noted: 2018-05-16

## 2020-08-12 PROBLEM — Z85.528 HX OF RENAL CELL CARCINOMA: Status: ACTIVE | Noted: 2018-05-14

## 2020-08-12 NOTE — LETTER
"    8/12/2020        RE: Carlos Neri  [address] [facility] [address] [facility] [address] [facility] [address]        [facility]   Carlos Neri is being evaluated via a billable video visit due to the restrictions of the Covid-19 pandemic.   The patient has been notified of following:  \"This video visit will be conducted via a call between you and your provider. We have found that certain health care needs can be provided without the need for an in-person physical exam.  This service lets us provide the care you need with a video conversation. If during the course of the call the provider feels a video visit is not appropriate, you will not be charged for this service.\"   The provider has received verbal consent for a Video Visit from the patient or first contact? Yes  Patient  or facility staff would like the video invitation sent by: Text to cell phone: [phone]  Video Start Time: 1103  [place] Medical Record Number: [id number]  Place of Location at the time of visit: [facility]  Chief Complaint   Patient presents with     Video Visit     Fall     HPI: Carlos Neri is a 85 year old (1935), who is being seen today for a visit. HPI information obtained from: facility chart records, facility staff, patient report, [facility] chart review and family/first contact son report. Today's concern is: falls, hypotension, dementia. S/p fall last few days. Typically found sitting on floor.  Falls mainly in apt.  No recent apparent inj.  Did have sig decrease in fall freq. With start of aricept.  Has had increased freq. Of falls past couple weeks.  Has PD, LBD.  No recent reports of increased anxiety, agitation.  Cont. On aricept, namenda, effexor for dementia.  For Low BPs cont. On tid midodrine.  BPs cont. To be lower at times. Requires encouragement at times to drink fluids.       Past Medical and Surgical History reviewed in Epic today.  MEDICATIONS:    Current Outpatient " Medications   Medication Sig Dispense Refill     acetaminophen (TYLENOL) 500 MG tablet Take 1,000 mg by mouth 3 times daily        Artificial Tear Solution (SOOTHE XP) SOLN Place 1 drop into both eyes 2 times daily       carbidopa-levodopa (SINEMET CR)  MG CR tablet Take 1 tablet by mouth At Bedtime       carbidopa-levodopa (SINEMET)  MG per tablet Take 1 tablet by mouth 4 times daily       clonazePAM (KLONOPIN) 0.5 MG tablet 2 TABLETS (1MG) BY MOUTH EVERY NIGHT AT BEDTIME;1 TABLET BY MOUTH ONCE A DAY AS NEEDED 90 tablet 5     donepezil (ARICEPT) 10 MG tablet Take 10 mg by mouth At Bedtime       entacapone (COMTAN) 200 MG tablet Take 100 mg by mouth 4 times daily        gabapentin (NEURONTIN) 300 MG capsule Take 300 mg by mouth At Bedtime And 100 mg every day prn       MIDODRINE HCL PO Take 5 mg by mouth 3 times daily (before meals)       polyethylene glycol (MIRALAX/GLYCOLAX) Packet Take 17 g by mouth daily as needed for constipation (AND every other day scheduled)        rOPINIRole (REQUIP) 0.25 MG tablet Take 0.75 mg by mouth 3 times daily        rOPINIRole (REQUIP) 0.25 MG tablet Take 0.25 mg by mouth At Bedtime       senna-docusate (SENOKOT-S/PERICOLACE) 8.6-50 MG tablet Take 2 tablets by mouth daily       trolamine salicylate (ASPERCREME) 10 % external cream Apply topically 2 times daily       venlafaxine (EFFEXOR) 25 MG tablet Take 25 mg by mouth 2 times daily       REVIEW OF SYSTEMS: Unobtainable secondary to cognitive impairment. Reports feeling ok  Objective: /50   Pulse 54   Temp 98.1  F (36.7  C)   Resp 18   SpO2 99%   Limited visit exam done given COVID-19 precautions.   GENERAL APPEARANCE:  Alert, in no distress, thin  ENT:  Mouth and posterior oropharynx normal, moist mucous membranes, Resighini  EYES:  EOM normal, Conjunctiva and lids normal  NECK:  some decreased ROM with rotation head/neck  RESP:  lungs clear to auscultation , no respiratory distress  CV:  no edema,  rate-normal  M/S:   gait slowed, shuffling.  occ festination.  requires cues to perform necessary movements to turn, sit in chair  NEURO:   Cranial nerves 2-12 are normal tested and grossly at patient's baseline, speech clear  PSYCH:  insight and judgement impaired, memory impaired , affect abnormal -flat  Labs:     Most Recent 3 CBC's:  Recent Labs   Lab Test 01/22/20 07/26/19 06/19/19   WBC 5.9 7.8 6.5   HGB 14.4 14.0 13.6*   * 99 101*    144* 140     Most Recent 3 BMP's:  Recent Labs   Lab Test 01/22/20 07/30/19 06/19/19    143 142   POTASSIUM 4.5 4.1 4.7   CHLORIDE 106 107 106   CO2 31 30 31   BUN 25 22 25   CR 0.84 0.79 0.79   ANIONGAP 6 6 5   SADIA 9.4 9.3 9.6    89 71       ASSESSMENT/PLAN:  Falls frequently  S/p fall last pm.  No apparent inj. Neuros stable  1. Refer to PT  2. Remind to use walker at all times  3. Monitor for further changes in gait  4. Cont. Tylenol, neurontin for pain    Hypotension, unspecified hypotension type  Ongoing lower BPs at times  1. Cont. Tid midodrine  2. Follow BPs, HRs. For further increase bradycardia, may need to decrease aricept  3. Family to bring in mixes for water to try and increase fluid intake  4. Hgb, bmp next week    Lewy body dementia without behavioral disturbance (H)  Memory loss. No recent reports of increased anxiety  1. Cont. aricept  2. Cont. Effexor, klonopin, neurontin  3. Monitor for insomnia, increased anxiety      Electronically signed by:  DONY Clark CNP     Video-Visit Details  Type of service:  Video Visit  Video End Time (time video stopped): 1113  Distant Location (provider location):  [facility]         Documentation of Face-to-Face and Certification for Home Health Services     Patient: Carlos Neri   YOB: 1935  MR Number: [id number]  Today's Date: 8/12/2020    I certify that patient: Carlos Neri is under my care and that I, or a nurse practitioner or physician's  assistant working with me, had a face-to-face encounter that meets the physician face-to-face encounter requirements with this patient on: 8/12/2020.    This encounter with the patient was in whole, or in part, for the following medical condition, which is the primary reason for home health care: LE weakness, falls.    I certify that, based on my findings, the following services are medically necessary home health services: Occupational Therapy and Physical Therapy.    My clinical findings support the need for the above services because: Occupational Therapy Services are needed to assess and treat cognitive ability and address ADL safety due to impairment in weakness. and Physical Therapy Services are needed to assess and treat the following functional impairments: falls, LE weakness.    Further, I certify that my clinical findings support that this patient is homebound (i.e. absences from home require considerable and taxing effort and are for medical reasons or [employer] services or infrequently or of short duration when for other reasons) because: Requires assistance of another person or specialized equipment to access medical services because patient: Range of motion limitations prevents ability to exit home safely. and  memory loss due to dementia..    Based on the above findings. I certify that this patient is confined to the home and needs intermittent skilled nursing care, physical therapy and/or speech therapy.  The patient is under my care, and I have initiated the establishment of the plan of care.  This patient will be followed by a physician who will periodically review the plan of care.  Physician/Provider to provide follow up care: Yang Martines    Responsible Medicare certified [place] Physician: Bety Rowe  Physician Signature: See electronic signature associated with these discharge orders.  Date: 8/12/2020      Sincerely,        DONY Clark CNP

## 2020-08-21 NOTE — LETTER
8/21/2020        RE: Carlos Neri  [address]  [facility] [address]        Carlos Neri is a 85 year old male seen August 21, 2020 at [facility] unit where he has resided for one and a half years (admit 2/2019) seen to follow up weight loss and falls.   He is seen in his apartment with his wife present, and she helps with history.  Pt has had gradual weight loss over the past year.  He is missing many teeth, and wife reports fecal incontinence.      He has also become weaker and falling even more frequently.   Attempts self transfers, ambulates with a stuttering gait, very short steps flexed at hips and knees.    Needs max cuing to transfer stand to sit.  Frequently found by AL staff sitting on the floor.       Patient was diagnosed with essential tremor in 2000, then with worsening symptoms was seen at [place] in 2004 and diagnosed with Parkinson's disease.   Tx'd with Sinemet since that time, with gradual decline in cognition and mobility.  He has had hallucinations and sleep disturbance, with added dx of Lewy body dementia.     He has autonomic dysfunction and needs midodrine for bp support.        Patient had a swallow study in July 2018, with dysphagia noted, eating regular diet at patient/family request.     Past Medical History:   Diagnosis Date     Dysphagia      Lewy body dementia      Parkinson disease (H)     With dementia.   Seizure disorder, 2017  S/p nephrectomy for renal cell CA, 2003  Dysphagia    SH:  Previously lived with his wife Haylee in [place]  They have 2 sons    ROS:    SLUMS 9/30   CPT 4.1 in 2019  Ambulatory with FWW    Frequent falls     EXAM:  Pleasant, NAD  /70   Pulse 58   Temp 97.7  F (36.5  C)   Resp 23   Wt 60.5 kg (133 lb 6.4 oz)   BMI 18.09 kg/m     Hypophonic  Neck supple without adenopathy  Lungs clear bilaterally with fair air movement  Heart RRR s1s2  Abd soft, NT, no distention, +BS  Ext without edema, +kyphosis  Neuro: bilateral  hand resting tremor, +cogwheeling, +dyskinesia, +freezing, stuttering gait  Psych: affect okay    Last Comprehensive Metabolic Panel:  Sodium   Date Value Ref Range Status   08/18/2020 141 136 - 145 mmol/L Final     Potassium   Date Value Ref Range Status   08/18/2020 4.1 3.5 - 5.0 mmol/L Final     Chloride   Date Value Ref Range Status   08/18/2020 103 98 - 107 mmol/L Final     Carbon Dioxide   Date Value Ref Range Status   08/18/2020 28 22 - 31 mmol/L Final     Anion Gap   Date Value Ref Range Status   08/18/2020 10 5 - 18 mmol/L Final     Glucose   Date Value Ref Range Status   08/18/2020 136 (H) 70 - 125 mg/dL Final     Urea Nitrogen   Date Value Ref Range Status   08/18/2020 28 8 - 28 mg/dL Final     Creatinine   Date Value Ref Range Status   08/18/2020 1.01 0.70 - 1.30 mg/dL Final     GFR Estimate   Date Value Ref Range Status   08/18/2020 >60 >60 ml/min/1.73m2 Final     Calcium   Date Value Ref Range Status   08/18/2020 9.3 8.5 - 10.5 mg/dL Final     Lab Results   Component Value Date    HGB 14.7 08/18/2020       IMP/PLAN:    (R63.4) Weight loss    Comment: likely related to dysphagia, poor dentition, agitation and donepezil  Plan: continue Ensure scheduled, preferences as tolerated.     Will need to wean off donepezil given persistent loss of weight and now very thin.        (F41.9) Anxiety    Comment: increased agitation recently, leads to falls  Plan: venlafaxine 25 mg bid    Also on HS clonazepam for REM sleep disorder, will decrease dose to 0.25 mg/HS to help decrease falls when he is up at night.       (G20) Parkinson's disease (H)  Comment: longstanding, gradually progressive     Plan: continue Sinemet 25/250 qid and 50/200 at HS, ropinirole 0.75 mg bid and 1 mg at HS, entacapone 100 mg qid  Need Neurology follow up to help with his tx, has not been seen >one year.        (R29.6) Falls frequently  Comment: weakness, ataxia  Plan:  Scheduled acetaminophen for pain, local measures.     (I95.1)  Orthostatic hypotension  Comment: autonomic dysfunction     Plan: midodrine 5 mg tid         (G31.83,  F02.80) Lewy body dementia without behavioral disturbance  Comment: cognitive decline, low functional status    Plan: [facility] Memory Care unit for assist with meds, meals, activity, safety    (G40.909) Seizure disorder (H)  Comment: no recent sz   Plan: remains on gabapentin 300 mg /HS and clonazepam, per Neurology        Bety Rowe MD       Sincerely,        Bety Rowe MD

## 2020-08-25 NOTE — LETTER
8/25/2020        RE: Carlos Neri  [address]  [facility] [address]        [place] [facility]  [place] Medical Record Number: [id number]  Place of Service where encounter took place: [facility] ([facility]) [[id number]]  Chief Complaint   Patient presents with     Fall       HPI:    Carlos Neri is a 85 year old (1935), who is being seen today for an episodic care visit. HPI information obtained from: facility chart records, facility staff, patient report and [facility] chart review. Today's concern is: falls, anxiety, wt. Loss.  S/p falls x2 yesterday.  Both with self-transfers.  No apparent inj.  Has PD. Freezing at times. Difficult with turning.  Has had ongoing increased anxiety, restlessness at times. Per staff has had ongoing insomnia. Wanders some nights.  Cont. On klonopin-recent hs dose decrease. Also taking neurontin at hs.  Per staff, does not regularly take prn neurontin of klonopin doses. Does take aricept at hs.  Has had ongoing gradual wt. Loss.  Is to have aricept dose decrease starting 9/4/20.       Past Medical and Surgical History reviewed in Epic today.    MEDICATIONS:    Current Outpatient Medications   Medication Sig Dispense Refill     clonazePAM (KLONOPIN) 0.5 MG tablet 1 tab po at bedtime and 1 tab every day prn 45 tablet 5     acetaminophen (TYLENOL) 500 MG tablet Take 1,000 mg by mouth 3 times daily        Artificial Tear Solution (SOOTHE XP) SOLN Place 1 drop into both eyes 2 times daily       carbidopa-levodopa (SINEMET CR)  MG CR tablet Take 1 tablet by mouth At Bedtime       carbidopa-levodopa (SINEMET)  MG per tablet Take 1 tablet by mouth 4 times daily       donepezil (ARICEPT) 10 MG tablet Take 10 mg by mouth At Bedtime       entacapone (COMTAN) 200 MG tablet Take 100 mg by mouth 4 times daily        gabapentin (NEURONTIN) 300 MG capsule Take 300 mg by mouth At Bedtime And 100 mg every day prn       MIDODRINE  HCL PO Take 5 mg by mouth 3 times daily (before meals)       polyethylene glycol (MIRALAX/GLYCOLAX) Packet Take 17 g by mouth daily as needed for constipation (AND every other day scheduled)        rOPINIRole (REQUIP) 0.25 MG tablet Take 0.75 mg by mouth 3 times daily        rOPINIRole (REQUIP) 0.25 MG tablet Take 0.25 mg by mouth At Bedtime       senna-docusate (SENOKOT-S/PERICOLACE) 8.6-50 MG tablet Take 2 tablets by mouth daily       trolamine salicylate (ASPERCREME) 10 % external cream Apply topically 2 times daily       venlafaxine (EFFEXOR) 25 MG tablet Take 25 mg by mouth 2 times daily       REVIEW OF SYSTEMS:  Unobtainable secondary to cognitive impairment. Reports feeling ok today    Objective:  /72   Pulse 62   Temp 97.7  F (36.5  C)   Resp 16   Wt 60.3 kg (133 lb)   SpO2 99%   BMI 18.04 kg/m    Exam:  GENERAL APPEARANCE:  Alert, in no distress, cooperative  ENT:  Mouth and posterior oropharynx normal, moist mucous membranes, [place]  EYES:  EOM, conjunctivae, lids, pupils and irises normal, PERRL  NECK:  No adenopathy,masses or thyromegaly  RESP:  lungs clear to auscultation , no respiratory distress  CV:  Palpation and auscultation of heart done , regular rate and rhythm, no murmur, rub, or gallop, no edema  ABDOMEN:  normal bowel sounds, soft, nontender, no hepatosplenomegaly or other masses, no guarding or rebound  M/S:   muscle strength 5/5 all 4 ext., normal tone.   NEURO:   Cranial nerves 2-12 are normal tested and grossly at patient's baseline, speech soft, clear. UE tremors bilat. restless at times. occ freezing with turns when amb  PSYCH:  insight and judgement impaired, memory impaired , affect abnormal -flat    Labs:     Most Recent 3 CBC's:  Recent Labs   Lab Test 08/18/20 01/22/20 07/26/19 06/19/19   WBC  --  5.9 7.8 6.5   HGB 14.7 14.4 14.0 13.6*   MCV  --  103* 99 101*   PLT  --  149 144* 140     Most Recent 3 BMP's:  Recent Labs   Lab Test 08/18/20 01/22/20 07/30/19    638  143   POTASSIUM 4.1 4.5 4.1   CHLORIDE 103 106 107   CO2 28 31 30   BUN 28 25 22   CR 1.01 0.84 0.79   ANIONGAP 10 6 6   SADIA 9.3 9.4 9.3   * 108 89       ASSESSMENT/PLAN:  Falls frequently  S/p falls x2 yesterday. Insomnia. PD  1. Cont. Current PD meds  2. Cont. PT  3. Remind to ask for staff assist with transfers  4. Monitor for further falls    Anxiety  Ongoing s/s. Ongoing insomnia as well  - clonazePAM (KLONOPIN) 0.5 MG tablet; 1 tab po at bedtime and 1 tab every day prn  2. Cont. Hs neurontin  3. Staff to start utilizing prn klonopin, neurontin-if effective, may sched. During the day  4. Change aricept admin time to am. Reassess insomnia over next week    Weight loss  Gradual, ongoing.  Decreased po intake. Dysphagia  1. Cont. NTL, mech soft diet  2. Cont. Ensure  3. Follow wt.s      Electronically signed by:  DONY Clark CNP         Sincerely,        DONY Clark CNP

## 2020-08-25 NOTE — PROGRESS NOTES
[place] [facility]  [place] Medical Record Number: [id number]  Place of Service where encounter took place: [facility] ([facility]) [[id number]]  Chief Complaint   Patient presents with     Fall       HPI:    Carlos Neri is a 85 year old (1935), who is being seen today for an episodic care visit. HPI information obtained from: facility chart records, facility staff, patient report and [facility] chart review. Today's concern is: falls, anxiety, wt. Loss.  S/p falls x2 yesterday.  Both with self-transfers.  No apparent inj.  Has PD. Freezing at times. Difficult with turning.  Has had ongoing increased anxiety, restlessness at times. Per staff has had ongoing insomnia. Wanders some nights.  Cont. On klonopin-recent hs dose decrease. Also taking neurontin at hs.  Per staff, does not regularly take prn neurontin of klonopin doses. Does take aricept at hs.  Has had ongoing gradual wt. Loss.  Is to have aricept dose decrease starting 9/4/20.       Past Medical and Surgical History reviewed in Epic today.    MEDICATIONS:    Current Outpatient Medications   Medication Sig Dispense Refill     clonazePAM (KLONOPIN) 0.5 MG tablet 1 tab po at bedtime and 1 tab every day prn 45 tablet 5     acetaminophen (TYLENOL) 500 MG tablet Take 1,000 mg by mouth 3 times daily        Artificial Tear Solution (SOOTHE XP) SOLN Place 1 drop into both eyes 2 times daily       carbidopa-levodopa (SINEMET CR)  MG CR tablet Take 1 tablet by mouth At Bedtime       carbidopa-levodopa (SINEMET)  MG per tablet Take 1 tablet by mouth 4 times daily       donepezil (ARICEPT) 10 MG tablet Take 10 mg by mouth At Bedtime       entacapone (COMTAN) 200 MG tablet Take 100 mg by mouth 4 times daily        gabapentin (NEURONTIN) 300 MG capsule Take 300 mg by mouth At Bedtime And 100 mg every day prn       MIDODRINE HCL PO Take 5 mg by mouth 3 times daily (before meals)       polyethylene glycol  (MIRALAX/GLYCOLAX) Packet Take 17 g by mouth daily as needed for constipation (AND every other day scheduled)        rOPINIRole (REQUIP) 0.25 MG tablet Take 0.75 mg by mouth 3 times daily        rOPINIRole (REQUIP) 0.25 MG tablet Take 0.25 mg by mouth At Bedtime       senna-docusate (SENOKOT-S/PERICOLACE) 8.6-50 MG tablet Take 2 tablets by mouth daily       trolamine salicylate (ASPERCREME) 10 % external cream Apply topically 2 times daily       venlafaxine (EFFEXOR) 25 MG tablet Take 25 mg by mouth 2 times daily       REVIEW OF SYSTEMS:  Unobtainable secondary to cognitive impairment. Reports feeling ok today    Objective:  /72   Pulse 62   Temp 97.7  F (36.5  C)   Resp 16   Wt 60.3 kg (133 lb)   SpO2 99%   BMI 18.04 kg/m    Exam:  GENERAL APPEARANCE:  Alert, in no distress, cooperative  ENT:  Mouth and posterior oropharynx normal, moist mucous membranes, [place]  EYES:  EOM, conjunctivae, lids, pupils and irises normal, PERRL  NECK:  No adenopathy,masses or thyromegaly  RESP:  lungs clear to auscultation , no respiratory distress  CV:  Palpation and auscultation of heart done , regular rate and rhythm, no murmur, rub, or gallop, no edema  ABDOMEN:  normal bowel sounds, soft, nontender, no hepatosplenomegaly or other masses, no guarding or rebound  M/S:   muscle strength 5/5 all 4 ext., normal tone.   NEURO:   Cranial nerves 2-12 are normal tested and grossly at patient's baseline, speech soft, clear. UE tremors bilat. restless at times. occ freezing with turns when amb  PSYCH:  insight and judgement impaired, memory impaired , affect abnormal -flat    Labs:     Most Recent 3 CBC's:  Recent Labs   Lab Test 08/18/20 01/22/20 07/26/19 06/19/19   WBC  --  5.9 7.8 6.5   HGB 14.7 14.4 14.0 13.6*   MCV  --  103* 99 101*   PLT  --  149 144* 140     Most Recent 3 BMP's:  Recent Labs   Lab Test 08/18/20 01/22/20 07/30/19    143 143   POTASSIUM 4.1 4.5 4.1   CHLORIDE 103 106 107   CO2 28 31 30   BUN 28 25 22    CR 1.01 0.84 0.79   ANIONGAP 10 6 6   SADIA 9.3 9.4 9.3   * 108 89       ASSESSMENT/PLAN:  Falls frequently  S/p falls x2 yesterday. Insomnia. PD  1. Cont. Current PD meds  2. Cont. PT  3. Remind to ask for staff assist with transfers  4. Monitor for further falls    Anxiety  Ongoing s/s. Ongoing insomnia as well  - clonazePAM (KLONOPIN) 0.5 MG tablet; 1 tab po at bedtime and 1 tab every day prn  2. Cont. Hs neurontin  3. Staff to start utilizing prn klonopin, neurontin-if effective, may sched. During the day  4. Change aricept admin time to am. Reassess insomnia over next week    Weight loss  Gradual, ongoing.  Decreased po intake. Dysphagia  1. Cont. NTL, mech soft diet  2. Cont. Ensure  3. Follow wt.s      Electronically signed by:  DONY Clark CNP

## 2020-08-28 PROBLEM — E46 PROTEIN-CALORIE MALNUTRITION (H): Status: ACTIVE | Noted: 2020-01-01

## 2020-08-28 NOTE — PROGRESS NOTES
Carlos Neri is a 85 year old male seen August 21, 2020 at [facility] unit where he has resided for one and a half years (admit 2/2019) seen to follow up weight loss and falls.   He is seen in his apartment with his wife present, and she helps with history.  Pt has had gradual weight loss over the past year.  He is missing many teeth, and wife reports fecal incontinence.      He has also become weaker and falling even more frequently.   Attempts self transfers, ambulates with a stuttering gait, very short steps flexed at hips and knees.    Needs max cuing to transfer stand to sit.  Frequently found by AL staff sitting on the floor.       Patient was diagnosed with essential tremor in 2000, then with worsening symptoms was seen at [place] in 2004 and diagnosed with Parkinson's disease.   Tx'd with Sinemet since that time, with gradual decline in cognition and mobility.  He has had hallucinations and sleep disturbance, with added dx of Lewy body dementia.     He has autonomic dysfunction and needs midodrine for bp support.        Patient had a swallow study in July 2018, with dysphagia noted, eating regular diet at patient/family request.     Past Medical History:   Diagnosis Date     Dysphagia      Lewy body dementia      Parkinson disease (H)     With dementia.   Seizure disorder, 2017  S/p nephrectomy for renal cell CA, 2003  Dysphagia    SH:  Previously lived with his wife Haylee in [place]  They have 2 sons    ROS:    SLUMS 9/30   CPT 4.1 in 2019  Ambulatory with FWW    Frequent falls     EXAM:  Pleasant, NAD  /70   Pulse 58   Temp 97.7  F (36.5  C)   Resp 23   Wt 60.5 kg (133 lb 6.4 oz)   BMI 18.09 kg/m     Hypophonic  Neck supple without adenopathy  Lungs clear bilaterally with fair air movement  Heart RRR s1s2  Abd soft, NT, no distention, +BS  Ext without edema, +kyphosis  Neuro: bilateral hand resting tremor, +cogwheeling, +dyskinesia, +freezing, stuttering gait  Psych: affect  okay    Last Comprehensive Metabolic Panel:  Sodium   Date Value Ref Range Status   08/18/2020 141 136 - 145 mmol/L Final     Potassium   Date Value Ref Range Status   08/18/2020 4.1 3.5 - 5.0 mmol/L Final     Chloride   Date Value Ref Range Status   08/18/2020 103 98 - 107 mmol/L Final     Carbon Dioxide   Date Value Ref Range Status   08/18/2020 28 22 - 31 mmol/L Final     Anion Gap   Date Value Ref Range Status   08/18/2020 10 5 - 18 mmol/L Final     Glucose   Date Value Ref Range Status   08/18/2020 136 (H) 70 - 125 mg/dL Final     Urea Nitrogen   Date Value Ref Range Status   08/18/2020 28 8 - 28 mg/dL Final     Creatinine   Date Value Ref Range Status   08/18/2020 1.01 0.70 - 1.30 mg/dL Final     GFR Estimate   Date Value Ref Range Status   08/18/2020 >60 >60 ml/min/1.73m2 Final     Calcium   Date Value Ref Range Status   08/18/2020 9.3 8.5 - 10.5 mg/dL Final     Lab Results   Component Value Date    HGB 14.7 08/18/2020       IMP/PLAN:    (R63.4) Weight loss    Comment: likely related to dysphagia, poor dentition, agitation and donepezil  Plan: continue Ensure scheduled, preferences as tolerated.     Will need to wean off donepezil given persistent loss of weight and now very thin.        (F41.9) Anxiety    Comment: increased agitation recently, leads to falls  Plan: venlafaxine 25 mg bid    Also on HS clonazepam for REM sleep disorder, will decrease dose to 0.25 mg/HS to help decrease falls when he is up at night.       (G20) Parkinson's disease (H)  Comment: longstanding, gradually progressive     Plan: continue Sinemet 25/250 qid and 50/200 at HS, ropinirole 0.75 mg bid and 1 mg at HS, entacapone 100 mg qid  Need Neurology follow up to help with his tx, has not been seen >one year.        (R29.6) Falls frequently  Comment: weakness, ataxia  Plan:  Scheduled acetaminophen for pain, local measures.     (I95.1) Orthostatic hypotension  Comment: autonomic dysfunction     Plan: midodrine 5 mg tid          (G31.83,  F02.80) Lewy body dementia without behavioral disturbance  Comment: cognitive decline, low functional status    Plan: AL Memory Care unit for assist with meds, meals, activity, safety    (G40.909) Seizure disorder (H)  Comment: no recent sz   Plan: remains on gabapentin 300 mg /HS and clonazepam, per Neurology        Bety Rowe MD

## 2020-09-02 NOTE — LETTER
9/2/2020        RE: Carlos Neri  C/o Haylee Neri  [address]  [facility] [address]        [place] [facility]  [place] Medical Record Number: [id number]  Place of Service where encounter took place: [facility] ([facility]) [[id number]]  Chief Complaint   Patient presents with     Fall       HPI:    Carlos Neri is a 85 year old (1935), who is being seen today for an episodic care visit. HPI information obtained from: facility chart records, facility staff, patient report, [facility] chart review and family/first contact spouse report. Today's concern is: anxiety, falls, dementia. Has had recurrent falls. Some increase in freq. Past couple week.  Recent decrease in hs klonopin dose.  Has had improved gait, decreased fall freq.  Has had ongoing anxiety at times, however not above baseline.  Cont. On hs, prn neurontin. Also taking effexor.  For dementia cont. On aricept.  Has had ongoing decreased po intake, gradual wt. Loss.      Past Medical and Surgical History reviewed in Epic today.    MEDICATIONS:    Current Outpatient Medications   Medication Sig Dispense Refill     clonazePAM (KLONOPIN) 0.5 MG tablet Take 1 tablet (0.5 mg) by mouth daily as needed for anxiety 30 tablet 5     gabapentin (NEURONTIN) 300 MG capsule Take 300 mg by mouth At Bedtime And 100 mg po tid prn       acetaminophen (TYLENOL) 500 MG tablet Take 1,000 mg by mouth 3 times daily        Artificial Tear Solution (SOOTHE XP) SOLN Place 1 drop into both eyes 2 times daily       carbidopa-levodopa (SINEMET CR)  MG CR tablet Take 1 tablet by mouth At Bedtime       carbidopa-levodopa (SINEMET)  MG per tablet Take 1 tablet by mouth 4 times daily       donepezil (ARICEPT) 10 MG tablet Take 10 mg by mouth At Bedtime       entacapone (COMTAN) 200 MG tablet Take 100 mg by mouth 4 times daily        MIDODRINE HCL PO Take 5 mg by mouth 3 times daily (before meals)       polyethylene  glycol (MIRALAX/GLYCOLAX) Packet Take 17 g by mouth daily as needed for constipation (AND every other day scheduled)        rOPINIRole (REQUIP) 0.25 MG tablet Take 0.75 mg by mouth 3 times daily        rOPINIRole (REQUIP) 0.25 MG tablet Take 0.25 mg by mouth At Bedtime       senna-docusate (SENOKOT-S/PERICOLACE) 8.6-50 MG tablet Take 2 tablets by mouth daily       trolamine salicylate (ASPERCREME) 10 % external cream Apply topically 2 times daily       venlafaxine (EFFEXOR) 25 MG tablet Take 25 mg by mouth 2 times daily       REVIEW OF SYSTEMS:  No chest pain, shortness of breath, fevers, chills, headache, nausea, vomiting, dysuria or bowel abnormalities.  Appetite is  fair.  No pain except occ neck.    Objective:  /67   Pulse 68   Resp 18   SpO2 92%   Exam:  GENERAL APPEARANCE:  Alert, in no distress, cooperative  ENT:  Mouth and posterior oropharynx normal, moist mucous membranes, [place]  EYES:  EOM, conjunctivae, lids, pupils and irises normal, PERRL  NECK:  No adenopathy,masses or thyromegaly  RESP:  respiratory effort and palpation of chest normal, lungs clear to auscultation , no respiratory distress  CV:  Palpation and auscultation of heart done , regular rate and rhythm, no murmur, rub, or gallop, no edema  ABDOMEN:  normal bowel sounds, soft, nontender, no hepatosplenomegaly or other masses, no guarding or rebound  M/S:   muscle strength 5/5 all 4 ext., normal tone  NEURO:   Cranial nerves 2-12 are normal tested and grossly at patient's baseline, speech soft, clear, restless at times  PSYCH:  insight and judgement impaired, memory impaired , affect abnormal -flat    Labs:     Most Recent 3 CBC's:  Recent Labs   Lab Test 08/18/20 01/22/20 07/26/19 06/19/19   WBC  --  5.9 7.8 6.5   HGB 14.7 14.4 14.0 13.6*   MCV  --  103* 99 101*   PLT  --  149 144* 140     Most Recent 3 BMP's:  Recent Labs   Lab Test 08/18/20 01/22/20 07/30/19    143 143   POTASSIUM 4.1 4.5 4.1   CHLORIDE 103 106 107   CO2 28 31  30   BUN 28 25 22   CR 1.01 0.84 0.79   ANIONGAP 10 6 6   SADIA 9.3 9.4 9.3   * 108 89       ASSESSMENT/PLAN:  Anxiety  No recent increase in s/s  - clonazePAM (KLONOPIN) 0.5 MG tablet; Take 1 tablet (0.5 mg) by mouth daily as needed for anxiety  2. Cont. Hs neurontin  3. Increase prn neurontin to 100 mg tid  4. Cont. effexor  5. Monitor for reports of insomnia    Falls frequently  Decrease fall freq. Past few days. Recent decreased hs klonopin dose  1. Discontinue hs klonopin  2. Cont. Every day prn klonopin  3. Monitor for increased freezing episodes  4. Encourage amb. As anupam. Monitor for further falls    Lewy body dementia with behavioral disturbance (H)  Anxiety at times. Behaviors gen. Stable  1. Discontinue hs klonopin as above  2. 9/4/20 plan to decrease aricept to 5 mg every day  3. Reassess mood, behaviors over next couple weeks      Electronically signed by:  DONY Clark CNP         Sincerely,        DONY Clark CNP

## 2020-11-11 NOTE — PROGRESS NOTES
[place]  [place] Medical Record Number:  [id number]  Place of Service where encounter took place:  [facility] ([facility]) [[id number]]  Chief Complaint   Patient presents with     Anxiety       HPI:    Carlos Neri  is a 85 year old (1935), who is being seen today for an episodic care visit.  HPI information obtained from: facility chart records, facility staff, patient report and [facility] chart review. Today's concern is: anxiety, falls, hypotension.  Has dementia, PD. Cont. On aricept. Dose decrease 9/20 due to decreased po intake.  Po intake, wt. Stable.  No reports of increased anxiety since this time. Cont. On effexor.  Has prn klonopin, not using regularly. No recent fall. Has PD.  Uses walker.  No recent changes in gait.  For Hypotension cont. On midodrine.  BPs, HRs have been stable. No reports of dizziness.       Past Medical and Surgical History reviewed in Epic today.    MEDICATIONS:    Current Outpatient Medications   Medication Sig Dispense Refill     donepezil (ARICEPT) 5 MG tablet Take 5 mg by mouth At Bedtime       acetaminophen (TYLENOL) 500 MG tablet Take 1,000 mg by mouth 3 times daily        Artificial Tear Solution (SOOTHE XP) SOLN Place 1 drop into both eyes 2 times daily       carbidopa-levodopa (SINEMET CR)  MG CR tablet Take 1 tablet by mouth At Bedtime       carbidopa-levodopa (SINEMET)  MG per tablet Take 1 tablet by mouth 4 times daily       clonazePAM (KLONOPIN) 0.5 MG tablet Take 1 tablet (0.5 mg) by mouth daily as needed for anxiety 30 tablet 5     entacapone (COMTAN) 200 MG tablet Take 100 mg by mouth 4 times daily        gabapentin (NEURONTIN) 300 MG capsule Take 300 mg by mouth At Bedtime And 100 mg po tid prn       MIDODRINE HCL PO Take 5 mg by mouth 3 times daily (before meals)       polyethylene glycol (MIRALAX/GLYCOLAX) Packet Take 17 g by mouth daily as needed for constipation (AND every other day scheduled)         rOPINIRole (REQUIP) 0.25 MG tablet Take 0.75 mg by mouth 3 times daily        rOPINIRole (REQUIP) 0.25 MG tablet Take 0.25 mg by mouth At Bedtime       senna-docusate (SENOKOT-S/PERICOLACE) 8.6-50 MG tablet Take 2 tablets by mouth daily       trolamine salicylate (ASPERCREME) 10 % external cream Apply topically 2 times daily       venlafaxine (EFFEXOR) 25 MG tablet Take 25 mg by mouth 2 times daily           REVIEW OF SYSTEMS:  No chest pain, shortness of breath, fevers, chills, headache, nausea, vomiting, dysuria or bowel abnormalities.  Appetite is  normal.  No pain except occ neck.    Objective:  /69   Pulse 59   Resp 18   SpO2 94%   Exam:  GENERAL APPEARANCE:  Alert, in no distress, thin, cooperative  ENT:  Mouth and posterior oropharynx normal, moist mucous membranes, [place]  EYES:  EOM, conjunctivae, lids, pupils and irises normal, PERRL  NECK:  No adenopathy,masses or thyromegaly, FROM  RESP:  respiratory effort and palpation of chest normal, lungs clear to auscultation , no respiratory distress  CV:  Palpation and auscultation of heart done , regular rate and rhythm, no murmur, rub, or gallop, no edema  ABDOMEN:  normal bowel sounds, soft, nontender, no hepatosplenomegaly or other masses, no guarding or rebound  M/S:   gait steady with walker. muscle strength 5/5 all 4 ext.  NEURO:   Cranial nerves 2-12 are normal tested and grossly at patient's baseline, speech soft, clear  PSYCH:  insight and judgement impaired, memory impaired , affect abnormal -flat    Labs:     Most Recent 3 CBC's:  Recent Labs   Lab Test 08/18/20 01/22/20 07/26/19 06/19/19   WBC  --  5.9 7.8 6.5   HGB 14.7 14.4 14.0 13.6*   MCV  --  103* 99 101*   PLT  --  149 144* 140     Most Recent 3 BMP's:  Recent Labs   Lab Test 08/18/20 01/22/20 07/30/19    143 143   POTASSIUM 4.1 4.5 4.1   CHLORIDE 103 106 107   CO2 28 31 30   BUN 28 25 22   CR 1.01 0.84 0.79   ANIONGAP 10 6 6   SADIA 9.3 9.4 9.3   * 108 89        ASSESSMENT/PLAN:  Anxiety  No recent increase in s/s. aricept dose decrease 9/20  1. Cont. aricept  2. Cont. effexor  3. For increased anxiety cont. Prn klonopin  4. Monitor for reports of insomnia    Recurrent falls  No recent falls. Gait gen. Stable. Has PD  1. Cont. Current PD meds  2. Remind to use walker at all times  3. Monitor for increased rigidity, freezing epiosdes  4. Encourage amb as anupam    Hypotension, unspecified hypotension type  Currently stable  1. Cont. Midodrine  2. Encourage fluids  3. Follow BPs, HRs  4. Monitor for dizziness  5. Bmp in next 1-3 mos      Electronically signed by:  DONY Clark CNP

## 2020-11-11 NOTE — LETTER
11/11/2020        RE: Carlos Neri  C/o Haylee Neri  [address] [address]        [facility]  [place] Medical Record Number:  [id number]  Place of Service where encounter took place:  [facility] ([facility]) [[id number]]  Chief Complaint   Patient presents with     Anxiety       HPI:    Carlos Neri  is a 85 year old (1935), who is being seen today for an episodic care visit.  HPI information obtained from: facility chart records, facility staff, patient report and [facility] chart review. Today's concern is: anxiety, falls, hypotension.  Has dementia, PD. Cont. On aricept. Dose decrease 9/20 due to decreased po intake.  Po intake, wt. Stable.  No reports of increased anxiety since this time. Cont. On effexor.  Has prn klonopin, not using regularly. No recent fall. Has PD.  Uses walker.  No recent changes in gait.  For Hypotension cont. On midodrine.  BPs, HRs have been stable. No reports of dizziness.       Past Medical and Surgical History reviewed in Epic today.    MEDICATIONS:    Current Outpatient Medications   Medication Sig Dispense Refill     donepezil (ARICEPT) 5 MG tablet Take 5 mg by mouth At Bedtime       acetaminophen (TYLENOL) 500 MG tablet Take 1,000 mg by mouth 3 times daily        Artificial Tear Solution (SOOTHE XP) SOLN Place 1 drop into both eyes 2 times daily       carbidopa-levodopa (SINEMET CR)  MG CR tablet Take 1 tablet by mouth At Bedtime       carbidopa-levodopa (SINEMET)  MG per tablet Take 1 tablet by mouth 4 times daily       clonazePAM (KLONOPIN) 0.5 MG tablet Take 1 tablet (0.5 mg) by mouth daily as needed for anxiety 30 tablet 5     entacapone (COMTAN) 200 MG tablet Take 100 mg by mouth 4 times daily        gabapentin (NEURONTIN) 300 MG capsule Take 300 mg by mouth At Bedtime And 100 mg po tid prn       MIDODRINE HCL PO Take 5 mg by mouth 3 times daily (before meals)       polyethylene  glycol (MIRALAX/GLYCOLAX) Packet Take 17 g by mouth daily as needed for constipation (AND every other day scheduled)        rOPINIRole (REQUIP) 0.25 MG tablet Take 0.75 mg by mouth 3 times daily        rOPINIRole (REQUIP) 0.25 MG tablet Take 0.25 mg by mouth At Bedtime       senna-docusate (SENOKOT-S/PERICOLACE) 8.6-50 MG tablet Take 2 tablets by mouth daily       trolamine salicylate (ASPERCREME) 10 % external cream Apply topically 2 times daily       venlafaxine (EFFEXOR) 25 MG tablet Take 25 mg by mouth 2 times daily           REVIEW OF SYSTEMS:  No chest pain, shortness of breath, fevers, chills, headache, nausea, vomiting, dysuria or bowel abnormalities.  Appetite is  normal.  No pain except occ neck.    Objective:  /69   Pulse 59   Resp 18   SpO2 94%   Exam:  GENERAL APPEARANCE:  Alert, in no distress, thin, cooperative  ENT:  Mouth and posterior oropharynx normal, moist mucous membranes, [place]  EYES:  EOM, conjunctivae, lids, pupils and irises normal, PERRL  NECK:  No adenopathy,masses or thyromegaly, FROM  RESP:  respiratory effort and palpation of chest normal, lungs clear to auscultation , no respiratory distress  CV:  Palpation and auscultation of heart done , regular rate and rhythm, no murmur, rub, or gallop, no edema  ABDOMEN:  normal bowel sounds, soft, nontender, no hepatosplenomegaly or other masses, no guarding or rebound  M/S:   gait steady with walker. muscle strength 5/5 all 4 ext.  NEURO:   Cranial nerves 2-12 are normal tested and grossly at patient's baseline, speech soft, clear  PSYCH:  insight and judgement impaired, memory impaired , affect abnormal -flat    Labs:     Most Recent 3 CBC's:  Recent Labs   Lab Test 08/18/20 01/22/20 07/26/19 06/19/19   WBC  --  5.9 7.8 6.5   HGB 14.7 14.4 14.0 13.6*   MCV  --  103* 99 101*   PLT  --  149 144* 140     Most Recent 3 BMP's:  Recent Labs   Lab Test 08/18/20 01/22/20 07/30/19    143 143   POTASSIUM 4.1 4.5 4.1   CHLORIDE 103 106 107    CO2 28 31 30   BUN 28 25 22   CR 1.01 0.84 0.79   ANIONGAP 10 6 6   SADIA 9.3 9.4 9.3   * 108 89       ASSESSMENT/PLAN:  Anxiety  No recent increase in s/s. aricept dose decrease 9/20  1. Cont. aricept  2. Cont. effexor  3. For increased anxiety cont. Prn klonopin  4. Monitor for reports of insomnia    Recurrent falls  No recent falls. Gait gen. Stable. Has PD  1. Cont. Current PD meds  2. Remind to use walker at all times  3. Monitor for increased rigidity, freezing epiosdes  4. Encourage amb as anupam    Hypotension, unspecified hypotension type  Currently stable  1. Cont. Midodrine  2. Encourage fluids  3. Follow BPs, HRs  4. Monitor for dizziness  5. Bmp in next 1-3 mos      Electronically signed by:  DONY Clark CNP                 Sincerely,        DONY Clark CNP

## 2020-11-20 NOTE — LETTER
"    11/20/2020        RE: Carlos Neri  C/o Haylee Neri  [address] [address] [address]        [facility]   Carlos Neri is being evaluated via a billable video visit.   The patient has been notified of following:  \"This video visit will be conducted via a call between you and your provider. We have found that certain health care needs can be provided without the need for an in-person physical exam.  This service lets us provide the care you need with a video conversation. If during the course of the call the provider feels a video visit is not appropriate, you will not be charged for this service.\"   The provider has received verbal consent for a Video Visit from the patient or first contact? Yes  Patient  or facility staff would like the video invitation sent by: Text to cell phone: [phone]  Video Start Time: 1246  [place] Medical Record Number: [id number]  Place of Location at the time of visit: [facility]  Chief Complaint   Patient presents with     Video Visit     Pain     HPI:  Carlos Neri is a 85 year old (1935), who is being seen today for a visit. HPI information obtained from: facility chart records, facility staff, patient report and [facility] chart review. Today's concern is: R 5th finger pain.  S/p fall this am.  Has h/o recurrent falls.  Has small skin tear, edema to R 5th finger. Some mild pain at site.  Cont. On tylenol.  2/19 had a fall resulting in oblique fx of R 5th metacarpal. Decision made for conservative tx-4,5th finger of R hand lucas taped.      Past Medical and Surgical History reviewed in Epic today.  MEDICATIONS:    Current Outpatient Medications   Medication Sig Dispense Refill     acetaminophen (TYLENOL) 500 MG tablet Take 1,000 mg by mouth 3 times daily        Artificial Tear Solution (SOOTHE XP) SOLN Place 1 drop into both eyes 2 times daily       carbidopa-levodopa (SINEMET CR)  MG CR " tablet Take 1 tablet by mouth At Bedtime       carbidopa-levodopa (SINEMET)  MG per tablet Take 1 tablet by mouth 4 times daily       clonazePAM (KLONOPIN) 0.5 MG tablet Take 1 tablet (0.5 mg) by mouth daily as needed for anxiety 30 tablet 5     donepezil (ARICEPT) 5 MG tablet Take 5 mg by mouth At Bedtime       entacapone (COMTAN) 200 MG tablet Take 100 mg by mouth 4 times daily        gabapentin (NEURONTIN) 300 MG capsule Take 300 mg by mouth At Bedtime And 100 mg po tid prn       MIDODRINE HCL PO Take 5 mg by mouth 3 times daily (before meals)       polyethylene glycol (MIRALAX/GLYCOLAX) Packet Take 17 g by mouth daily as needed for constipation (AND every other day scheduled)        rOPINIRole (REQUIP) 0.25 MG tablet Take 0.75 mg by mouth 3 times daily        rOPINIRole (REQUIP) 0.25 MG tablet Take 0.25 mg by mouth At Bedtime       senna-docusate (SENOKOT-S/PERICOLACE) 8.6-50 MG tablet Take 2 tablets by mouth daily       trolamine salicylate (ASPERCREME) 10 % external cream Apply topically 2 times daily       venlafaxine (EFFEXOR) 25 MG tablet Take 25 mg by mouth 2 times daily       REVIEW OF SYSTEMS: ENT:  [place], RESPIRATORY: neg, NEURO:  tremor, MUSCULOSKELETAL: mild R 5th finger pain and SKIN: skin tear R 5th finger  Objective: Resp 18   Limited visit exam done given COVID-19 precautions.   GENERAL APPEARANCE:  Alert, in no distress, cooperative  EYES:  EOM normal, Conjunctiva and lids normal  RESP:  no respiratory distress  CV:  trace edema R 5th finger  M/S:   no apparent pain with palp. R 5th finger. R 5th finger sl. flexed position   NEURO:   Cranial nerves 2-12 are normal tested and grossly at patient's baseline  PSYCH:  affect abnormal -flat  Labs:     Most Recent 3 CBC's:  Recent Labs   Lab Test 08/18/20 01/22/20 07/26/19 06/19/19   WBC  --  5.9 7.8 6.5   HGB 14.7 14.4 14.0 13.6*   MCV  --  103* 99 101*   PLT  --  149 144* 140     Most Recent 3 BMP's:  Recent Labs   Lab Test 08/18/20 01/22/20  07/30/19    143 143   POTASSIUM 4.1 4.5 4.1   CHLORIDE 103 106 107   CO2 28 31 30   BUN 28 25 22   CR 1.01 0.84 0.79   ANIONGAP 10 6 6   SADIA 9.3 9.4 9.3   * 108 89       ASSESSMENT/PLAN:  Pain of finger of right hand  S/p fall. Skin tear, edema R 5th finger.  Previous fx of R 5th finger 2/19  1. Cont. Tylenol  2. Apply bandaid to finger  3. James tape R 4th, 5th fingers  4. Check XR R hand.  5. Refer to [facility] prn          Electronically signed by:  DONY Clark CNP     Video-Visit Details  Type of service:  Video Visit  Video End Time (time video stopped): 1251  Distant Location (provider location):  [place] [facility]             Sincerely,        DONY Clark CNP

## 2020-11-20 NOTE — PROGRESS NOTES
"[facility]   Carlos Neri is being evaluated via a billable video visit.   The patient has been notified of following:  \"This video visit will be conducted via a call between you and your provider. We have found that certain health care needs can be provided without the need for an in-person physical exam.  This service lets us provide the care you need with a video conversation. If during the course of the call the provider feels a video visit is not appropriate, you will not be charged for this service.\"   The provider has received verbal consent for a Video Visit from the patient or first contact? Yes  Patient  or facility staff would like the video invitation sent by: Text to cell phone: [phone]  Video Start Time: 1246  [place] Medical Record Number: [id number]  Place of Location at the time of visit: [facility]  Chief Complaint   Patient presents with     Video Visit     Pain     HPI:  Carlos Neri is a 85 year old (1935), who is being seen today for a visit. HPI information obtained from: facility chart records, facility staff, patient report and [facility] chart review. Today's concern is: R 5th finger pain.  S/p fall this am.  Has h/o recurrent falls.  Has small skin tear, edema to R 5th finger. Some mild pain at site.  Cont. On tylenol.  2/19 had a fall resulting in oblique fx of R 5th metacarpal. Decision made for conservative tx-4,5th finger of R hand lucas taped.      Past Medical and Surgical History reviewed in Epic today.  MEDICATIONS:    Current Outpatient Medications   Medication Sig Dispense Refill     acetaminophen (TYLENOL) 500 MG tablet Take 1,000 mg by mouth 3 times daily        Artificial Tear Solution (SOOTHE XP) SOLN Place 1 drop into both eyes 2 times daily       carbidopa-levodopa (SINEMET CR)  MG CR tablet Take 1 tablet by mouth At Bedtime       carbidopa-levodopa (SINEMET)  MG per tablet Take 1 tablet " by mouth 4 times daily       clonazePAM (KLONOPIN) 0.5 MG tablet Take 1 tablet (0.5 mg) by mouth daily as needed for anxiety 30 tablet 5     donepezil (ARICEPT) 5 MG tablet Take 5 mg by mouth At Bedtime       entacapone (COMTAN) 200 MG tablet Take 100 mg by mouth 4 times daily        gabapentin (NEURONTIN) 300 MG capsule Take 300 mg by mouth At Bedtime And 100 mg po tid prn       MIDODRINE HCL PO Take 5 mg by mouth 3 times daily (before meals)       polyethylene glycol (MIRALAX/GLYCOLAX) Packet Take 17 g by mouth daily as needed for constipation (AND every other day scheduled)        rOPINIRole (REQUIP) 0.25 MG tablet Take 0.75 mg by mouth 3 times daily        rOPINIRole (REQUIP) 0.25 MG tablet Take 0.25 mg by mouth At Bedtime       senna-docusate (SENOKOT-S/PERICOLACE) 8.6-50 MG tablet Take 2 tablets by mouth daily       trolamine salicylate (ASPERCREME) 10 % external cream Apply topically 2 times daily       venlafaxine (EFFEXOR) 25 MG tablet Take 25 mg by mouth 2 times daily       REVIEW OF SYSTEMS: ENT:  [place], RESPIRATORY: neg, NEURO:  tremor, MUSCULOSKELETAL: mild R 5th finger pain and SKIN: skin tear R 5th finger  Objective: Resp 18   Limited visit exam done given COVID-19 precautions.   GENERAL APPEARANCE:  Alert, in no distress, cooperative  EYES:  EOM normal, Conjunctiva and lids normal  RESP:  no respiratory distress  CV:  trace edema R 5th finger  M/S:   no apparent pain with palp. R 5th finger. R 5th finger sl. flexed position   NEURO:   Cranial nerves 2-12 are normal tested and grossly at patient's baseline  PSYCH:  affect abnormal -flat  Labs:     Most Recent 3 CBC's:  Recent Labs   Lab Test 08/18/20 01/22/20 07/26/19 06/19/19   WBC  --  5.9 7.8 6.5   HGB 14.7 14.4 14.0 13.6*   MCV  --  103* 99 101*   PLT  --  149 144* 140     Most Recent 3 BMP's:  Recent Labs   Lab Test 08/18/20 01/22/20 07/30/19    143 143   POTASSIUM 4.1 4.5 4.1   CHLORIDE 103 106 107   CO2 28 31 30   BUN 28 25 22   CR 1.01  0.84 0.79   ANIONGAP 10 6 6   SADIA 9.3 9.4 9.3   * 108 89       ASSESSMENT/PLAN:  Pain of finger of right hand  S/p fall. Skin tear, edema R 5th finger.  Previous fx of R 5th finger 2/19  1. Cont. Tylenol  2. Apply bandaid to finger  3. James tape R 4th, 5th fingers  4. Check XR R hand.  5. Refer to [facility] prn          Electronically signed by:  DONY Clark CNP     Video-Visit Details  Type of service:  Video Visit  Video End Time (time video stopped): 1251  Distant Location (provider location):  [place] [facility]

## 2020-12-18 NOTE — LETTER
12/18/2020        RE: Carlos Neri  C/o Haylee Neri  [address] [address]  [facility] [place] [address]        [place] [facility]  [place] Medical Record Number: [id number]  Place of Service where encounter took place: [facility] ([facility]) [[id number]]  Chief Complaint   Patient presents with     Diarrhea     Depression       HPI:    Carlos Neri is a 85 year old (1935), who is being seen today for an episodic care visit. HPI information obtained from: facility chart records, facility staff, patient report, [facility] chart review and family/first contact spouse report. Today's concern is: diarrhea, depression, hypotension.  Per staff, has had episodes of stool incont., loose stools at times.  For constipation cont. On senna, miralax. Has BM about every other day, every third day.  At times has difficulty making it to bathroom in time, has episodes of stool incont.  At this times becomes apologetic to staff, makes negative statements at times about not wanting to be alive any longer.  Cont. On effexor, aricept for dementia. Did have decreased aricept dose due to decrease in po intake.  Mood stable today.  For hypotension cont. On midodrine.  BPs, HRs have been stable. No recent reports of dizziness.       Past Medical and Surgical History reviewed in Epic today.    MEDICATIONS:    Current Outpatient Medications   Medication Sig Dispense Refill     acetaminophen (TYLENOL) 500 MG tablet Take 1,000 mg by mouth 3 times daily        Artificial Tear Solution (SOOTHE XP) SOLN Place 1 drop into both eyes 2 times daily       carbidopa-levodopa (SINEMET CR)  MG CR tablet Take 1 tablet by mouth At Bedtime       carbidopa-levodopa (SINEMET)  MG per tablet Take 1 tablet by mouth 4 times daily       clonazePAM (KLONOPIN) 0.5 MG tablet Take 1 tablet (0.5 mg) by mouth daily as needed for anxiety 30 tablet 5     entacapone (COMTAN) 200 MG tablet Take 100 mg by mouth 4  times daily        gabapentin (NEURONTIN) 300 MG capsule Take 300 mg by mouth At Bedtime And 100 mg po tid prn       MIDODRINE HCL PO Take 5 mg by mouth 3 times daily (before meals)       polyethylene glycol (MIRALAX/GLYCOLAX) Packet Take 17 g by mouth daily as needed for constipation (AND every other day scheduled)        rOPINIRole (REQUIP) 0.25 MG tablet Take 0.75 mg by mouth 3 times daily        rOPINIRole (REQUIP) 0.25 MG tablet Take 0.25 mg by mouth At Bedtime       senna-docusate (SENOKOT-S/PERICOLACE) 8.6-50 MG tablet Take 2 tablets by mouth daily       trolamine salicylate (ASPERCREME) 10 % external cream Apply topically 2 times daily       venlafaxine (EFFEXOR) 25 MG tablet Take 25 mg by mouth 2 times daily       REVIEW OF SYSTEMS:  Unobtainable secondary to cognitive impairment. Reports feeling ok today    Objective:  /83   Pulse 64   Resp 16   SpO2 93%   Exam:  GENERAL APPEARANCE:  Alert, in no distress, thin, cooperative  ENT:  Mouth and posterior oropharynx normal, moist mucous membranes, [place]  EYES:  EOM, conjunctivae, lids, pupils and irises normal, PERRL  RESP:  respiratory effort and palpation of chest normal, lungs clear to auscultation , no respiratory distress  CV:  Palpation and auscultation of heart done , regular rate and rhythm, no murmur, rub, or gallop, no edema  ABDOMEN:  normal bowel sounds, soft, nontender, no hepatosplenomegaly or other masses, no guarding or rebound  M/S:   gait steady with walker. muscle strength 5/5 all 4 ext.  NEURO:   Cranial nerves 2-12 are normal tested and grossly at patient's baseline, speech soft, clear  PSYCH:  insight and judgement impaired, memory impaired , affect and mood normal    Labs:     Most Recent 3 CBC's:  Recent Labs   Lab Test 08/18/20 01/22/20 07/26/19 06/19/19   WBC  --  5.9 7.8 6.5   HGB 14.7 14.4 14.0 13.6*   MCV  --  103* 99 101*   PLT  --  149 144* 140     Most Recent 3 BMP's:  Recent Labs   Lab Test 08/18/20 01/22/20 07/30/19   NA  141 143 143   POTASSIUM 4.1 4.5 4.1   CHLORIDE 103 106 107   CO2 28 31 30   BUN 28 25 22   CR 1.01 0.84 0.79   ANIONGAP 10 6 6   SADIA 9.3 9.4 9.3   * 108 89       ASSESSMENT/PLAN:  Diarrhea, unspecified type  Loose stools at times. Episodes of stool incont.  1. Cont. Senna, miralax for now-BMs every other day to q 3rd day  2. For loose stools every day, may decrease senna dose  3. Discontinue aricept  4. Reassess s/s over next 2 weeks    Depression, unspecified depression type  Appears somewhat r/t stool incont, frustration.  Mood stable today  1. Cont. Effexor, neurontin  2. Cont. Prn klonopin for increased anxiety  3. Reassess mood, behaviors over next couple weeks  4. For further increased s/s, may consider remeron    Hypotension, unspecified hypotension type  BPs stable. Fluid intake stable per staff  1. Cont. Midodrine  2. Encourage fluids  3. Monitor for reports of dizziness  4. Cbc, bmp in next 1-3 mos      Electronically signed by:  DONY Clark CNP               Sincerely,        DONY Clark CNP

## 2020-12-18 NOTE — PROGRESS NOTES
[place] [facility]  [place] Medical Record Number: [id number]  Place of Service where encounter took place: [facility] ([facility]) [[id number]]  Chief Complaint   Patient presents with     Diarrhea     Depression       HPI:    Carlos Neri is a 85 year old (1935), who is being seen today for an episodic care visit. HPI information obtained from: facility chart records, facility staff, patient report, [facility] chart review and family/first contact spouse report. Today's concern is: diarrhea, depression, hypotension.  Per staff, has had episodes of stool incont., loose stools at times.  For constipation cont. On senna, miralax. Has BM about every other day, every third day.  At times has difficulty making it to bathroom in time, has episodes of stool incont.  At this times becomes apologetic to staff, makes negative statements at times about not wanting to be alive any longer.  Cont. On effexor, aricept for dementia. Did have decreased aricept dose due to decrease in po intake.  Mood stable today.  For hypotension cont. On midodrine.  BPs, HRs have been stable. No recent reports of dizziness.       Past Medical and Surgical History reviewed in Epic today.    MEDICATIONS:    Current Outpatient Medications   Medication Sig Dispense Refill     acetaminophen (TYLENOL) 500 MG tablet Take 1,000 mg by mouth 3 times daily        Artificial Tear Solution (SOOTHE XP) SOLN Place 1 drop into both eyes 2 times daily       carbidopa-levodopa (SINEMET CR)  MG CR tablet Take 1 tablet by mouth At Bedtime       carbidopa-levodopa (SINEMET)  MG per tablet Take 1 tablet by mouth 4 times daily       clonazePAM (KLONOPIN) 0.5 MG tablet Take 1 tablet (0.5 mg) by mouth daily as needed for anxiety 30 tablet 5     entacapone (COMTAN) 200 MG tablet Take 100 mg by mouth 4 times daily        gabapentin (NEURONTIN) 300 MG capsule Take 300 mg by mouth At Bedtime And 100 mg po tid prn        MIDODRINE HCL PO Take 5 mg by mouth 3 times daily (before meals)       polyethylene glycol (MIRALAX/GLYCOLAX) Packet Take 17 g by mouth daily as needed for constipation (AND every other day scheduled)        rOPINIRole (REQUIP) 0.25 MG tablet Take 0.75 mg by mouth 3 times daily        rOPINIRole (REQUIP) 0.25 MG tablet Take 0.25 mg by mouth At Bedtime       senna-docusate (SENOKOT-S/PERICOLACE) 8.6-50 MG tablet Take 2 tablets by mouth daily       trolamine salicylate (ASPERCREME) 10 % external cream Apply topically 2 times daily       venlafaxine (EFFEXOR) 25 MG tablet Take 25 mg by mouth 2 times daily       REVIEW OF SYSTEMS:  Unobtainable secondary to cognitive impairment. Reports feeling ok today    Objective:  /83   Pulse 64   Resp 16   SpO2 93%   Exam:  GENERAL APPEARANCE:  Alert, in no distress, thin, cooperative  ENT:  Mouth and posterior oropharynx normal, moist mucous membranes, [place]  EYES:  EOM, conjunctivae, lids, pupils and irises normal, PERRL  RESP:  respiratory effort and palpation of chest normal, lungs clear to auscultation , no respiratory distress  CV:  Palpation and auscultation of heart done , regular rate and rhythm, no murmur, rub, or gallop, no edema  ABDOMEN:  normal bowel sounds, soft, nontender, no hepatosplenomegaly or other masses, no guarding or rebound  M/S:   gait steady with walker. muscle strength 5/5 all 4 ext.  NEURO:   Cranial nerves 2-12 are normal tested and grossly at patient's baseline, speech soft, clear  PSYCH:  insight and judgement impaired, memory impaired , affect and mood normal    Labs:     Most Recent 3 CBC's:  Recent Labs   Lab Test 08/18/20 01/22/20 07/26/19 06/19/19   WBC  --  5.9 7.8 6.5   HGB 14.7 14.4 14.0 13.6*   MCV  --  103* 99 101*   PLT  --  149 144* 140     Most Recent 3 BMP's:  Recent Labs   Lab Test 08/18/20 01/22/20 07/30/19    143 143   POTASSIUM 4.1 4.5 4.1   CHLORIDE 103 106 107   CO2 28 31 30   BUN 28 25 22   CR 1.01 0.84 0.79    ANIONGAP 10 6 6   SADIA 9.3 9.4 9.3   * 108 89       ASSESSMENT/PLAN:  Diarrhea, unspecified type  Loose stools at times. Episodes of stool incont.  1. Cont. Senna, miralax for now-BMs every other day to q 3rd day  2. For loose stools every day, may decrease senna dose  3. Discontinue aricept  4. Reassess s/s over next 2 weeks    Depression, unspecified depression type  Appears somewhat r/t stool incont, frustration.  Mood stable today  1. Cont. Effexor, neurontin  2. Cont. Prn klonopin for increased anxiety  3. Reassess mood, behaviors over next couple weeks  4. For further increased s/s, may consider remeron    Hypotension, unspecified hypotension type  BPs stable. Fluid intake stable per staff  1. Cont. Midodrine  2. Encourage fluids  3. Monitor for reports of dizziness  4. Cbc, bmp in next 1-3 mos      Electronically signed by:  DONY Clark CNP

## 2020-12-29 NOTE — LETTER
12/29/2020        RE: Carlos Neri  C/o Haylee Neri  [address] [facility] [address]        [place] [facility]  [place] Medical Record Number: [id number]  Place of Service where encounter took place: [facility] ([facility]) [[id number]]  Chief Complaint   Patient presents with     Diarrhea       HPI:    Carlos Neri is a 85 year old (1935), who is being seen today for an episodic care visit. HPI information obtained from: facility chart records, facility staff, patient report and [facility] chart review. Today's concern is: diarrhea, dementia, anxiety. Has recent h/o stool incont. Loose stools at times.  Would attempt to clean self in bathroom at times, has h/o falls. Had recent decreased aricept dose due to decreased po intake.  12/18/20 aricept dc'd due to loose stools, stool incont.  Since this time, staff reports some increased anxiety in pms typically starting around 4pm.  Prn neurontin typically effective.  Has memory loss due to dementia.  Cont. On neurontin, effexor, prn klonopin.  Did have non-inj fall last week.  Per staff, has increased anxiety at times, typically following visit from spouse.  Make statements of wanting to leave.      Past Medical and Surgical History reviewed in Epic today.    MEDICATIONS:    Current Outpatient Medications   Medication Sig Dispense Refill     gabapentin (NEURONTIN) 100 MG capsule Take 300 mg by mouth At Bedtime And 100 mg every day at 4pm.  Also 100 mg po tid prn       acetaminophen (TYLENOL) 500 MG tablet Take 1,000 mg by mouth 3 times daily        Artificial Tear Solution (SOOTHE XP) SOLN Place 1 drop into both eyes 2 times daily       carbidopa-levodopa (SINEMET CR)  MG CR tablet Take 1 tablet by mouth At Bedtime       carbidopa-levodopa (SINEMET)  MG per tablet Take 1 tablet by mouth 4 times daily       clonazePAM (KLONOPIN) 0.5 MG tablet Take 1 tablet (0.5 mg) by mouth daily as needed for  anxiety 30 tablet 5     entacapone (COMTAN) 200 MG tablet Take 100 mg by mouth 4 times daily        MIDODRINE HCL PO Take 5 mg by mouth 3 times daily (before meals)       polyethylene glycol (MIRALAX/GLYCOLAX) Packet Take 17 g by mouth daily as needed for constipation (AND every other day scheduled)        rOPINIRole (REQUIP) 0.25 MG tablet Take 0.75 mg by mouth 3 times daily        rOPINIRole (REQUIP) 0.25 MG tablet Take 0.25 mg by mouth At Bedtime       senna-docusate (SENOKOT-S/PERICOLACE) 8.6-50 MG tablet Take 2 tablets by mouth daily       trolamine salicylate (ASPERCREME) 10 % external cream Apply topically 2 times daily       venlafaxine (EFFEXOR) 25 MG tablet Take 25 mg by mouth 2 times daily       REVIEW OF SYSTEMS:  Unobtainable secondary to cognitive impairment. Reports feeling ok today    Objective:  /64   Pulse 71   Resp 16   SpO2 94%   Exam:  GENERAL APPEARANCE:  Alert, in no distress, thin, cooperative  ENT:  Mouth and posterior oropharynx normal, moist mucous membranes, [place]  EYES:  EOM, conjunctivae, lids, pupils and irises normal, PERRL  RESP:  respiratory effort and palpation of chest normal, lungs clear to auscultation , no respiratory distress  CV:  Palpation and auscultation of heart done , regular rate and rhythm, no murmur, rub, or gallop, no edema  ABDOMEN:  normal bowel sounds, soft, nontender, no hepatosplenomegaly or other masses, no guarding or rebound  M/S:   gait stable with walker. muscle strength 5/5 all 4 ext.  NEURO:   Cranial nerves 2-12 are normal tested and grossly at patient's baseline, speech soft, clear  PSYCH:  insight and judgement impaired, memory impaired , affect abnormal -flat    Labs:     Most Recent 3 CBC's:  Recent Labs   Lab Test 08/18/20 01/22/20 07/26/19 06/19/19   WBC  --  5.9 7.8 6.5   HGB 14.7 14.4 14.0 13.6*   MCV  --  103* 99 101*   PLT  --  149 144* 140     Most Recent 3 BMP's:  Recent Labs   Lab Test 08/18/20 01/22/20 07/30/19    143 143    POTASSIUM 4.1 4.5 4.1   CHLORIDE 103 106 107   CO2 28 31 30   BUN 28 25 22   CR 1.01 0.84 0.79   ANIONGAP 10 6 6   SADIA 9.3 9.4 9.3   * 108 89       ASSESSMENT/PLAN:  Diarrhea, unspecified type  Currently resolved since aricept dc'd 12/18/20  1. Cont. Every other day miralax  2. Monitor for further loose stools, episodes of stool incont.   3. For further loose stools, discontinue sched miralax  4. Encourage fluids    Lewy body dementia with behavioral disturbance (H)  Memory loss, some increased sundowning behaviors past week.  aricept discontinued as above  1. Cont. effexor  2. Sched. neurontin dose at 1 pm. Cont. Hs neurontin, prn neurontin  3. Reassess for ongoing sundowning behaviors over next week  4. Monitor for changes in gait, falls    Anxiety  occ increased s/s  1. Cont. Effexor, neurontin as above  2. Cont. Prn klonopin  3. Monitor for reports of insomnia  4. Reassess over next week      Electronically signed by:  DONY Clark CNP               Sincerely,        DONY Clark CNP

## 2021-01-01 ENCOUNTER — ASSISTED LIVING VISIT (OUTPATIENT)
Dept: GERIATRICS | Facility: CLINIC | Age: 86
End: 2021-01-01
Payer: COMMERCIAL

## 2021-01-01 ENCOUNTER — ASSISTED LIVING VISIT (OUTPATIENT)
Dept: GERIATRICS | Facility: CLINIC | Age: 86
End: 2021-01-01
Payer: MEDICARE

## 2021-01-01 ENCOUNTER — MEDICAL CORRESPONDENCE (OUTPATIENT)
Dept: HEALTH INFORMATION MANAGEMENT | Facility: CLINIC | Age: 86
End: 2021-01-01

## 2021-01-01 ENCOUNTER — RECORDS - HEALTHEAST (OUTPATIENT)
Dept: LAB | Facility: CLINIC | Age: 86
End: 2021-01-01

## 2021-01-01 ENCOUNTER — HEALTH MAINTENANCE LETTER (OUTPATIENT)
Age: 86
End: 2021-01-01

## 2021-01-01 VITALS
DIASTOLIC BLOOD PRESSURE: 58 MMHG | HEART RATE: 64 BPM | RESPIRATION RATE: 18 BRPM | OXYGEN SATURATION: 93 % | SYSTOLIC BLOOD PRESSURE: 98 MMHG

## 2021-01-01 VITALS
OXYGEN SATURATION: 92 % | RESPIRATION RATE: 18 BRPM | HEART RATE: 62 BPM | DIASTOLIC BLOOD PRESSURE: 75 MMHG | SYSTOLIC BLOOD PRESSURE: 115 MMHG

## 2021-01-01 VITALS
DIASTOLIC BLOOD PRESSURE: 77 MMHG | SYSTOLIC BLOOD PRESSURE: 128 MMHG | OXYGEN SATURATION: 92 % | RESPIRATION RATE: 18 BRPM | HEART RATE: 63 BPM

## 2021-01-01 VITALS
OXYGEN SATURATION: 92 % | SYSTOLIC BLOOD PRESSURE: 111 MMHG | DIASTOLIC BLOOD PRESSURE: 67 MMHG | HEART RATE: 70 BPM | RESPIRATION RATE: 18 BRPM

## 2021-01-01 VITALS
OXYGEN SATURATION: 92 % | RESPIRATION RATE: 16 BRPM | DIASTOLIC BLOOD PRESSURE: 67 MMHG | HEART RATE: 67 BPM | SYSTOLIC BLOOD PRESSURE: 118 MMHG

## 2021-01-01 VITALS
BODY MASS INDEX: 17.63 KG/M2 | DIASTOLIC BLOOD PRESSURE: 62 MMHG | HEART RATE: 62 BPM | RESPIRATION RATE: 18 BRPM | OXYGEN SATURATION: 93 % | SYSTOLIC BLOOD PRESSURE: 108 MMHG | WEIGHT: 130 LBS

## 2021-01-01 VITALS
RESPIRATION RATE: 18 BRPM | DIASTOLIC BLOOD PRESSURE: 84 MMHG | OXYGEN SATURATION: 91 % | SYSTOLIC BLOOD PRESSURE: 143 MMHG | HEART RATE: 60 BPM | TEMPERATURE: 98.5 F

## 2021-01-01 VITALS
SYSTOLIC BLOOD PRESSURE: 111 MMHG | OXYGEN SATURATION: 92 % | RESPIRATION RATE: 18 BRPM | DIASTOLIC BLOOD PRESSURE: 67 MMHG | HEART RATE: 70 BPM

## 2021-01-01 DIAGNOSIS — R19.7 DIARRHEA, UNSPECIFIED TYPE: ICD-10-CM

## 2021-01-01 DIAGNOSIS — F02.818 LEWY BODY DEMENTIA WITH BEHAVIORAL DISTURBANCE (H): ICD-10-CM

## 2021-01-01 DIAGNOSIS — G31.83 LEWY BODY DEMENTIA WITH BEHAVIORAL DISTURBANCE (H): ICD-10-CM

## 2021-01-01 DIAGNOSIS — R29.6 RECURRENT FALLS: Primary | ICD-10-CM

## 2021-01-01 DIAGNOSIS — R13.10 DYSPHAGIA, UNSPECIFIED TYPE: ICD-10-CM

## 2021-01-01 DIAGNOSIS — R29.6 FALLS FREQUENTLY: Primary | ICD-10-CM

## 2021-01-01 DIAGNOSIS — R63.4 WEIGHT LOSS: ICD-10-CM

## 2021-01-01 DIAGNOSIS — I95.1 ORTHOSTATIC HYPOTENSION: ICD-10-CM

## 2021-01-01 DIAGNOSIS — F41.9 ANXIETY: Primary | ICD-10-CM

## 2021-01-01 DIAGNOSIS — R29.6 FALLS FREQUENTLY: ICD-10-CM

## 2021-01-01 DIAGNOSIS — R29.6 RECURRENT FALLS: ICD-10-CM

## 2021-01-01 DIAGNOSIS — I95.9 HYPOTENSION, UNSPECIFIED HYPOTENSION TYPE: ICD-10-CM

## 2021-01-01 DIAGNOSIS — F41.9 ANXIETY: ICD-10-CM

## 2021-01-01 DIAGNOSIS — R53.83 LETHARGY: Primary | ICD-10-CM

## 2021-01-01 DIAGNOSIS — R13.10 DYSPHAGIA, UNSPECIFIED TYPE: Primary | ICD-10-CM

## 2021-01-01 LAB
ANION GAP SERPL CALCULATED.3IONS-SCNC: 6 MMOL/L (ref 5–18)
BASOPHILS # BLD AUTO: 0 THOU/UL (ref 0–0.2)
BASOPHILS NFR BLD AUTO: 0 % (ref 0–2)
BUN SERPL-MCNC: 34 MG/DL (ref 8–28)
CALCIUM SERPL-MCNC: 8.5 MG/DL (ref 8.5–10.5)
CHLORIDE BLD-SCNC: 109 MMOL/L (ref 98–107)
CO2 SERPL-SCNC: 30 MMOL/L (ref 22–31)
CREAT SERPL-MCNC: 0.93 MG/DL (ref 0.7–1.3)
EOSINOPHIL # BLD AUTO: 0.1 THOU/UL (ref 0–0.4)
EOSINOPHIL NFR BLD AUTO: 1 % (ref 0–6)
ERYTHROCYTE [DISTWIDTH] IN BLOOD BY AUTOMATED COUNT: 13.7 % (ref 11–14.5)
GFR SERPL CREATININE-BSD FRML MDRD: >60 ML/MIN/1.73M2
GLUCOSE BLD-MCNC: 85 MG/DL (ref 70–125)
HCT VFR BLD AUTO: 42.3 % (ref 40–54)
HGB BLD-MCNC: 13.5 G/DL (ref 14–18)
IMM GRANULOCYTES # BLD: 0 THOU/UL
IMM GRANULOCYTES NFR BLD: 1 %
LYMPHOCYTES # BLD AUTO: 1.9 THOU/UL (ref 0.8–4.4)
LYMPHOCYTES NFR BLD AUTO: 31 % (ref 20–40)
MCH RBC QN AUTO: 31.3 PG (ref 27–34)
MCHC RBC AUTO-ENTMCNC: 31.9 G/DL (ref 32–36)
MCV RBC AUTO: 98 FL (ref 80–100)
MONOCYTES # BLD AUTO: 0.6 THOU/UL (ref 0–0.9)
MONOCYTES NFR BLD AUTO: 9 % (ref 2–10)
NEUTROPHILS # BLD AUTO: 3.6 THOU/UL (ref 2–7.7)
NEUTROPHILS NFR BLD AUTO: 58 % (ref 50–70)
PLATELET # BLD AUTO: 166 THOU/UL (ref 140–440)
PMV BLD AUTO: 10.2 FL (ref 8.5–12.5)
POTASSIUM BLD-SCNC: 4.8 MMOL/L (ref 3.5–5)
RBC # BLD AUTO: 4.32 MILL/UL (ref 4.4–6.2)
SODIUM SERPL-SCNC: 145 MMOL/L (ref 136–145)
WBC: 6.2 THOU/UL (ref 4–11)

## 2021-01-01 RX ORDER — HALOPERIDOL 0.5 MG/1
0.5 TABLET ORAL EVERY 6 HOURS
COMMUNITY
End: 2021-01-01

## 2021-01-01 RX ORDER — DONEPEZIL HYDROCHLORIDE 5 MG/1
5 TABLET, FILM COATED ORAL AT BEDTIME
COMMUNITY

## 2021-01-01 RX ORDER — HALOPERIDOL 1 MG/1
1 TABLET ORAL EVERY 4 HOURS
COMMUNITY

## 2021-01-01 RX ORDER — LORAZEPAM 0.5 MG/1
0.5 TABLET ORAL EVERY 6 HOURS
COMMUNITY
End: 2021-01-01

## 2021-01-01 RX ORDER — LORAZEPAM 2 MG/1
2 TABLET ORAL EVERY 4 HOURS
COMMUNITY

## 2021-01-01 RX ORDER — MORPHINE SULFATE 30 MG/1
2.5 TABLET ORAL
COMMUNITY

## 2021-01-05 PROBLEM — F02.818 LEWY BODY DEMENTIA WITH BEHAVIORAL DISTURBANCE (H): Status: ACTIVE | Noted: 2020-01-01

## 2021-01-05 PROBLEM — G31.83 LEWY BODY DEMENTIA WITH BEHAVIORAL DISTURBANCE (H): Status: ACTIVE | Noted: 2020-01-01

## 2021-01-05 NOTE — PROGRESS NOTES
[place] [facility]  [place] [facility] Record Number: [id number]  Place of Service where encounter took place: [facility] ([facility]) [[id number]]  Chief Complaint   Patient presents with     Fall       HPI:    Carlos Neri is a 85 year old (1935), who is being seen today for an episodic care visit. HPI information obtained from: facility chart records, facility staff, patient report, [facility] chart review and family/first contact spouse report. Today's concern is:  Falls, dementia, diarrhea. Had aricept dc'd late last month due to diarrhea, occ episodes of stool incont. Diarrhea has been stable.  Since aricept discontinue, has had sig. Increase freq. Of falls, increased anxiety at times.  For dementia cont. On effexor, neurontin, prn klonopin. Per staff, has increased restlessness at times.  Had fall this am in apt.  Has small bump on head, no bleed. Neuros intact.     Past Medical and Surgical History reviewed in Epic today.    MEDICATIONS:    Current Outpatient Medications   Medication Sig Dispense Refill     donepezil (ARICEPT) 5 MG tablet Take 5 mg by mouth At Bedtime       acetaminophen (TYLENOL) 500 MG tablet Take 1,000 mg by mouth 3 times daily        Artificial Tear Solution (SOOTHE XP) SOLN Place 1 drop into both eyes 2 times daily       carbidopa-levodopa (SINEMET CR)  MG CR tablet Take 1 tablet by mouth At Bedtime       carbidopa-levodopa (SINEMET)  MG per tablet Take 1 tablet by mouth 4 times daily       clonazePAM (KLONOPIN) 0.5 MG tablet Take 1 tablet (0.5 mg) by mouth daily as needed for anxiety 30 tablet 5     entacapone (COMTAN) 200 MG tablet Take 100 mg by mouth 4 times daily        gabapentin (NEURONTIN) 100 MG capsule Take 300 mg by mouth At Bedtime And 100 mg every day at 4pm.  Also 100 mg po tid prn       MIDODRINE HCL PO Take 5 mg by mouth 3 times daily (before meals)       polyethylene glycol (MIRALAX/GLYCOLAX) Packet Take 17 g by  mouth daily as needed for constipation (AND every other day scheduled)        rOPINIRole (REQUIP) 0.25 MG tablet Take 0.75 mg by mouth 3 times daily        rOPINIRole (REQUIP) 0.25 MG tablet Take 0.25 mg by mouth At Bedtime       senna-docusate (SENOKOT-S/PERICOLACE) 8.6-50 MG tablet Take 2 tablets by mouth daily       trolamine salicylate (ASPERCREME) 10 % external cream Apply topically 2 times daily       venlafaxine (EFFEXOR) 25 MG tablet Take 25 mg by mouth 2 times daily       REVIEW OF SYSTEMS:  Unobtainable secondary to cognitive impairment. Reports feeling ok today. No pains s/p fall this am    Objective:  /67   Pulse 70   Resp 18   SpO2 92%   Exam:  GENERAL APPEARANCE:  Alert, in no distress, cooperative  ENT:  Mouth and posterior oropharynx normal, moist mucous membranes, [place]  EYES:  EOM, conjunctivae, lids, pupils and irises normal, PERRL  NECK:  No adenopathy,masses or thyromegaly, FROM  RESP:  respiratory effort and palpation of chest normal, lungs clear to auscultation , no respiratory distress  CV:  Palpation and auscultation of heart done , regular rate and rhythm, no murmur, rub, or gallop, no edema  ABDOMEN:  normal bowel sounds, soft, nontender, no hepatosplenomegaly or other masses, no guarding or rebound  M/S:   muscle strength 5/5 all 4 ext. no pain with PROM extremities  NEURO:   Cranial nerves 2-12 are normal tested and grossly at patient's baseline, speech soft, clear  PSYCH:  insight and judgement impaired, memory impaired , affect abnormal -flat    Labs:     Most Recent 3 CBC's:  Recent Labs   Lab Test 08/18/20 01/22/20 07/26/19 06/19/19   WBC  --  5.9 7.8 6.5   HGB 14.7 14.4 14.0 13.6*   MCV  --  103* 99 101*   PLT  --  149 144* 140     Most Recent 3 BMP's:  Recent Labs   Lab Test 08/18/20 01/22/20 07/30/19    143 143   POTASSIUM 4.1 4.5 4.1   CHLORIDE 103 106 107   CO2 28 31 30   BUN 28 25 22   CR 1.01 0.84 0.79   ANIONGAP 10 6 6   SADIA 9.3 9.4 9.3   * 108 89        ASSESSMENT/PLAN:  Falls frequently  S/p fall this am, small bump on head. neuros intact.  Increase fall freq. Since discontinue of aricept  1. Spoke with spouse, in agreement to restart aricept 5 mg every day, given recent increase in fall freq.  2. Monitor for changes in neuros  3. Remind to use walker, monitor for changes in gait    Lewy body dementia with behavioral disturbance (H)  Memory loss. Increased anxiety, increased restlessness at times, appears increased since discontinue of aricept  1. Restart aricept as above  2. Cont. effexor  3.  Cont to use prn klonopin for increased anxiety  4. Reassess over next few days    Diarrhea, unspecified type  More stable since discontinue of aricept.  aricept to restart today  1. Monitor for episodes of diarrhea  2. For increased s/s, may consider imodium  3. Reassess over next 1-2 weeks      Electronically signed by:  DONY Clark CNP

## 2021-01-05 NOTE — LETTER
1/5/2021        RE: Carlos Neri  C/o Haylee Neri  [address] [facility] [address]        [place] [facility]  [place] Medical Record Number: [id number]  Place of Service where encounter took place: [facility] ([facility]) [[id number]]  Chief Complaint   Patient presents with     Fall       HPI:    Carlos Neri is a 85 year old (1935), who is being seen today for an episodic care visit. HPI information obtained from: facility chart records, facility staff, patient report, [facility] chart review and family/first contact spouse report. Today's concern is:  Falls, dementia, diarrhea. Had aricept dc'd late last month due to diarrhea, occ episodes of stool incont. Diarrhea has been stable.  Since aricept discontinue, has had sig. Increase freq. Of falls, increased anxiety at times.  For dementia cont. On effexor, neurontin, prn klonopin. Per staff, has increased restlessness at times.  Had fall this am in apt.  Has small bump on head, no bleed. Neuros intact.     Past Medical and Surgical History reviewed in Epic today.    MEDICATIONS:    Current Outpatient Medications   Medication Sig Dispense Refill     donepezil (ARICEPT) 5 MG tablet Take 5 mg by mouth At Bedtime       acetaminophen (TYLENOL) 500 MG tablet Take 1,000 mg by mouth 3 times daily        Artificial Tear Solution (SOOTHE XP) SOLN Place 1 drop into both eyes 2 times daily       carbidopa-levodopa (SINEMET CR)  MG CR tablet Take 1 tablet by mouth At Bedtime       carbidopa-levodopa (SINEMET)  MG per tablet Take 1 tablet by mouth 4 times daily       clonazePAM (KLONOPIN) 0.5 MG tablet Take 1 tablet (0.5 mg) by mouth daily as needed for anxiety 30 tablet 5     entacapone (COMTAN) 200 MG tablet Take 100 mg by mouth 4 times daily        gabapentin (NEURONTIN) 100 MG capsule Take 300 mg by mouth At Bedtime And 100 mg every day at 4pm.  Also 100 mg po tid prn       MIDODRINE HCL PO Take 5  mg by mouth 3 times daily (before meals)       polyethylene glycol (MIRALAX/GLYCOLAX) Packet Take 17 g by mouth daily as needed for constipation (AND every other day scheduled)        rOPINIRole (REQUIP) 0.25 MG tablet Take 0.75 mg by mouth 3 times daily        rOPINIRole (REQUIP) 0.25 MG tablet Take 0.25 mg by mouth At Bedtime       senna-docusate (SENOKOT-S/PERICOLACE) 8.6-50 MG tablet Take 2 tablets by mouth daily       trolamine salicylate (ASPERCREME) 10 % external cream Apply topically 2 times daily       venlafaxine (EFFEXOR) 25 MG tablet Take 25 mg by mouth 2 times daily       REVIEW OF SYSTEMS:  Unobtainable secondary to cognitive impairment. Reports feeling ok today. No pains s/p fall this am    Objective:  /67   Pulse 70   Resp 18   SpO2 92%   Exam:  GENERAL APPEARANCE:  Alert, in no distress, cooperative  ENT:  Mouth and posterior oropharynx normal, moist mucous membranes, [place]  EYES:  EOM, conjunctivae, lids, pupils and irises normal, PERRL  NECK:  No adenopathy,masses or thyromegaly, FROM  RESP:  respiratory effort and palpation of chest normal, lungs clear to auscultation , no respiratory distress  CV:  Palpation and auscultation of heart done , regular rate and rhythm, no murmur, rub, or gallop, no edema  ABDOMEN:  normal bowel sounds, soft, nontender, no hepatosplenomegaly or other masses, no guarding or rebound  M/S:   muscle strength 5/5 all 4 ext. no pain with PROM extremities  NEURO:   Cranial nerves 2-12 are normal tested and grossly at patient's baseline, speech soft, clear  PSYCH:  insight and judgement impaired, memory impaired , affect abnormal -flat    Labs:     Most Recent 3 CBC's:  Recent Labs   Lab Test 08/18/20 01/22/20 07/26/19 06/19/19   WBC  --  5.9 7.8 6.5   HGB 14.7 14.4 14.0 13.6*   MCV  --  103* 99 101*   PLT  --  149 144* 140     Most Recent 3 BMP's:  Recent Labs   Lab Test 08/18/20 01/22/20 07/30/19    143 143   POTASSIUM 4.1 4.5 4.1   CHLORIDE 103 106 107   CO2  28 31 30   BUN 28 25 22   CR 1.01 0.84 0.79   ANIONGAP 10 6 6   SADIA 9.3 9.4 9.3   * 108 89       ASSESSMENT/PLAN:  Falls frequently  S/p fall this am, small bump on head. neuros intact.  Increase fall freq. Since discontinue of aricept  1. Spoke with spouse, in agreement to restart aricept 5 mg every day, given recent increase in fall freq.  2. Monitor for changes in neuros  3. Remind to use walker, monitor for changes in gait    Lewy body dementia with behavioral disturbance (H)  Memory loss. Increased anxiety, increased restlessness at times, appears increased since discontinue of aricept  1. Restart aricept as above  2. Cont. effexor  3.  Cont to use prn klonopin for increased anxiety  4. Reassess over next few days    Diarrhea, unspecified type  More stable since discontinue of aricept.  aricept to restart today  1. Monitor for episodes of diarrhea  2. For increased s/s, may consider imodium  3. Reassess over next 1-2 weeks      Electronically signed by:  DONY Clark CNP               Sincerely,        DONY Clark CNP

## 2021-01-07 NOTE — PROGRESS NOTES
Carlos Neri is a 85 year old male seen January 7, 2021 at [facility] unit where he has resided for 2 years (admit 2/2019) seen to follow up weight loss and falls.   He is seen in his apartment up to chair, not able to give much history   [facility] Nursing staff reports 2 falls in past 2 days.   No significant injuries noted.      He needs assist of 2 to stand from his chair, with cuing can take stuttering steps with his walker.  Needs max cuing.   Pt has had weight loss and diarrhea with fecal incontinence, so donepezil was discontinued.   Nursing staff report a significant change in mobility and function, and it was restarted earlier this week.       Patient was diagnosed with essential tremor in 2000, then with worsening symptoms was seen at [facility] in 2004 and diagnosed with Parkinson's disease.   Tx'd with Sinemet since that time, with gradual decline in cognition and mobility.  He has had hallucinations and sleep disturbance, with added dx of Lewy body dementia.    He has autonomic dysfunction and needs midodrine for bp support.        Patient had a swallow study in July 2018, with dysphagia noted, eating regular diet at patient/family request.     Past Medical History:   Diagnosis Date     Dysphagia      Lewy body dementia      Parkinson disease (H)     With dementia.   Seizure disorder, 2017  S/p nephrectomy for renal cell CA, 2003  Dysphagia    SH:  Previously lived with his wife Haylee in [place]  They have 2 sons    ROS:    SLUMS 9/30   CPT 4.1 in 2019  Ambulatory with FWW    Frequent falls   Wt Readings from Last 5 Encounters:   08/25/20 60.3 kg (133 lb)   08/28/20 60.5 kg (133 lb 6.4 oz)   06/04/20 61 kg (134 lb 6.4 oz)   11/14/19 67.1 kg (148 lb)   06/18/19 62.1 kg (137 lb)      EXAM:  Pleasant, NAD  /67   Pulse 70   Resp 18   SpO2 92%    Hypophonic  Neck supple without adenopathy  Lungs clear bilaterally with fair air movement  Heart RRR s1s2  Abd soft, NT, no distention, +BS  Ext  without edema, +kyphosis  Neuro: bilateral hand resting tremor, +cogwheeling, +dyskinesia, +freezing, stuttering gait, +apraxia  Psych: affect okay    Labs reviewed      IMP/PLAN:    (R29.6) Falls frequently  (primary encounter diagnosis)  Comment: multifactorial and related to deconditioning and weakness, poor safety awareness with self transfers, Parkinsonism, agitation and orthostatic hypotension  Plan: no longer able to work with therapies.   Needs constant cuing and supervision.        (R63.4) Weight loss    Comment: related to dysphagia, poor dentition, agitation and donepezil  Plan: continue Ensure scheduled, preferences as tolerated.          (F41.9) Anxiety    Comment: increased agitation leads to falls  Plan: venlafaxine 25 mg bid       (G20) Parkinson's disease (H)  Comment: longstanding, gradually progressive     Plan: continue Sinemet 25/250 qid and 50/200 at HS, ropinirole 0.75 mg tid and 1 mg at HS, entacapone 100 mg qid  Need Neurology follow up to help with his tx, has not been seen >one year.        (I95.1) Orthostatic hypotension  Comment: autonomic dysfunction     Plan: midodrine 5 mg tid         (G31.83,  F02.80) Lewy body dementia without behavioral disturbance  Comment: cognitive decline, low functional status    Plan: [facility] for assist with meds, meals, activity, safety   Failed trial off donepezil with significant increase in falls and behavioral change, now restarted at 5 mg/day.        (G40.909) Seizure disorder (H)   Comment: no recent sz   Plan: remains on gabapentin 300 mg /HS and prn clonazepam, per Neurology        Bety Rowe MD

## 2021-01-07 NOTE — LETTER
1/7/2021        RE: Carlos Neri  C/o Haylee Neri  [address] [address]  [facility] [address]        Carlos Neri is a 85 year old male seen January 7, 2021 at [facility] unit where he has resided for 2 years (admit 2/2019) seen to follow up weight loss and falls.   He is seen in his apartment up to chair, not able to give much history   AL Nursing staff reports 2 falls in past 2 days.   No significant injuries noted.      He needs assist of 2 to stand from his chair, with cuing can take stuttering steps with his walker.  Needs max cuing.   Pt has had weight loss and diarrhea with fecal incontinence, so donepezil was discontinued.   Nursing staff report a significant change in mobility and function, and it was restarted earlier this week.       Patient was diagnosed with essential tremor in 2000, then with worsening symptoms was seen at [place] in 2004 and diagnosed with Parkinson's disease.   Tx'd with Sinemet since that time, with gradual decline in cognition and mobility.  He has had hallucinations and sleep disturbance, with added dx of Lewy body dementia.    He has autonomic dysfunction and needs midodrine for bp support.        Patient had a swallow study in July 2018, with dysphagia noted, eating regular diet at patient/family request.     Past Medical History:   Diagnosis Date     Dysphagia      Lewy body dementia      Parkinson disease (H)     With dementia.   Seizure disorder, 2017  S/p nephrectomy for renal cell CA, 2003  Dysphagia    SH:  Previously lived with his wife Haylee in [place]  They have 2 sons    ROS:    SLUMS 9/30   CPT 4.1 in 2019  Ambulatory with [facility]    Frequent falls   Wt Readings from Last 5 Encounters:   08/25/20 60.3 kg (133 lb)   08/28/20 60.5 kg (133 lb 6.4 oz)   06/04/20 61 kg (134 lb 6.4 oz)   11/14/19 67.1 kg (148 lb)   06/18/19 62.1 kg (137 lb)      EXAM:  Pleasant, NAD  /67   Pulse 70   Resp 18   SpO2 92%    Hypophonic  Neck supple without  adenopathy  Lungs clear bilaterally with fair air movement  Heart RRR s1s2  Abd soft, NT, no distention, +BS  Ext without edema, +kyphosis  Neuro: bilateral hand resting tremor, +cogwheeling, +dyskinesia, +freezing, stuttering gait, +apraxia  Psych: affect okay    Labs reviewed      IMP/PLAN:    (R29.6) Falls frequently  (primary encounter diagnosis)  Comment: multifactorial and related to deconditioning and weakness, poor safety awareness with self transfers, Parkinsonism, agitation and orthostatic hypotension  Plan: no longer able to work with therapies.   Needs constant cuing and supervision.        (R63.4) Weight loss    Comment: related to dysphagia, poor dentition, agitation and donepezil  Plan: continue Ensure scheduled, preferences as tolerated.          (F41.9) Anxiety    Comment: increased agitation leads to falls  Plan: venlafaxine 25 mg bid       (G20) Parkinson's disease (H)  Comment: longstanding, gradually progressive     Plan: continue Sinemet 25/250 qid and 50/200 at HS, ropinirole 0.75 mg tid and 1 mg at HS, entacapone 100 mg qid  Need Neurology follow up to help with his tx, has not been seen >one year.        (I95.1) Orthostatic hypotension  Comment: autonomic dysfunction     Plan: midodrine 5 mg tid         (G31.83,  F02.80) Lewy body dementia without behavioral disturbance  Comment: cognitive decline, low functional status    Plan: [facility] for assist with meds, meals, activity, safety   Failed trial off donepezil with significant increase in falls and behavioral change, now restarted at 5 mg/day.        (G40.909) Seizure disorder (H)   Comment: no recent sz   Plan: remains on gabapentin 300 mg /HS and prn clonazepam, per Neurology        Bety Rowe MD         Sincerely,        Bety Rowe MD

## 2021-01-18 NOTE — PROGRESS NOTES
[facility]  [place] Medical Record Number: [id number]  Place of Service where encounter took place: [facility] ([facility]) [[id number]]  Chief Complaint   Patient presents with     Fall       HPI:    Carlos Neri is a 85 year old (1935), who is being seen today for an episodic care visit. HPI information obtained from: facility chart records, facility staff, patient report and [facility] chart review. Today's concern is: falls, low BPs, diarrhea.  Has h/o recurrent falls.  Fall freq. Had been more stable since starting aricpet.  Had episodes of diarrhea, stool incont. Last month.  aricept dc'd.  Had increased freq. Of falls.  aricept 5 mg every day restarted 1/5/21.  Had fall 1/6/21, no inj.  Fall 1/12/21, no inj.  Overall sig. Decrease in fall freq. Compared with previous month when aricept dc'd. Per staff, has also had less anxiety which appeared to increase when aricept was dc'd. BPs. Sl lower at times.  Cont. On midodrine for hypotension.  BP stable. Today. No reports of dizziness. Staff cont. To encourage fluid intake.  No reports of diarrhea, stool incont. Since restart of aricept.  Po intake stable.       Past Medical and Surgical History reviewed in Epic today.    MEDICATIONS:    Current Outpatient Medications   Medication Sig Dispense Refill     acetaminophen (TYLENOL) 500 MG tablet Take 1,000 mg by mouth 3 times daily        Artificial Tear Solution (SOOTHE XP) SOLN Place 1 drop into both eyes 2 times daily       carbidopa-levodopa (SINEMET CR)  MG CR tablet Take 1 tablet by mouth At Bedtime       carbidopa-levodopa (SINEMET)  MG per tablet Take 1 tablet by mouth 4 times daily       clonazePAM (KLONOPIN) 0.5 MG tablet Take 1 tablet (0.5 mg) by mouth daily as needed for anxiety 30 tablet 5     donepezil (ARICEPT) 5 MG tablet Take 5 mg by mouth At Bedtime       entacapone (COMTAN) 200 MG tablet Take 100 mg by mouth 4 times daily         gabapentin (NEURONTIN) 100 MG capsule Take 300 mg by mouth At Bedtime And 100 mg every day at 4pm.  Also 100 mg po tid prn       MIDODRINE HCL PO Take 5 mg by mouth 3 times daily (before meals)       polyethylene glycol (MIRALAX/GLYCOLAX) Packet Take 17 g by mouth daily as needed for constipation (AND every other day scheduled)        rOPINIRole (REQUIP) 0.25 MG tablet Take 0.75 mg by mouth 3 times daily        rOPINIRole (REQUIP) 0.25 MG tablet Take 0.25 mg by mouth At Bedtime       senna-docusate (SENOKOT-S/PERICOLACE) 8.6-50 MG tablet Take 2 tablets by mouth daily       trolamine salicylate (ASPERCREME) 10 % external cream Apply topically 2 times daily       venlafaxine (EFFEXOR) 25 MG tablet Take 25 mg by mouth 2 times daily       REVIEW OF SYSTEMS:  Unobtainable secondary to cognitive impairment. Reports feeling fine today    Objective:  /75   Pulse 62   Resp 18   SpO2 92%   Exam:  GENERAL APPEARANCE:  Alert, in no distress, thin, cooperative  ENT:  Mouth and posterior oropharynx normal, moist mucous membranes, [place]  EYES:  EOM, conjunctivae, lids, pupils and irises normal, PERRL  NECK:  No adenopathy,masses or thyromegaly, FROM  RESP:  respiratory effort and palpation of chest normal, lungs clear to auscultation , no respiratory distress  CV:  Palpation and auscultation of heart done , regular rate and rhythm, no murmur, rub, or gallop, no edema  ABDOMEN:  normal bowel sounds, soft, nontender, no hepatosplenomegaly or other masses, no guarding or rebound  M/S:   gait steady with walker. muscle strength 5/5 all 4 ext.  NEURO:   Cranial nerves 2-12 are normal tested and grossly at patient's baseline, speech soft, clear  PSYCH:  insight and judgement impaired, memory impaired , affect abnormal -flat    Labs:     Most Recent 3 CBC's:  Recent Labs   Lab Test 08/18/20 01/22/20 07/26/19 06/19/19   WBC  --  5.9 7.8 6.5   HGB 14.7 14.4 14.0 13.6*   MCV  --  103* 99 101*   PLT  --  149 144* 140     Most Recent 3  BMP's:  Recent Labs   Lab Test 08/18/20 01/22/20 07/30/19    143 143   POTASSIUM 4.1 4.5 4.1   CHLORIDE 103 106 107   CO2 28 31 30   BUN 28 25 22   CR 1.01 0.84 0.79   ANIONGAP 10 6 6   SADIA 9.3 9.4 9.3   * 108 89       ASSESSMENT/PLAN:  Falls frequently  Sig. Decrease in fall freq. sicne restart of aricept 1/5/21.  Less anxiety over past 2 weeks  1. Cont. aricept  2. Cont. PD meds  3. Remind to use walker at all times,not to  items off floor  4. Monitor for changes in gait, increased rigidity    Orthostatic hypotension  BP stable. Today.  No recent reports of dizziness  1. Cont. Midodrine  2. Encourage fluid intake  3. Monitor for reports of dizziness  4. Follow BPs, HRs  5. Cbc, bmp in next 1-3 mos    Diarrhea, unspecified type  Currently stable.  No reports of increased s/s since restart of aricept  1. Cont. Current senna, miralax orders  2. For diarrhea, hold, discontinue bowel meds  3. Encourage fluids as above      Electronically signed by:  DONY Clark CNP

## 2021-01-18 NOTE — LETTER
1/18/2021        RE: Carlos Neri  C/o Haylee Neri  [address] [facility] [address]        [facility]  [place] Medical Record Number: [id number]  Place of Service where encounter took place: [facility] ([facility]) [[id number]]  Chief Complaint   Patient presents with     Fall       HPI:    Carlos Neri is a 85 year old (1935), who is being seen today for an episodic care visit. HPI information obtained from: facility chart records, facility staff, patient report and [facility] chart review. Today's concern is: falls, low BPs, diarrhea.  Has h/o recurrent falls.  Fall freq. Had been more stable since starting aricpet.  Had episodes of diarrhea, stool incont. Last month.  aricept dc'd.  Had increased freq. Of falls.  aricept 5 mg every day restarted 1/5/21.  Had fall 1/6/21, no inj.  Fall 1/12/21, no inj.  Overall sig. Decrease in fall freq. Compared with previous month when aricept dc'd. Per staff, has also had less anxiety which appeared to increase when aricept was dc'd. BPs. Sl lower at times.  Cont. On midodrine for hypotension.  BP stable. Today. No reports of dizziness. Staff cont. To encourage fluid intake.  No reports of diarrhea, stool incont. Since restart of aricept.  Po intake stable.       Past Medical and Surgical History reviewed in Epic today.    MEDICATIONS:    Current Outpatient Medications   Medication Sig Dispense Refill     acetaminophen (TYLENOL) 500 MG tablet Take 1,000 mg by mouth 3 times daily        Artificial Tear Solution (SOOTHE XP) SOLN Place 1 drop into both eyes 2 times daily       carbidopa-levodopa (SINEMET CR)  MG CR tablet Take 1 tablet by mouth At Bedtime       carbidopa-levodopa (SINEMET)  MG per tablet Take 1 tablet by mouth 4 times daily       clonazePAM (KLONOPIN) 0.5 MG tablet Take 1 tablet (0.5 mg) by mouth daily as needed for anxiety 30 tablet 5     donepezil (ARICEPT) 5 MG tablet Take 5  mg by mouth At Bedtime       entacapone (COMTAN) 200 MG tablet Take 100 mg by mouth 4 times daily        gabapentin (NEURONTIN) 100 MG capsule Take 300 mg by mouth At Bedtime And 100 mg every day at 4pm.  Also 100 mg po tid prn       MIDODRINE HCL PO Take 5 mg by mouth 3 times daily (before meals)       polyethylene glycol (MIRALAX/GLYCOLAX) Packet Take 17 g by mouth daily as needed for constipation (AND every other day scheduled)        rOPINIRole (REQUIP) 0.25 MG tablet Take 0.75 mg by mouth 3 times daily        rOPINIRole (REQUIP) 0.25 MG tablet Take 0.25 mg by mouth At Bedtime       senna-docusate (SENOKOT-S/PERICOLACE) 8.6-50 MG tablet Take 2 tablets by mouth daily       trolamine salicylate (ASPERCREME) 10 % external cream Apply topically 2 times daily       venlafaxine (EFFEXOR) 25 MG tablet Take 25 mg by mouth 2 times daily       REVIEW OF SYSTEMS:  Unobtainable secondary to cognitive impairment. Reports feeling fine today    Objective:  /75   Pulse 62   Resp 18   SpO2 92%   Exam:  GENERAL APPEARANCE:  Alert, in no distress, thin, cooperative  ENT:  Mouth and posterior oropharynx normal, moist mucous membranes, [place]  EYES:  EOM, conjunctivae, lids, pupils and irises normal, PERRL  NECK:  No adenopathy,masses or thyromegaly, FROM  RESP:  respiratory effort and palpation of chest normal, lungs clear to auscultation , no respiratory distress  CV:  Palpation and auscultation of heart done , regular rate and rhythm, no murmur, rub, or gallop, no edema  ABDOMEN:  normal bowel sounds, soft, nontender, no hepatosplenomegaly or other masses, no guarding or rebound  M/S:   gait steady with walker. muscle strength 5/5 all 4 ext.  NEURO:   Cranial nerves 2-12 are normal tested and grossly at patient's baseline, speech soft, clear  PSYCH:  insight and judgement impaired, memory impaired , affect abnormal -flat    Labs:     Most Recent 3 CBC's:  Recent Labs   Lab Test 08/18/20 01/22/20 07/26/19 06/19/19   WBC  --   5.9 7.8 6.5   HGB 14.7 14.4 14.0 13.6*   MCV  --  103* 99 101*   PLT  --  149 144* 140     Most Recent 3 BMP's:  Recent Labs   Lab Test 08/18/20 01/22/20 07/30/19    143 143   POTASSIUM 4.1 4.5 4.1   CHLORIDE 103 106 107   CO2 28 31 30   BUN 28 25 22   CR 1.01 0.84 0.79   ANIONGAP 10 6 6   SADIA 9.3 9.4 9.3   * 108 89       ASSESSMENT/PLAN:  Falls frequently  Sig. Decrease in fall freq. sicne restart of aricept 1/5/21.  Less anxiety over past 2 weeks  1. Cont. aricept  2. Cont. PD meds  3. Remind to use walker at all times,not to  items off floor  4. Monitor for changes in gait, increased rigidity    Orthostatic hypotension  BP stable. Today.  No recent reports of dizziness  1. Cont. Midodrine  2. Encourage fluid intake  3. Monitor for reports of dizziness  4. Follow BPs, HRs  5. Cbc, bmp in next 1-3 mos    Diarrhea, unspecified type  Currently stable.  No reports of increased s/s since restart of aricept  1. Cont. Current senna, miralax orders  2. For diarrhea, hold, discontinue bowel meds  3. Encourage fluids as above      Electronically signed by:  DONY Clark CNP               Sincerely,        DONY Clark CNP

## 2021-01-25 NOTE — LETTER
1/25/2021        RE: Carlos Neri  C/o Haylee Neri  [address] [facility] [address]        [facility]  [place] Medical Record Number: [id number]  Place of Service where encounter took place: [facility] ([facility]) [[id number]]  Chief Complaint   Patient presents with     Throat Problem       HPI:    Carlos Neri is a 85 year old (1935), who is being seen today for an episodic care visit. HPI information obtained from: facility chart records, facility staff, patient report, [facility] chart review and family/first contact spouse report. Today's concern is: dysphagia, hypotension, dementia.  Spoke with spouse who is present for exam.  Wishes for ST to eval, tx for dysphagia.  Cont on HTL, meck soft diet.  Spouse feels resident's speech more clear recently, would like to see if diet can be upgraded.  Has variable fluid intake at times.  BPs sl low at times with SBP in 90s.  Cont. On midodrine.  Does drink water at times which is not thickened. Has lewy body dementia, PD. Sig. Decrease in fall freq. With restart of aricept. Mood more stable, decrease anxiety.  Also taking effexor.       Past Medical and Surgical History reviewed in Epic today.    MEDICATIONS:    Current Outpatient Medications   Medication Sig Dispense Refill     acetaminophen (TYLENOL) 500 MG tablet Take 1,000 mg by mouth 3 times daily        Artificial Tear Solution (SOOTHE XP) SOLN Place 1 drop into both eyes 2 times daily       carbidopa-levodopa (SINEMET CR)  MG CR tablet Take 1 tablet by mouth At Bedtime       carbidopa-levodopa (SINEMET)  MG per tablet Take 1 tablet by mouth 4 times daily       clonazePAM (KLONOPIN) 0.5 MG tablet Take 1 tablet (0.5 mg) by mouth daily as needed for anxiety 30 tablet 5     donepezil (ARICEPT) 5 MG tablet Take 5 mg by mouth At Bedtime       entacapone (COMTAN) 200 MG tablet Take 100 mg by mouth 4 times daily        gabapentin  (NEURONTIN) 100 MG capsule Take 300 mg by mouth At Bedtime And 100 mg every day at 4pm.  Also 100 mg po tid prn       MIDODRINE HCL PO Take 5 mg by mouth 3 times daily (before meals)       polyethylene glycol (MIRALAX/GLYCOLAX) Packet Take 17 g by mouth daily as needed for constipation (AND every other day scheduled)        rOPINIRole (REQUIP) 0.25 MG tablet Take 0.75 mg by mouth 3 times daily        rOPINIRole (REQUIP) 0.25 MG tablet Take 0.25 mg by mouth At Bedtime       senna-docusate (SENOKOT-S/PERICOLACE) 8.6-50 MG tablet Take 2 tablets by mouth daily       trolamine salicylate (ASPERCREME) 10 % external cream Apply topically 2 times daily       venlafaxine (EFFEXOR) 25 MG tablet Take 25 mg by mouth 2 times daily       REVIEW OF SYSTEMS:  Unobtainable secondary to cognitive impairment. Reports feeling fine today    Objective:  BP 98/58   Pulse 64   Resp 18   SpO2 93%   Exam:  GENERAL APPEARANCE:  Alert, in no distress, thin, cooperative  ENT:  Mouth and posterior oropharynx normal, moist mucous membranes, [place]  EYES:  EOM, conjunctivae, lids, pupils and irises normal, PERRL  RESP:  respiratory effort and palpation of chest normal, lungs clear to auscultation , no respiratory distress  CV:  Palpation and auscultation of heart done , regular rate and rhythm, no murmur, rub, or gallop, no edema  ABDOMEN:  normal bowel sounds, soft, nontender, no hepatosplenomegaly or other masses, no guarding or rebound  M/S:   muscle strength 5/5 all 4 ext., sl. difficulty standing from seated pos.  gait steady with walker  NEURO:   Cranial nerves 2-12 are normal tested and grossly at patient's baseline, speech soft, clear, some spasticity of extremities during exam  PSYCH:  insight and judgement impaired, memory impaired , affect and mood normal    Labs:     Most Recent 3 BMP's:  Recent Labs   Lab Test 08/18/20 01/22/20 07/30/19    143 143   POTASSIUM 4.1 4.5 4.1   CHLORIDE 103 106 107   CO2 28 31 30   BUN 28 25 22   CR  1.01 0.84 0.79   ANIONGAP 10 6 6   SADIA 9.3 9.4 9.3   * 108 89       ASSESSMENT/PLAN:  Dysphagia, unspecified type  No recent increase in s/s. Spouse wishes for ST eval for possible upgrade of diet  1. Refer to ST  2. Cont. HTL, mech soft diet for now  3. Monitor for reports of coughing with po intake    Hypotension, unspecified hypotension type  Ongoing lower SBPs at times-90s.  1. ST as above to see if liquids can be upgraded to promote increased fluid intake  2. Follow BPs, HRs  3. Encourage fluids between meals  4. Cont. Midodrine  5. Cbc, bmp in next 1-3 mos    Lewy body dementia with behavioral disturbance (H)  Memory loss. No recent increase in anxiety. Behaviors gen. Stable.  Decrease fall freq. Since restart of aricept  1. Cont. aricept-monitor for diarrhea, stool incont.  2. Cont. effexor  3. Monitor for changes in gait, falls  4. Cont. Secured memory care unit      Electronically signed by:  DONY Clark CNP         Documentation of Face-to-Face and Certification for Home Health Services     Patient: Carlos Neri   YOB: 1935  MR Number: [id number]  Today's Date: 1/25/2021    I certify that patient: Carlos Neri is under my care and that I, or a nurse practitioner or physician's assistant working with me, had a face-to-face encounter that meets the physician face-to-face encounter requirements with this patient on: 1/25/2021.    This encounter with the patient was in whole, or in part, for the following medical condition, which is the primary reason for home health care: dysphagia.    I certify that, based on my findings, the following services are medically necessary home health services: Speech Language Therapy.    My clinical findings support the need for the above services because: Speech Therapy Services are needed to assess and treat impairments in language and/or swallow functions due to dysphagia.    Further, I certify that my clinical findings support  that this patient is homebound (i.e. absences from home require considerable and taxing effort and are for medical reasons or [employer] services or infrequently or of short duration when for other reasons) because: Requires assistance of another person or specialized equipment to access medical services because patient:  dementia with memory loss..    Based on the above findings. I certify that this patient is confined to the home and needs intermittent skilled nursing care, physical therapy and/or speech therapy.  The patient is under my care, and I have initiated the establishment of the plan of care.  This patient will be followed by a physician who will periodically review the plan of care.  Physician/Provider to provide follow up care: Yang Martines    Responsible Medicare certified [place] Physician: Bety Rowe  Physician Signature: See electronic signature associated with these discharge orders.  Date: 1/25/2021              Sincerely,        DONY Clark CNP

## 2021-01-25 NOTE — PROGRESS NOTES
[facility]  [place] Medical Record Number: [id number]  Place of Service where encounter took place: [facility] ([facility]) [[id number]]  Chief Complaint   Patient presents with     Throat Problem       HPI:    Carlos Neri is a 85 year old (1935), who is being seen today for an episodic care visit. HPI information obtained from: facility chart records, facility staff, patient report, [facility] chart review and family/first contact spouse report. Today's concern is: dysphagia, hypotension, dementia.  Spoke with spouse who is present for exam.  Wishes for ST to eval, tx for dysphagia.  Cont on HTL, meck soft diet.  Spouse feels resident's speech more clear recently, would like to see if diet can be upgraded.  Has variable fluid intake at times.  BPs sl low at times with SBP in 90s.  Cont. On midodrine.  Does drink water at times which is not thickened. Has lewy body dementia, PD. Sig. Decrease in fall freq. With restart of aricept. Mood more stable, decrease anxiety.  Also taking effexor.       Past Medical and Surgical History reviewed in Epic today.    MEDICATIONS:    Current Outpatient Medications   Medication Sig Dispense Refill     acetaminophen (TYLENOL) 500 MG tablet Take 1,000 mg by mouth 3 times daily        Artificial Tear Solution (SOOTHE XP) SOLN Place 1 drop into both eyes 2 times daily       carbidopa-levodopa (SINEMET CR)  MG CR tablet Take 1 tablet by mouth At Bedtime       carbidopa-levodopa (SINEMET)  MG per tablet Take 1 tablet by mouth 4 times daily       clonazePAM (KLONOPIN) 0.5 MG tablet Take 1 tablet (0.5 mg) by mouth daily as needed for anxiety 30 tablet 5     donepezil (ARICEPT) 5 MG tablet Take 5 mg by mouth At Bedtime       entacapone (COMTAN) 200 MG tablet Take 100 mg by mouth 4 times daily        gabapentin (NEURONTIN) 100 MG capsule Take 300 mg by mouth At Bedtime And 100 mg every day at 4pm.  Also 100 mg po tid prn        MIDODRINE HCL PO Take 5 mg by mouth 3 times daily (before meals)       polyethylene glycol (MIRALAX/GLYCOLAX) Packet Take 17 g by mouth daily as needed for constipation (AND every other day scheduled)        rOPINIRole (REQUIP) 0.25 MG tablet Take 0.75 mg by mouth 3 times daily        rOPINIRole (REQUIP) 0.25 MG tablet Take 0.25 mg by mouth At Bedtime       senna-docusate (SENOKOT-S/PERICOLACE) 8.6-50 MG tablet Take 2 tablets by mouth daily       trolamine salicylate (ASPERCREME) 10 % external cream Apply topically 2 times daily       venlafaxine (EFFEXOR) 25 MG tablet Take 25 mg by mouth 2 times daily       REVIEW OF SYSTEMS:  Unobtainable secondary to cognitive impairment. Reports feeling fine today    Objective:  BP 98/58   Pulse 64   Resp 18   SpO2 93%   Exam:  GENERAL APPEARANCE:  Alert, in no distress, thin, cooperative  ENT:  Mouth and posterior oropharynx normal, moist mucous membranes, [place]  EYES:  EOM, conjunctivae, lids, pupils and irises normal, PERRL  RESP:  respiratory effort and palpation of chest normal, lungs clear to auscultation , no respiratory distress  CV:  Palpation and auscultation of heart done , regular rate and rhythm, no murmur, rub, or gallop, no edema  ABDOMEN:  normal bowel sounds, soft, nontender, no hepatosplenomegaly or other masses, no guarding or rebound  M/S:   muscle strength 5/5 all 4 ext., sl. difficulty standing from seated pos.  gait steady with walker  NEURO:   Cranial nerves 2-12 are normal tested and grossly at patient's baseline, speech soft, clear, some spasticity of extremities during exam  PSYCH:  insight and judgement impaired, memory impaired , affect and mood normal    Labs:     Most Recent 3 BMP's:  Recent Labs   Lab Test 08/18/20 01/22/20 07/30/19    143 143   POTASSIUM 4.1 4.5 4.1   CHLORIDE 103 106 107   CO2 28 31 30   BUN 28 25 22   CR 1.01 0.84 0.79   ANIONGAP 10 6 6   SADIA 9.3 9.4 9.3   * 108 89       ASSESSMENT/PLAN:  Dysphagia, unspecified  type  No recent increase in s/s. Spouse wishes for ST eval for possible upgrade of diet  1. Refer to ST  2. Cont. HTL, mech soft diet for now  3. Monitor for reports of coughing with po intake    Hypotension, unspecified hypotension type  Ongoing lower SBPs at times-90s.  1. ST as above to see if liquids can be upgraded to promote increased fluid intake  2. Follow BPs, HRs  3. Encourage fluids between meals  4. Cont. Midodrine  5. Cbc, bmp in next 1-3 mos    Lewy body dementia with behavioral disturbance (H)  Memory loss. No recent increase in anxiety. Behaviors gen. Stable.  Decrease fall freq. Since restart of aricept  1. Cont. aricept-monitor for diarrhea, stool incont.  2. Cont. effexor  3. Monitor for changes in gait, falls  4. Cont. Secured memory care unit      Electronically signed by:  DONY Clark CNP         Documentation of Face-to-Face and Certification for Home Health Services     Patient: Carlos Neri   YOB: 1935  MR Number: [id number]  Today's Date: 1/25/2021    I certify that patient: Carlos Neri is under my care and that I, or a nurse practitioner or physician's assistant working with me, had a face-to-face encounter that meets the physician face-to-face encounter requirements with this patient on: 1/25/2021.    This encounter with the patient was in whole, or in part, for the following medical condition, which is the primary reason for home health care: dysphagia.    I certify that, based on my findings, the following services are medically necessary home health services: Speech Language Therapy.    My clinical findings support the need for the above services because: Speech Therapy Services are needed to assess and treat impairments in language and/or swallow functions due to dysphagia.    Further, I certify that my clinical findings support that this patient is homebound (i.e. absences from home require considerable and taxing effort and are for medical  reasons or Yazidi services or infrequently or of short duration when for other reasons) because: Requires assistance of another person or specialized equipment to access medical services because patient:  dementia with memory loss..    Based on the above findings. I certify that this patient is confined to the home and needs intermittent skilled nursing care, physical therapy and/or speech therapy.  The patient is under my care, and I have initiated the establishment of the plan of care.  This patient will be followed by a physician who will periodically review the plan of care.  Physician/Provider to provide follow up care: Yang Martines    Responsible Medicare certified PEC Physician: Bety Rowe  Physician Signature: See electronic signature associated with these discharge orders.  Date: 1/25/2021

## 2021-03-02 NOTE — PROGRESS NOTES
[facility]  [place] Medical Record Number:  [id number]  Place of Service where encounter took place:  [facility] ([facility]) [[id number]]  Chief Complaint   Patient presents with     Fatigue       HPI:    Carlos Neri  is a 85 year old (1935), who is being seen today for an episodic care visit.  HPI information obtained from: facility chart records, facility staff, patient report and [facility] chart review. Today's concern is: lethargy, falls, hypotension.  Per staff has increased lethargy this am.  Yesterday was out of facility for dental appt. Last pm restless, unable to sleep. At 2:30 am had prn klonopin dose. neuros o/w baseline.  Had had some increased freq. Of falls past couple weeks. 2 falls past couple weeks. Had small skin tears to L hand.  No L hand pain today.  Amb. With walker. Cont. On sinemet for PD.  No recent episodes of freezing per staff. Some difficulty with transfers.  For hypotension cont. On midodrine.  Fluid intake stable. No recent reports of low BPs or c/o dizziness.       Past Medical and Surgical History reviewed in Epic today.    MEDICATIONS:    Current Outpatient Medications   Medication Sig Dispense Refill     acetaminophen (TYLENOL) 500 MG tablet Take 1,000 mg by mouth 3 times daily        Artificial Tear Solution (SOOTHE XP) SOLN Place 1 drop into both eyes 2 times daily       carbidopa-levodopa (SINEMET CR)  MG CR tablet Take 1 tablet by mouth At Bedtime       carbidopa-levodopa (SINEMET)  MG per tablet Take 1 tablet by mouth 4 times daily       clonazePAM (KLONOPIN) 0.5 MG tablet Take 1 tablet (0.5 mg) by mouth daily as needed for anxiety 30 tablet 5     donepezil (ARICEPT) 5 MG tablet Take 5 mg by mouth At Bedtime       entacapone (COMTAN) 200 MG tablet Take 100 mg by mouth 4 times daily        gabapentin (NEURONTIN) 100 MG capsule Take 300 mg by mouth At Bedtime And 100 mg every day at 4pm.  Also 100 mg po tid prn        MIDODRINE HCL PO Take 5 mg by mouth 3 times daily (before meals)       polyethylene glycol (MIRALAX/GLYCOLAX) Packet Take 17 g by mouth daily as needed for constipation (AND every other day scheduled)        rOPINIRole (REQUIP) 0.25 MG tablet Take 0.75 mg by mouth 3 times daily        rOPINIRole (REQUIP) 0.25 MG tablet Take 0.25 mg by mouth At Bedtime       senna-docusate (SENOKOT-S/PERICOLACE) 8.6-50 MG tablet Take 2 tablets by mouth daily       trolamine salicylate (ASPERCREME) 10 % external cream Apply topically 2 times daily       venlafaxine (EFFEXOR) 25 MG tablet Take 25 mg by mouth 2 times daily           REVIEW OF SYSTEMS:  Unobtainable secondary to cognitive impairment. Reports L hand feels ok, feels tired    Objective:  BP (!) 143/84   Pulse 60   Temp 98.5  F (36.9  C)   Resp 18   SpO2 91%   Exam:  GENERAL APPEARANCE:  in no distress, cooperative, sleepy  ENT:  Mouth and posterior oropharynx normal, moist mucous membranes, [place]  EYES:  EOM, conjunctivae, lids, pupils and irises normal, PERRL  NECK:  No adenopathy,masses or thyromegaly, FROM  RESP:  respiratory effort and palpation of chest normal, lungs clear to auscultation , no respiratory distress  CV:  Palpation and auscultation of heart done , regular rate and rhythm, no murmur, rub, or gallop, no edema  ABDOMEN:  normal bowel sounds, soft, nontender, no hepatosplenomegaly or other masses, no guarding or rebound  M/S:   gait per baseline with walker.  muscle strength 5/5 all 4 ext., some gen. LE weakness  NEURO:   CNs gross intact, increased lethargy  PSYCH:  insight and judgement impaired, memory impaired , affect abnormal -flat    Labs:     Most Recent 3 CBC's:  Recent Labs   Lab Test 08/18/20 01/22/20 07/26/19 06/19/19   WBC  --  5.9 7.8 6.5   HGB 14.7 14.4 14.0 13.6*   MCV  --  103* 99 101*   PLT  --  149 144* 140     Most Recent 3 BMP's:  Recent Labs   Lab Test 08/18/20 01/22/20 07/30/19    143 143   POTASSIUM 4.1 4.5 4.1   CHLORIDE  103 106 107   CO2 28 31 30   BUN 28 25 22   CR 1.01 0.84 0.79   ANIONGAP 10 6 6   SADIA 9.3 9.4 9.3   * 108 89       ASSESSMENT/PLAN:  Lethargy  New onset this am. CNs gross intact. Likely r/t decreased sleep last night, prn klonopin dose at 2:30 am  1. Hold effexor dose this am  2. Hold neurontin dose this afternoon  3. Try to keep engaged with activity today  4. Monitor for further sleep disturbance tonight    Recurrent falls  Some increase in freq. Over past couple weeks, had been more stable with restart of aricept.  Amb. To dining room this am, gait baseline.  - HOME CARE NURSING REFERRAL-PT to [facility]  2. Monitor for changes in gait, freezing episodes  3. Cont. Current PD meds    Hypotension, unspecified hypotension type  BPs, HRs gen. Stable. Occ HRs in upper 50s  1. Cont. Midodrine  2. Follow BPs, HRs  3. Encourage fluids  4. Cbc, bmp in next 1-3 mos      Electronically signed by:  DONY Clark CNP

## 2021-03-02 NOTE — LETTER
3/2/2021        RE: Carlos Neri  C/o Haylee Neri  [address] [address] [address]        [facility]  [place] Medical Record Number:  [id number]  Place of Service where encounter took place:  [facility] ([facility]) [[id number]]  Chief Complaint   Patient presents with     Fatigue       HPI:    Carlos Neri  is a 85 year old (1935), who is being seen today for an episodic care visit.  HPI information obtained from: facility chart records, facility staff, patient report and [facility] chart review. Today's concern is: lethargy, falls, hypotension.  Per staff has increased lethargy this am.  Yesterday was out of facility for dental appt. Last pm restless, unable to sleep. At 2:30 am had prn klonopin dose. neuros o/w baseline.  Had had some increased freq. Of falls past couple weeks. 2 falls past couple weeks. Had small skin tears to L hand.  No L hand pain today.  Amb. With walker. Cont. On sinemet for PD.  No recent episodes of freezing per staff. Some difficulty with transfers.  For hypotension cont. On midodrine.  Fluid intake stable. No recent reports of low BPs or c/o dizziness.       Past Medical and Surgical History reviewed in Epic today.    MEDICATIONS:    Current Outpatient Medications   Medication Sig Dispense Refill     acetaminophen (TYLENOL) 500 MG tablet Take 1,000 mg by mouth 3 times daily        Artificial Tear Solution (SOOTHE XP) SOLN Place 1 drop into both eyes 2 times daily       carbidopa-levodopa (SINEMET CR)  MG CR tablet Take 1 tablet by mouth At Bedtime       carbidopa-levodopa (SINEMET)  MG per tablet Take 1 tablet by mouth 4 times daily       clonazePAM (KLONOPIN) 0.5 MG tablet Take 1 tablet (0.5 mg) by mouth daily as needed for anxiety 30 tablet 5     donepezil (ARICEPT) 5 MG tablet Take 5 mg by mouth At Bedtime       entacapone (COMTAN) 200 MG tablet Take 100 mg by mouth 4 times daily        gabapentin  (NEURONTIN) 100 MG capsule Take 300 mg by mouth At Bedtime And 100 mg every day at 4pm.  Also 100 mg po tid prn       MIDODRINE HCL PO Take 5 mg by mouth 3 times daily (before meals)       polyethylene glycol (MIRALAX/GLYCOLAX) Packet Take 17 g by mouth daily as needed for constipation (AND every other day scheduled)        rOPINIRole (REQUIP) 0.25 MG tablet Take 0.75 mg by mouth 3 times daily        rOPINIRole (REQUIP) 0.25 MG tablet Take 0.25 mg by mouth At Bedtime       senna-docusate (SENOKOT-S/PERICOLACE) 8.6-50 MG tablet Take 2 tablets by mouth daily       trolamine salicylate (ASPERCREME) 10 % external cream Apply topically 2 times daily       venlafaxine (EFFEXOR) 25 MG tablet Take 25 mg by mouth 2 times daily           REVIEW OF SYSTEMS:  Unobtainable secondary to cognitive impairment. Reports L hand feels ok, feels tired    Objective:  BP (!) 143/84   Pulse 60   Temp 98.5  F (36.9  C)   Resp 18   SpO2 91%   Exam:  GENERAL APPEARANCE:  in no distress, cooperative, sleepy  ENT:  Mouth and posterior oropharynx normal, moist mucous membranes, [place]  EYES:  EOM, conjunctivae, lids, pupils and irises normal, PERRL  NECK:  No adenopathy,masses or thyromegaly, FROM  RESP:  respiratory effort and palpation of chest normal, lungs clear to auscultation , no respiratory distress  CV:  Palpation and auscultation of heart done , regular rate and rhythm, no murmur, rub, or gallop, no edema  ABDOMEN:  normal bowel sounds, soft, nontender, no hepatosplenomegaly or other masses, no guarding or rebound  M/S:   gait per baseline with walker.  muscle strength 5/5 all 4 ext., some gen. LE weakness  NEURO:   CNs gross intact, increased lethargy  PSYCH:  insight and judgement impaired, memory impaired , affect abnormal -flat    Labs:     Most Recent 3 CBC's:  Recent Labs   Lab Test 08/18/20 01/22/20 07/26/19 06/19/19   WBC  --  5.9 7.8 6.5   HGB 14.7 14.4 14.0 13.6*   MCV  --  103* 99 101*   PLT  --  149 144* 140     Most  Recent 3 BMP's:  Recent Labs   Lab Test 08/18/20 01/22/20 07/30/19    143 143   POTASSIUM 4.1 4.5 4.1   CHLORIDE 103 106 107   CO2 28 31 30   BUN 28 25 22   CR 1.01 0.84 0.79   ANIONGAP 10 6 6   SADIA 9.3 9.4 9.3   * 108 89       ASSESSMENT/PLAN:  Lethargy  New onset this am. CNs gross intact. Likely r/t decreased sleep last night, prn klonopin dose at 2:30 am  1. Hold effexor dose this am  2. Hold neurontin dose this afternoon  3. Try to keep engaged with activity today  4. Monitor for further sleep disturbance tonight    Recurrent falls  Some increase in freq. Over past couple weeks, had been more stable with restart of aricept.  Amb. To dining room this am, gait baseline.  - HOME CARE NURSING REFERRAL-PT to [facility]  2. Monitor for changes in gait, freezing episodes  3. Cont. Current PD meds    Hypotension, unspecified hypotension type  BPs, HRs gen. Stable. Occ HRs in upper 50s  1. Cont. Midodrine  2. Follow BPs, HRs  3. Encourage fluids  4. Cbc, bmp in next 1-3 mos      Electronically signed by:  DONY Clark CNP                 Sincerely,        DONY Clark CNP

## 2021-03-04 NOTE — LETTER
3/4/2021        RE: Carlos Neri  C/o Haylee Neri  [address] [address] [address]        [facility]  [place] Medical Record Number:  [id number]  Place of Service where encounter took place:  [facility] ([facility]) [[id number]]  Chief Complaint   Patient presents with     Fall     Dementia       HPI:    Carlos Neri  is a 85 year old (1935), who is being seen today for an episodic care visit.  HPI information obtained from: facility chart records, facility staff, patient report, [facility] chart review and family/first contact spouse report. Today's concern is:  Falls, dementia, diarrhea.  Has had ongoing falls.  No recent reports of dizziness.  BPs, HRs stable. Per staff, has increased anxiety, restlessness at times, increased activity level. At times moves impulsively, reaching for items.  No recent changes in gait. For dementia cont. On aricept, effexor, klonopin. Occ. Insomnia.  Spoke with spouse about recurrent falls. She wishes to make resident comfort cares at this time. May consider hospice cares.  Spouse to also start looking for alt. Placement at smaller secured memory care unit, or SNF. No recent reports of diarrhea.  Has recent h/o loose stools, stool urgency, falls in bathroom.     Past Medical and Surgical History reviewed in Epic today.    MEDICATIONS:    Current Outpatient Medications   Medication Sig Dispense Refill     acetaminophen (TYLENOL) 500 MG tablet Take 1,000 mg by mouth 3 times daily        Artificial Tear Solution (SOOTHE XP) SOLN Place 1 drop into both eyes 2 times daily       carbidopa-levodopa (SINEMET CR)  MG CR tablet Take 1 tablet by mouth At Bedtime       carbidopa-levodopa (SINEMET)  MG per tablet Take 1 tablet by mouth 4 times daily       clonazePAM (KLONOPIN) 0.5 MG tablet Take 1 tablet (0.5 mg) by mouth daily as needed for anxiety 30 tablet 5     donepezil (ARICEPT) 5 MG tablet Take 5 mg by  mouth At Bedtime       entacapone (COMTAN) 200 MG tablet Take 100 mg by mouth 4 times daily        gabapentin (NEURONTIN) 100 MG capsule Take 300 mg by mouth At Bedtime And 100 mg every day at 4pm.  Also 100 mg po tid prn       MIDODRINE HCL PO Take 5 mg by mouth 3 times daily (before meals)       polyethylene glycol (MIRALAX/GLYCOLAX) Packet Take 17 g by mouth daily as needed for constipation (AND every other day scheduled)        rOPINIRole (REQUIP) 0.25 MG tablet Take 0.75 mg by mouth 3 times daily        rOPINIRole (REQUIP) 0.25 MG tablet Take 0.25 mg by mouth At Bedtime       senna-docusate (SENOKOT-S/PERICOLACE) 8.6-50 MG tablet Take 2 tablets by mouth daily       trolamine salicylate (ASPERCREME) 10 % external cream Apply topically 2 times daily       venlafaxine (EFFEXOR) 25 MG tablet Take 25 mg by mouth 2 times daily           REVIEW OF SYSTEMS:  Unobtainable secondary to cognitive impairment. Reports feeling ok, no current pains    Objective:  /62   Pulse 62   Resp 18   Wt 59 kg (130 lb)   SpO2 93%   BMI 17.63 kg/m    Exam:  GENERAL APPEARANCE:  Alert, in no distress, thin, cooperative  ENT:  Mouth and posterior oropharynx normal, moist mucous membranes, [place]  EYES:  EOM, conjunctivae, lids, pupils and irises normal, PERRL  NECK:  FROM  RESP:  respiratory effort and palpation of chest normal, lungs clear to auscultation , no respiratory distress  CV:  Palpation and auscultation of heart done , regular rate and rhythm, no murmur, rub, or gallop, no edema  ABDOMEN:  normal bowel sounds, soft, nontender, no hepatosplenomegaly or other masses, no guarding or rebound  M/S:   muscle strength 5/5 all 4 ext. some gen. LE edema with some diff. standing from seated pos.  gait steady with walker  NEURO:   Cranial nerves 2-12 are normal tested and grossly at patient's baseline, speech soft, clear  PSYCH:  insight and judgement impaired, memory impaired , affect abnormal -flat    Labs: 3/4/21 cbc, bmp  stable    Most Recent 3 CBC's:  Recent Labs   Lab Test 08/18/20 01/22/20 07/26/19 06/19/19   WBC  --  5.9 7.8 6.5   HGB 14.7 14.4 14.0 13.6*   MCV  --  103* 99 101*   PLT  --  149 144* 140     Most Recent 3 BMP's:  Recent Labs   Lab Test 08/18/20 01/22/20 07/30/19    143 143   POTASSIUM 4.1 4.5 4.1   CHLORIDE 103 106 107   CO2 28 31 30   BUN 28 25 22   CR 1.01 0.84 0.79   ANIONGAP 10 6 6   SADIA 9.3 9.4 9.3   * 108 89       ASSESSMENT/PLAN:  Recurrent falls  Ongoing. Appears r/t impulsive behaviors, reaching for items, restlessness  1. Cont. PT  2. Monitor for changes in gait  3. Cont. Current PD meds. Monitor for increased rigidity  4. Cont. Current safety checks  5. Spouse will start to look for alt. placement    Lewy body dementia with behavioral disturbance (H)  Memory loss. Increased anxiety, restlessness at times  1. Cont. Aricept, effexor  2. Cont. neurontin  3. Cont. Prn klonopin  4. For increased restlessness in pms, utilize prn neurontin  5. Spouse to consider start of hospice cares once PT done.  Add comfort cares to code status  6. Spouse to make f/u eye  appt to f/u on macular deg.-staff, spouse reporting some more recent difficulty reaching for, picking up items, using utensils    Diarrhea, unspecified type  Currently stable  1. Cont. Current bowel meds  2. Monitor for increase in s/s  3. Encourage fluids        Electronically signed by:  DONY Clark CNP                 Sincerely,        DONY Clark CNP

## 2021-03-04 NOTE — PROGRESS NOTES
[facility]  [place] Medical Record Number:  [id number]  Place of Service where encounter took place:  [facility] (S) [[id number]]  Chief Complaint   Patient presents with     Fall     Dementia       HPI:    Carlos Neri  is a 85 year old (1935), who is being seen today for an episodic care visit.  HPI information obtained from: facility chart records, facility staff, patient report, [facility] chart review and family/first contact spouse report. Today's concern is:  Falls, dementia, diarrhea.  Has had ongoing falls.  No recent reports of dizziness.  BPs, HRs stable. Per staff, has increased anxiety, restlessness at times, increased activity level. At times moves impulsively, reaching for items.  No recent changes in gait. For dementia cont. On aricept, effexor, klonopin. Occ. Insomnia.  Spoke with spouse about recurrent falls. She wishes to make resident comfort cares at this time. May consider hospice cares.  Spouse to also start looking for alt. Placement at smaller secured memory care unit, or SNF. No recent reports of diarrhea.  Has recent h/o loose stools, stool urgency, falls in bathroom.     Past Medical and Surgical History reviewed in Epic today.    MEDICATIONS:    Current Outpatient Medications   Medication Sig Dispense Refill     acetaminophen (TYLENOL) 500 MG tablet Take 1,000 mg by mouth 3 times daily        Artificial Tear Solution (SOOTHE XP) SOLN Place 1 drop into both eyes 2 times daily       carbidopa-levodopa (SINEMET CR)  MG CR tablet Take 1 tablet by mouth At Bedtime       carbidopa-levodopa (SINEMET)  MG per tablet Take 1 tablet by mouth 4 times daily       clonazePAM (KLONOPIN) 0.5 MG tablet Take 1 tablet (0.5 mg) by mouth daily as needed for anxiety 30 tablet 5     donepezil (ARICEPT) 5 MG tablet Take 5 mg by mouth At Bedtime       entacapone (COMTAN) 200 MG tablet Take 100 mg by mouth 4 times daily        gabapentin  (NEURONTIN) 100 MG capsule Take 300 mg by mouth At Bedtime And 100 mg every day at 4pm.  Also 100 mg po tid prn       MIDODRINE HCL PO Take 5 mg by mouth 3 times daily (before meals)       polyethylene glycol (MIRALAX/GLYCOLAX) Packet Take 17 g by mouth daily as needed for constipation (AND every other day scheduled)        rOPINIRole (REQUIP) 0.25 MG tablet Take 0.75 mg by mouth 3 times daily        rOPINIRole (REQUIP) 0.25 MG tablet Take 0.25 mg by mouth At Bedtime       senna-docusate (SENOKOT-S/PERICOLACE) 8.6-50 MG tablet Take 2 tablets by mouth daily       trolamine salicylate (ASPERCREME) 10 % external cream Apply topically 2 times daily       venlafaxine (EFFEXOR) 25 MG tablet Take 25 mg by mouth 2 times daily           REVIEW OF SYSTEMS:  Unobtainable secondary to cognitive impairment. Reports feeling ok, no current pains    Objective:  /62   Pulse 62   Resp 18   Wt 59 kg (130 lb)   SpO2 93%   BMI 17.63 kg/m    Exam:  GENERAL APPEARANCE:  Alert, in no distress, thin, cooperative  ENT:  Mouth and posterior oropharynx normal, moist mucous membranes, [place]  EYES:  EOM, conjunctivae, lids, pupils and irises normal, PERRL  NECK:  FROM  RESP:  respiratory effort and palpation of chest normal, lungs clear to auscultation , no respiratory distress  CV:  Palpation and auscultation of heart done , regular rate and rhythm, no murmur, rub, or gallop, no edema  ABDOMEN:  normal bowel sounds, soft, nontender, no hepatosplenomegaly or other masses, no guarding or rebound  M/S:   muscle strength 5/5 all 4 ext. some gen. LE edema with some diff. standing from seated pos.  gait steady with walker  NEURO:   Cranial nerves 2-12 are normal tested and grossly at patient's baseline, speech soft, clear  PSYCH:  insight and judgement impaired, memory impaired , affect abnormal -flat    Labs: 3/4/21 cbc, bmp stable    Most Recent 3 CBC's:  Recent Labs   Lab Test 08/18/20 01/22/20 07/26/19 06/19/19   WBC  --  5.9 7.8 6.5    HGB 14.7 14.4 14.0 13.6*   MCV  --  103* 99 101*   PLT  --  149 144* 140     Most Recent 3 BMP's:  Recent Labs   Lab Test 08/18/20 01/22/20 07/30/19    143 143   POTASSIUM 4.1 4.5 4.1   CHLORIDE 103 106 107   CO2 28 31 30   BUN 28 25 22   CR 1.01 0.84 0.79   ANIONGAP 10 6 6   SADIA 9.3 9.4 9.3   * 108 89       ASSESSMENT/PLAN:  Recurrent falls  Ongoing. Appears r/t impulsive behaviors, reaching for items, restlessness  1. Cont. PT  2. Monitor for changes in gait  3. Cont. Current PD meds. Monitor for increased rigidity  4. Cont. Current safety checks  5. Spouse will start to look for alt. placement    Lewy body dementia with behavioral disturbance (H)  Memory loss. Increased anxiety, restlessness at times  1. Cont. Aricept, effexor  2. Cont. neurontin  3. Cont. Prn klonopin  4. For increased restlessness in pms, utilize prn neurontin  5. Spouse to consider start of hospice cares once PT done.  Add comfort cares to code status  6. Spouse to make f/u eye  appt to f/u on macular deg.-staff, spouse reporting some more recent difficulty reaching for, picking up items, using utensils    Diarrhea, unspecified type  Currently stable  1. Cont. Current bowel meds  2. Monitor for increase in s/s  3. Encourage fluids        Electronically signed by:  DONY Clark CNP

## 2021-03-09 NOTE — LETTER
3/9/2021        RE: Carlos Neri  C/o Haylee Neri  [address] [address] [address]        [facility]  [place] Medical Record Number:  [id number]  Place of Service where encounter took place:  [facility] ([facility]) [[id number]]  Chief Complaint   Patient presents with     Fall     Anxiety       HPI:    Carlos Neri  is a 85 year old (1935), who is being seen today for an episodic care visit.  HPI information obtained from: facility chart records, facility staff, patient report and [facility] chart review. Today's concern is: falls, dementia, anxiety. Has had ongoing recurrent falls.  Cont. On sinemet for PD. Had worked with PT-noted to have occ freezing. Has memory loss due to dementia. Cont. On aricept. Has had increased anxiety, restlessness at times.  Started on hospice cares. Currently using low Broda chair. Is able to foot propel.  Has ongoing episodes of increased restlessness, attempts to self-transfer.  Started on sched, prn ativan. Started on prn haldol.  Sched. Ativan. Somewhat effective for increased anxiety.        Past Medical and Surgical History reviewed in Epic today.    MEDICATIONS:    Current Outpatient Medications   Medication Sig Dispense Refill     haloperidol (HALDOL) 0.5 MG tablet Take 0.5 mg by mouth every 6 hours And q 4 hr prn       LORazepam (ATIVAN) 0.5 MG tablet Take 0.5 mg by mouth every 6 hours And q 2 hr prn       morphine 2.5 MG solu-tab Take 2.5 mg by mouth every hour as needed for shortness of breath / dyspnea or moderate to severe pain       acetaminophen (TYLENOL) 500 MG tablet Take 1,000 mg by mouth 3 times daily        Artificial Tear Solution (SOOTHE XP) SOLN Place 1 drop into both eyes 2 times daily       carbidopa-levodopa (SINEMET CR)  MG CR tablet Take 1 tablet by mouth At Bedtime       carbidopa-levodopa (SINEMET)  MG per tablet Take 1 tablet by mouth 4 times daily       clonazePAM  (KLONOPIN) 0.5 MG tablet Take 1 tablet (0.5 mg) by mouth daily as needed for anxiety 30 tablet 5     donepezil (ARICEPT) 5 MG tablet Take 5 mg by mouth At Bedtime       entacapone (COMTAN) 200 MG tablet Take 100 mg by mouth 4 times daily        gabapentin (NEURONTIN) 100 MG capsule Take 300 mg by mouth At Bedtime And 100 mg every day at 4pm.  Also 100 mg po tid prn       MIDODRINE HCL PO Take 5 mg by mouth 3 times daily (before meals)       polyethylene glycol (MIRALAX/GLYCOLAX) Packet Take 17 g by mouth daily as needed for constipation (AND every other day scheduled)        rOPINIRole (REQUIP) 0.25 MG tablet Take 0.75 mg by mouth 3 times daily        rOPINIRole (REQUIP) 0.25 MG tablet Take 0.25 mg by mouth At Bedtime       senna-docusate (SENOKOT-S/PERICOLACE) 8.6-50 MG tablet Take 2 tablets by mouth daily       trolamine salicylate (ASPERCREME) 10 % external cream Apply topically 2 times daily       venlafaxine (EFFEXOR) 25 MG tablet Take 25 mg by mouth 2 times daily           REVIEW OF SYSTEMS:  Unobtainable secondary to cognitive impairment. Reports feeling ok today    Objective:  /77   Pulse 63   Resp 18   SpO2 92%   Exam:  GENERAL APPEARANCE:  Alert, thin, anxious  ENT:  Mouth and posterior oropharynx normal, moist mucous membranes, [place]  EYES:  EOM, conjunctivae, lids, pupils and irises normal, PERRL  RESP:  respiratory effort and palpation of chest normal, lungs clear to auscultation , no respiratory distress  CV:  Palpation and auscultation of heart done , regular rate and rhythm, no murmur, rub, or gallop, no edema  ABDOMEN:  normal bowel sounds, soft, nontender, no hepatosplenomegaly or other masses, no guarding or rebound  M/S:   muscle strength 5/5 all 4 ext., some gen. LE weakness  NEURO:   Cranial nerves 2-12 are normal tested and grossly at patient's baseline, speech soft, clear  PSYCH:  insight and judgement impaired, memory impaired , affect abnormal -flat    Labs:     Most Recent 3  CBC's:  Recent Labs   Lab Test 08/18/20 01/22/20 07/26/19 06/19/19   WBC  --  5.9 7.8 6.5   HGB 14.7 14.4 14.0 13.6*   MCV  --  103* 99 101*   PLT  --  149 144* 140     Most Recent 3 BMP's:  Recent Labs   Lab Test 08/18/20 01/22/20 07/30/19    143 143   POTASSIUM 4.1 4.5 4.1   CHLORIDE 103 106 107   CO2 28 31 30   BUN 28 25 22   CR 1.01 0.84 0.79   ANIONGAP 10 6 6   SADIA 9.3 9.4 9.3   * 108 89       ASSESSMENT/PLAN:  Recurrent falls  Ongoing impulsive behaviors at times. No current pain  1. Cont. broda chair in low position  2. Cont to use walker for transfers  3. Monitor for reports of pain  4. Cont. Hospice cares    Lewy body dementia with behavioral disturbance (H)  Memory loss, some recent functional decline with increased freq. Of falls, some increased need of staff assist with ADLs  1. Cont. aricept  2. Start sched. Haldol 0.5 mg q6 hr  3. Cont. Prn haldol  4. Reassess over next few days    Anxiety  Ongoing increased s/s at times  1. Cont. Ativan q 6 hr and prn  2. Add haldol as above  3. Cont prn klonopin  4. Reassess over next few days, for ongoing increased s/s may increase sched. Ativan dose to 1 mg q6 hr      Electronically signed by:  DONY Clark CNP                 Sincerely,        DONY Clark CNP

## 2021-03-09 NOTE — PROGRESS NOTES
[facility]  [place] Medical Record Number:  [id number]  Place of Service where encounter took place:  [facility] ([facility]) [[id number]]  Chief Complaint   Patient presents with     Fall     Anxiety       HPI:    Carlos Neri  is a 85 year old (1935), who is being seen today for an episodic care visit.  HPI information obtained from: facility chart records, facility staff, patient report and [facility] chart review. Today's concern is: falls, dementia, anxiety. Has had ongoing recurrent falls.  Cont. On sinemet for PD. Had worked with PT-noted to have occ freezing. Has memory loss due to dementia. Cont. On aricept. Has had increased anxiety, restlessness at times.  Started on hospice cares. Currently using low Broda chair. Is able to foot propel.  Has ongoing episodes of increased restlessness, attempts to self-transfer.  Started on sched, prn ativan. Started on prn haldol.  Sched. Ativan. Somewhat effective for increased anxiety.        Past Medical and Surgical History reviewed in Epic today.    MEDICATIONS:    Current Outpatient Medications   Medication Sig Dispense Refill     haloperidol (HALDOL) 0.5 MG tablet Take 0.5 mg by mouth every 6 hours And q 4 hr prn       LORazepam (ATIVAN) 0.5 MG tablet Take 0.5 mg by mouth every 6 hours And q 2 hr prn       morphine 2.5 MG solu-tab Take 2.5 mg by mouth every hour as needed for shortness of breath / dyspnea or moderate to severe pain       acetaminophen (TYLENOL) 500 MG tablet Take 1,000 mg by mouth 3 times daily        Artificial Tear Solution (SOOTHE XP) SOLN Place 1 drop into both eyes 2 times daily       carbidopa-levodopa (SINEMET CR)  MG CR tablet Take 1 tablet by mouth At Bedtime       carbidopa-levodopa (SINEMET)  MG per tablet Take 1 tablet by mouth 4 times daily       clonazePAM (KLONOPIN) 0.5 MG tablet Take 1 tablet (0.5 mg) by mouth daily as needed for anxiety 30 tablet 5     donepezil  (ARICEPT) 5 MG tablet Take 5 mg by mouth At Bedtime       entacapone (COMTAN) 200 MG tablet Take 100 mg by mouth 4 times daily        gabapentin (NEURONTIN) 100 MG capsule Take 300 mg by mouth At Bedtime And 100 mg every day at 4pm.  Also 100 mg po tid prn       MIDODRINE HCL PO Take 5 mg by mouth 3 times daily (before meals)       polyethylene glycol (MIRALAX/GLYCOLAX) Packet Take 17 g by mouth daily as needed for constipation (AND every other day scheduled)        rOPINIRole (REQUIP) 0.25 MG tablet Take 0.75 mg by mouth 3 times daily        rOPINIRole (REQUIP) 0.25 MG tablet Take 0.25 mg by mouth At Bedtime       senna-docusate (SENOKOT-S/PERICOLACE) 8.6-50 MG tablet Take 2 tablets by mouth daily       trolamine salicylate (ASPERCREME) 10 % external cream Apply topically 2 times daily       venlafaxine (EFFEXOR) 25 MG tablet Take 25 mg by mouth 2 times daily           REVIEW OF SYSTEMS:  Unobtainable secondary to cognitive impairment. Reports feeling ok today    Objective:  /77   Pulse 63   Resp 18   SpO2 92%   Exam:  GENERAL APPEARANCE:  Alert, thin, anxious  ENT:  Mouth and posterior oropharynx normal, moist mucous membranes, [place]  EYES:  EOM, conjunctivae, lids, pupils and irises normal, PERRL  RESP:  respiratory effort and palpation of chest normal, lungs clear to auscultation , no respiratory distress  CV:  Palpation and auscultation of heart done , regular rate and rhythm, no murmur, rub, or gallop, no edema  ABDOMEN:  normal bowel sounds, soft, nontender, no hepatosplenomegaly or other masses, no guarding or rebound  M/S:   muscle strength 5/5 all 4 ext., some gen. LE weakness  NEURO:   Cranial nerves 2-12 are normal tested and grossly at patient's baseline, speech soft, clear  PSYCH:  insight and judgement impaired, memory impaired , affect abnormal -flat    Labs:     Most Recent 3 CBC's:  Recent Labs   Lab Test 08/18/20 01/22/20 07/26/19 06/19/19   WBC  --  5.9 7.8 6.5   HGB 14.7 14.4 14.0 13.6*    MCV  --  103* 99 101*   PLT  --  149 144* 140     Most Recent 3 BMP's:  Recent Labs   Lab Test 08/18/20 01/22/20 07/30/19    143 143   POTASSIUM 4.1 4.5 4.1   CHLORIDE 103 106 107   CO2 28 31 30   BUN 28 25 22   CR 1.01 0.84 0.79   ANIONGAP 10 6 6   SADIA 9.3 9.4 9.3   * 108 89       ASSESSMENT/PLAN:  Recurrent falls  Ongoing impulsive behaviors at times. No current pain  1. Cont. broda chair in low position  2. Cont to use walker for transfers  3. Monitor for reports of pain  4. Cont. Hospice cares    Lewy body dementia with behavioral disturbance (H)  Memory loss, some recent functional decline with increased freq. Of falls, some increased need of staff assist with ADLs  1. Cont. aricept  2. Start sched. Haldol 0.5 mg q6 hr  3. Cont. Prn haldol  4. Reassess over next few days    Anxiety  Ongoing increased s/s at times  1. Cont. Ativan q 6 hr and prn  2. Add haldol as above  3. Cont prn klonopin  4. Reassess over next few days, for ongoing increased s/s may increase sched. Ativan dose to 1 mg q6 hr      Electronically signed by:  DONY Clark CNP

## 2021-03-22 NOTE — PROGRESS NOTES
[facility]  [place] Medical Record Number:  [id number]  Place of Service where encounter took place:  [facility] ([facility]) [[id number]]  Chief Complaint   Patient presents with     Anxiety     Fall       HPI:    Carlos Neri  is a 85 year old (1935), who is being seen today for an episodic care visit.  HPI information obtained from: facility chart records, facility staff and [facility] chart review. Today's concern is: anxiety, falls, dysphagia. Recently started on hospice cares. Has dementia, PD.  Has had increased freq. Of falls. Increase in gen. Weakness.  Has ongoing increased anxiety, restlessness at times.  Several fall over weekend, out of bed, and out of broda chair.  At times has increased restlessness.  Per staff, prn ativan effective.  Also taking haldol.  Has dysphagia.  Per staff, requires sig increased assist with eating, however po intake gen. Stable. No reports of coughing with meals or changes in resp. Status.       Past Medical and Surgical History reviewed in Epic today.    MEDICATIONS:    Current Outpatient Medications   Medication Sig Dispense Refill     haloperidol (HALDOL) 1 MG tablet Take 1 mg by mouth every 4 hours       LORazepam (ATIVAN) 2 MG tablet Take 2 mg by mouth every 4 hours And q 2 hr prn       acetaminophen (TYLENOL) 500 MG tablet Take 1,000 mg by mouth 3 times daily        Artificial Tear Solution (SOOTHE XP) SOLN Place 1 drop into both eyes 2 times daily       carbidopa-levodopa (SINEMET CR)  MG CR tablet Take 1 tablet by mouth At Bedtime       carbidopa-levodopa (SINEMET)  MG per tablet Take 1 tablet by mouth 4 times daily       clonazePAM (KLONOPIN) 0.5 MG tablet Take 1 tablet (0.5 mg) by mouth daily as needed for anxiety 30 tablet 5     donepezil (ARICEPT) 5 MG tablet Take 5 mg by mouth At Bedtime       entacapone (COMTAN) 200 MG tablet Take 100 mg by mouth 4 times daily        gabapentin (NEURONTIN) 100 MG  capsule Take 300 mg by mouth At Bedtime And 100 mg every day at 4pm.  Also 100 mg po tid prn       MIDODRINE HCL PO Take 5 mg by mouth 3 times daily (before meals)       morphine 2.5 MG solu-tab Take 2.5 mg by mouth every hour as needed for shortness of breath / dyspnea or moderate to severe pain       polyethylene glycol (MIRALAX/GLYCOLAX) Packet Take 17 g by mouth daily as needed for constipation (AND every other day scheduled)        rOPINIRole (REQUIP) 0.25 MG tablet Take 0.75 mg by mouth 3 times daily        rOPINIRole (REQUIP) 0.25 MG tablet Take 0.25 mg by mouth At Bedtime       senna-docusate (SENOKOT-S/PERICOLACE) 8.6-50 MG tablet Take 2 tablets by mouth daily       trolamine salicylate (ASPERCREME) 10 % external cream Apply topically 2 times daily       venlafaxine (EFFEXOR) 25 MG tablet Take 25 mg by mouth 2 times daily           REVIEW OF SYSTEMS:  Unobtainable secondary to cognitive impairment.     Objective:  /67   Pulse 67   Resp 16   SpO2 92%   Exam:  GENERAL APPEARANCE:  in no distress, thin, cooperative  ENT:  Mouth and posterior oropharynx normal, moist mucous membranes, [place]  EYES:  EOM, conjunctivae, lids, pupils and irises normal, PERRL  RESP:  respiratory effort and palpation of chest normal, lungs clear to auscultation , no respiratory distress  CV:  Palpation and auscultation of heart done , regular rate and rhythm, no murmur, rub, or gallop, no edema  ABDOMEN:  normal bowel sounds, soft, nontender, no hepatosplenomegaly or other masses, no guarding or rebound  M/S:   muscle strength 5/5 UEs, gen. LE weakness.  SKIN:  mult. bruises of UEs  NEURO:   Cranial nerves 2-12 are normal tested and grossly at patient's baseline  PSYCH:  insight and judgement impaired, memory impaired , affect abnormal -flat    Labs:     Most Recent 3 CBC's:  Recent Labs   Lab Test 08/18/20 01/22/20 07/26/19 06/19/19   WBC  --  5.9 7.8 6.5   HGB 14.7 14.4 14.0 13.6*   MCV  --  103* 99 101*   PLT  --  149 144*  140     Most Recent 3 BMP's:  Recent Labs   Lab Test 08/18/20 01/22/20 07/30/19    143 143   POTASSIUM 4.1 4.5 4.1   CHLORIDE 103 106 107   CO2 28 31 30   BUN 28 25 22   CR 1.01 0.84 0.79   ANIONGAP 10 6 6   SADIA 9.3 9.4 9.3   * 108 89       ASSESSMENT/PLAN:  Anxiety  Ongoing s/s  1. Cont. Hospice cares  2. Increase sched. Ativan to2 mg q 4 hr  3. Cont prn ativan  4. Increase sched. Haldol to 1 mg q4 hr  5. Reassess for effectiveness over next couple days    Falls frequently  Increased restlessness at times, no longer amb. Gen. LE weakness  1. Address anxiety as above  2. Cont. Fall pads by bed  3. Staff to lower bed-will use mattress on floor, sarai lift for transfers  4. Cont. Neurontin, MS for pain    Dysphagia, unspecified type  Po intake gen stable. Increased need of assist with eating  1. Cont. Staff assist with meals  2. Cont. To offer finger food throughout day  3. Cont. NTL  4. Monitor for changes in resp. Status. Cont. levsin        Electronically signed by:  DONY Clark CNP

## 2021-03-22 NOTE — LETTER
3/22/2021        RE: Carlos Neri  C/o Haylee Neri  [id number] [facility] [address]        [facility]  [place] [facility] Record Number:  [id number]  Place of Service where encounter took place:  [facility] (Critical access hospital) [[id number]]  Chief Complaint   Patient presents with     Anxiety     Fall       HPI:    Carlos Neri  is a 85 year old (1935), who is being seen today for an episodic care visit.  HPI information obtained from: facility chart records, facility staff and [facility] chart review. Today's concern is: anxiety, falls, dysphagia. Recently started on hospice cares. Has dementia, PD.  Has had increased freq. Of falls. Increase in gen. Weakness.  Has ongoing increased anxiety, restlessness at times.  Several fall over weekend, out of bed, and out of broda chair.  At times has increased restlessness.  Per staff, prn ativan effective.  Also taking haldol.  Has dysphagia.  Per staff, requires sig increased assist with eating, however po intake gen. Stable. No reports of coughing with meals or changes in resp. Status.       Past Medical and Surgical History reviewed in Epic today.    MEDICATIONS:    Current Outpatient Medications   Medication Sig Dispense Refill     haloperidol (HALDOL) 1 MG tablet Take 1 mg by mouth every 4 hours       LORazepam (ATIVAN) 2 MG tablet Take 2 mg by mouth every 4 hours And q 2 hr prn       acetaminophen (TYLENOL) 500 MG tablet Take 1,000 mg by mouth 3 times daily        Artificial Tear Solution (SOOTHE XP) SOLN Place 1 drop into both eyes 2 times daily       carbidopa-levodopa (SINEMET CR)  MG CR tablet Take 1 tablet by mouth At Bedtime       carbidopa-levodopa (SINEMET)  MG per tablet Take 1 tablet by mouth 4 times daily       clonazePAM (KLONOPIN) 0.5 MG tablet Take 1 tablet (0.5 mg) by mouth daily as needed for anxiety 30 tablet 5     donepezil (ARICEPT) 5 MG tablet Take 5 mg by mouth At Bedtime        entacapone (COMTAN) 200 MG tablet Take 100 mg by mouth 4 times daily        gabapentin (NEURONTIN) 100 MG capsule Take 300 mg by mouth At Bedtime And 100 mg every day at 4pm.  Also 100 mg po tid prn       MIDODRINE HCL PO Take 5 mg by mouth 3 times daily (before meals)       morphine 2.5 MG solu-tab Take 2.5 mg by mouth every hour as needed for shortness of breath / dyspnea or moderate to severe pain       polyethylene glycol (MIRALAX/GLYCOLAX) Packet Take 17 g by mouth daily as needed for constipation (AND every other day scheduled)        rOPINIRole (REQUIP) 0.25 MG tablet Take 0.75 mg by mouth 3 times daily        rOPINIRole (REQUIP) 0.25 MG tablet Take 0.25 mg by mouth At Bedtime       senna-docusate (SENOKOT-S/PERICOLACE) 8.6-50 MG tablet Take 2 tablets by mouth daily       trolamine salicylate (ASPERCREME) 10 % external cream Apply topically 2 times daily       venlafaxine (EFFEXOR) 25 MG tablet Take 25 mg by mouth 2 times daily           REVIEW OF SYSTEMS:  Unobtainable secondary to cognitive impairment.     Objective:  /67   Pulse 67   Resp 16   SpO2 92%   Exam:  GENERAL APPEARANCE:  in no distress, thin, cooperative  ENT:  Mouth and posterior oropharynx normal, moist mucous membranes, [place]  EYES:  EOM, conjunctivae, lids, pupils and irises normal, PERRL  RESP:  respiratory effort and palpation of chest normal, lungs clear to auscultation , no respiratory distress  CV:  Palpation and auscultation of heart done , regular rate and rhythm, no murmur, rub, or gallop, no edema  ABDOMEN:  normal bowel sounds, soft, nontender, no hepatosplenomegaly or other masses, no guarding or rebound  M/S:   muscle strength 5/5 UEs, gen. LE weakness.  SKIN:  mult. bruises of UEs  NEURO:   Cranial nerves 2-12 are normal tested and grossly at patient's baseline  PSYCH:  insight and judgement impaired, memory impaired , affect abnormal -flat    Labs:     Most Recent 3 CBC's:  Recent Labs   Lab Test 08/18/20 01/22/20  07/26/19 06/19/19   WBC  --  5.9 7.8 6.5   HGB 14.7 14.4 14.0 13.6*   MCV  --  103* 99 101*   PLT  --  149 144* 140     Most Recent 3 BMP's:  Recent Labs   Lab Test 08/18/20 01/22/20 07/30/19    143 143   POTASSIUM 4.1 4.5 4.1   CHLORIDE 103 106 107   CO2 28 31 30   BUN 28 25 22   CR 1.01 0.84 0.79   ANIONGAP 10 6 6   SADIA 9.3 9.4 9.3   * 108 89       ASSESSMENT/PLAN:  Anxiety  Ongoing s/s  1. Cont. Hospice cares  2. Increase sched. Ativan to2 mg q 4 hr  3. Cont prn ativan  4. Increase sched. Haldol to 1 mg q4 hr  5. Reassess for effectiveness over next couple days    Falls frequently  Increased restlessness at times, no longer amb. Gen. LE weakness  1. Address anxiety as above  2. Cont. Fall pads by bed  3. Staff to lower bed-will use mattress on floor, sarai lift for transfers  4. Cont. Neurontin, MS for pain    Dysphagia, unspecified type  Po intake gen stable. Increased need of assist with eating  1. Cont. Staff assist with meals  2. Cont. To offer finger food throughout day  3. Cont. NTL  4. Monitor for changes in resp. Status. Cont. levsin        Electronically signed by:  DONY Clark CNP                 Sincerely,        DONY Clark CNP

## 2021-05-30 VITALS — WEIGHT: 174 LBS | BODY MASS INDEX: 23.6 KG/M2

## 2021-05-31 VITALS — WEIGHT: 166 LBS | BODY MASS INDEX: 22.51 KG/M2

## 2021-05-31 VITALS — WEIGHT: 170 LBS | BODY MASS INDEX: 23.06 KG/M2

## 2021-05-31 VITALS — BODY MASS INDEX: 22.92 KG/M2 | WEIGHT: 169 LBS

## 2021-05-31 VITALS — BODY MASS INDEX: 23.6 KG/M2 | WEIGHT: 174 LBS

## 2021-06-01 VITALS — BODY MASS INDEX: 21.29 KG/M2 | WEIGHT: 157 LBS

## 2021-06-09 NOTE — PROGRESS NOTES
"Occupational Therapy  Occupational Therapy  Movement Disorders/Parkinson's Occupational Therapy Initial Evaluation    Goes by \"Al\"    Medicare Insurance           Units: Sameera Collier   Total Minutes: 55 minutes     Medical Diagnosis: Parkinson's Disease   Treatment Diagnosis:Lack of Coordination 781.3    Referring Practitioner: Dari Molina MD   Date of Onset: 03/08/2017  Start of Care: March 31, 2017    ASSESSMENT  Patient presented to therapy with flat affect,however able to engage in conversation and smile/laugh with therapist as session progressed. Patient was alone, ambulating with SEC- however during ambulation was carrying cane instead of using it for balance, stated son dropped him off. Patient's son has moved in with patient and patient's wife as son needs to \"organize some things\". This has been beneficial for the patient has son has been able to drive patient to appointments/errands. Patient has current deficits in the areas of fine motor coordination and gross motor coordination, I/ADLs, and cognition per nine hole peg test, box and blocks, and MOCA. Patient is appropriate for skilled OT at this time to address functional deficits and improve quality of life. Patient seems willing to participate in therapy but benefits from repetitive explanation for understanding.     Recommendations: Patient is appropriate for 1:1 skilled OT at this time.    PLAN  Frequency/Duration: 1-2 times per week, for 4-6 weeks; Up to 8-12 visits    Long-term goals: Patient will incorporate large amplitude movements into I/ADL tasks to improve efficiency and safety., Patient will demonstrate and verbalize understanding of work efficiency techniques related to PD to improve safety and decrease fatigue., Patient will improve FMC per 9HPT by 5 seconds seconds to improve I/ADL independence., Patient will improve GMC per B & B by 5 blocks blocks to improve I/ADL independence., Patient will participate in cognitive assess, for tx planning and " recommendations re: I/ADLs., Patient will decrease double vision by participatine in oculomotor exercises and verbalize and demonstrate understanding of HEP. and Patient will verbalize and demonstrate understanding of home exercise program for UE's (strength, A/AA/P/SROM) with B (bilateral, right,left) UE to improve function with I/ADLs.    Plan of Care: Self Cares / ADL Training, Communications with patient, caregiver and treatment Team, Patient/ Family instruction: Etiology of diagnosis or presented symtoms, Treatment plan/rationale, Home Exercise Program and Expected Functional Outcomes , Cognitive Training: for memory compensation and safety techniques , Therapeutic Exercise: to improve coordination, Mobility/ Transfer Training, Compensatory Strategies: for IADLS/ and/or cognition and Adaptive Equipment/ DME: for home and EC   Prognosis to achieve goals: Good    Goals were established with the patient: Yes    SUBJECTIVE  Pain: No    Past Medical History: See medical chart    Current Level of Function:  Home  Shopping: wife does most of the time  Meals: wife makes most of the meals, patient able to complete simple meals  Housework:  every two weeks  Laundry: wife does laundry  Driving: does not drive- son drives and so does patient's spouse  Medications: responsible for medication- uses pill box  Finances: splits fiance responsibility between wife- used to be soley on him  Stairs required: flight of stairs to basement- tools are located therr    Motor Impairments:  Rigidity ,, Juan Kinesia ,, Shuffling Gait ,, Tremors , , Stoop Posture , and Ambulation ,                 OBJECTIVE  U/E Motor Assessments: Dominant Upper Extremity: Right   L R   AROM Within Functional Limits Within Functional Limits   GMC Below Functional Limits Below Functional Limits   Strength Within Functional Limits Within Functional Limits      L L Norms R R Norms   9 HPT 32 seconds Male 71+ years old: 25.95 seconds 44 seconds  "Male 71+ years old: 25.79 seconds   Box & Blocks 33 blocks Male 75+ years old: 61.3 blocks 32 blocks Male 75+ years old: 61.3 blocks    81 lbs  Males 75+ years old: 55.0 lbs. 100 lbs  Males 75+ years old: 65.7 lbs.       Functional Assessments:   Independent / Needs   Assist Comments   Eating No issue     Grooming No issue    UB Dressing No issue    LB Dressing No issue     Bed Mobility No issue    Basic Transfers Slow to start, uses cane at times for balance. Demo'd difficulties with sit-stand from chair with no arms Increased time/effort, states \"I'm slow when I start but then i'm off walking full speed!'   Tub Transfers  Does not use tub, walk in shower with chair and grab bars (suctioned kind)   Toilet Transfers No issue Standard, vanity near by    Meal Prep No issue       Handwriting/Keyboarding: \"handwriting is terrible\" Able to write name - with increased time and effort with print able to distinguish letters. Patient states if he \"concentrates very hard\" during handwriting task he is able to produce legible letters.     Physical Performance Test (7 item): Unable to complete due to time constraint     Cognitive Assessments:   MOCA: 18/30 (>26/30 is considered 'normal')     Visual-Perceptual Skills:  Occasionally sees double vision while watching television but not consistent. Wears cheaters for reading only.     MEDICARE PATIENT: Yes: HCIN #[id number]; Provider # ; Certification Dates: from 03/31/2017 to 05/21/2017    Physician Recommendation:  1. I certify the need for these services furnished within this plan and while under my care. I agree with the therapist's recommendation for plan of care.  2. If there is any recommendation for modification of therapy plan, please indicate below.                          "

## 2021-06-09 NOTE — PROGRESS NOTES
Assessment /Plan:  right handed, came with spouse, Haylee.   Parkinson disease. On sinemet 25/250 to take at 6:30, every 3 hours total of about 5 times daily, quit entacapone 200 mg completely due to nausea.  Keep 50/200 CR HS. On Ropinirole 0.25 tid every 8 hours. Went back on ropinirole (was off due sleepiness ). Some freezing and more tremors at time, sometimes wearing off for 30 min to 1 hr,  not able to tell time pattern. Has some dyskinesia. Has some confusion on ropinirole at 0.25 mg HS for RSL, but not anymore. Will rechallange with Entacapone, if fails try increase Sinemet to q2.5 hours. Will see Staci in about 3 weeks and consider a trial of Rotary.   No falls. Turning is difficulty.   ADL: showing, dressing by self. Doesn't prepare meals, no driving. Had matrimonially and has been using.   Therapy: Exercise: 5-6 days/week. Refer to therapies.   Some coughing and speech difficulty: recommend SP. .   RSL: doing well on Ropinirole and clonazepam. On 2 tabs pf each before sleep, helps    REM sleep disorder controled: on clonazepam 0.5 mg HS, still occasionally still acting dreams out.   Psych: no depression, no hallucination.   Reviewed his PD condition, discussed about treatment options, including surgery.   Drooling: not too bad.   Essential tremor: mild degree   Dementia. On Aricept. MOCA scored 17/30 on 3/8/2017. Consider staring medication treatment.  Neuro psych : Overall, the results are significant for mildly impaired auditory attention and working memory, new learning efficiency, and basic cognitive processing speed. His performance fell in the moderately impaired range across measures of visuospatial planning and organization, mental flexibility and set-shifting, and response inhibition (particularly under timed conditions). Subtle declines in processing speed are observed; however, the majority of his cognitive performances remain largely stable when compared to his 2013 evaluation. This  stability to subtle improvement in some of his scores may reflect the benefit of his outpatient therapies and/or the optimization of his Parkinson's disease medications.   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   PD summary: He has had essential tremor diagnosed in 2000 and probably developed Parkinson disease in 2004. Because of dementia not the best candidate for dopamine agonists. He has tried Artane and trihexyphenidyl but not able to tolerate because of confusion.   No signs of orthostasis reported.       Instructions today:   Memory today  Try add Entacopone 1/4 to 1/2 with the Sinemet again, if not able to toleerated, then try to take the Sinemet every 2.5 hours.   Come back to see Staci in 3-4 weeks for Rytary  Start therapies.   Memory test today.     Total time spent with patient and his wife for about 45 min, more than 50% consoling.     Review of system otherwise unremarkable.      Patient Active Problem List   Diagnosis     Parkinson disease     Essential tremor     Vision abnormalities     [place] (hard of hearing)     Poor balance             Current Outpatient Prescriptions   Medication Sig Dispense Refill     carbidopa-levodopa (SINEMET CR)  mg per tablet Take 1 tablet by mouth bedtime. 90 tablet 3     carbidopa-levodopa (SINEMET)  mg per tablet Take 1 tablet by mouth 4 (four) times a day. (Patient taking differently: Take 1 tablet by mouth 5 (five) times a day. ) 360 tablet 3     clonazePAM (KLONOPIN) 0.5 MG tablet Take 1 tablet (0.5 mg total) by mouth bedtime. (Patient taking differently: Take 0.5 mg by mouth bedtime. ) 90 tablet 0     clonazePAM (KLONOPIN) 0.5 MG tablet Take 1 tablet (0.5 mg total) by mouth bedtime. 90 tablet 1     donepezil (ARICEPT) 10 MG  "tablet Take 1 tablet (10 mg total) by mouth bedtime. 90 tablet 2     rOPINIRole (REQUIP) 0.25 MG tablet Take 1 tablet (0.25 mg total) by mouth 3 (three) times a day. (Patient taking differently: Take 0.25 mg by mouth 4 (four) times a day. ) 360 tablet 1     No current facility-administered medications for this encounter.        Morphine    Past Medical History:   Diagnosis Date     Cataract      Hypercholesterolemia      Hypertrophy of prostate      Parkinson's disease        Social History     Social History     Marital status:      Spouse name: N/A     Number of children: N/A     Years of education: N/A     Occupational History     Not on file.     Social History Main Topics     Smoking status: Former Smoker     Quit date: 1/16/1969     Smokeless tobacco: Never Used     Alcohol use 8.4 oz/week     14 Cans of beer per week      Comment: \"A couple of beers per day.\"     Drug use: No     Sexual activity: Not on file     Other Topics Concern     Not on file     Social History Narrative       No family history on file.    Objective:  Vitals:    03/08/17 1156   BP: 130/82   Pulse: 61     Vitals reviewed.   Well dressed and groomed. Normocephalic and atraumatic. Sitting in chair, NAD.   Edema: absent   Skin: bruise sign: No, dyspigmentation: No; rash: No   CN II to XII   II, III, IV, VII: negative; .   VIII: normal hearing   CN V: Facial sensory intact. [place] strength normal   CN VII: Facial moves symmetrically  IX, X, XI, XII : Gag present, tongue protrude ML. Soft palate elevate symmetrically,   Motor:   Bulk: normal.   Strength: normal.   Tone: increased, bilateral, symmetrically, rigidity.   Sensory: normal,   Coordination: finger tapping:decreased amplitude on right,   Gait and Station: Stooping of shoulders   Bradykinesia: bilateral   Hypokinsia: bilateral   Tremor: bilateral, Associated with intention and position, also left resting tremor, more pronounced initially, but improved 30 min after taking the " Sinemet during exam.   Balance: poor with pull test. Turning difficulty.   Anterocolis. Difficulty get out of chair    MOCA 17/30      .

## 2021-06-09 NOTE — PROGRESS NOTES
How have you been doing since we last saw you? any concerns? Medication f/u       Current Symptoms : No

## 2021-06-09 NOTE — PROGRESS NOTES
Speech Language/Pathology  Speech Therapy Outpatient Evaluation and Initial Plan of Care- Voice/Motor Speech    Carlos Neri  YOB: 1935     [id number]     Referring Provider: Tracy Chapin MD      Insurance:  MEDICARE PATIENTS:  [facility] # [id number]  Provider #   Certification Dates: from 3/31/2017 to 6/31/2017    Date of Onset: 3/8/2017  Start of Care: 3/31/2017  Medical Diagnosis: Parkinson's  Treatment Diagnosis:  dysarthria  Visit Number 1 of 9  Interventions provided during this session: speech/language 55 minutes    Subjective  Patient presents as cooperative and awake during this session.  Patient reports He is currently in no pain.   is not applicable.    Patient states they are here today for evaluation by a Speech Therapist because  My voice has gotten very soft. There are times when people can't hear.  My wife frequently tells me to speak up.   Patient arrived at evaluation alone. Pt had his son drop him off. Pt no longer drives. MOCA 3/8/2017 is 17/30. Dementia noted.     Patient is an 81 year old male diagnosed with Parkinson's since 2004 (diagnosed with essential tremor 2000). Pt has received speech therapy in the past. Last seen 5/18/2015 after a total of 10 visits. Pt states that he has not been completing his voice/speech exercises since he was last here.     Objective  Oral motor function: very limited eye brow raise and frown.  symmetrical with smile and pucker and tongue protrusion. Patient presents with significant facial masking. Pt is aware of facial masking.   Pt has forward head posture, looking down and speaks to me looking up with his eyes. Pt able to change posture with cues but does not maintain or self correct. Stooped posture was also noted in 2015 notes.     Sustained /AH/ task:   Duration in seconds Quality of voice Volume range of voice at a distance of 12 inches Does pt maintain strong steady effort for entire duration of task? Is mouth opening  decreased with task? Is this task impaired?   Sustained /AH/ without cues 7 WNL s  76 to 84 DBSPL yes Slightly Yes, with duration   Sustained /AH/ with cues for increased amplitude 13 WNL's 80 to 86 DBSPL Yes Slightly Not with volume.     previous therapy in 2015 pt held sustained AH for an average of 20 seconds at greater than or equal to 80 dBSPL.       Reading Aloud task:   Range of voice volume at a distance of 12 in, DBSPL.  Voice Quality Is careful listening Needed? Why? Is there decreased amplitude with voice? Does it fluctuate? Is facial masking noted? Is speech monotone?   Paragraph:  [place]  (100 word sample). 63-68, typically around 65 Hoarse and breathy Yes, due to dysphonia and reduced volume.   no, stays fairly stable.  yes yes   Paragraph:  [place]   with cue to increase amplitude  65-70 Hoarse and breathy. Pt felt a little too loud.  Pt was heard, but dysphonia does make it harder to hear.    no, stays fairly stable.  yes yes   Short sentences: No cues 68-69 Hoarse and breathy Pt was heard, but dysphonia does make it harder to hear.   no, stays fairly stable.  yes yes   Short sentences: with cue to increase amplitude 72-75 inconsistnat dysphonia. At times it was eliminated. Pt did not feel too loud.  no  no, stays fairly stable.  yes yes   Notes from 2015 Discharge: (Pt will read paragraph level passage aloud a greater than or equal to 70 dBSPL. Pt will state that they do not feel too loud With use of increased effort and vocal volume for communicating with others- overall this goal was met. Pt was fairly consistent with volume level however pt did not consistently use speech strategies to increase amplitude, increase breath size taken, or slow rate which ultimately affects intelligibility and dysfluencies increase.)  Conversational Speech Sample:   Range of voice volume at a distance of 12 in, DBSPL.  Voice Quality Is careful listening Needed? Why? Is there decreased amplitude with  voice? Does it fluctuate? Is facial masking noted? Is speech monotone?   Short Conversational Speech Sample 66 or less, typically 63-64.  Hoarse and breathy Yes, due to low volume, dysphonia, dysfluencies, and decreased projection and articulation Stays fairly consistent Yes Yes   Structured sentence response with cues to use increased amplitude greater than or equal to  70  inconsistant haorseness and breathiness.    Easy to hear.  Stays fairly consistent yes yes   Pt is extremely hard to hear walking from the waiting room and while he speaks in the waiting room. intelligibility would be rated at less than or equal to 50%. Increased dysfluencies noted with increased cognitive demand tasks.     No dysphonia with hard onset word when told to be strong with effort. No dysphonia with counting task if given cue to use increased amplitude before beginning task.  Performance with all tasks were reviewed with the patient using an iPad. Patient benefited for seeing and hearing performance with tasks to increase awareness of goal areas.     Notes from 2015 speech discharge:(At discharge pt needed max cues to increase volume/ projection at the conversation level. Pt consistently demonstrates improvement with speech/voice strategies of 1)take a big breath before talking 2) slow rate 3) and over articulate, but continues to need cues to utilize. Cognitive impairment decreases use and carryover of techniques. Voice projection, dysfluencies, and overall intelligibly is improved with use of techniques. Overall speech is understandable for 1:1 conversations.During structured multi sentence level response task pt met goal of use of increased amplitude strategies with speech approximately 25% of the time 4/7 responses, 50% 2/7, 75% 1/7. Decreased use of techniques to increase amplitude, take a big breath and slow rate with increase cognitive demand. Head is tilted forward and down. This is consistent with performance in 2009 when pt  had speech therapy at this facility. Speech is hesitant at times, dysfluencies occur when pt is not using speech strategies, and thought formulation is impaired due to cognitive impairments. Overall speech is understood easily in a 1:1 conversational situation, careful listening is more needed to thought formulation).    Assessment  Patient presents with decreased increased dysarthria compared to 2015 speech therapy sessions. Pt is harder to understand as his voice is softer. It was noted that pt has not been able to carryover speech strategies for increased amplitude at the conversation and unstructured tasks in the past and even as far back as 2009 speech therapy sessions so this will not be focused on.  Pt does have decreased performance compared to 2015 sessions with sustained /ah/, sentence level reading tasks, and sentence level response tasks.       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 Pt is appropriate for skilled 1:1 ST to address his speech/voice and communicative impairments related to Parkinson's in order to optimize function due to progressive nature of disease.    Patient participated in education regarding evaluation results, treatment plan/rationale and home program and verbalized understanding with education throughout evaluation.    Patient is a fair candidate for speech therapy and rehab potential is judged as fair due to cognitive impairments and baseline impairments with speech and voice.    Plan  Speech therapy 3 times per week for 3 weeks for 8 treatment sessions for a total of 9 visits.  Ongoing patient/family instruction regarding evaluation results, treatment plan/rationale, home program, expected functional outcome.  Long Term Goals: To improve communication through increased voice volume, facial expression, speech inflection, and speech intelligibility to decrease risk of social isolation and increase independence and  engagement with communication partners.  Short Term Goals:  1. Sustained /AH/ and greater than or equal to 18 seconds at greater than or equal to 80 dBSPL without cues.  2. Pt will state that they are asked to repeat themselves less often with family and friends than before starting therapy.  Pt will receive one complement on their voice from a friend or a family member.   3. Pt will read two sentence level passage aloud at greater than or equal to 70 dBSPL without cues. Pt will state that they do not feel too loud with use of increased effort and volume, but that it is an appropriate level of volume for communicating with others.   4. Structured 1-2 sentence level response with greater than or equal to 70 dBSPL, with no careful listening with 80%, Pt will state that they do not feel too loud with use of increased effort and volume, but that it is an appropriate level of volume for communicating with others.     Physician Recommendation:  1. I certify the need for these services furnished within this plan and while under my care. I agree with the therapist's recommendation for plan of care.  2. If there is any recommendation for modification of therapy plan, please indicate below.

## 2021-06-09 NOTE — PROGRESS NOTES
Physical Therapy  PHYSICAL THERAPY  MOVEMENT DISORDER:  INITIAL EVALUATION    SUBJECTIVE INFORMATION    Diagnosis: Parkinson's Disease  Date of diagnosis: 2006    Date of last Neurologist visit: 3/8/2017  Treatment Diagnosis: bradykinesia, postural instability and gait instability, proximal mm weakness  Precautions/pmh: abdominal hernias  First movement disorder symptom presentation: tremors  Date: 2003  Non-motor symptoms: Visual impairments, Early mild cognitive impairment, Constipation and Sleep disturbances    Time of Evaluation: 1500       Time of last PD med: 1230       Time of next PD med: 1630  On/Off presentation/symptoms: increased tremors  History of PD specific PT? Yes: When and where?:  2015 at [facility]    Pain: No    Living Situation:[facility] - mainly stays on one level  Caregiver Support: lives with spouse, Stella  Equipment: SEC for walking while in the community. 4WW for walking in the middle of the night  Current Activity Level: performs exercises daily for about 15-30 minutes; intermittent walks. Enjoys golfing, playing cards   Chief Mobility Complaint: difficulty getting up from chairs, Difficulty with initiation of gait  Patient's Functional Goal: improve his balance so he can walk without a cane; increase ease with getting out of chairs    Fall history:None    Freezing: Often - about once a day      OBJECTIVE INFORMATION    Cognition: see OT evaluation    Bradykinesia and Hypokinesia (Combining slowness, hesitency, decreased arm swing, small amplitude and poverty of movement in general):  Moderate    Dyskinesia:No    Posture: increased thoracic kyphosis, rounded shoulders and forward head posture     Postural Stability: Posterior tug test (Response to sudden posterior displacement produced by pull on shoulders while patient is erect, with eyes open and feet slightly apart.  Patient is prepared.)  Retropulsion but recovers unaided    Transitional  Movements  Sit?Stand:   Modified Independent, increased knee valgus during transfer      Sit? Supine:   Not Tested  360 degree turn:  12 steps CCW, 18 steps CW    TUG(Timed Up and Go): 10.49 seconds    (>13 sec:  Falls Risk)      Divided Attention   Cognitive TU.94 seconds (Task):  Counting backwards by 3s from 25 (25-10 correctly) pt stopped twice      Strength   30 Second Chair Stand:  # 10  Without UE support  Age/Gender Norm: 12       Balance      Eyes open Eyes closed     Romberg (sec) 30 30    Left LE Right LE     Single Leg Stance (sec) unable 2.5 seconds     Balance Assessment: Mini-BEST  (<)   1) Sit <> Stand: (2) Normal   2) Rise to Toes: (0) Severe   3) Stand on One Leg: (0) Severe L: unable R: 2.5 seconds   4) Compensatory Stepping Correction-Forward: (2) Normal   5) Compensatory Stepping Correction-Backward: (1) Moderate   6) Compensatory Stepping Correction-Lateral: (0) Severe L: (1) Moderate R: (0) Severe   7) Eyes Open, Firm Surface: (2) Normal >30 seconds   8) Eyes Closed, Foam Surface: (1) Moderate 27 seconds   9) Incline, Eyes Closed: (1) Moderate  30 seconds, aligns with surface   10) Change in Gait Speed: (1) Moderate Mild change in speed   11) Walk with Head Turns-Horizontal: (1) Moderate Path deviations  12) Walk with Pivot Turns: (1) Moderate 5 steps  13) Step Over Obstacles: (2) Normal   14) TUG with Dual Tasks: (0) Severe  TUG:  10.49 seconds  Cognitive TU.94 seconds (stopped twice        Motor Planning / Coordination   Four Square Step Test Trial 1: unable to complete due to confusion   Trial 2: 18.41      >15 sec:  Falls Risk        18.41  seconds (best of 2 trials)    Comments: pt struck every dowel and require cues for changing directions    Gait   Preferred Gait Speed  6 meters in 6.1 seconds Meters/second: 0.98 Age/Gender Norm:   1.02 meters/second  # steps in 6 meters: 11-12  Assistive Device: none  Gait Speed Fall risk:  Med Falls Risk (0.6-1.2  m/s)  Gait Analysis: narrow TAE with occasional scissoring and path deviations, decreased heel strike on L and decreased arm swing on R    Activity Tolerance   6 minute walk test:  1440 feet    RPD:  3/10    Age/Gender Norm: 1572 feet        Total OP Minutes: 55      Rx Units: Evaluation

## 2021-06-09 NOTE — PROGRESS NOTES
"Physical Therapy  Physical Therapy  Movement Disorders  Medicare Certification    Medical Diagnosis: Parkinson's Disease    Treatment Diagnosis: Bradykinesia, postural and gait instability, proximal mm weakness    Referring MD: Dr. Tracy Chapin  Onset Date: 3/81540  Start of Care: 3/31/2017     Assessment:  Patient is a 82 yo male with Parkinson's Disease x 11 years who presents with complaints of difficulty getting up from chairs, unsteady balance and frequent freezing episodes.  Physical therapy evaluation revealed high falls risk on the mini BEST test (14/28).  He also had increased difficulty with divided attention activities and motor planning for the 4 square step test.  Patient scored below age/gender norms with 30\" Chair Stand and 6 Minute Walk Test indicating lower extremity weakness and gait inefficiencies. Skilled 1:1 PT is indicated to address deficits and decrease fall risk.     Prognostic Indicators: Rehabilitation Potential Good and based on age, response to prior therapy and family support  Impairment: Acitivity Tolerance, Balance, Bradykinesia/Hypokinesia, Motor Function, Motor Planning/Coordination, Postural/Gait Instability and Sensory Function    Functional Goals to be met by 13 visits   Patient will show improved efficiency and quality of movement, improved gait and postural stability for increased safety, decreased fall risk when performing ADL's, IADL's, household &/or community ambulation as measured by:      360 degree turn < 12 steps CW and CCW   Cognitive TUG < 20 seconds,    30 Second Chair Stand > /= 11 stands,    Balance Assessment: mini BEST test: 19/28,    Gait speed > 1.08m/second and # steps in 6 meters < 11 steps,   Patient will ambulate > 1550 feet in 6 minute with RPD< 3/10 to indicate improved functional activity tolerance for participating in social events &/or household/community ambulation.   Patient will be mod I/SBA with HEP  Patient Functional Goal: improve balance and " walking  Goals were established with the patient: yes    Plan of Care  Communication with: referral Source, patient, caregiver and treatment Team, Patient / Family / Instruction: Etiology of diagnosis or presented symtoms, Treatment plan/rationale, Home Exercise Program and Expected Functional Outcomes, Big/PWR Techniques, Therapeutic Exercise, Mobility / Transfer Training, Gait Training and Neuro Re-ed / Balance    Frequency/Duration 2-3x/week for up to 13 visits including evaluation    [facility]  [id number]  Provider #   Certification Dates: from 3/31/2017  to 6/28/2017        Physician Recommendation:  1. I certify the need for these services furnished within this plan and while under my care. I agree with the therapist's recommendation for plan of care.    2. If there is any recommendation for modification of therapy plan, please indicate below.      Physician's Signature (Printed):  _____________________________         no stemi

## 2021-06-10 NOTE — PROGRESS NOTES
Physical Therapy  Physical Therapy  Movement Disorders Discharge Summary    Medical Diagnosis: Parkinson's Disease                                         Treatment Diagnosis: Bradykinesia, postural and gait instability, proximal mm weakness                                    Referring MD: Dr. Tracy Chapin  Onset Date: 3/95254  Start of Care: 3/31/2017    Time of Evaluation: 1300  Time of last PD med: 1200  Time of next PD med: 1530    Transitional Movements   Initial Post Treatment Goals      360 degree turn (steps) 12-18 9-10 12 - MET   TUG  (sec) 10.49 9.94    TUG with Cognitive task (sec) 28.94 17.88 <20 - MET   Strength  Initial Post Treatment Goals   30sec Chair Stand (#)  10 12 11 - MET   Balance  Initial Post Treatment Goals   Mini- BESTest  14/28 19/28 19/28 - MET   Gait Initial Post Treatment Goals   Meters/seconds 0.98 1.39 1.08 - MET   # steps in 6m 11-12 10-11 11 - MET   Activity Tolerance Initial Post Treatment Goals   6 min walk test (ft)  1440 1606 1550 - MET       Functional Outcome Changes: Patient demonstrated improvements in all areas of strength, activity tolerance, gait mechanics and balance.  He continues to score high fall risk on the mini BEST test, however he had good compensatory stepping reactions.  Divided attention tasks continue to be challenging for him and his speech is very soft.  He has been issued a comprehensive HEP to maintain the gains he made in therapy.     Evaluation: Goals Met? Yes Comments: see table above    Patient seen from 3/31/2017 to 5/24/2017  for 13 treatment sessions.  Recommendation: D/C Patient to follow up with PT assessment in 6 months for best Parkinson's management.

## 2021-06-10 NOTE — PROGRESS NOTES
Occupational Therapy  OT Parkinsons' Disease and Movement Disorders Progress Note    Medicare Insurance    Units: 4   Total Minutes: 55  Treatment #: 4/12    ASSESSMENT:   Summary/Analysis of treatment: Patient arrived well dressed, carrying cane with coat and bag. Though flat, patient able to smile/laugh with therapist at appropriate times, and with verbal cues, patient demonstrated increased vocal volume during conversation. Patient successful with large amplitude movements with /tactile cues during seated exercises. Increased difficulty observed with multi-tasking during step/reach exercise and even more so with step/reach exercise with cognitive component. However, with increased time/effort and frequent verbal cues from therapist- patient success with task. In addition, patient struggled with step/reach task with [employer], requiring therapist demonstration and constant verbal cues to be successful and complete larger movements. Patient demonstrated improved fine motor coordination during functional task- nuts and bolts. And furthermore, illustrated improved fine motor coordination during theraputty exercise with additional challenge to retrieve smaller objects as well as paper scrunch/smooth exercise. Therapist instructed patient to continue to complete HEP -theraputty at home and encouraged patient to try other exercises from everyday objects as home such as the paper exercise with patient verbalizing understanding and committing to do so.     See initial evaluation for goals and POC. Progress toward goals: Improved    PLAN:   Treatment Time: Therapeutic Exercise 55 minutes    Plan: Continue per plan of care    Recommendations: Unilateral/alternating step/reach, wall climbs with hands, wash cloth toss, grooved peg board    SUBJECTIVE:   Pain: No      OBJECTIVE:   Therapeutic Exercise-  - Patient completed x10 reps x1 set seated exercises with hand clap at top for feedback for large amplitude  movements  -Patient completed x10 reps x 4 sets standing zoomball exercises with verbal cues for large ROM movements   -Patient completed x13 reps x2 sets step/reach with multidirectional tasks requiring increased time/effort and verbal cues/assistance to be successful.   -Patient completed unilateral step reach x20 reps x3 sets in standing Dyanvision with constant verbal cues and therapist demonstration to be successful.   -patient completed green (medium) resistance theraputty exercises and retrieved 5 items with B hands.   -Patient completed paper scrunch/smooth exercises with B hands.

## 2021-06-10 NOTE — PROGRESS NOTES
"Physical Therapy  Physical Therapy  Movement Disorder Treatment Note    Date: 5/15/2017   Visit #: 10/13  - MEDICARE (3/31/2017  to 6/28/2017) Reassessed 5/3/2017  Rx Units: 1TE, 1NR, 2- TA   Total OP Minutes: 55    Pain:  No    Subjective:  No complaints this date.       Objective:     Therapeutic Exercise:  Total Minutes: 20  Sustained Movements (sitting)  4reps  x 1-2 sets       Exercise   Reps   Sets   Effort     1A Floor to Ceiling Without Flicking 4 1 10     1B Side to Side Without Flicking 4 1 10      Comments:    1A: VC for tall posture and palms forward   1B: VC for palm up and 25% shaping for knee extension. Performed on slightly elevated mat to assist with knee extension.     Other:  - Nustep x 10 minutes  Workload #5  Average METS: 3.3 SPM: 79   - Bridging x 10 reps - VC for increased hip extension  - Hook lying LTR with contralateral cervical rotation stretch x 20 x 2 reps each  - VC for breathing    Neuro-Reeducation/Balance:  Total Minutes: 15  Multidirectional Repetitive Movements.  8-16reps  x 1-2 sets    Step and Reach:   UE support: 2A, 2B  Chairs Nearby: 2A, 2B      Exercise   Reps   Sets   Effort     2A Forward Step and Reach    10    2 10     2B Sideward Step and Reach    10    2 10     2C Backward Step and Reach        Comments:   2A - VC for BIG step back to center.  Improved steps with L LE.  Decreased coordination with arm therefore used PWR! reach  2B - alternating sides. VC for BIG posture and BIG arms B.     Rock and Reach:  UE support: 2D  Chairs Nearby:      Exercise   Reps   Sets   Effort     2D Left leg Forward/Backward     20      2 8     2D Right leg Forward/Backward     20      2 8     2E Side to Side        Comments: 2D - VC for TAE and foot placement. Decreased amplitude of rocking and arm swing as reps progressed. VC for consistent \"toe, heel\" in order to recall task and perform with large amplitude.      Other:         Gait Training:   Total Minutes:     Time Speed (MPH) UE " support Direction Incline Comments                                      Other:     Therapeutic Activity  Total Minutes: 25     Functional Movement  Rep  Sets  Effort    Rolling  5 1 each 9    Supine to Sit  5 1 10    Sit to Stand from standard chair 15 1 10    Sit to Stand from low, soft chair 15 1 9    Sit and Reach       Walk and Turn between chairs 10 1 9    PWR Moves:       PWR Moves:        Comments: Rolling - VC for BIG reach across body and more effort.   Supine to sit: VC for BIG push in legs  Sit to supine: VC for PWR lift of B LEs   Sit to stand: VC to scoot forward to EOC  Walk and turn between chairs  - VC for increased step length and BIG steps with turn. Pt tends to freeze when turning to the right.     Other:   BIG walking x 460' without AD.  VC for BIG posture and increased pace.       Carryover Assignment: sit to stand BIG    Assessment/Plan for week:  5/15/2017-5/19/2017  Patient responded well to verbal cues for increased amplitude especially with functional activities.  Performance of BIG exercises notes not appear to carryover to functional activities well.  He requires constant cues for coordination and recalling BIG exercises.  Plan to focus on functional activities to improve carryover and address goals.  Skilled PT needed to decrease fall risk and improve amplitude of movements.

## 2021-06-10 NOTE — PROGRESS NOTES
Speech Language/Pathology  Speech Therapy Outpatient Daily Visit Note- Voice/Motor Speech  Discharge Note    Carlos Neri   1935     [id number]    Visit Number 9 of 9  Interventions provided during this session: speech/language 55 minutes    Subjective  Patient presents as cooperative and awake during this session.  Patient reports no pain  Patient reports completing ~80% of his homework given during last therapy session.       Objective/ Assessment  Patient did not use stronger clearer speech walking back to therapy room from waiting room.  Stooped posture. Soft volume and need for careful listening utilized the entire time.     Automatic counting 1-10, days of the week, months of the year were completed Brittany using strong, projected, power voice (some breathiness is noted but much stronger than baseline), easy hear, no careful listening needed.     Patient held sustained /AH/ at habitual pitch with a strong clear voice, wide open mouth for 13.31 to 14.58 seconds, with an average of 14 seconds. Volume of voice greater than or equal to 83 dBSPL and voice volume and strength is steady. stooped posture. needed cues to sit up tall.  No dysphonia today. Pt Brittany used a strong voice with all except first production when asked to tell me the duration that was visible on the timer for each sustained /AH/, cue was provided.      Functional phrase reading:  Goal was use of increased effort and amplitude, no decreased projection and no dysphonia. Pt met goal with 100% voice volume 70-77 dBSPL (WNL's). No dysphonia. Pt did not feel to loud with use of increased effort and amplitude.     4-6 sentence level reading task: pt did not meet goal of largely using crisp, clear and projected speech that does not have decreased precision with first attempt. Pt did benefit from cue to shout. With this cue he would start louder and more articulate compared to the end of the paragraph. Patient did not feel that they are using a voice  volume that is too loud for 1:1 conversation during functional phrase reading task using increased effort for volume, clarity, and expression, pt demonstrates improvement with amplitude, clarity and reading ability (focus) as he rereads the paragraph over and over. Recommend when he is doing this at home he should read the same paragraph four-five times in a row to decrease cognitive demands and increase accuracy with use of increased amplitude.     During structured one  sentence level generation task: (pt was given a picture of many actions that were occurring, pt was to generate sentence to describe actions). Pt had been given this task to practice at home and stated that he worked on 1/2 pictures. Goal was greater than or equal to 70 dBSPL, with no careful listening with 80% (16/20).  The patient does not feel that they are using a voice volume that is too loud for 1:1 conversation during this task using increased effort for volume, clarity, and expression.    Improved voice volume and clarity was noted with audiotape/visual feedback from iPad with tasks for all tasks.      1. Sustained /AH/ and greater than or equal to 18 seconds at greater than or equal to 80 dBSPL without cues- pt consistently produces 14 seconds and WNL's for voice volume and quality. Task is not impaired  2. Pt will state that they are asked to repeat themselves less often with family and friends than before starting therapy. Pt will receive one complement on their voice from a friend or a family member- Goal met: pt received a compliment from his wife and son per his report on 5/3/17   3. Pt will read two sentence level passage aloud at greater than or equal to 70 dBSPL without cues. Pt will state that they do not feel too loud with use of increased effort and volume, but that it is an appropriate level of volume for communicating with others- pt met goal: voice volume was 72-77 dBSPL, mild hoarseness/breathiness, but easy to hear.  understood 100% of the time, there were 5 words that had mild decreased precision.   4. Structured 1-2 sentence level response with greater than or equal to 70 dBSPL, with no careful listening with 80%, Pt will state that they do not feel too loud with use of increased effort and volume, but that it is an appropriate level of volume for communicating with others.- goal met with structured 1 sentence level task. 2 sentences to maintain effort and amplitude is difficulty due to cognitive-linguistic impairments.     Plan  Patient to continue to complete structured increased amplitude exercises at home for voice and speech 3 times per week at discharge. Pt verbalized understanding of importance of completing home exercise program to maintain current performance level. Handout of exercises and instructions were provided to pt.    1:1 skilled Speech therapy will be discontinued at this.  Patient is in agreement with plan. Continues to be soft spoken and cognitive-linguistic impairment affect communication and conversation. Pt is consistent with previous therapy course. It has been noted that pt has not been able to carryover speech strategies for increased amplitude at the conversation and unstructured tasks in the past and even as far back as 2009 speech therapy sessions so it will not focused on. Continues with mask like face and stooped posture, head down positioning.

## 2021-06-10 NOTE — PROGRESS NOTES
"Occupational Therapy  OT Parkinsons' Disease and Movement Disorders Progress Note    Medicare Insurance    Units: 1 ADL, 2 therapeutic ex, 1 sensory   Total Minutes: 60  Treatment #: 10/12    ASSESSMENT:   Summary/Analysis of treatment: Patient arrived from physical therapy appearing well dressed and in good spirits. Demonstrated improved gross and fine motor coordination in speed/efficancy as evidenced by the improved scores in both R and L hand 9 hole Peg Test and Box and Block assessments. Patient demonstrated good follow through and recall for large amplified movements during ADLs/IADLs. Able to don/doff jacket with x1 visual/verbal cue for technique for initiation. In addition, patient completed step-reach using large amplified movements during kitchen task reaching from high cabinet to counter top. In addition, patient able to complete functional transfers using large amplified movements and be successful from low surfaced areas including recliner, standard height toilet, and bench. Patient struggled with vision spatial exercise and reported difficulties with his vision as evidenced by patients completion of parque board that required frequent cues from therapist to be successful.     See initial evaluation for goals and POC. Progress toward goals: Improved    PLAN:   Treatment Time: Therapeutic Exercise 30 minutes, Self care/ADL training 15 minutes and 15 minutes sensory integration    Plan: Continue per plan of care    Recommendations: Double vision exercises (sree ball, zoomball, dot to [place] activity), Gross motor coordination exercises with focus on upright posture (DynaVision, letter and rope board, discuss GMC activities that cont to be difficult at home and practice these in clinic), patient may be adequate to D/C next session    SUBJECTIVE:   Pain: No   When discussing vision patient reports, \"Sometimes it gets blurry\"     OBJECTIVE:     Therapeutic Exercise:Reaching GMC exercise - seated " calisthenic exercises x2 minutes  FMC- paper scrunch and smooth B hands  Gross Motor Coordination Exercise for 5 minutes minutes in seated  Position- zoomball x3 sets with rest breaks. Focused on patient tracking ball during activity for visual exercises as well. Patient Was independent with activities    3/31/17 Scores  Box and Block : R hand 32 blocks L hand 33 blocks  9 hole peg test: R hand 44 seconds L hand 32 seconds    05/12/2017 (Today's) Scores  Box and Block: R hand 42 blocks 10 block Improvement  L hand 50 blocks 17 block improvement  9 Hole Peg Test: R hand 36 seconds 8 second improvement L hand 28 seconds 4 second improvement    Activities of Daily Living: Reviewed large amplified movements for ADLs- including donning/doffing jacks, kitchen mobility, functional/advanced transfers. Patient required x1 VC and visual demonstration to be successful.     Transfers/Mobility: Toilet Transfer: SBA using large amplified movements and Kitchen Mobility: completed kitchen task demonstrating step and reach      Sensory- completed moderate difficulty visual task- parque board with laundry clip to incorporate FMC. Patient required verbal cues for sequencing to be successful.

## 2021-06-10 NOTE — PROGRESS NOTES
Physical Therapy  Physical Therapy  Movement Disorder Treatment Note    Date: 5/8/2017   Visit #: 8/13  - MEDICARE (3/31/2017  to 6/28/2017) Reassessed 5/3/2017  Rx Units: 1TE, 2NR, 1TA   Total OP Minutes: 60    Pain:  No    Subjective:  Patient notes mild fatigue this date due to having 2 hours of therapy prior to PT.     Objective:     Therapeutic Exercise:  Total Minutes: 20  Sustained Movements (sitting)  4reps  x 1-2 sets       Exercise   Reps   Sets   Effort     1A Floor to Ceiling Without Flicking   4 1 10     1B Side to Side Without Flicking 4 1 10      Comments:    1A: VC for tall posture   1B: 10% shaping for knee extension and palm up.    Other:  - Nustep x 10 minutes  Level #5  Average METS: 3.2 SPM: 92    Neuro-Reeducation/Balance:  Total Minutes: 25  Multidirectional Repetitive Movements.  8-16reps  x 1-2 sets    Step and Reach:   UE support: 2A, 2B  Chairs Nearby:      Exercise   Reps   Sets   Effort     2A Forward Step and Reach    10   2-3 9     2B Sideward Step and Reach    10    2 7     2C Backward Step and Reach        Comments:   2A - VC for BIG step back to center. Yard stick placed for visual feedback. Improved step length with visual cue.  Decreased coordination with arm therefore used PWR! reach  2B - alternating sides. VC for increased step height and alternate sides.     Rock and Reach:  UE support:   Chairs Nearby:      Exercise   Reps   Sets   Effort     2D Left leg Forward/Backward        2D Right leg Forward/Backward        2E Side to Side        Comments:      Other:   360 degree turn around cone: 6 reps CCW - Pt initially took 14 steps. VC for BIG steps and wider turn. Pt took 9-10 steps at the end.   3 reps CW. Increased confusion noted.   360 degree turn without visual target: 9-10 steps CCW and 10 steps CW.     Dynamic Balance Activities with close SBA:   Walking with speed changes x 60 feet x 2 reps - good change in speed and no LOB   Walking with horizontal head turns x 60 feet  x 2 reps - mild path deviations both directions   Walking with vertical head turns x 60 feet x 1 reps - no path deviations, however minimal head movements   Walking backwards x 30 feet x 1 reps - VC for increased step length with R  LE.  CGA for safety.    Walking with 180 degree turns x 5 reps - VC for BIG, deliberate steps and wider turn.         Gait Training:   Total Minutes:     Time Speed (MPH) UE support Direction Incline Comments                                      Other:     Therapeutic Activity  Total Minutes: 15     Functional Movement  Rep  Sets  Effort    Rolling        Supine to Sit       Sit to Stand from standard chair 10 2 10    Sit to Stand from low, soft chair       Sit and Reach       Walk and Turn between chairs 8 1 10    PWR Moves:       PWR Moves:        Comments: sit to stand: VC for BIG effort and scooting to EOC.  Walk and turn: VC to maintain BIG steps during turn.  Improved amplitude by last 2 reps    Big Walking: x 950' - VC for upright posture, wide turns and maintain heel strike.     Other:     Carryover Assignment: sit to stand BIG    Assessment/Plan for week:  5/8/2017-5/12/2017  See next note for this week's A/P.

## 2021-06-10 NOTE — PROGRESS NOTES
Occupational Therapy  OT Parkinsons' Disease and Movement Disorders Progress Note    Medicare Insurance    Units: 2 ADL, 2 therapeutic ex  Total Minutes: 55 minutes  Treatment #: 9/12    ASSESSMENT:   Summary/Analysis of treatment: Patient was motivated to complete all tasks during the session. Patient expressed concern regarding knowing how to use large amplitude movements after discharge; however he had difficulty recalling the areas of concern at home. Patient appeared to feel more comfortable using large amplitude movements after addressing ADLs during the session. Therapist encouraged patient to evaluate performance in his home environment prior to the next session. Patient demonstrated increased safety during mobility and chair transfers when using large amplitude movements. Patient also demonstrated understanding of functional fine motor coordination exercise program to use at home after discharge. Patient demonstrated decreased movement amplitude during kitchen mobility reaching task and would benefit from additional practice during follow-up session.    See initial evaluation for goals and POC. Progress toward goals: Improved     1. Patient will incorporate large amplitude movements into I/ADL tasks to improve efficiency and safety.  2. Patient will demonstrate and verbalize understanding of work efficiency techniques related to PD to improve safety and decrease fatigue.  3. Patient will improve FMC per 9HPT by 5 seconds seconds to improve I/ADL independence.  4. Patient will improve GMC per B & B by 5 blocks blocks to improve I/ADL independence.  5. Patient will participate in cognitive assess, for tx planning and recommendations re: I/ADLs.  6. Patient will decrease double vision by participating in oculomotor exercises and verbalize and demonstrate understanding of HEP.   7. Patient will verbalize and demonstrate understanding of home exercise program for UE's with B (bilateral, right,left) UE to improve  function with I/ADLs.    PLAN:   Treatment Time: Therapeutic Exercise 25 minutes and Self care/ADL training 30 minutes    Plan: Continue per plan of care and Patient progressing towards goals    Recommendations: Re-test all areas during next session to evaluate if patient is appropriate for discharge at this time. Practice step and reach activity with kitchen mobility while focusing on maintaining large amplitude movements throughout task. Review safety and use of large movements with chair and ottoman to simulate home environment. GMC/FMC exercises. Provide additional examples of ways patient can use large amplitude movements at home.    SUBJECTIVE:   Pain: No  Patient reported feeling concerned that he will not know how to use large movements or exercises after discharge. Therapist reviewed exercise programs to continue to use at home and provided additional written information for patient. Patient reported specific difficulty with mobility when attempting to sit and stand from his chair with an ottoman at home.    OBJECTIVE:   ADLs:  -Patient completed sit to stand and functional mobility activity with low chair and ottoman to simulate his home environment. Patient demonstrated improvement with performance with instruction to step with outside facing foot first and to stand using large amplitude techniques after moving towards the front of the chair as needed. Patient did not demonstrate freezing or unsafe movements during task after initial demonstration.  -Patient completed laundry technique to use large amplitude movements to hang clothing and fold clothing. Patient demonstrated improvement in performance after initial cues and demonstration. Patient reported understanding of how to use this technique at home with multiple items.    Therapeutic ex:  -Patient completed step and reach task in the kitchen to reach for magnets in multiple locations. Patient required cues to maintain movement amplitude throughout  the task. Patient's step amplitude reduced between each rep and patient had difficulty maintaining powerful movements during the entire 3-5 min activity.  -Patient demonstrated use of a functional fine motor coordination exercise program to include with theraputty exercises. Specific focus placed on:  *picking up small items  *crumpling newspaper with one hand  *flicking small wads of paper  *using clothespins

## 2021-06-10 NOTE — PROGRESS NOTES
Occupational Therapy  OT Parkinsons' Disease and Movement Disorders Progress Note    Medicare Insurance    Units: 1 ADL, 3 therapeutic ex  Total Minutes: 53 minutes  Treatment #: 5/12    ASSESSMENT:   Summary/Analysis of treatment: Patient appeared to have possible difficulty with visual perceptual skills during the fine motor coordination exercises with a parquetry board due to therapist assisting to identify errors related to spatial orientation. However, Patient reported he may have better performance if he wore his glasses during fine motor tasks. Patient requires cues to use large amplitude movements during GMC tasks; however, he is improving with practice and therapist's support. Patient benefits from identifying ways that he can use concepts from exercises in his home life and would benefit from further discussion about areas of difficulty at home. Patient's performance with donning his coat significantly improved when using large amplitude movement techniques.    See initial evaluation for goals and POC. Progress toward goals: Improved     Long-term goals:   1. Patient will incorporate large amplitude movements into I/ADL tasks to improve efficiency and safety.  2. Patient will demonstrate and verbalize understanding of work efficiency techniques related to PD to improve safety and decrease fatigue.  3. Patient will improve FMC per 9HPT by 5 seconds seconds to improve I/ADL independence.  4. Patient will improve GMC per B & B by 5 blocks blocks to improve I/ADL independence.  5. Patient will participate in cognitive assess, for tx planning and recommendations re: I/ADLs.  6. Patient will decrease double vision by participating in oculomotor exercises and verbalize and demonstrate understanding of HEP.   7. Patient will verbalize and demonstrate understanding of home exercise program for UE's with B (bilateral, right,left) UE to improve function with I/ADLs.    PLAN:   Treatment Time: Therapeutic Exercise 40  minutes and Self care/ADL training 13 minutes    Plan: Continue per plan of care and Patient progressing towards goals    Recommendations: Double vision exercises (sree ball, zoomball, dot to [place] activity), Gross motor coordination exercises with focus on upright posture (DynaVision, letter and rope board, discuss GMC activities that cont to be difficult at home and practice these in clinic), FMC ex: nuts and bolts, flipping pegs.    SUBJECTIVE:   Pain: No  Patient reported he would like more exercises to complete at home for the days he is not in therapy. Therapist reminded patient of theraputty exercises, kvng string, and PT exercises to complete on non-therapy days. Therapist will provide additional exercise ideas during follow-up sessions as needed.    OBJECTIVE:   ADLs:  -Patient completed donning/doffing his coat with focus on large amplitude movements with trunk rotation and large arm pushes through sleeves. Patient completed well after initial demonstration and appeared to have increased success in donning coat than previously noted in the lobby.    Therapeutic ex:  -Patient completed a 25 piece parquetry board design with resistive pinch device to focus on FMC. Patient required additional time to complete task and cues for errors. Patient reported he feels that the task would have gone better with his glasses.  -Patient completed diagonal reaching task in sitting to address GMC and large amplitude movements. Patient required 1-2 cues initially to increase movement amplitude; however, he was able to maintain amplitude with reps. Patient completed trunk and UE flexion/trunk and UE ext exercises to address GMC, large amplitude movements, and posture. Patient required one cue to increase trunk extension. Pt completed both exercises x 10 reps.

## 2021-06-10 NOTE — PROGRESS NOTES
"Occupational Therapy  OT Parkinsons' Disease and Movement Disorders Progress Note    Medicare Insurance    Units: 4 Ex  Total Minutes: 55  Treatment #: 7/12    ASSESSMENT:   Summary/Analysis of treatment: Pt presented to appointment pleasant and motivated for therapy. Tolerated full session well with focus today on endurance ex, GMC ex with large amplitude movement, FMC ex and reassessment of GMC and FMC per repeat Box and Blocks and 9 hole peg test. Pt shows good improvement in both tests unilaterally (although opposite UE improved on each test): LUE improved in B and B test, RUE improved in 9 hole peg test. See scores below. At end of tx session, OT inquired if pt is maintaining HEP; he confirmed he completes exercises every day except for therapy days. We discussed how to modify his HEP in various ways, per his concern that he was doing the same exercises in the same way every day. OT suggested increasing speed with ex, increasing individual ex for longer period of time or increased reps and adding light wrist weights. Pt was very receptive to suggestions and verbalized understanding. When pt stood at end of session to walk with OT to next therapy, he stumbled and caught himself on the wall. No fall occurred, no injury occurred. Pt continued ambulating without further incident, stating he stumbled when \"stepping over the line in the floor\".      See initial evaluation for goals and POC. Progress toward goals: Improved     1. Patient will incorporate large amplitude movements into I/ADL tasks to improve efficiency and safety.  2. Patient will demonstrate and verbalize understanding of work efficiency techniques related to PD to improve safety and decrease fatigue.  3. Patient will improve FMC per 9HPT by 5 seconds seconds to improve I/ADL independence.  4. Patient will improve GMC per B & B by 5 blocks blocks to improve I/ADL independence.  5. Patient will participate in cognitive assess, for tx planning and " recommendations re: I/ADLs.  6. Patient will decrease double vision by participating in oculomotor exercises and verbalize and demonstrate understanding of HEP.   7. Patient will verbalize and demonstrate understanding of home exercise program for UE's with B (bilateral, right,left) UE to improve function with I/ADLs.    PLAN:   Treatment Time: Therapeutic Exercise 55 minutes    Plan: Continue per plan of care and Patient progressing towards goals    Recommendations: Continue with double vision ex (Daphne ball, zoomball), continue with endurance ex (calisthenics, zoomball), GMC ex (Dynavision, rope/letter board), divided attn and GMC ex (ball toss with naming, walk and ball toss, etc), FMC (nuts/bolts, beads/marbles in palm to pincher grasp, etc); discuss/review energy conservation and work efficiency techniques    SUBJECTIVE:   Pain: No  Pt stated he was tired today.    OBJECTIVE:   Therapeutic Exercise:.  - Initiated session with gentle stretching to all planes, pt requires verbal and visual cues to sustain correct movements in stretch. Followed by endurance and activity tolerance ex: arm bike- goal to sustain for 5 minutes with moderate resistance, pt fatigued after 2 minutes. Took a short break then continued, able to sustain only for 1 more minute.   - Dynavision to address GMC reaching, pt required only initial cues to utilize large amplitude movements on first trial using BUE. 39 hits/1 minute. Second trial advanced to using every other UE, pt sustained large amplitude movements with no further cues. Good performance at 36 hits/minute. Advanced further to step and reach, pt required ongoing cues for large amplitude steps while reaching, decline in speed with task at 20 hits/minute. Brief loss of balance x1 but quick self recovery and no stumbling.   - FMC ex at table: palm stretch on tennis ball, 5 reps on each hand with cues for large amplitude finger stretch.   - FMC ex at table: finger flick exercises  with cotton balls, 6 reps each finger, on each hand. Great performance, no significant difference between R and L with exercise after initial cues for large amplitude movements of fingers.     Box and Block Retest:    5/3  3/31  R 33 blocks 32 blocks  L 42 blocks 33 blocks    Good improvement on LUE, slight improvement on RUE.     9 Hole Peg Retest:   5/3  3/31  R 39 sec  44 sec  L 32 sec  32 sec    Good improvement on RUE, stable performance on LUE.     Mona Brown, OT

## 2021-06-10 NOTE — PROGRESS NOTES
Physical Therapy  Physical Therapy  Movement Disorder Treatment Note    Date: 5/17/2017   Visit #: 11/13  - MEDICARE (3/31/2017  to 6/28/2017) Reassessed 5/3/2017  Rx Units: 1TE, 1NR, 2- TA   Total OP Minutes: 55    Pain:  No    Subjective:  No complaints this date.       Objective:     Therapeutic Exercise:  Total Minutes: 15  Sustained Movements (sitting)  4reps  x 1-2 sets       Exercise   Reps   Sets   Effort     1A Floor to Ceiling Without Flicking 4 1 10     1B Side to Side Without Flicking 4 1 9.5      Comments:    1A: VC for tall posture and palms forward   1B: VC for palm up and 25% shaping for knee extension. Performed on slightly elevated mat to assist with knee extension.  Overall decreased amplitude and effort this date.    Other:  - Nustep x 10 minutes  Level #5  Average METS: 2.3 SPM: 82       Neuro-Reeducation/Balance:  Total Minutes: 15  Multidirectional Repetitive Movements.  8-16reps  x 1-2 sets    Step and Reach:   UE support: 2A, 2B  Chairs Nearby: 2A, 2B      Exercise   Reps   Sets   Effort     2A Forward Step and Reach    10    2 each 8     2B Sideward Step and Reach    10    2 10     2C Backward Step and Reach        Comments:   2A - VC for BIG step back to center.  Improved steps with R LE this date.  Decreased coordination with arm therefore used PWR! reach  2B - alternating sides. VC for BIG steps and BIG arms B.     Rock and Reach:  UE support: 2D  Chairs Nearby:      Exercise   Reps   Sets   Effort     2D Left leg Forward/Backward     20      2 10     2D Right leg Forward/Backward     20      2 10     2E Side to Side        Comments: 2D - Decreased amplitude of rocking and arm swing as reps progressed. Pt able to better coordination rocking this date without direct VC.        Other:         Gait Training:   Total Minutes:     Time Speed (MPH) UE support Direction Incline Comments                                      Other:     Therapeutic Activity  Total Minutes: 25     Functional  Movement  Rep  Sets  Effort    Rolling  8 1 each 9    Supine to Sit  5 2 10    Sit to Stand from standard chair 15 1 10    Sit to Stand from low, soft chair       Sit and Reach       Walk and Turn between chairs       PWR Moves: Supine Marching 10 1 10    PWR Moves:        Comments: Rolling - VC for BIG reach across body and more effort.   Supine to sit: VC for BIG push in legs  Sit to supine: VC for PWR lift of B LEs   PWR supine marching - VC for BIG effort  Sit to stand: VC for BIG effort and power up.       Other:   BIG walking x 460' without AD.  VC for BIG posture and increased foot clearance on L with BIG heel strike.       Carryover Assignment: sit to stand BIG    Assessment/Plan for week:  5/15/2017-5/19/2017  Pt demonstrated increased bradykinesia and rigidity this date. See previous note for this week's A/P.

## 2021-06-10 NOTE — PROGRESS NOTES
Occupational Therapy  Outpatient Occupational Therapy - Parkinson's Discharge Summary    Medicare Insurance    Units: 4 ADL  Total Minutes: 55  Treatment #: 11    ASSESSMENT  Summary & Analysis of treatment: Pt engaged in full tx session and tolerated all aspects of session well. Pt aware that this session would be his last one and he will be discharged from OT today. OT reviewed care plan with pt to ensure that no lingering concerns or impairments are left unaddressed. See goals below, all met or partially met and appropriately completed. Pt stated his continued concern with ADL tasks is toilet transfers, finding it difficulty to get off his toilet at home. He states this is not a consistent issue, but one that happens often enough to be a slight concern. OT demonstrated large amplitude floor tap while seated on toilet to improve his sit to stand transfer from a low toilet and pt demonstrated success with this technique. See further information below. OT also reviewed and discussed energy compensation and work efficiency techniques with patient and provided him with a take home handout, to which he verbalized understanding. Regarding vision, OT reviewed vision HEP with pt, who stated he has not engaged in them at home. He also stated he has not attempted to use the eye patch that was given to him at a previous session, so he is unsure if this will be of benefit. He reports no improvement in double vision, although he has not followed through on any exercises at home that he has learned in OT.     See initial evaluation for goals and POC. Goal Status: Achieved     1. Patient will incorporate large amplitude movements into I/ADL tasks to improve efficiency and safety.- MET  2. Patient will demonstrate and verbalize understanding of work efficiency techniques related to PD to improve safety and decrease fatigue.- MET  3. Patient will improve FMC per 9HPT by 5 seconds seconds to improve I/ADL independence.- MET  4.  "Patient will improve GMC per B & B by 5 blocks blocks to improve I/ADL independence.- MET   5. Patient will participate in cognitive assess, for tx planning and recommendations re: I/ADLs.- MET  6. Patient will decrease double vision by participating in oculomotor exercises and verbalize and demonstrate understanding of HEP. - partially MET  7. Patient will verbalize and demonstrate understanding of home exercise program for UE's with B (bilateral, right,left) UE to improve function with I/ADLs.- MET    PLAN  Treatment time: Self-care / ADL training 55 minutes    Plan: 3.Patient has met goals. See discharge summary.. Discharged from 1:1 skilled Occupational Therapy.    Recommendations: Follow up in 6-12 months due to progressive nature of PD. Re-evaluate change or stability in function, to determine if further OT would be indicated.     SUBJECTIVE  Pain: No    Pt reported that he was feeling \"weird right now\", although unable to specify what that meant. He stated he was feeling effects from his medications, believing it was med related because he \"didn't know what else to attribute it to\". He was able to tolerate the full treatment session without any difficulty.     OBJECTIVE  - Pt attempted toilet transfer from a low toilet x1 with no cues or techniques provided. Pt required rocking back and forth and was able to stand after 2 attempts.  Following this initial trial, OT demonstrated large amplitude floor taps in between feet while seated on the toilet prior to attempting a stand- tap to tall sit, x3.  Pt trialed this technique and was able to come to full stand on the first attempt, improved from his initial stand without using this technique. Repeated the sit-stand x3, all with good performance.  OT also introduced him to a raised toilet seat, pt trialed sit to stand from RTS without floor tap technique. Pt was able to come to full stand on first attempt without rocking. OT provided verbal resources on where he " can purchase a RTS if were interested.   - OT reviewed PD energy conservation and work efficiency handout with pt. Pt was very receptive to all information and verbalized understanding.     Mona Brown, OT

## 2021-06-10 NOTE — PROGRESS NOTES
Speech Language/Pathology  Speech Therapy Outpatient Daily Visit Note- Voice/Motor Speech    Carlos Neri   1935     [id number]    Visit Number 5 of 9  Interventions provided during this session: speech/language 45 minutes    Subjective  Patient presents as cooperative and awake during this session.  Patient reports no pain  Patient reports completing ~90% of his homework given during last therapy session. Pt's son dropped him off today. Pt was at therapy session alone.     Objective  Patient did inconsistently  use stronger clearer speech walking back to therapy room from waiting room (expecially with shorter responses).  Stooped posture    Automatic counting 1-10, days of the week, months of the year were completed Brittany using strong, projected, power voice.    Patient held sustained /AH/ at habitual pitch with a strong clear voice, wide open mouth for 11.07 to 13.03 seconds, with an average of 12.18 seconds. Volume of voice greater than or equal to 83 dBSPL. stooped posture. needed cues to sit up tall with each one.rst one only and then he was noted to self correct for the duration of the task . coughing noted at the end of the duration today with 4/5. No dysphonia today. Pt Brittany used a strong voice  3/5 of the time when asked to tell me the duration that was visible on the timer for each sustained /AH/.     Functional phrase reading:Patient was able to read functional phrases using  Goal of increased effort and amplitude, no decreased projection and no dysphonia with 20/20 (100%). Patient did not feel that they are using a voice volume that is too loud for 1:1 conversation during functional phrase reading task using increased effort for volume, clarity, and expression. voice volume 73-78 dBSPL (WNL's). No dysphonia    4 sentence level reading task: goal was use of crisp, clear and projected speech that does not have decreased precision. First paragraph pt had 3 periods where decreased precision occurred  (with cue to repeat with increased amplitude pt met goal with all but one period), second paragraph pt had 1 period where decreased precision occurred (with cue to repeat with increased amplitude pt met goal with 100%), third and fourth paragraph ~75% of paragraph had decreased precision (with cue to repeat with increased amplitude pt only slightly improved).  Pt felt that he was having to concentrate more with paragraph number 3 and 4 as he felt they were more complex.     Encouraged pt to use overexaggeration and power. Patient did not feel that they are using a voice volume that is too loud for 1:1 conversation during this task with use of increased effort for volume, clarity, and expression. Voice volume fairly consistently greater than or equal to 70 dBSPL for paragraph number one and two (3rd and 4th were softer but not measured).  mild hoarseness and breathiness with voice.       During structured single word (complete phrase with 1 word)  response task pt met goal of no periods of dysphonia and use of good amplitude and no decreased projection with 100%. The patient does not feel that they are using a voice volume that is too loud for 1:1 conversation during this task using increased effort for volume, clarity, and expression.    During structured sentence (made up a sentence given a word)  response task pt met goal of no periods of dysphonia and use of good amplitude and no decreased projection and no stuttered like speech with 80% . The patient does not feel that they are using a voice volume that is too loud for 1:1 conversation during this task using increased effort for volume, clarity, and expression. Pt needed cues with first two that he really needed to increase amplitude, power and projection.     Improved voice volume and clarity was noted with audiotape/visual feedback from iPad with tasks for all tasks.    Assessment    Continue with structured tasks at the sentence level and paragraph level  reading level.   Pt continues to progress toward goals and benefits from cues to increased amplitude and projection.  Some carryover noted today inconsistently with single word and phrase level response in conversation. i. Patient is in agreement with plan.  Continues to be appropriate for skilled 1:1 skilled speech therapy. Continue to encourage increased amplitude and effort with voicing and to maintain strength for the entire response.     Plan  Current goals remain appropriate.  Patient was given home vocal strengthening exercises to complete prior to next therapy session. Patient verbalize understanding of exercises and importance of daily structured home practice for increased carryover for improved voice/speech for communication.

## 2021-06-10 NOTE — PROGRESS NOTES
"Physical Therapy  Physical Therapy  Movement Disorder Treatment Note    Date: 2017   Visit #: 13/13  - MEDICARE (3/31/2017  to 2017) Reassessed 5/3/2017  Rx Units: 2TE, 2- NR   Total OP Minutes: 55    Pain:  No    Subjective:  Patient reports increased energy during his daily walks since beginning therapy    Objective:     Therapeutic Exercise:  Total Minutes: 25    - Nustep x 7 minutes  Level #3  Average METS: 2.7 SPM: 100  -30\" Chair Stand: 12 reps  Without UE support    -Preferred Gait Speed:   6 meters in 4.31 seconds  Meters/second: 1.39     # steps in 6 meters: 10-11   Assistive Device: none   Gait Analysis: good step length and heel strike    Fast Gait Speed:   6 meters in 3.5 seconds Meters/second: 1.71   # steps in 6 meters: 9   Assistive Device: none    6 minute walk test: 1606 feet  RPD: 2/10     Re-issued written HEP of 1A, 1B, 2A, 2B, 2D and all questions addressed.       Neuro-Reeducation/Balance:  Total Minutes: 30    360 degree turn: 9-10 steps    TUG(Timed Up and Go): 9.94 seconds  (>13 sec: Falls Risk)  Cognitive TU.88 seconds (Task): Boys names in alphabetical order (A to F)    Balance Assessment: Mini-BEST  (<)   1) Sit <> Stand: (2) Normal   2) Rise to Toes: (1) Moderate   3) Stand on One Leg: (0) Severe L: unable R: 3.57 seconds   4) Compensatory Stepping Correction-Forward: (2) Normal   5) Compensatory Stepping Correction-Backward: (2) Normal   6) Compensatory Stepping Correction-Lateral: (1) Moderate L: (2) Normal R: (1) Moderate   7) Eyes Open, Firm Surface: (2) Normal  >30 seconds  8) Eyes Closed, Foam Surface: (2) Normal >30 seconds with mild sway  9) Incline, Eyes Closed: (1) Moderate  >30 seconds however aligns with gravity   10) Change in Gait Speed: (1) Moderate   11) Walk with Head Turns-Horizontal: (1) Moderate*  12) Walk with Pivot Turns: (2) Normal  13) Step Over Obstacles: (2) Normal   14) TUG with Dual Tasks: (0) Severe  TU.94 seconds  Cognitive TUG: "  17.88 seconds - paused upon standing        Carryover Assignment: BIG posture    Assessment/Plan for week:  5/22/2017-5/26/2017  See discharge summary for details.

## 2021-06-10 NOTE — PROGRESS NOTES
Speech Language/Pathology  Speech Therapy Outpatient Daily Visit Note- Voice/Motor Speech    Carlos Neri   1935     [id number]    Visit Number 8 of 9  Interventions provided during this session: speech/language 55 minutes    Subjective  Patient presents as cooperative and awake during this session.  Patient reports no pain  Patient reports completing ~90% of his homework given during last therapy session.       Objective  Patient did not use stronger clearer speech walking back to therapy room from waiting room (expecially with shorter responses).  Stooped posture    Automatic counting 1-10, days of the week, months of the year were completed Brittany using strong, projected, power voice (some breathiness is noted but much stronger than baseline).    Patient held sustained /AH/ at habitual pitch with a strong clear voice, wide open mouth for 10.57 to 13.57 seconds, with an average of 12.44 seconds. Volume of voice greater than or equal to 83 dBSPL and voice volume and strength is steady. stooped posture. needed cues to sit up tall.  Cough noted at end of AHH x1. No dysphonia today. Pt Brittany used a strong voice with all except first production when asked to tell me the duration that was visible on the timer for each sustained /AH/, cue was provided.      Functional phrase reading:  Goal was use of increased effort and amplitude, no decreased projection and no dysphonia. First 2 attempts pt was intelligible, but mild breathiness is noted and I feel that pt would be easier to hear with increased effort. Pt is told to shout and met goal with 100% Patient did not feel that they are using a voice volume that is too loud for 1:1 conversation during functional phrase reading task using increased effort for volume, clarity, and expression, but feels that it takes more effort and word than normal. voice volume 73-78 dBSPL (WNL's). No dysphonia    4 sentence level reading task: pt meet goal of using largely using crisp, clear  and projected speech that does not have decreased precision with 2/6 paragraphs with first attempt. Pt demonstrated improvement with cue to use 10/10 effort and with assist of audiotape review.  Pt met goal with multiple repetitions, practice and feedback with 5/6. Patient did not feel that they are using a voice volume that is too loud for 1:1 conversation during functional phrase reading task using increased effort for volume, clarity, and expression,     During structured two sentence level generation task: (pt was given a picture of many actions that were occurring, pt was to generate sentence to describe actions). Pt demonstrated difficulty with generation of though and pt was aware of this.  Cognitive-linguistic impairments affect use of strategies. Pt used increased amplitude to speech, voice and projection with 0/6 two sentence responses, but did have increased accuracy when pt was told to work on producing just one sentence.  The patient does not feel that they are using a voice volume that is too loud for 1:1 conversation during this task using increased effort for volume, clarity, and expression.pt requires cues to use max effort.     During structured single word genretion (name one item in a category) and met goal of no periods of dysphonia and use of good amplitude and no decreased projection with 12/12 (100%)The patient does not feel that they are using a voice volume that is too loud for 1:1 conversation during this task using increased effort for volume, clarity, and expression.    Improved voice volume and clarity was noted with audiotape/visual feedback from iPad with tasks for all tasks.    Assessment    Continue with structured tasks at the sentence level and paragraph level reading level.   Pt continues to benefits from cues to increased amplitude and projection, but cognitive-linguistic impairments affect carryover of strategy and implementation of strategies.  Patient is in agreement with  plan.   Continues to be appropriate for skilled 1:1 skilled speech therapy. Continue to encourage increased amplitude and effort with voicing and to maintain strength for the entire response.     Plan  Current goals remain appropriate.  Patient was given home vocal strengthening exercises to complete prior to next therapy session. Patient verbalize understanding of exercises and importance of daily structured home practice for increased carryover for improved voice/speech for communication.

## 2021-06-10 NOTE — PROGRESS NOTES
Speech Language/Pathology  Speech Therapy Outpatient Daily Visit Note- Voice/Motor Speech    Carlos Neri   1935     [id number]    Visit Number 2 of 9  Interventions provided during this session: speech/language 55 minutes    Subjective  Patient presents as cooperative and awake during this session.  Patient reports no pain  Patient reports not completing homework given during last therapy session. Pt took metro mobility today. Pt was at therapy session alone.     Objective  Patient did not use stronger clearer speech walking back to therapy room from waiting room. Stooped posture    Patient held sustained /AH/ at habitual pitch with a strong clear voice, wide open mouth for 11.07 to 14.16 seconds, with an average of 12. seconds. Volume of voice ranged from 81 dBSPL to 88. stooped posture.  needed cues to sit up tall. Coughing only noted 1/10. Some of them  He would push down his arms on the arm rest to increased effort. No dysphonia today. Pt Brittany used a strong voice  100% of the time when asked to tell me the duration that was visible on the timer for each sustained /AH/.    Functional phrase reading:Patient was able to read functional phrases using  Goal of increased effort and amplitude, no decreased projection and no dysphonia with 35/50 (70%). Last 3 attempts were 100% after audiotape review was utilized. Patient did not feel that they are using a voice volume that is too loud for 1:1 conversation during functional phrase reading task using increased effort for volume, clarity, and expression.voice volume 72-78 dBSPL (WNL's). No dysphonia    During structured single word (name 2 items in a category)  response task pt met goal of no periods of dysphonia and use of good amplitude and no decreased projection with 70% (previous session 38%). The patient does not feel that they are using a voice volume that is too loud for 1:1 conversation during this task using increased effort for volume, clarity, and  expression.    During structured phrase (phrase level completion)  response task pt met goal of no periods of dysphonia and use of good amplitude and no decreased projection and stuttered like speech with 55%. With repetition and cues to be bolder pt met goal with 100%. The patient does not feel that they are using a voice volume that is too loud for 1:1 conversation during this task using increased effort for volume, clarity, and expression.  Improved voice volume and clarity was noted with audiotape/visual feedback from iPad with tasks for all tasks.    Assessment    Continue with structured tasks at the phrase level, sentence level and sentence level reading level.   Pt continues to progress toward goals and benefits from cues to increased amplitude and projection.  Patient is in agreement with plan.  Continues to be appropriate for skilled 1:1 skilled speech therapy. Continue to encourage increased amplitude and effort with voicing and to maintain strength for the entire response.     Plan  Current goals remain appropriate.  Patient was given home vocal strengthening exercises to complete prior to next therapy session. Patient verbalize understanding of exercises and importance of daily structured home practice for increased carryover for improved voice/speech for communication.

## 2021-06-10 NOTE — PROGRESS NOTES
"Occupational Therapy  OT Parkinsons' Disease and Movement Disorders Progress Note    Medicare Insurance    Units: 4 Ex  Total Minutes: 55  Treatment #: 8/12    ASSESSMENT:   Summary/Analysis of treatment: Pt presented to the appointment alone, tolerated full session well although OT observed increased tremors in head today and more pronounced hypophonia. Pt stated his \"meds were off\" today and wasn't due for more until one hour later. Focus of today's session was endurance exercise, GMC and FMC. Pt continues to fatigue fairly quickly with seated calisthenic ex, although today he tolerated a light weight, which is improved from previous sessions. Pt demonstrated good large amplitude movements in GMC activity standing at table, but required significantly increased cues for gross coordination during calisthenic exercise that involved multiple steps. Increased time needed with divided attention task during exercise activities. FMC activities were tolerated well, pt reported \"moderate effort\" with all of today's tasks. OT provided pt with eye patch to trial at home while watching TV, to assess if it helped diminish double vision.     See initial evaluation for goals and POC. Progress toward goals: Improved    PLAN:   Treatment Time: Therapeutic Exercise 55 minutes    Plan: Continue per plan of care and Patient progressing towards goals     1. Patient will incorporate large amplitude movements into I/ADL tasks to improve efficiency and safety.  2. Patient will demonstrate and verbalize understanding of work efficiency techniques related to PD to improve safety and decrease fatigue.  3. Patient will improve FMC per 9HPT by 5 seconds seconds to improve I/ADL independence.  4. Patient will improve GMC per B & B by 5 blocks blocks to improve I/ADL independence.  5. Patient will participate in cognitive assess, for tx planning and recommendations re: I/ADLs.  6. Patient will decrease double vision by participating in " "oculomotor exercises and verbalize and demonstrate understanding of HEP.   7. Patient will verbalize and demonstrate understanding of home exercise program for UE's with B (bilateral, right,left) UE to improve function with I/ADLs.    Recommendations: Pt stated he looks forward to further focus on GMC and FMC exercise in remaining tx sessions. Also, endurance ex (arm bike, 2# dowel calisthenics for trunk/UE); utilize divided attn tasks within GMC and endurance ex.     SUBJECTIVE:   Pain: No  \"My meds feel off today\".     OBJECTIVE:   Therapeutic Exercise:  - Initiated session with UE stretching and calisthenic ex with 2# weighted dowel. Pt tolerated 30-60 seconds of each exercise, 5 total exercises with a short break in between each. After the last exercise completed, pt stated \"I can't do anymore\". Good performance with all exercises and good effort throughout. The only ex that he showed difficulty with is a multistep UE ex, bringing the dowel from his knees, to his chest, overhead, down to chest and end at his knees. With verbal and visual cues, he had difficulty maintaining the pattern, succeeding when the pattern was reduced to 1 step (chest to overhead reps).  - GMC exercises with focus on large amplitude movements and \"smooth\" movements of UE.  Pt stood at table for 10 minutes with a rope/letter board, 1 rep each on right and left. Reaching and \"hooking\" rope on the board was completed very smoothly with no observable difficulty in reaching. However, increased time with following the letter pattern back and forth across board. Similar performance on LUE, good coordination but increased time following pattern.   - FMC activity seated at table: pt given handful of small beads to hold in his palm, instructed to manipulate one bead at a time to finger/thumb pinch and place in small cups on table. After first trial, OT had pt complete finger tapping ex on each hand, 10 taps per finger for 2 reps. Minimal cues for " large amplitude tapping, with good follow through from pt. After this ex, pt completed the bead manipulation activity again with greater ease of task and increased speed.    - FMC activity seated at table: pt given a pattern to follow with snap beads. Good bilateral use of UE during task, pt reported mod effort with manipulating small snap beads. Slowness in performance noted when concentrating on pattern following. OT also observed increased tremors in head when his concentration increased.     Mona Brown, OT

## 2021-06-10 NOTE — PROGRESS NOTES
Speech Language/Pathology  Speech Therapy Outpatient Daily Visit Note- Voice/Motor Speech    Carlos Neri   1935     [id number]    Visit Number 4 of 9  Interventions provided during this session: speech/language 45 minutes    Subjective  Patient presents as cooperative and awake during this session.  Patient reports no pain  Patient reports partially completing homework given during last therapy session. Pt took metro mobility today. Pt was at therapy session alone.     Objective  Patient did not use stronger clearer speech walking back to therapy room from waiting room. Repetition nedded and careful listening utilized.  Stooped posture    Automatic counting 1-10, days of the week, months of the year were completed Brittany using strong, projected, power voice.    Patient held sustained /AH/ at habitual pitch with a strong clear voice, wide open mouth for 11.07 to 13.03 seconds, with an average of 12.18 seconds. Volume of voice greater than or equal to 83 dBSPL. stooped posture. needed cues to sit up tall with the first one only and then he was noted to self correct for the duration of the task . No coughing noted with duration today. No dysphonia today. Pt Brittany used a strong voice  4/5 of the time when asked to tell me the duration that was visible on the timer for each sustained /AH/. Needed a cue after the first one to use over exaggeraion with speech when significantly improved the strength and clarity.     Functional phrase reading:Patient was able to read functional phrases using  Goal of increased effort and amplitude, no decreased projection and no dysphonia with 30/30 (100%). Patient did not feel that they are using a voice volume that is too loud for 1:1 conversation during functional phrase reading task using increased effort for volume, clarity, and expression. voice volume 72-78 dBSPL (WNL's). No dysphonia    4 sentence level reading task: first attempt pt's volume level is functional however  hoarseness and breathiness is noted and speech is not crisp, clear and projected. Encouraged pt to use overexaggeration and power.  Pt met goal of increased effort and amplitude, no decreased projection, easy to hear and no dysphonia with first 2 sentences then decrease in amplitude and clarity was noted. Patient did not feel that they are using a voice volume that is too loud for 1:1 conversation during this task with use of increased effort for volume, clarity, and expression.     During structured single word (complete phrase with 1 word)  response task pt met goal of no periods of dysphonia and use of good amplitude and no decreased projection with 100%. The patient does not feel that they are using a voice volume that is too loud for 1:1 conversation during this task using increased effort for volume, clarity, and expression.    During structured sentence (made up a sentence given a word)  response task pt met goal of no periods of dysphonia and use of good amplitude and no decreased projection and no stuttered like speech with 100% (last session pt was 55% accurate). The patient does not feel that they are using a voice volume that is too loud for 1:1 conversation during this task using increased effort for volume, clarity, and expression.    Improved voice volume and clarity was noted with audiotape/visual feedback from iPad with tasks for all tasks.    Assessment    Continue with structured tasks at the sentence level and paragraph level reading level.   Pt continues to progress toward goals and benefits from cues to increased amplitude and projection.  improvement noted today compared to previous session. Patient is in agreement with plan.  Continues to be appropriate for skilled 1:1 skilled speech therapy. Continue to encourage increased amplitude and effort with voicing and to maintain strength for the entire response.     Plan  Current goals remain appropriate.  Patient was given home vocal strengthening  exercises to complete prior to next therapy session. Patient verbalize understanding of exercises and importance of daily structured home practice for increased carryover for improved voice/speech for communication.

## 2021-06-10 NOTE — PROGRESS NOTES
Physical Therapy  Physical Therapy  Movement Disorder Treatment Note    Date: 5/10/2017   Visit #: 9/13  - MEDICARE (3/31/2017  to 6/28/2017) Reassessed 5/3/2017  Rx Units: 1TE, 2NR, 1- TA  Total OP Minutes: 55    Pain:  No    Subjective:  Patient notes mild fatigue this date. Reports compliance to HEP on a daily basis.      Objective:     Therapeutic Exercise:  Total Minutes: 20  Sustained Movements (sitting)  4reps  x 1-2 sets       Exercise   Reps   Sets   Effort     1A Floor to Ceiling Without Flicking   4 1 10     1B Side to Side Without Flicking 4 1 10      Comments:    1A: VC for tall posture   1B: VC for palm up    Other:  - Nustep x 10 minutes  Level #5  Average METS: 3.5 SPM: 96  -Heel raises x 10 reps with B  UE support  - VC for full ROM.      Neuro-Reeducation/Balance:  Total Minutes: 25  Multidirectional Repetitive Movements.  8-16reps  x 1-2 sets    Step and Reach:   UE support: 2A, 2B  Chairs Nearby: 2A, 2B      Exercise   Reps   Sets   Effort     2A Forward Step and Reach    10    2 9     2B Sideward Step and Reach    10    2 7     2C Backward Step and Reach        Comments:   2A - VC for BIG step back to center.  Improved with second set.  Decreased coordination with arm therefore used PWR! reach  2B - alternating sides. VC for increased step height on R and alternate sides.     Rock and Reach:  UE support:   Chairs Nearby:      Exercise   Reps   Sets   Effort     2D Left leg Forward/Backward        2D Right leg Forward/Backward        2E Side to Side        Comments:      Other:   On green foam disc with CGA for balance:   Static standing, eyes open x 30 seconds - no LOB   Horizontal head turns x 10 reps - mild sway   Eyes closed 10 seconds, 18 seconds, 18 seconds before left LOB with mod A for recovery.    Obstacle Course   [place] around cone to herberth around cone to herberth to green foam to river rocks orange, yellow, purple    - 8 reps of obstacle course; cueing for upright posture, being big on  "the hurdles and proper sequencing of the obstacle course; first 3 reps had trouble with hurdles and after that he improved; no LOB noted   3x hurdles, figure 8 around 3 cones, 5x step ups onto 6\" step, to green foam to 3 river rocks    - 4 reps of obstacle course; cueing for upright posture, being big on the hurdles and proper sequencing of the obstacle course occasionally; some shuffled gait during figure 8 pattern but overall good performance; no LOB noted        Gait Training:   Total Minutes:     Time Speed (MPH) UE support Direction Incline Comments                                      Other:     Therapeutic Activity  Total Minutes: 10     Functional Movement  Rep  Sets  Effort    Rolling        Supine to Sit       Sit to Stand from standard chair       Sit to Stand from low, soft chair 15 1 9    Sit and Reach       Walk and Turn between chairs       PWR Moves:       PWR Moves:        Comments: sit to stand: cueing to increase anterior lean prior to patient standing up, good technique overall    Other:   BIG walking x 460' without AD, close SBA for safety.  VC for BIG posture and upright gaze.       Carryover Assignment: sit to stand BIG    Assessment/Plan for week:  5/8/2017-5/12/2017  Patient demonstrated improved amplitude this week with BIG exercises. He was able to alternate without much confusion, however continues to have difficulty coordinating proper UE and LE movements with 2A and 2B.  Large amplitude movement carryover to daily activities continues to vary depending on awareness. He typically requires initial cues for BIG effort and energy with functional movements and ambulation.  Plan to continue working on balance and functional carryover items in order to progress towards goals.           "

## 2021-06-10 NOTE — PROGRESS NOTES
Speech Language/Pathology  Speech Therapy Outpatient Daily Visit Note- Voice/Motor Speech    Carlos Neri   1935     [id number]    Visit Number 6 of 9  Interventions provided during this session: speech/language 45 minutes    Subjective  Patient presents as cooperative and awake during this session.  Patient reports no pain  Patient reports completing homework given during last therapy session.     Objective  Patient did inconsistently  use stronger clearer speech walking back to therapy room from waiting room (expecially with shorter responses).  Soft speech is consistent with conversation recap of weekend. Phoneme and word repetitions are noted. Stooped posture.    trialed speech Boomrate today adjusted settings to speaker output 75 dBSPL and trigger sensitivity to 78. Overall this device did not increase voice volume or amplitude of pt's speech. This was revidwed with pt and he was in agreement with assessment.   trialed fluency howard  on ipad which provides delayed auditory feedback. No change noted with dysfluencies and did not positively impact speech.     Multiple sentence level reading: overall pt presents with decreased amplitude with articulation and projection of voice. Decreased precision is noted and some careful listening is utilized. Pt improved performance where no careful listening would be noted with cue to use 9/10 effort when reading aloud. Pt needs multiple cues, feedback and practice to use this amount of increased effort and amplitude.     Improved voice volume and clarity was noted with audiotape/visual feedback from iPad with tasks for all tasks.    Assessment    Continue with structured tasks at the sentence level and paragraph level reading level.   Pt continues to progress toward goals and benefits from cues to increased amplitude and projection.  Patient is in agreement with plan.  Continues to be appropriate for skilled 1:1 skilled speech therapy. Continue to encourage increased  amplitude and effort with voicing and to maintain strength for the entire response. Pt to strive to use 9/10 effort with speaking.     Plan  Current goals remain appropriate.  Patient was given home vocal strengthening exercises to complete prior to next therapy session. Patient verbalize understanding of exercises and importance of daily structured home practice for increased carryover for improved voice/speech for communication.

## 2021-06-10 NOTE — PROGRESS NOTES
Occupational Therapy  OT Parkinsons' Disease and Movement Disorders Progress Note    Medicare Insurance    Units: 1 Sensory integration, 2 therapeutic ex, 1 ADL  Total Minutes: 65 minutes  Treatment #: 3/12    ASSESSMENT:   Summary/Analysis of treatment: Patient demonstrated freezing when therapist and patient left the lobby. Patient benefits from using large amplitude movements to stand and counting to begin walking to counteract freezing. Patient demonstrated more difficulty with LUE during fine motor coordination tasks specifically when isolating finger movements. Patient would benefit from continuing to complete FMC exercises at home to improve skills required for I/ADLs. Patient continues to demonstrate double vision; however, patient was able to demonstrate understanding of kvng string exercises to use at home to improve convergence.     See initial evaluation for goals and POC. Progress toward goals: Improved     Long-term goals:   1. Patient will incorporate large amplitude movements into I/ADL tasks to improve efficiency and safety.  2. Patient will demonstrate and verbalize understanding of work efficiency techniques related to PD to improve safety and decrease fatigue.  3. Patient will improve FMC per 9HPT by 5 seconds seconds to improve I/ADL independence.  4. Patient will improve GMC per B & B by 5 blocks blocks to improve I/ADL independence.  5. Patient will participate in cognitive assess, for tx planning and recommendations re: I/ADLs.  6. Patient will decrease double vision by participating in oculomotor exercises and verbalize and demonstrate understanding of HEP.   7. Patient will verbalize and demonstrate understanding of home exercise program for UE's with B (bilateral, right,left) UE to improve function with I/ADLs.    PLAN:   Treatment Time: Therapeutic Exercise 30 minutes, Self care/ADL training 20 minutes and Sensory integration 15 minutes    Plan: Continue per plan of care and Patient  progressing towards goals    Recommendations: FMC ex: nut and bolt, flipping pegs, grooved peg board. GMC: DynaVision, zoomball. Donning/doffing coat with large movements.    SUBJECTIVE:   Pain: No  Pt reported freezing at home.    OBJECTIVE:   ADLs:  -Patient demonstrated techniques to decrease freezing. Patient demonstrated use of visual markers with cones to reduce freezing. Patient progressed to counting to decrease freezing to allow patient to use techniques at home and in the clinic. Patient demonstrated understanding x 5 reps during kitchen mobility with cluttered environment.   -Patient completed donning his coat x 2 using large amplitude movements and large trunk movements. Patient required min assistance to complete task correctly and would benefit from continuing to practice the technique.    Therapeutic ex:  -Patient completed FMC exercises to address coordination required for daily tasks:  *Patient completed 10 rotations with marbles in one hand to isolate finger movements.   *Patient completed rotation with 1st and 2nd digits with nut and bolts and had difficulty maintaining large amplitude movements during the task. Patient required cues throughout and demonstrated more difficulty with LUE than RUE.  *Patient completed translation exercise with 6 coins to move coins from palm to pincer grasp. Pt had min difficulty stacking coins at the end of task.    Sensory integration:  -Patient completed kvng string exercises x 2 rep. Patient reported understanding of home exercise program.

## 2021-06-10 NOTE — PROGRESS NOTES
"Physical Therapy  Physical Therapy  Movement Disorder Treatment Note    Date: 5/22/2017   Visit #: 12/13  - MEDICARE (3/31/2017  to 6/28/2017) Reassessed 5/3/2017  Rx Units: 2TE, 1NR, 1- TA    Total OP Minutes: 55    Pain:  No    Subjective:  Patient reports increased stiffness this date despite taking his medication at 12:30.      Objective:     Therapeutic Exercise:  Total Minutes: 30  Sustained Movements (sitting)  4reps  x 1-2 sets       Exercise   Reps   Sets   Effort     1A Floor to Ceiling Without Flicking 4 1 10     1B Side to Side Without Flicking 4 1 10      Comments:    1A: VC for tall posture and palms forward. Cues to avoid flicking in order to focus on amplitude of movements   1B: Shaping and VC for alternating sides, increased knee extension and palm up. Increased confusion this date.    Other:  - Nustep x 10 minutes  Workload #4  Average METS: 3.2 SPM: 83  -Hook lying LTR stretch with arms in T position and contralateral rotation x 30 seconds x 2 reps each side - VC to relax into stretch  -Supine full body stretch with arms extended over head x 30 seconds x 2 reps - \"I like this stretch.\"  -Bridging x 10 reps - VC for increased hip extension       Neuro-Reeducation/Balance:  Total Minutes: 15   Multidirectional Repetitive Movements.  8-16reps  x 1-2 sets    Step and Reach:   UE support: 2A, 2B  Chairs Nearby: 2A, 2B      Exercise   Reps   Sets   Effort     2A Forward Step and Reach    10    2 each 9-10     2B Sideward Step and Reach    10    2 10     2C Backward Step and Reach        Comments:   2A - VC for BIG step back to center 25% of time.  Overall improved amplitude of stepping this date. Decreased coordination with arm therefore used PWR! reach  2B -  VC for increased step height and palm up.     Rock and Reach:  UE support: 2D  Chairs Nearby:      Exercise   Reps   Sets   Effort     2D Left leg Forward/Backward     20      2 10     2D Right leg Forward/Backward     20      2 10     2E Side to " "Side        Comments: 2D - Initial VC for appropriate TAE.  Decreased coordination with rocking when R foot forward which required therapist to cue \"toe, heel\"         Other:   On blue foam in romberg stance with close SBA for safety:   Eyes open x 30 seconds   Horizontal head turns x 10 reps - mild sway, but no LOB   Eyes closed x 25 seconds, 30 seconds without LOB.     On incline:   Eyes open x 30 seconds   Vertical head turns x 10 reps - no LOB, however increased time to completed task. Pt noted difficulty and uncertainty of balance   Eyes closed x 30 seconds x 3 reps - VC to keep eyes closed tightly.       Gait Training:   Total Minutes:     Time Speed (MPH) UE support Direction Incline Comments                                      Other:     Therapeutic Activity  Total Minutes: 10     Functional Movement  Rep  Sets  Effort    Rolling  5 1 each 9    Supine to Sit 3 1 10    Sit to Stand from standard chair 15 1 10    Sit to Stand from low, soft chair       Sit and Reach       Walk and Turn between chairs       PWR Moves: Supine Marching       PWR Moves:        Comments: Rolling - VC for BIG reach across body and more effort.   Supine to sit: VC for BIG push with arms  Sit to supine: VC for PWR lift of B LEs       Other:   BIG walking x 700' without AD.  VC for BIG posture with upright gaze and increased foot clearance on L with BIG heel strike.       Carryover Assignment: BIG posture    Assessment/Plan for week:  5/22/2017-5/26/2017  Patient demonstrated increased rigidity and confusion during first portion of session, however amplitude improved as session progressed.  Plan to complete assessment next session.               "

## 2021-06-10 NOTE — PROGRESS NOTES
Physical Therapy  Physical Therapy  Movement Disorder Treatment Note    Date: 5/3/2017   Visit #: 7/13  - MEDICARE (3/31/2017  to 6/28/2017) Reassessed 5/3/2017  Rx Units: 1TE, 3NR   Total OP Minutes: 55     Pain:  No    Subjective:  Patient states he is doing well.  He says he had some low back soreness following last session that he believes was from the bridges.  Patient notes taking his Parkinson medication 1 hour later today than usual due to forgetting to take it over lunch.    Objective: MiniBest score: 18/28    Therapeutic Exercise:  Total Minutes: 10  Sustained Movements (sitting)  4reps  x 1-2 sets       Exercise   Reps   Sets   Effort     1A Floor to Ceiling Without Flicking          1B Side to Side Without Flicking         Comments:    1A:    1B:     Other:   - Nustep x 10 minutes  Level #4  Average METS: 2.3 SPM: 78    -- cueing to keep SPM >80      Neuro-Reeducation/Balance:  Total Minutes: 45    Other:  Balance Assessment: Mini-BEST 18/28 (<23/28)   1) Sit <> Stand: (2) Normal   2) Rise to Toes: (1) Moderate; decrease in heel raise height over the 3 second period, able to maintain heel raise for >3 seconds   3) Stand on One Leg: (0) Severe L: 0 R: 0; unable to get into SLS without UE support   4) Compensatory Stepping Correction-Forward: (2) Normal   5) Compensatory Stepping Correction-Backward: (1) Moderate; took 4-5 shuffle steps before regaining balance   6) Compensatory Stepping Correction-Lateral: (1) Moderate L: (1) Moderate R: (1) Moderate; required 3-4 shuffle steps when testing L vs 4-5 when testing R   7) Eyes Open, Firm Surface: (2) Normal   8) Eyes Closed, Foam Surface: (2) Normal; moderate amount of sway noted   9) Incline, Eyes Closed: (1) Moderate: head aligns with surface   10) Change in Gait Speed: (1) Moderate; maintains balance with speed changes but unable to make significant changes in walking speed from fast -- normal -- slow   11) Walk with Head Turns-Horizontal: (1) Moderate;  "reduced gait speed  12) Walk with Pivot Turns: (1) Moderate; required 4 steps to perform task  13) Step Over Obstacles: (2) Normal   14) TUG with Dual Tasks: (1) Moderate  TU.08  Cognitive TU.59 (44-29 by 3's correctly)    Patient given results of test scored above, discussed areas that improved and areas that further improvements over the next few weeks can be made and a general plan on how to achieve those goals    HEP print out was provided due to patient stating he didn't have \"my workout sheet anymore and need a new one\"   HEP   - Bridges   - Sit to stand   - Heel raises with 3 second hold    Handout was provided for above exercises to be included in home exercise program for improved compliance with PT intervention.    All questions and concerns were addressed prior to patient leaving today's session        Multidirectional Repetitive Movements.  8-16reps  x 1-2 sets    Step and Reach:   UE support: 2A, 2B  Chairs Nearby:      Exercise   Reps   Sets   Effort     2A Forward Step and Reach           2B Sideward Step and Reach        2C Backward Step and Reach        Comments:   2A -   2B -     Other:    Rock and Reach:  UE support:   Chairs Nearby:      Exercise   Reps   Sets   Effort     2D Left leg Forward/Backward        2D Right leg Forward/Backward        2E Side to Side        Comments:      Other:     Gait Training:   Total Minutes:     Time Speed (MPH) UE support Direction Incline Comments                                      Other:     Therapeutic Activity  Total Minutes:      Functional Movement  Rep  Sets  Effort    Rolling        Supine to Sit       Sit to Stand from EOM       Sit to Stand from low, soft chair       Sit and Reach       Walk and Turn       PWR Moves:       PWR Moves:        Comments:    Big Walking:     Other:     Carryover Assignment: sit to stand BIG    Assessment/Plan for week:  2017-2017  Patient responded well to treatment today and had no adverse reaction to " previous treatment session.  Today he was re-assessed using the Mini-BEST test in which he scored an 18/28, improvement from initial test score of 14/28.  Areas of improvement include walking with head turns, timed up and go compared to dual task timed up and go, heel raise and hold and an improvement in normalization of compensatory stepping correction in posterior and lateral direction.  Patient has made significant improvements in his TUG vs dual task TUG which he notes when walking and talking with family members that it has become much easier to do both and he doesn't feel like he is going to lose his balance.  Plan to continue posterolateral hip and LE strengthening and progressing the dual task exercises already introduced to the patient.  Patient remains appropriate for 1:1 PT skilled intervention.

## 2021-06-10 NOTE — PROGRESS NOTES
Occupational Therapy  OT Parkinsons' Disease and Movement Disorders Progress Note    Medicare Insurance    Units: 1 Sensory integration, 3 therapeutic ex  Total Minutes: 55 minutes  Treatment #: 2/12    ASSESSMENT:   Summary/Analysis of treatment: Patient demonstrated double vision during visual screen which may negatively impact his performance during I/ADLs at this time. Patient also demonstrated difficulty coordination UE and LE movements during gross motor task and required cues to maintain movement amplitude. Patient demonstrated theraputty exercises to use at home to address fine motor coordination deficits. Patient continues to benefit from skilled OT services to address areas of concern.    See initial evaluation for goals and POC. Progress toward goals: Improved     Long-term goals:   1. Patient will incorporate large amplitude movements into I/ADL tasks to improve efficiency and safety.  2. Patient will demonstrate and verbalize understanding of work efficiency techniques related to PD to improve safety and decrease fatigue.  3. Patient will improve FMC per 9HPT by 5 seconds seconds to improve I/ADL independence.  4. Patient will improve GMC per B & B by 5 blocks blocks to improve I/ADL independence.  5. Patient will participate in cognitive assess, for tx planning and recommendations re: I/ADLs.  6. Patient will decrease double vision by participating in oculomotor exercises and verbalize and demonstrate understanding of HEP.   7. Patient will verbalize and demonstrate understanding of home exercise program for UE's with B (bilateral, right,left) UE to improve function with I/ADLs.    PLAN:   Treatment Time: Therapeutic Exercise 45 minutes and sensory integration 10 minutes    Plan: Continue per plan of care and Patient progressing towards goals    Recommendations: Provide patient with kvng string HEP. Address fine motor coordination exercises: grooved peg board, flipping pegs, finger tap exercises. GMC  ex: yenifer Lombardo.    SUBJECTIVE:   Pain: No  Patient reported double vision when watching TV specifically.    OBJECTIVE:   Sensory integration:  -Convergence: 39 cm (abnormal).  -Patient completed kvng string exercises x 1 rep. Patient demonstrated min difficulty with directions during task and required repeated cues.  -Therapist provided education regarding convergence and the impact of PD on muscle movements. Patient reported understanding.     Therapeutic ex:  -Patient completed hand and finger exercises with medium resistance theraputty to address fine motor coordination deficits:  *Finger flexion x 10 reps  *Finger extension x 10 reps  *Finger adduction x 1 rep with each set of fingers  *Finger tip pinch x 5 reps  *Lateral pinch x 5 reps  Patient completed exercises after instruction/demonstration from therapist.    -Patient completed reaching activity to address movement speed/amplitude and gross motor coordination required to successfully complete I/ADLs. Patient completed 5 sets with 20 reps during each set. Patient's reaction time improved with each rep. Activity progressed during final 2 sets to include step and reach. Patient required cues to maintain movement amplitude and demonstrated some freezing toward the end of the task.

## 2021-06-10 NOTE — PROGRESS NOTES
Speech Language/Pathology  Speech Therapy Outpatient Daily Visit Note- Voice/Motor Speech    Carlos Neri   1935     [id number]    Visit Number 2 of 9  Interventions provided during this session: speech/language 55 minutes    Subjective  Patient presents as cooperative and awake during this session.  Patient reports no pain  Patient reports not completing homework given during last therapy session. Pt was dropped off by his son. Pt was at therapy session alone.     Objective  Patient did not use stronger clearer speech walking back to therapy room from waiting room.    Patient held sustained /AH/ at habitual pitch with a strong clear voice, wide open mouth for 11.8 to 13.7 seconds, with an average of 12.4 seconds. Volume of voice ranged from 82 dBSPL to 85.  No cues needed. Pt reported that a tickle would occur and he would slightly cough. Pt did not appear to be doing the exercise too forcefully. Pt Brittany used a strong voice  100% of the time when asked to tell me the duration that was visible on the timer for each sustained /AH/ however it was somewhat hoarse and breathy. When I had him push down his arms on the arm rest he was able to eliminate this.      Created list of 10 functional phrases. Patient was able to read functional phrases using  Goal of increased effort and amplitude, no decreased projection and no dysphonia with 33/50 (66%). Pt demonstrated improvement with each set of 10 each time I had him read the list and provides cues for increased amplitude.  Voice volume and intelligibility improved with use of speech/voice techniques.  When pt used increased effort and amplitude tasks with completed WNL's. Pt continues with stooped posture.  Pt was noted to stand up during this task as he said it helped his tremors. Patient did not feel that they are using a voice volume that is too loud for 1:1 conversation during functional phrase reading task using increased effort for volume, clarity, and  expression.    During structured single word (simple opposites) response task pt met goal of no periods of dysphonia and use of good amplitude and no decreased projection with 100%. The patient does not feel that they are using a voice volume that is too loud for 1:1 conversation during this task using increased effort for volume, clarity, and expression.During structured single word (name 2 items in a category) response task pt met goal of no periods of dysphonia and use of good amplitude and no decreased projection with 3/8 (38%) Brittany, with repetition and cue to use increased effort and strength with second word pt me goals with 6/8 (75%). The patient does not feel that they are using a voice volume that is too loud for 1:1 conversation during this task using increased effort for volume, clarity, and expression.    Improved voice volume and clarity was noted with audiotape/visual feedback from iPad with tasks for all tasks.    Assessment    Continue with structured tasks at the single word, phrase level, sentence level and sentence level reading level.   Pt continues to progress toward goals. Patient is in agreement with plan.  Continues to be appropriate for skilled 1:1 skilled speech therapy. Continue to encourage increased amplitude and effort with voicing and to maintain strength for the entire response.     Plan  Current goals remain appropriate.  Patient was given home vocal strengthening exercises to complete prior to next therapy session. Patient verbalize understanding of exercises and importance of daily structured home practice for increased carryover for improved voice/speech for communication.

## 2021-06-10 NOTE — PROGRESS NOTES
Speech Language/Pathology  Speech Therapy Outpatient Daily Visit Note- Voice/Motor Speech    Carlos Neri   1935     [id number]    Visit Number 7 of 9  Interventions provided during this session: speech/language 45 minutes    Subjective  Patient presents as cooperative and awake during this session.  Patient reports no pain  Patient reports completing ~80% of his homework given during last therapy session.   Pt stated that he took his PD meds 1 hour late today so he feels slower . Discussed using a pill timer.   Pt stated that he received compliments from son and wife on his speech (re: loudness).    Objective  Patient did inconsistently  use stronger clearer speech walking back to therapy room from waiting room (expecially with shorter responses).  Stooped posture    Automatic counting 1-10, days of the week (slight decrease in strength compared to counting and months), months of the year were completed Brittany using strong, projected, power voice.    Patient held sustained /AH/ at habitual pitch with a strong clear voice, wide open mouth for 13.83 to 14.80 seconds, with an average of 14.2 seconds. Volume of voice greater than or equal to 83 dBSPL. stooped posture. needed cues to sit up tall.  No coughing noted during duration today. No dysphonia today. Pt Brittany used a strong voice 100%of the time when asked to tell me the duration that was visible on the timer for each sustained /AH/.     Functional phrase reading:Patient was able to read functional phrases using  Goal of increased effort and amplitude, no decreased projection and no dysphonia with 100%. Patient did not feel that they are using a voice volume that is too loud for 1:1 conversation during functional phrase reading task using increased effort for volume, clarity, and expression. voice volume 73-78 dBSPL (WNL's). No dysphonia    2 sentence level reading task: goal was use of crisp, clear and projected speech that does not have decreased precision.  "Speech is monotone however. Pt met goal grossly with 100%.  The patient does not feel that they are using a voice volume that is too loud for 1:1 conversation during this task using increased effort for volume, clarity, and expression. Four sentence level reading task: goal was use of crisp, clear and projected speech that does not have decreased precision. Speech is monotone. Pt met goal with first attempt except for ~6 words (second attempt 3 words), second paragraph pt met goal with ~75% (second attempt with ~90%) and third paragraph with 25% (second attempt with 50%).  The patient does not feel that they are using a voice volume that is too loud for 1:1 conversation during this task using increased effort for volume, clarity, and expression.    During structured sentence level repetition and creation task (pt was asked to name an item in a category, but he would consistently phrase it in a sentence; \"[place] is [place]\")  response task pt met goal of no periods of dysphonia and use of good amplitude and no decreased projection with 19/30 (63%) on first attempt, with cue to repeat and use increased amplitude pt met goal with 100%. The patient does not feel that they are using a voice volume that is too loud for 1:1 conversation during this task using increased effort for volume, clarity, and expression.    Improved voice volume and clarity was noted with audiotape/visual feedback from iPad with tasks for all tasks.    Assessment    Continue with structured tasks at the sentence level and paragraph level reading level.   Pt continues to progress toward goals and benefits from cues to increased amplitude and projection.  Patient is in agreement with plan. Pt is noted to demonstrate some self corrections during the session when he does not use increased effort with short responses.  Continues to be appropriate for skilled 1:1 skilled speech therapy. Continue to encourage increased amplitude and " effort with voicing and to maintain strength for the entire response.     Plan  Current goals remain appropriate.  Patient was given home vocal strengthening exercises to complete prior to next therapy session. Patient verbalize understanding of exercises and importance of daily structured home practice for increased carryover for improved voice/speech for communication.

## 2021-06-10 NOTE — PROGRESS NOTES
Physical Therapy  Physical Therapy  Movement Disorder Treatment Note    Date: 4/26/2017   Visit #: 5/13  - MEDICARE (3/31/2017  to 6/28/2017)  Rx Units: 3TE, 1NR,  Total OP Minutes: 60    Pain:  No    Subjective:  No complaints.  Patient notes that his son drove him to today's sessions.   He ambulated to therapy with [facility] and intermittently used.     Objective:     Therapeutic Exercise:  Total Minutes: 40  Sustained Movements (sitting)  4reps  x 1-2 sets       Exercise   Reps   Sets   Effort     1A Floor to Ceiling Without Flicking   6  1 9     1B Side to Side Without Flicking   6  1 9      Comments:    1A: VC for BIG posture and BIG hands. .    1B: Performed on EOM. 25% shaping for knee extension during first 2 reps. Good knee extension with last rep.     Other:   - Nustep x 8 minutes,   level #4  Average METS: 3.3 SPM: 98   - Attempted modified roll outs on exercise ball in standing position with ball on mat - pt unable to keep feet stationary due to fear of falling   -quadruped with alt LE lift x 10 reps - VC for coordination   -quadruped with alt UE/LE lift x 2 reps - pt unable to follow directions for task with max cues and A   -tall kneeling x 60 seconds   -Tall kneeling with upper trunk rotation x 10 reps - VC for increased ROM and engage glut muscles   -BIG walking x 500' - Initial VC for BIG posture and upright gaze.    -Provided patient with frankie application for transportation assistance.       Neuro-Reeducation/Balance:  Total Minutes: 20  Multidirectional Repetitive Movements.  8-16reps  x 1-2 sets    Step and Reach:   UE support: 2A, 2B  Chairs Nearby:      Exercise   Reps   Sets   Effort     2A Forward Step and Reach    10 2 each 9     2B Sideward Step and Reach    10 2 each 10     2C Backward Step and Reach        Comments:   2A - modified with use of boom sticks to encourage increased effort and power behind movements. VC for increased step height and BIG return to center with L LE. Improved with  second set    2B - modified with use of boom sticks to encourage increased effort and power behind movements.  VC for increased step height upon return to center with L LE. Improved amplitude with second set.        Rock and Reach:  UE support:   Chairs Nearby:      Exercise   Reps   Sets   Effort     2D Left leg Forward/Backward        2D Right leg Forward/Backward        2E Side to Side        Comments:      Other:   Obstacle course of cones, canes and foam discs x 30' x 8 reps - CGA to Juan for balance. Pt demonstrates small steps around cones and decreased step length on foam discs.  VC for BIG steps and step into center of foam.  Improved amplitude as reps progressed.    Gait Training:   Total Minutes:     Time Speed (MPH) UE support Direction Incline Comments                                      Other:     Therapeutic Activity  Total Minutes:      Functional Movement  Rep  Sets  Effort    Rolling        Supine to Sit       Sit to Stand from EOM       Sit to Stand from low, soft chair       Sit and Reach       Walk and Turn       PWR Moves:       PWR Moves:        Comments:    Big Walking:     Other:     Carryover Assignment: sit to stand BIG    Assessment/Plan for week:  4/24/2017-4/28/2017  See previous note for this week's A/P.

## 2021-06-10 NOTE — PROGRESS NOTES
Occupational Therapy  OT Parkinsons' Disease and Movement Disorders Progress Note    Medicare Insurance    Units: 2 Ther Ex, 2 Sensory  Total Minutes: 55  Treatment #: 6/12    ASSESSMENT:   Summary/Analysis of treatment: Pt arrived at appointment alone, ambulating with a cane. He was pleasant and fully cooperative with session. Care plan for the day reviewed, pt agreeable to start session with GMC and trunk ex, then move to occular motor ex to address double vision treatment. During GMC and calisthenic ex, pt required verbal and visual cues to initiate appropriate movements for each ex, able to continue with occasional verbal cues to correct. Verbal cues to use large amplitude movements during standing trunk ex, pt able to sustain appropriate movements with each ex, good posture and balance noted throughout. Pt stated he was fatigued after UE and calisthenic ex were completed- during Zoomball activity for ocular motor ex, pt had difficulty making the ball push out to end of the rope. Per request in previous appt notes, OT discussed what specific areas pt was having difficulty with at home regarding gross motor tasks, pt did not offer specific complaints at this time. When asked about double vision concerns at home, he stated the only time he notices it as a problem is when he is watching TV.     See initial evaluation for goals and POC. Progress toward goals: Improved     Long-term goals:   1. Patient will incorporate large amplitude movements into I/ADL tasks to improve efficiency and safety.  2. Patient will demonstrate and verbalize understanding of work efficiency techniques related to PD to improve safety and decrease fatigue.  3. Patient will improve FMC per 9HPT by 5 seconds seconds to improve I/ADL independence.  4. Patient will improve GMC per B & B by 5 blocks blocks to improve I/ADL independence.  5. Patient will participate in cognitive assess, for tx planning and recommendations re: I/ADLs.  6. Patient  "will decrease double vision by participating in oculomotor exercises and verbalize and demonstrate understanding of HEP.   7. Patient will verbalize and demonstrate understanding of home exercise program for UE's with B (bilateral, right,left) UE to improve function with I/ADLs.    PLAN:   Treatment Time: Therapeutic Exercise 25 minutes, Sensory 30 minutes    Plan: Continue per plan of care and Patient progressing towards goals    Recommendations for future sessions:  - Continue with double vision exercises  - Continue with endurance ex (UE calisthenics, zoomball, etc)  - Continue with GMC ex (Dynavision, letter/rope board, etc)  - FMC (nuts/bolts, flipping peg, etc)      SUBJECTIVE:   Pain: No  Pt stated he is occasionally using Russell string home exercises at home and when asked of other concerns to address, pt stated he was having stiffness in his fingers. OT asked if he was following theraputty HEP at home, to which he stated he was using a  ball more often. OT explained the benefit of theraputty to address stiffness and strengthening fingers, as the putty offered more versatility of all finger muscles, versus just the \"\" muscles that the ball offers.     OBJECTIVE:   Therapeutic Exercise:  - Initial UE and core stretching followed by seated UB calisthenic and strengthening ex with a 3# dowel; tolerated 30 continuous seconds of each ex with a rest break in between each ex. Pt showed greater difficulty in coordination with multistep ex, requiring verbal and visual cues to maintain.   - Standing trunk ex with wall behind pt's back: initially holding a 2# weighted ball, pt completed side to side trunk rotation ex, tapping the wall to each side. Able to tolerate up to 30 sec, 3-4 reps. Attempted to complete diagonal movements across body (tap wall behind shoulder and down to wall behind opposite knee) with weighted ball but pt demonstrated difficulty bringing ball up behind shoulder. Much improved " "coordination with ex when no resistance was added. Pt tolerated up to 1 minute of continuous diagonal movement to each side, 2 reps.  - Pt participated in seated Zoomball ex, to address both GMC and occular motor ex. Pt required repeated verbal cues for coordination, \"open\" (arms) and \"close\" (arms) verbal directions throughout the activity.      Sensory:  - Dot to [place] exercise to address double vision: R side: 66% accuracy on 1st trial, 58% accuracy on 2nd trial (dot close to middle of [place] in 30 seconds)  L side: 50% accuracy on 1st trial, 83% accuracy on 2nd trial.  - Hi-Dis(Mosen) ball ex:     Mona Brown, OT      "

## 2021-06-10 NOTE — PROGRESS NOTES
Physical Therapy  Physical Therapy  Movement Disorder Treatment Note    Date: 4/21/2017   Visit #: 3/13  - MEDICARE (3/31/2017  to 6/28/2017)  Rx Units: 3TE, 1NR   Total OP Minutes: 55    Pain:  No    Subjective:  No significant complaints. Pt presented with NBQC however carried it the majority of the time.     Objective:    Therapeutic Exercise:  Total Minutes: 40  Sustained Movements (sitting)  4reps  x 1-2 sets      Exercise   Reps   Sets   Effort     1A Floor to Ceiling Without Flicking   6  1  7     1B Side to Side Without Flicking   6  1  7      Comments:    1A: 25% Shaping for BIG hands, posture and arms especially with BACK BIG with first rep. Discontinued flicking due to significantly decreased amplitude and bradykinesia.  VC for BIG posture and BIG hands throughout.    1B: 25-50% shaping for trunk rotation, knee extension and palm up. VC to look at targets on wall in order to improve posture.     Other:   -Nustep x 8 minutes,   level #3  Average METS: 2.2, SPM: 86   - Bridging x 10 reps x 2 sets - VC for increased hip extension and breathing techniques.    - Side lying clams x 10 reps B - TC and VC for proper form and technique throughout entire exercise due to confusion.    -Tall kneeling x 60 seconds      Neuro-Reeducation/Balance:  Total Minutes: 20  Multidirectional Repetitive Movements.  8-16reps  x 1-2 sets    Step and Reach:   UE support: 2A, 2B  Chairs Nearby: 2A, 2B      Exercise   Reps   Sets   Effort     2A Forward Step and Reach    10 2 each 9     2B Sideward Step and Reach    10 2 each 10     2C Backward Step and Reach        Comments: 2A - modified with use of boom sticks to encourage increased effort and power behind movements. VC for increased step height and power behind arm movement.  Improved amplitude with second set.  2B - modified with use of boom sticks to encourage increased effort and power behind movements.  VC for increased step height upon return to center.  Good power and  amplitude with second set on R.       Rock and Reach:  UE support:   Chairs Nearby:      Exercise   Reps   Sets   Effort     2D Left leg Forward/Backward        2D Right leg Forward/Backward        2E Side to Side        Comments:      Other:     Gait Training:   Total Minutes:     Time Speed (MPH) UE support Direction Incline Comments                                      Other:     Therapeutic Activity  Total Minutes: combined with TE     Functional Movement  Rep  Sets  Effort    Rolling        Supine to Sit       Sit to Stand from EOM 15 2 9    Sit to Stand from low, soft chair       Sit and Reach       Walk and Turn       PWR Moves:       PWR Moves:        Comments: sit to stand: VC for BIG, powerful reach forward and PWR! Up. Improved ease of transfers with second set.     Big Walking:     Other:     Carryover Assignment: sit to stand BIG    Assessment/Plan for week:  4/17/2017-4/21/2017  Patient presents with bradykinesia with all movements and his impaired cognition appears to affect his understanding of multiple step exercises.   Performance of multiple step exercises improved when modified to use more PWR! Techniques and boom sticks.  Patient's difficulty with transitional movements such as sit to stand seems to be a combination of proximal mm weakness and lack of power and amplitude in his movements.  Plan to continue progressing PWR! Techniques, strengthening and balance activities in order to progress towards goals.

## 2021-06-10 NOTE — PROGRESS NOTES
Physical Therapy  Physical Therapy  Movement Disorder Treatment Note    Date: 5/1/2017   Visit #: 6/13  - MEDICARE (3/31/2017  to 6/28/2017)  Rx Units: 3TE, 1NR  Total OP Minutes: 60    Pain:  No    Subjective:  Patient states he is doing well and has no questions on his HEP.      Objective:     Therapeutic Exercise:  Total Minutes: 40  Sustained Movements (sitting)  4reps  x 1-2 sets       Exercise   Reps   Sets   Effort     1A Floor to Ceiling Without Flicking   6  1 9     1B Side to Side Without Flicking   6  1 9      Comments:    1A: VC for BIG posture and BIG hands.   1B: Performed on EOM. 25% shaping for knee extension during first 2-3 reps. Good knee extension with last rep on each side.    Other:   - Nustep x 10 minutes,   level #4  Average METS: 3.3 SPM: 99   - BIG walking x 460' - Initial VC for BIG posture and upright gaze.    - Bridges with 3 second hold, 2 sets of 10, cueing to increase height of bridge   - Heel raises with 2 finger B UE, 2 sets of 10, SPT demo'd prior to patient performing, cueing to go up on toes as high as he can and hold for as long as he can    - Quadruped alt UE 1 set of 10, moderate tactile cueing needed to perform exercise properly   - Quadruped alt LE 1 set of 10, moderate verbal/tactile cueing needed to perform exercise correctly while alternating LE correctly, cueing to increase height of leg extension and not simply sliding foot backwards   - Tall kneeling, 2x 60 seconds, cueing to attain upright posture and upward gaze, SBA for patient safety on the high mat      Neuro-Reeducation/Balance:  Total Minutes: 20  Multidirectional Repetitive Movements.  8-16reps  x 1-2 sets    Step and Reach:   UE support: 2A, 2B  Chairs Nearby:      Exercise   Reps   Sets   Effort     2A Forward Step and Reach    10 2 each 9     2B Sideward Step and Reach    10 2 each 10     2C Backward Step and Reach        Comments:   2A - modified with use of boom sticks to encourage increased effort and  "power behind movements. VC for increased step height and BIG return to center with L LE. Improved with second set.  Patient had trouble coordinating on R side step forward and wanted to alternate R/L towards that side  2B - modified with use of boom sticks to encourage increased effort and power behind movements.  VC for increased step height upon return to center with L LE. Improved amplitude with second set.  Required consistent cueing to look up and to increase foot slap towards each direction, improved with second set    Other:   - Cones step over, placed 5 cones out in front of patient in an arc and had patient take \"big step over and back\" 2x each direction of arc with each foot leading, cueing to increase amplitude of step forward and back, CGA for patient safety, 1 LOB requiring min assist for patient to regain balance.      Rock and Reach:  UE support:   Chairs Nearby:      Exercise   Reps   Sets   Effort     2D Left leg Forward/Backward        2D Right leg Forward/Backward        2E Side to Side        Comments:      Other:     Gait Training:   Total Minutes:     Time Speed (MPH) UE support Direction Incline Comments                                      Other:     Therapeutic Activity  Total Minutes:      Functional Movement  Rep  Sets  Effort    Rolling        Supine to Sit       Sit to Stand from EOM       Sit to Stand from low, soft chair       Sit and Reach       Walk and Turn       PWR Moves:       PWR Moves:        Comments:    Big Walking:     Other:     Carryover Assignment: sit to stand BIG    Assessment/Plan for week:  5/1/2017-5/5/2017  See next note for this week's A/P.  Plan to reassess the miniBEST per  guidelines next Rx session        "

## 2021-06-12 NOTE — PROGRESS NOTES
Assessment /Plan:  right handed, came with spouse, Haylee.   Parkinson disease. On sinemet 25/250 to take at 6:30, every 3 hours total of about 5 times daily, quit entacapone 200 mg completely due to nausea.  Keep 50/200 CR HS. On Ropinirole 0.25 mg 2 tab at 6:30 am, 10 ma, and HS.  Some freezing and more tremors at time, sometimes wearing off for 30 min to 1 hr,  not able to tell time pattern. Has some dyskinesia. Has some confusion on ropinirole at 0.25 mg HS for RSL, but not anymore. Will rechallange with Entacapone, if fails try increase Sinemet to q2.5 hours. Will see Staci in about 3 weeks and consider a trial of Rotary.   No falls. Turning is difficulty.   ADL: showing, dressing by self. Doesn't prepare meals, no driving. Had matrimonially and has been using.   Therapy: Exercise: 5-6 days/week. Refer to therapies.   Some coughing and speech difficulty: recommend SP. .   RSL: doing well on Ropinirole and clonazepam. On 2 tabs pf each before sleep, helps    REM sleep disorder controled: on clonazepam 0.5 mg HS, still occasionally still acting dreams out.   Psych: no depression, no hallucination. Watching nude images online at times. Discussed.    Drooling: not too bad.   Essential tremor: mild degree   Dementia. On Aricept. MOCA scored 17/30 on 3/8/2017. Consider staring medication treatment.  Neuro psych : Overall, the results are significant for mildly impaired auditory attention and working memory, new learning efficiency, and basic cognitive processing speed. His performance fell in the moderately impaired range across measures of visuospatial planning and organization, mental flexibility and set-shifting, and response inhibition (particularly under timed conditions). Subtle declines in processing speed are observed; however, the majority of his cognitive performances remain largely stable when compared to his 2013 evaluation. This stability to subtle improvement in some of his scores may reflect the  benefit of his outpatient therapies and/or the optimization of his Parkinson's disease medications.   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     PD summary: Reviewed his PD condition, discussed about treatment options,not a good candidate for surgery.   He has had essential tremor diagnosed in 2000 and probably developed Parkinson disease in 2004. Because of dementia not the best candidate for dopamine agonists. He has tried Artane and trihexyphenidyl but not able to tolerate because of confusion.   No signs of orthostasis reported.         Subjectively:   Feeling worse.   Fell [facility] weekend, went to ER. BP in low 100 range. EKG monitoring ok. 2 more falls. Feet stuck on floor, freezing. More than before. Facial color ok when fall. No incontinence, LOC, foaming, or incontinence.    End of July drove back from [place]. Son, Curtis (who's son has epilepsy), saw him whole body shaking 15 sec, eyes rolled back. Curtis felt he had a seizure.   Once a month, he shake for seconds, or jerky movements.     Instructions today:   Try add Entacopone 1/4 to 1/2 with the Sinemet again, if not able to toleerated, then try to take the Sinemet every 2.5 hours.   Come back to monthly    Total time spent with patient and his wife for about 45 min, more than 50% consoling.      Review of system otherwise unremarkable.      Patient Active Problem List   Diagnosis     Parkinson disease     Essential tremor     Vision abnormalities     [place] (hard of hearing)     Poor balance             Current Outpatient Prescriptions   Medication Sig Dispense Refill     carbidopa-levodopa (SINEMET CR)  mg per tablet Take 1 tablet by mouth bedtime. 90 tablet 3     carbidopa-levodopa (SINEMET)  mg per tablet Take 1  "tablet by mouth 4 (four) times a day. (Patient taking differently: Take 1 tablet by mouth 5 (five) times a day. ) 360 tablet 3     clonazePAM (KLONOPIN) 0.5 MG tablet Take 1 tablet (0.5 mg total) by mouth bedtime. (Patient taking differently: Take 0.5 mg by mouth bedtime. ) 90 tablet 0     clonazePAM (KLONOPIN) 0.5 MG tablet Take 1 tablet (0.5 mg total) by mouth bedtime. 90 tablet 1     rOPINIRole (REQUIP) 0.25 MG tablet Take 1 tablet (0.25 mg total) by mouth 3 (three) times a day. (Patient taking differently: Take 0.25 mg by mouth 4 (four) times a day. ) 360 tablet 1     No current facility-administered medications for this encounter.        Morphine    Past Medical History:   Diagnosis Date     Cataract      Hypercholesterolemia      Hypertrophy of prostate      Parkinson's disease        Social History     Social History     Marital status:      Spouse name: N/A     Number of children: N/A     Years of education: N/A     Occupational History     Not on file.     Social History Main Topics     Smoking status: Former Smoker     Quit date: 1/16/1969     Smokeless tobacco: Never Used     Alcohol use 8.4 oz/week     14 Cans of beer per week      Comment: \"A couple of beers per day.\"     Drug use: No     Sexual activity: Not on file     Other Topics Concern     Not on file     Social History Narrative       History reviewed. No pertinent family history.    Objective:  Vitals:    08/23/17 1340   BP: 143/86   Pulse: 68       Well dressed and groomed. Normocephalic and atraumatic. Sitting in chair, NAD. Anxiety.  Edema: absent. Skin: bruise sign: No  CN II to XII: II to XII significant for [place].   Motor: rigidity, but  Normal bulk and strength.   Sensory: normal,   Coordination: poor  Gait and Station: Stooping of shoulders, freezing using a walker  Bradykinesia and hypokinsia: bilateral   Tremor: bilateral, Associated with intention and position, also left resting tremor,  Balance: poor with pull test. Turning " difficulty.   Anterocolis. Difficulty get out of chair

## 2021-06-12 NOTE — PROGRESS NOTES
OP EEG was performed that included photic stimulation. The patient was both awake and asleep during the recording.

## 2021-06-12 NOTE — PROGRESS NOTES
How have you been doing since we last saw you? any concerns? Pt. Says he feels that he is getting worse.      Current Symptoms : Yes Tremors are worse today.

## 2021-06-13 NOTE — PROGRESS NOTES
Assessment /Plan:  right handed, came with spouse, Haylee.  His son also came today  Parkinson disease. We will continue Sinemet 25/250 to take at 6:30, every 3 hours total of about 5 times daily, with entacapone 200 mg.  Keep 50/200 CR HS. On Ropinirole 0.25 mg 2 tab at 6:30 am, 10 ma, and HS.    Memory, fall risks, autonomic function, therapy, potential side effects medication and wearing off effects,  Sleep, and psychiatric issues have been  addressed.  PD features and treatment options, including surgery have been reviewed and discussed. Advanced medical directives discussed.       Therapy: Wife deferred therapy at the Center since it has been difficult for them to come this far for therapies regularly.  They have gotten the sheath of paper and want to practice at home at this time.  Drooling: Consider Myobloc injections at next visit.   RSL: doing well on Ropinirole and clonazepam.    REM sleep disorder controled: on clonazepam 0.5 mg, 2 tablets HS, still occasionally still acting dreams out.   Psych: no depression, no hallucination. Watching nude images online at times. Discussed.    Essential tremor: mild degree   Dementia. On Aricept. MOCA scored 17/30 on 3/8/2017.        PD summary: Reviewed his PD condition, discussed about treatment options,not a good candidate for surgery.   He has had essential tremor diagnosed in 2000 and probably developed Parkinson disease in 2004. Because of dementia not the best candidate for dopamine agonists. He has tried Artane and trihexyphenidyl but not able to tolerate because of confusion.   No signs of orthostasis reported.          Subjectively:   Feeling no changes.  Added entacapone 1 tablets each time with carbidopa levodopa did not benefit significantly.  No side effects either.  On carbidopa levodopa 25/250, 1 tablets 5 times a day every 3 hours starting from 6:30 AM, each dose with a entacapone 200 mg.  He has some freezings and soft speech.  He also has not been  noted to have some dyskinesia in the afternoon and evening.  He wakes up every 1-2 hours at nighttime to go to the bathroom.  He seemed to be a little restless at nighttime.  No hallucinations.  No depression.  Denies any falls.  Also jewelings and used a whole box of tissues the other day.  At nighttime noted a pool of saliva on the pillow.  This has been gotten much worse since last visit.  Sometime he gurgles in the back of his throat.  ADL: showing, dressing by self. Doesn't prepare meals, no driving. Had matrimonially and has been using.      Instructions today:   We will try Neurontin 300 mg p.o. nightly to see if we can improve the sleep pattern.  He is speaking of hourly or every 2 hours going to the bathroom little restless.  Keep a journal of freezings and dyskinesia symptoms in relationship with one he takes his Parkinson medications so we know how to adjust his medication.  Check out the cost of the right artery.  Consider the switch at next visit.  My nurse will work on the preauthorization and holding the drug company can help to cut the cost down.     Total time spent with patient son, and his wife for about 45 min, more than 50% consoling.       Review of system otherwise unremarkable.         Patient Active Problem List   Diagnosis     Parkinson disease     Essential tremor     Vision abnormalities     [place] (hard of hearing)     Poor balance             Current Outpatient Prescriptions   Medication Sig Dispense Refill     carbidopa-levodopa (SINEMET CR)  mg per tablet Take 1 tablet by mouth bedtime. 90 tablet 3     carbidopa-levodopa (SINEMET)  mg per tablet Take 1 tablet by mouth 4 (four) times a day. (Patient taking differently: Take 1 tablet by mouth 5 (five) times a day. ) 360 tablet 3     clonazePAM (KLONOPIN) 0.5 MG tablet Take 1 tablet (0.5 mg total) by mouth bedtime. (Patient taking differently: Take 0.5 mg by mouth bedtime. ) 90 tablet 0     clonazePAM (KLONOPIN) 0.5 MG tablet  "Take 1 tablet (0.5 mg total) by mouth bedtime. 90 tablet 1     rOPINIRole (REQUIP) 0.25 MG tablet Take 1 tablet (0.25 mg total) by mouth 3 (three) times a day. (Patient taking differently: Take 0.25 mg by mouth 4 (four) times a day. ) 360 tablet 1     No current facility-administered medications for this encounter.        Morphine    Past Medical History:   Diagnosis Date     Cataract      Hypercholesterolemia      Hypertrophy of prostate      Parkinson's disease        Social History     Social History     Marital status:      Spouse name: N/A     Number of children: N/A     Years of education: N/A     Occupational History     Not on file.     Social History Main Topics     Smoking status: Former Smoker     Quit date: 1/16/1969     Smokeless tobacco: Never Used     Alcohol use 8.4 oz/week     14 Cans of beer per week      Comment: \"A couple of beers per day.\"     Drug use: No     Sexual activity: Not on file     Other Topics Concern     Not on file     Social History Narrative       No family history on file.    Objective:  Vitals:    10/04/17 1404   BP: 129/68   Pulse: 62     Well dressed and groomed. Normocephalic and atraumatic. Sitting in chair, NAD. Anxiety.  Edema: absent. Skin: bruise sign: No  CN II to XII: II to XII significant for [place].    Soft and slurred speech.  Normal mental status and language.  Initially during visited he was freezing with ambulation difficult to initiate a gait and to get up from chair.  He was having tremor left greater than right.  After more than 30 minutes in the clinic he started to have dyskinesia and no further tremor and markedly improved [place] and hypokinesia.  But he started to have dyskinesia though relatively mild degree.  Motor: rigidity, but  Normal bulk and strength.   Sensory: normal,   Coordination: poor  Gait and Station: Stooping of shoulders, freezing using a walker  Bradykinesia and hypokinsia: bilateral   Tremor: bilateral, Associated with intention and " position, also left resting tremor,  Balance: poor with pull test. Turning difficulty.   Anterocolis. Difficulty get out of chair

## 2021-06-13 NOTE — PROGRESS NOTES
How have you been doing since we last saw you? any concerns? same      Current Symptoms : No

## 2021-06-14 NOTE — PROGRESS NOTES
How have you been doing since we last saw you? Most important neurological symptom or concern for this visit (Medication wearing off, hallucinations, freezing, pain, sleep difficulty, constipation etc.)   Pt is still been getting stuck a lot and taking more time to get going.     Current Symptoms : Yes    How many falls has the pt. Had over the last year?(if pt. Falls frequently, daily, weekly, monthly?) 5 in the past month.     Best explanation of falls (poor balance, fail/recognizing/judgement, trip, dizzy, moving too quickly, carrying too much, poor lighting, any loss of consciousness, confusion, incontinence, tongue biting, or any unusual features of events)? Poor balance     Experiencing insomnia? yes    Experiencing hypersomnia? yes    Any pains? (Locations, frequency, positional factors, time pattern, aggravating factors.) Yes, from the falls. Back and side pain.     What relieves the pain? Tylenol

## 2021-06-14 NOTE — PROGRESS NOTES
Assessment /Plan:  right handed, came with spouse, Haylee.  His son also came today    Parkinson disease.     Memory, fall risks, autonomic function, therapy, potential side effects medication and wearing off effects,  Sleep, and psychiatric issues have been  addressed.  PD features and treatment options, including surgery have been reviewed and discussed. Advanced medical directives discussed.      Plan:   We will see him in January as scheduled   Sinemet 25/250, 1 tab at 6:30 am, 9:30 am, 12:30 pm, 3: 30 pm, 6: 30 pm  Entacapone, 200 mg, 1 tab at 6:30 am, 9:30 am, 12:30 pm, 3: 30 pm, 6: 60 pm  Sinemet CR 50/200, before sleep  Increase Ropinirole, 0.25 mg, 3 tab at 6:30 am, 12:30 pm and 3:30 pm (was on 2 tablets in the morning, noon and bedtime) for both Parkinson disease and restless leg syndrome.  Consider start change current Sinemet and entacapone to Rytary 23.75/95, 4 tablets 3-4 times a day.  He is having trouble to swallow the entacapone.  Is also having fluctuation of dyskinesia and freezing spells.  Check blood pressure if falls and if lightheaded and watch out make sure he does not have hypotension.  Discussed about for prevention and safety issue.    Subjectively: Has a lot of freezing spell, there are times that he had dyskinesia.  No significant pattern.   Trouble to initiate gait.  Fall frequently due to poor balance.  No major injuries.  Speech has been soft.  Some trouble to swallow big pills with water, mainly the entacapone.  No hallucinations.  Sleeping well. No trouble with, pending, which is helping him to sleep better and reducing the muscle spasticity is.    Therapy: Wife deferred therapy at the Center since it has been difficult for them to come this far for therapies regularly.  They have gotten the sheath of paper and want to practice at home at this time.  Drooling: Not so bad at this time.   RSL: doing well on Ropinirole and clonazepam.    REM sleep disorder controled: on clonazepam 0.5  mg, 2 tablets HS, still occasionally still acting dreams out.   Psych: no depression, no hallucination. Watching nude images online at times. Discussed.    Essential tremor: mild degree   Dementia. On Aricept. MOCA scored 17/30 on 3/8/2017.   No signs of orthostasis reported.  Recommend to watch out for low blood pressure though with increased Requip.  Follow-up with dermatologist yearly also watch out potential late slightly risk risk of skin cancers on ropinirole for Parkinson disease.        ADL: showing, dressing by self. Doesn't prepare meals, no driving. Had matrimonially and has been using.       PD summary: Reviewed his PD condition, discussed about treatment options,not a good candidate for surgery.   He has had essential tremor diagnosed in 2000 and probably developed Parkinson disease in 2004. Because of dementia not the best candidate for dopamine agonists.   Medication tried for PD:  Artane t not able to tolerate because of confusion.           Review of system otherwise unremarkable.          Patient Active Problem List   Diagnosis     Parkinson disease     Essential tremor     Vision abnormalities     [place] (hard of hearing)     Poor balance             Current Outpatient Prescriptions   Medication Sig Dispense Refill     carbidopa-levodopa (SINEMET CR)  mg per tablet Take 1 tablet by mouth bedtime. 90 tablet 3     carbidopa-levodopa (SINEMET)  mg per tablet Take 1 tablet by mouth 4 (four) times a day. (Patient taking differently: Take 1 tablet by mouth 5 (five) times a day. ) 360 tablet 3     entacapone (COMTAN) 200 mg tablet TAKE 1 TABLET BY MOUTH 5 TIMES A DAY  11     gabapentin (NEURONTIN) 300 MG capsule Take 1 capsule (300 mg total) by mouth at bedtime. 30 capsule 2     No current facility-administered medications for this encounter.        Morphine    Past Medical History:   Diagnosis Date     Cataract      Hypercholesterolemia      Hypertrophy of prostate      Parkinson's disease   "      Social History     Social History     Marital status:      Spouse name: N/A     Number of children: N/A     Years of education: N/A     Occupational History     Not on file.     Social History Main Topics     Smoking status: Former Smoker     Quit date: 1/16/1969     Smokeless tobacco: Never Used     Alcohol use 8.4 oz/week     14 Cans of beer per week      Comment: \"A couple of beers per day.\"     Drug use: No     Sexual activity: Not on file     Other Topics Concern     Not on file     Social History Narrative       History reviewed. No pertinent family history.    Objective:  Vitals:    12/06/17 1350   BP: 94/60   Pulse: 65     Well dressed and groomed. Normocephalic and atraumatic. Sitting in chair, NAD. Anxiety.  Edema: absent. Skin: bruise sign: No  CN II to XII: II to XII significant for [place].     Soft and slurred speech.  Normal mental status and language.  Difficult to initiate a gait and to get up from chair.    Resting tremor left greater than right at times mainly when he is anxious.    No dyskinesia, mild Bready and hypokinesia. Normal bulk and strength.  No focal sensory difficulty.  Decreased coordination ability.  Has a walker for ambulation.  Decreased balance.  Mild Anterocolis. Difficulty get out of chair.          "

## 2021-06-15 NOTE — PROGRESS NOTES
.  Assessment /Plan:  right handed, came with spouse, Haylee.       Parkinson disease.   Continue Sinemet 25/250, 1 tablets 4 times daily with entacapone 200 mg each time, and Sinemet CR 50/200, 1 tablets at bedtime.  Also on ropinirole 0.25 mg, 3 tablets 3 times daily and 2 tablets at bedtime.  Will reduce evening dose of ropinirole to 1 tab only for nighttime dyskinesia.  Consider to drop Sinemet CR to 25/100.  We will also reduce and tolcapone 2 half tablets each time to reduce daytime dyskinesia.    Memory, fall risks, autonomic function, therapy, potential side effects medication and wearing off effects,  Sleep, and psychiatric issues have been  addressed.  PD features and treatment options, including surgery have been reviewed and discussed. Advanced medical directives discussed.       REM sleep disorder: Continue clonazepam 0.5 mg, 2 tablets p.o. Nightly.    Restless leg syndrome.  Responding to ropinirole.  Recommend to see a rheumatologist regularly for increased risk of skin cancer with Parkinson disease on ropinirole.    Instructions today:   Reduce entacapone to 1/2 tab each time  Reduce the Ropinirole at night time only to 1 tab, but keep the daytime 3 tab three time a day  Try melatonin 3 mg before sleep, if fails and continue to have restlessness in legs at night tight. ,   Come back in about 3 months.      Subjectively:  Has occasional off time no time pattern.  Has significant dyskinesia sometime during the daytime sometimes in the middle the night.    Entacapone change from tear 2 to tier 3/2018 will be more expensive. no falls.    Not exercising on his own.  Does not want to do therapy since is too far away from home.  Speech has been soft.  Some trouble to swallow big pills with water, mainly the entacapone.  No hallucinations.  Sleeping well. No trouble with.     Therapy: Wife deferred therapy at the Center since it has been difficult for them to come this far for therapies regularly.  They have  gotten the instruction manual to practice at home.  Drooling: Not so bad at this time.   RSL: doing well on Ropinirole and clonazepam.    REM sleep disorder controled: on clonazepam 0.5 mg, 2 tablets HS, still occasionally still acting dreams out.   Psych: no depression, no hallucination. Watching nude images online at times. Discussed.    Essential tremor: mild degree   Dementia. On Aricept. MOCA scored 17/30 on 3/8/2017.   No signs of orthostasis reported.  Recommend to watch out for low blood pressure though with increased Requip.  Follow-up with dermatologist yearly also watch out potential late slightly risk risk of skin cancers on ropinirole for Parkinson disease.         ADL: showing, dressing by self. Doesn't prepare meals, no driving.         PD summary: Reviewed his PD condition, discussed about treatment options,not a good candidate for surgery.   He has had essential tremor diagnosed in 2000 and probably developed Parkinson disease in 2004. Because of dementia not the best candidate for dopamine agonists.   Medication tried for PD:  Artane t not able to tolerate because of confusion.             Review of system otherwise unremarkable.       Patient Active Problem List   Diagnosis     Parkinson disease     Essential tremor     Vision abnormalities     [place] (hard of hearing)     Poor balance             Current Outpatient Prescriptions   Medication Sig Dispense Refill     carbidopa-levodopa (SINEMET CR)  mg per tablet Take 1 tablet by mouth bedtime. 90 tablet 3     carbidopa-levodopa (SINEMET)  mg per tablet Take 1 tablet by mouth 4 (four) times a day. (Patient taking differently: Take 1 tablet by mouth 5 (five) times a day. ) 360 tablet 3     donepezil (ARICEPT) 10 MG tablet Take 10 mg by mouth at bedtime.       entacapone (COMTAN) 200 mg tablet TAKE 1 TABLET BY MOUTH 5 TIMES A DAY  11     gabapentin (NEURONTIN) 300 MG capsule Take 1 capsule (300 mg total) by mouth at bedtime. 30 capsule 2  "    No current facility-administered medications for this encounter.        Morphine    Past Medical History:   Diagnosis Date     Cataract      Hypercholesterolemia      Hypertrophy of prostate      Parkinson's disease        Social History     Social History     Marital status:      Spouse name: N/A     Number of children: N/A     Years of education: N/A     Occupational History     Not on file.     Social History Main Topics     Smoking status: Former Smoker     Quit date: 1/16/1969     Smokeless tobacco: Never Used     Alcohol use 8.4 oz/week     14 Cans of beer per week      Comment: \"A couple of beers per day.\"     Drug use: No     Sexual activity: Not on file     Other Topics Concern     Not on file     Social History Narrative       No family history on file.    Objective:  Vitals:    01/10/18 1338   BP: 137/68   Pulse: 61     Well dressed and groomed. Normocephalic and atraumatic. Sitting in chair, NAD.   No edema or rasho  CN II to XII: II to XII significant for [place].  Soft and slurred speech.  Normal mental status and language.  Difficult to initiate a gait and to get up from chair, mild to moderate degree.    Resting tremor left greater than right at times mainly when he is anxious.      Mild to moderate degree of  dyskinesia, mild Bready and hypokinesia. Normal bulk and strength.  No focal sensory difficulty.  Decreased coordination ability.  Has a walker for ambulation.  Decreased balance.  Mild Anterocolis and stooped over body posture. Difficulty get out of chair.                "

## 2021-06-15 NOTE — PROGRESS NOTES
Dr. Chapin requested a  meet with this patient and spouse regarding VA benefits and adult day programming. This writer met with Carlos Neri and his wife, Stella on 1/11/18. According to Stella, the Patient is in need of more socialization and is interested in adult day programming. 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 The Patient's wife indicated that approximately 3-4 years ago they attempted to find out the Patient's eligibility for VA benefits and were both confused as to the process and what exactly the Patient was eligible for.  This writer contacted the [facility]  Services Agency and was told that the Patient was not enrolled as a VET. This writer was informed that if the Patient and Spouse wanted a review of general benefits they could contact the following, make an appointment and meet with an officer. This could be done at the [facility], [address] [place] [address] [place] [address] ([phone], or  [email or web address]). However, if the Patient wanted to access any of the benefits he would need to make an appointment at the [facility], [place] [address] ([phone] or [email or web address]). The Patient would need to be seen a minimum of one time per year at a VA clinic for a physical to access benefits. This writer was told that the Patient could/would still retain his primary physician and any other physicians he already had, as well as, retain his Blue Cross Blue Shield Coverage for medication. The only purpose of the one time per year VA visit was to initially get enrolled and then be able to access the benefits he is eligible for. The Patient, like other's who served during 59-62, are eligible for some benefits but not necessarily the same benefits for VETS that served during war time.   This writer also obtained information re: the VA Adult Day Program and [facility]  All of this information was put into an e-mail and sent to the Patient on 1/12/18.      The Patient and spouse also inquired about other Adult Programming in their area, this writer included contact information for 6 adult day programs in the [facility], which was also included in the e-mail. This writer also, followed up with a phone call to the Patient and his wife on Wed. 1/17/18 to discuss the information. At that time the Patient's wife did not believe they had received the e-mail sent on 1/12 thus it was resent. This writer reviewed the e-mailed information with the Patient's wife.    Arlin Andre, Social Work Intern  1/19/18    I have read, discussed, and agree with the documentation as presented by Arlin Andre, Social Work Intern.    Dolly Lobo, Dorothea Dix Psychiatric CenterSW  1/19/18

## 2021-06-17 NOTE — PROGRESS NOTES
Assessment /Plan:  right handed, came with spouse, Haylee.  His son also came to the visit.  His son lives with them      Parkinson disease.   Continue Sinemet 25/250, 1 tablets 4 times daily with entacapone 200 mg half tablet each time, and Sinemet CR 50/200, 1 tablets at bedtime.  Also on ropinirole 0.25 mg, 3 tablets 3 times daily and 1  tablets at bedtime.       Memory difficulty: No progression on donepezil without side effects.  Deferred memory test.      REM sleep disorder: Continue clonazepam 0.5 mg, 2 tablets p.o. Nightly.  Continue melatonin added in 1/2018.     Restless leg syndrome.  Responding to ropinirole.       Plan:   A prescription of Rytary 23.75/95, 6 capsules 3 times daily was sent with patient to work with VA to see if they can cover or decide if his durable without the support.  I encouraged him to contact the hospital also getting some home health so she can take a brief respite.  He has pretty advanced stage now.  He does not drive and needing more help around the house to get things done.  He is slow in down even dressing himself.  He is at significant risk of falling's.  We will not start right heart rate until next visit in about 2 weeks with detailed plan of conversion from the current Sinemet bridging to the new pills.  Have to see if this financially is doable for him to use.  Wife will also look up the cost of entacapone, if it is too expensive and may discontinue that and increase ropinirole to 1 mg 3 times daily and keep half tablets in the evening to replace current ropinirole.      Subjectively:   Occasional falls with loss of balance.  Has freezings and sometimes dyskinesia fluctuating difficult to figure out a pattern for family.  Does not feel lightheaded or dizzy no passing out spells.  Reduced entacapone to half tablets each time may reduce dyskinesia but at the same time the freezing is more a problem with the wearing off toward the end of the dosage for about an hour or so.   Added benefit toning which has helped the sleep better.        Therapy: Wife deferred therapy at the Center since it has been difficult for them to come this far for therapies regularly.  They have gotten the instruction manual to practice at home.  Drooling: Not so bad at this time.   RSL: doing well on Ropinirole and clonazepam.    REM sleep disorder controled: on clonazepam 0.5 mg, 2 tablets HS, still occasionally  acting dreams out.   Psych: no depression, no hallucination. Watching nude images online at times.    Essential tremor: mild degree   Dementia. On Aricept. MOCA scored 17/30 on 3/8/2017.   Autonomic: No signs of orthostasis reported.  Recommend to watch out for low blood pressure though with increased Requip.  Follow-up with dermatologist yearly also watch out potential late slightly risk risk of skin cancers on ropinirole for Parkinson disease.          ADL: showing, dressing by self. Doesn't prepare meals, no driving.  Wife is the caregiver          PD summary:   Reviewed his PD condition, discussed about treatment options,not a good candidate for surgery due to dementia.   He has had essential tremor diagnosed in 2000 and probably developed Parkinson disease in 2004. Because of dementia not the best candidate for dopamine agonists.   Medication tried for PD:  Artane t not able to tolerate because of confusion.               Review of system otherwise unremarkable.       Patient Active Problem List   Diagnosis     Parkinson disease     Essential tremor     Vision abnormalities     [place] (hard of hearing)     Poor balance             Current Outpatient Prescriptions   Medication Sig Dispense Refill     carbidopa-levodopa (SINEMET CR)  mg per tablet Take 1 tablet by mouth bedtime. 90 tablet 3     carbidopa-levodopa (SINEMET)  mg per tablet Take 1 tablet by mouth 4 (four) times a day. (Patient taking differently: Take 1 tablet by mouth 5 (five) times a day. ) 360 tablet 3     donepezil  "(ARICEPT) 10 MG tablet Take 10 mg by mouth at bedtime.       entacapone (COMTAN) 200 mg tablet TAKE 1 TABLET BY MOUTH 5 TIMES A DAY  11     gabapentin (NEURONTIN) 300 MG capsule Take 1 capsule (300 mg total) by mouth at bedtime. 30 capsule 2     melatonin 3 mg Tab tablet Take 3 mg by mouth at bedtime as needed.       No current facility-administered medications for this encounter.        Morphine    Past Medical History:   Diagnosis Date     Cataract      Hypercholesterolemia      Hypertrophy of prostate      Parkinson's disease        Social History     Social History     Marital status:      Spouse name: N/A     Number of children: N/A     Years of education: N/A     Occupational History     Not on file.     Social History Main Topics     Smoking status: Former Smoker     Quit date: 1/16/1969     Smokeless tobacco: Never Used     Alcohol use 8.4 oz/week     14 Cans of beer per week      Comment: \"A couple of beers per day.\"     Drug use: No     Sexual activity: Not on file     Other Topics Concern     Not on file     Social History Narrative       No family history on file.    Objective:  Vitals:    04/04/18 1239   BP: 141/72   Pulse: 62       Well dressed and groomed. Normocephalic and atraumatic. Sitting in chair, NAD.   No edema or rashes  CN II to XII: II to XII significant for [place].  Soft and slurred speech.  Normal mental status and language.  Slight difficulty to initiate a gait and to get up from chair.    No significant resting tremor, which normally is left greater than right and.      Mild to moderate degree of  dyskinesia, mild bradykinesia and hypokinesia. Normal muscle bulk and strength, but increased muscle tone.  No focal sensory difficulty.  Decreased coordination.  Has a walker for ambulation.  Decreased balance.  Mild Anterocolis and stooped over body posture.  Slight dyskinesis               "

## 2021-06-19 NOTE — PROGRESS NOTES
How have you been doing since we last saw you? Most important neurological symptom or concern for this visit (Medication wearing off, hallucinations, freezing, pain, sleep difficulty, constipation etc.) Pt. Wife  says things have been okay but they are noticing some changes in his skin , he is becoming more pale, his vision is getting worse and loosing his balance a lot, and his speech is not getting better.    How many falls has the pt. Had over the last year?(if pt. Falls frequently, daily, weekly, monthly?) PT. Has had 3 to 4 falls since June 1st and that's with him loosing his balance every so often.          Any pains? (Locations, frequency, positional factors, time pattern, aggravating factors.) No pain.

## 2021-06-19 NOTE — PROGRESS NOTES
Assessment /Plan:  right handed, came with spouse, Haylee.  His son also came to the visit.  His son lives with them      Parkinson disease.         Orthostasis hypotension, consider to taper off ropinirole.  I will add midodrine for trial.  Plan to see him back in about 2-4 months.      Memory difficulty: No progression on donepezil without side effects.  Deferred memory test.      REM sleep disorder: Continue clonazepam 0.5 mg, 2 tablets p.o. Nightly.  Continue melatonin added in 1/2018.      Restless leg syndrome.  Responding to ropinirole.        Plan:   Increase fluid intake  We will continue Parkinson medication at the same dosage.  Start midodrine 2,5 mg at 7 am, 1 pm and 4 pm.   Keep checking blood pressure 3 times a week, and when passing out or loss of consciousness, light headed.   Follow-up in about 2-4 months.      Subjectively:  On carbidopa levodopa 25/100, 1 tablet 5 times a day at 7 AM, 10 AM, 1 PM, 4 PM and 7 PM with an tolcapone 200 mg each time.  Also taking Sinemet CR 50/200, one tablets before sleep.  Ropinirole 0.25 mg 3 times daily at 7 AM, 1 PM and 4 PM.  Speech is very soft.  There is some dyskinesia.  Not able to ambulate.  Need 24 7 care.  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  At the same time they may have had enough saving not benefiting any government supplements.    His son has checked his blood pressure for 2 days most of them are in the 70s and 80s.  He should drink more fluid.  He has some constipations.  Maybe there is some generalized weakness.  Not particular fatigable.  He falls into sleep easily.  No major injuries but falls 1-2 times a week.         Therapy: Wife deferred therapy at the Center since it has been difficult for them to come this far for therapies regularly.  They have gotten the instruction manual to practice at home.  Drooling: Not so bad at this  time.   RSL: doing well on Ropinirole and clonazepam.    REM sleep disorder controled: on clonazepam 0.5 mg, 2 tablets HS, still occasionally  acting dreams out.   Psych: no depression, no hallucination. Watching nude images online at times.    Essential tremor: mild degree   Dementia. On Aricept. MOCA scored 17/30 on 3/8/2017.   Autonomic: No signs of orthostasis reported.  Recommend to watch out for low blood pressure though with increased Requip.  Follow-up with dermatologist yearly also watch out potential late slightly risk risk of skin cancers on ropinirole for Parkinson disease.          ADL: showing, dressing by self. Doesn't prepare meals, no driving.  Wife is the caregiver          PD summary:   He has had essential tremor diagnosed in 2000 and probably developed Parkinson disease in 2004. Because of dementia not the best candidate for dopamine agonists or DBS surgery.   Medication tried for PD:  Artane t not able to tolerate because of confusion.               Review of system otherwise unremarkable.           Patient Active Problem List   Diagnosis     Parkinson disease (H)     Essential tremor     Vision abnormalities     [place] (hard of hearing)     Poor balance             Current Outpatient Prescriptions   Medication Sig Dispense Refill     carbidopa-levodopa (RYTARY) 23.75-95 mg CpER ER capsule Take 6 capsules by mouth 3 (three) times a day. 540 capsule 3     carbidopa-levodopa (SINEMET CR)  mg per tablet Take 1 tablet by mouth bedtime. 90 tablet 3     carbidopa-levodopa (SINEMET)  mg per tablet Take 1 tablet by mouth 4 (four) times a day. (Patient taking differently: Take 1 tablet by mouth 5 (five) times a day. ) 360 tablet 3     donepezil (ARICEPT) 10 MG tablet Take 10 mg by mouth at bedtime.       entacapone (COMTAN) 200 mg tablet TAKE 1 TABLET BY MOUTH 5 TIMES A DAY  11     gabapentin (NEURONTIN) 300 MG capsule Take 1 capsule (300 mg total) by mouth at bedtime. 30 capsule 2      "melatonin 3 mg Tab tablet Take 3 mg by mouth at bedtime as needed.       No current facility-administered medications for this encounter.        Morphine    Past Medical History:   Diagnosis Date     Cataract      Hypercholesterolemia      Hypertrophy of prostate      Parkinson's disease (H)        Social History     Social History     Marital status:      Spouse name: N/A     Number of children: N/A     Years of education: N/A     Occupational History     Not on file.     Social History Main Topics     Smoking status: Former Smoker     Quit date: 1/16/1969     Smokeless tobacco: Never Used     Alcohol use 8.4 oz/week     14 Cans of beer per week      Comment: \"A couple of beers per day.\"     Drug use: No     Sexual activity: Not on file     Other Topics Concern     Not on file     Social History Narrative       No family history on file.    Objective:  Vitals:    08/01/18 1147   BP: 97/55   Patient Position: Sitting   Pulse: 60   Weight: 157 lb (71.2 kg)        Well dressed and groomed. Normocephalic and atraumatic. Sitting in chair, NAD.   No edema or rashes  CN II to XII: II to XII significant for [place].  Soft and slurred speech.  Normal mental status and language.  Slight difficulty to initiate a gait and to get up from chair.    No significant resting tremor, which normally is left greater than right and.      Mild to moderate degree of  dyskinesia, mild bradykinesia and hypokinesia. Normal muscle bulk and strength, but increased muscle tone.  No focal sensory difficulty.  Decreased coordination.  Has a walker for ambulation.  Decreased balance.  Mild Anterocolis and stooped over body posture.              "

## 2021-06-23 NOTE — PROGRESS NOTES
Assessment /Plan:  right handed, came with spouse, Haylee.  His son also came to the visit.  His son lives with mom.  He has moved into care center.      Parkinson disease.   Advanced stage with poor balance using a walker and not able to care for self living in senior assistant living.  Need to help for dressing bathing and preparing for food.  Unpredictable on and off times.      Orthostasis hypotension: Still with low blood pressures in the 80s and 40s per family report.  However I do not have a record to review.  Patient is not able to provide much information.  Currently on midodrine 10 mg, half tablets 3 times daily.  But last blood pressure checking this months was normal.  There is no documentation if he had any symptoms or not when the blood pressure was taken.    Excessive drooling and no tripping.  He deferred Botox injection.  Hopefully adding venlafaxine will help this    Memory difficulty: No progression on donepezil without side effects.  Deferred memory test.      REM sleep disorder: Continue clonazepam 0.5 mg, 2 tablets p.o. Nightly.  Discontinued melatonin due to lack of effectiveness.  Tried in 1/2018.      Restless leg syndrome.  Responding to ropinirole.  But due to significant orthostatic hypotension will discontinue on 1/30/2019.  She is on gabapentin 300 at bedtime.  If needed can add during the daytime.  Hopefully venlafaxine 37.5 mg would be also beneficial    Anxiety/depression: We will try venlafaxine starting 1/30/19      Plan:   Check blood pressure and pulse twice a week.  Bring those readings to next visit in about 1-2 months.  We will also see him in about 3-month.  Continue midodrine 3 times a day at 6 AM, 10 AM and 2 PM  Recommend to give carbidopa/levodopa 25/250 and entacapone 200 mg half tablets 4 times a day at 6 AM, 10 AM, 2 PM and 4 PM   Give carbidopa/levodopa CR or ER 50/200 at 8 PM  Give clonazepam 0.5 mg, 2 tablets at 8 PM  Discontinue ropinirole   Discontinue  donepezil  Start venlafaxine 37.5 mg daily  Start carbidopa/levodopa CR O ER 25/100 at 6 AM and 2 PM    I have also given a prescription of right artery 48.75/195, 3 capsules 3 times daily for his [facility] to see if they can support him taking this medication.  If the cost is reasonable decide to try this medication and we will be able hopefully to replace the carbidopa levodopa and entacapone during the daytime to reduce fluctuation of unpredictable dyskinesia and freezings.      Subjectively:  On carbidopa levodopa 25/ 250, 1 tablet 5 times a day at 4 times daily, and entacapone 200 mg, half tablets 4 times daily. Sinemet CR 50/200, one tablets before sleep.  Ropinirole 0.25 mg 3 times 3 times daily.  Speech is very soft.    Some swallowing difficulty so all pills were crashed.  Unpredictable difficulty ambulating some rocking or dyskinesia as described by wife.  Significant drooling and no stripping, more pronounced when sleep.  Some chronic constipations.  No diarrhea.  Some lightheadedness but no passing out.  Fell one time.           Therapy: Did some therapy at place where he lives now.  Drooling: Significant but patient is not interested in Botox injections.   RSL: Still very anxious during the daytime and will discontinue ropinirole for increased orthostasis.  To a trial of venlafaxine.  Continue gabapentin 300 nightly..    REM sleep disorder controled: on clonazepam 0.5 mg, 2 tablets HS, still occasionally  acting dreams out.   Psych: Some moderate anxiety and mild depression, no hallucination.  We will do a trial of venlafaxine.    Essential tremor: mild degree   Dementia. MOCA scored 17/30 on 3/8/2017.  Discontinue Aricept on 1/30/19 for too many pills patient is taking.  Autonomic: Low blood pressures per family but no actual numbers or measures brought to me for review.            ADL: showing, dressing by self. Doesn't prepare meals, no driving.  Wife is the caregiver.   Moved to [facility] center August 2018.          PD summary:   He has had essential tremor diagnosed in 2000 and probably developed Parkinson disease in 2004. Because of dementia not the best candidate for dopamine agonists or DBS surgery.   Medication tried for PD:  Artane t not able to tolerate because of confusion.               Review of system otherwise unremarkable.              Patient Active Problem List   Diagnosis     Parkinson disease (H)     Essential tremor     Vision abnormalities     [place] (hard of hearing)     Poor balance             Current Outpatient Medications   Medication Sig Dispense Refill     carbidopa-levodopa (RYTARY) 23.75-95 mg CpER ER capsule Take 6 capsules by mouth 3 (three) times a day. 540 capsule 3     carbidopa-levodopa (SINEMET CR)  mg per tablet Take 1 tablet by mouth bedtime. 90 tablet 3     carbidopa-levodopa (SINEMET)  mg per tablet Take 1 tablet by mouth 4 (four) times a day. (Patient taking differently: Take 1 tablet by mouth 5 (five) times a day. ) 360 tablet 3     donepezil (ARICEPT) 10 MG tablet Take 10 mg by mouth at bedtime.       entacapone (COMTAN) 200 mg tablet TAKE 1 TABLET BY MOUTH 5 TIMES A DAY  11     gabapentin (NEURONTIN) 300 MG capsule Take 1 capsule (300 mg total) by mouth at bedtime. 30 capsule 2     melatonin 3 mg Tab tablet Take 3 mg by mouth at bedtime as needed.       No current facility-administered medications for this encounter.        Morphine    Past Medical History:   Diagnosis Date     Cataract      Hypercholesterolemia      Hypertrophy of prostate      Parkinson's disease (H)        Social History     Socioeconomic History     Marital status:      Spouse name: Not on file     Number of children: Not on file     Years of education: Not on file     Highest education level: Not on file   Social Needs     Financial resource strain: Not on file     Food insecurity - worry: Not on file     Food insecurity - inability: Not on file      "Transportation needs - medical: Not on file     Transportation needs - non-medical: Not on file   Occupational History     Not on file   Tobacco Use     Smoking status: Former Smoker     Last attempt to quit: 1969     Years since quittin.0     Smokeless tobacco: Never Used   Substance and Sexual Activity     Alcohol use: Yes     Alcohol/week: 8.4 oz     Types: 14 Cans of beer per week     Comment: \"A couple of beers per day.\"     Drug use: No     Sexual activity: Not on file   Other Topics Concern     Not on file   Social History Narrative     Not on file       No family history on file.    Objective:  Vitals:    19 1248   BP: 144/74   Pulse: 70            Well dressed and groomed. Normocephalic and atraumatic. Sitting in chair, NAD.   No edema or rashes  CN II to XII: II to XII significant for [place].  Drooling and no stripping.  Soft and slurred speech.  Normal mental status and language.  Significant difficulty to initiate a gait and to get up from chair.    No significant resting tremor, which normally is left greater than right and.      Mild degree of  dyskinesia, moderate bradykinesia and hypokinesia. Normal muscle bulk and strength, but increased muscle tone.  No focal sensory difficulty.  Decreased coordination.  Has a walker for ambulation.  Markedly decreased balance.  Moderate Anterocolis and stooped over body posture.                  "

## 2021-06-25 NOTE — PROGRESS NOTES
Progress Notes by Ubaldo Roa PT Student at 4/24/2017  3:04 PM     Author: Ubaldo Roa PT Student Service: -- Author Type: PT Student    Filed: 4/24/2017  4:18 PM Date of Service: 4/24/2017  3:04 PM Status: Attested    : Ubaldo Roa PT Student (PT Student)    Related Notes: Original Note by Ubaldo Roa PT Student (PT Student) filed at 4/24/2017  4:07 PM    Cosigner: Verna Manzano PT at 4/24/2017  4:19 PM    Attestation signed by Verna Manzano PT at 4/24/2017  4:19 PM    SPT in direct supervision of PT with PT directing treatment and plan of care.  Verna Manzano PT, DPT, MTC, NCS                  Physical Therapy  Physical Therapy  Movement Disorder Treatment Note    Date: 4/24/2017   Visit #: 4/13  - MEDICARE (3/31/2017  to 6/28/2017)  Rx Units: 2TE, 1NR, 1TA   Total OP Minutes: 55    Pain:  No    Subjective:  Patient states he is doing well.  He has a quad cane with him that he mainly drags behind on his L side.  Has no questions with regards to his HEP    Objective:  Increase in power during 2A, 2B with boom sticks compared to previous sessions with patient.    Therapeutic Exercise:  Total Minutes: 35  Sustained Movements (sitting)  4reps  x 1-2 sets       Exercise   Reps   Sets   Effort     1A Floor to Ceiling With Flicking   4  1  8     1B Side to Side With Flicking   4  1  8      Comments:    1A: Cueing for upright posture, increase vocal and palms up.    1B: Cueing for palms up, better going to R vs L with technique, tends to trunk lean to L when going to L side    Other:   - Nustep x 8 minutes,   level #2  Average METS: 2.4 SPM: 98   - Bridging x 10 reps x 2 sets - VC for increased hip extension and breathing techniques.    - Boxing x8 minutes, left foot forward, L jab only, R jab only, L to R jab only, L to R to L jab only, L to R to L to R jab only, L and L jab, R and R jab, L step forward L jab, L step forward R jab      Neuro-Reeducation/Balance:  Total Minutes:  "12  Multidirectional Repetitive Movements.  8-16reps  x 1-2 sets    Step and Reach:   UE support: 2A, 2B  Chairs Nearby: 2A      Exercise   Reps   Sets   Effort     2A Forward Step and Reach    10 2 each 9     2B Sideward Step and Reach    10 2 each 8     2C Backward Step and Reach        Comments: 2A - modified with use of boom sticks to encourage increased effort and power behind movements. VC for increased step height and power behind arm movement.             2B - modified with use of boom sticks to encourage increased effort and power behind movements.  VC for increased step height upon return to center.        Rock and Reach:  UE support:   Chairs Nearby:      Exercise   Reps   Sets   Effort     2D Left leg Forward/Backward        2D Right leg Forward/Backward        2E Side to Side        Comments:      Other:     Gait Training:   Total Minutes:     Time Speed (MPH) UE support Direction Incline Comments                                      Other:     Therapeutic Activity  Total Minutes: 8   - Sit to stand 19\" height, 3 sets of 8, attempted with boom sticks but patient had difficult coordinating the movements so discontinued, cueing to increase forward trunk lean prior to sitting up   - Sit to stand on middle soft chairs, 1 set of 12, cueing to increase forward trunk lean and to stand all the way up with upright posture and upward gaze     Functional Movement  Rep  Sets  Effort    Rolling        Supine to Sit       Sit to Stand from EOM       Sit to Stand from low, soft chair       Sit and Reach       Walk and Turn       PWR Moves:       PWR Moves:        Comments:    Big Walking:     Other:     Carryover Assignment: sit to stand BIG    Assessment/Plan for week:  4/24/2017-4/28/2017  Patient presents with bradykinesia and impaired cognition that makes multi-step exercises difficult for him to complete.  Noticeable improvement in amplitude when using the boom sticks and patient reported an increase in overall " effort when completed with boom sticks compared to without.  Patient presents with decreased LE and core strength and is evident when completing bridges with decreased lateral trunk control and limited excursion of the exercise performed.  Plan to continue progressing PWR, strengthen LE and core for improved balance and stability in order to progress toward goals.

## 2021-07-03 NOTE — ADDENDUM NOTE
Addendum Note by Anusha Mtz RN at 1/10/2018 11:59 PM     Author: Anusha Mtz RN Service: -- Author Type: Registered Nurse    Filed: 1/15/2018 10:18 AM Date of Service: 1/10/2018 11:59 PM Status: Signed    : Anusha Mtz RN (Registered Nurse)    Encounter addended by: Anusha Mtz RN on: 1/15/2018 10:18 AM<BR>     Actions taken: Charge Capture section accepted

## 2021-07-03 NOTE — ADDENDUM NOTE
Addendum Note by Louisa Holliday CMA at 12/6/2017 11:59 PM     Author: Louisa Holliday CMA Service: -- Author Type: Certified Medical Assistant    Filed: 12/11/2017  1:46 PM Date of Service: 12/6/2017 11:59 PM Status: Signed    : Louisa Holliday CMA (Certified Medical Assistant)    Encounter addended by: Louisa Holliday CMA on: 12/11/2017  1:46 PM<BR>     Actions taken: Charge Capture section accepted

## 2021-07-03 NOTE — ADDENDUM NOTE
Addendum Note by Anusha Mtz RN at 10/4/2017 11:59 PM     Author: Anusha Mtz RN Service: -- Author Type: Registered Nurse    Filed: 10/9/2017  8:33 AM Date of Service: 10/4/2017 11:59 PM Status: Signed    : Anusha Mtz RN (Registered Nurse)    Encounter addended by: Anusha Mtz RN on: 10/9/2017  8:33 AM<BR>     Actions taken: Charge Capture section accepted

## 2021-07-03 NOTE — ADDENDUM NOTE
Addendum Note by Louisa Holliday CMA at 8/1/2018 11:59 PM     Author: Louisa Holliday CMA Service: -- Author Type: Certified Medical Assistant    Filed: 8/9/2018 11:57 AM Date of Service: 8/1/2018 11:59 PM Status: Signed    : Louisa Holliday CMA (Certified Medical Assistant)    Encounter addended by: Louisa Holliday CMA on: 8/9/2018 11:57 AM<BR>     Actions taken: Charge Capture section accepted

## 2021-07-03 NOTE — ADDENDUM NOTE
Addendum Note by Arleen Rosales OT at 5/15/2017 11:59 PM     Author: Arleen Rosales OT Service: -- Author Type: Occupational Therapist    Filed: 5/16/2017  2:56 PM Date of Service: 5/15/2017 11:59 PM Status: Signed    : Arleen Rosales OT (Occupational Therapist)    Encounter addended by: Arleen Parekh OT on: 5/16/2017  2:56 PM<BR>     Actions taken: Visit Navigator Flowsheet section accepted

## 2021-07-03 NOTE — ADDENDUM NOTE
Addendum Note by Anusha Mtz RN at 8/23/2017  2:52 PM     Author: Anusha Mtz RN Service: -- Author Type: Registered Nurse    Filed: 8/30/2017  9:57 AM Date of Service: 8/23/2017  2:52 PM Status: Signed    : Anusha Mtz RN (Registered Nurse)    Encounter addended by: Anusha Mtz RN on: 8/30/2017  9:57 AM<BR>     Actions taken: Charge Capture section accepted

## 2021-07-03 NOTE — ADDENDUM NOTE
Addendum Note by Evangelist Hayes, PT at 5/3/2017 11:59 PM     Author: Evangelist Hayes, PT Service: -- Author Type: Physical Therapist    Filed: 5/9/2017  1:08 PM Date of Service: 5/3/2017 11:59 PM Status: Signed    : Evangelist Hayes PT (Physical Therapist)    Encounter addended by: Evangelist Hayes PT on: 5/9/2017  1:08 PM<BR>     Actions taken: Flowsheet accepted

## 2021-07-03 NOTE — ADDENDUM NOTE
Addendum Note by Louisa Holliday CMA at 8/1/2018 11:59 PM     Author: Louisa Holliday CMA Service: -- Author Type: Certified Medical Assistant    Filed: 8/9/2018 11:58 AM Date of Service: 8/1/2018 11:59 PM Status: Signed    : Louisa Holliday CMA (Certified Medical Assistant)    Encounter addended by: Louisa Holliday CMA on: 8/9/2018 11:58 AM<BR>     Actions taken: Charge Capture section accepted

## 2021-07-03 NOTE — ADDENDUM NOTE
Addendum Note by Evangelist Hayes, PT at 5/3/2017 11:59 PM     Author: Evangelist Hayes, PT Service: -- Author Type: Physical Therapist    Filed: 5/9/2017  4:14 PM Date of Service: 5/3/2017 11:59 PM Status: Signed    : Evangelist Hayes PT (Physical Therapist)    Encounter addended by: Evangelist Hayes PT on: 5/9/2017  4:14 PM<BR>     Actions taken: Flowsheet accepted

## 2021-07-03 NOTE — ADDENDUM NOTE
Addendum Note by Louisa Holliday CMA at 1/30/2019 11:59 PM     Author: Lousia Holliday CMA Service: -- Author Type: Certified Medical Assistant    Filed: 2/1/2019  1:41 PM Date of Service: 1/30/2019 11:59 PM Status: Signed    : Louisa Holliday CMA (Certified Medical Assistant)    Encounter addended by: Louisa Holliday CMA on: 2/1/2019  1:41 PM      Actions taken: Charge Capture section accepted

## 2021-08-09 NOTE — PROGRESS NOTES
[facility]  [facility] Record Number: [id number]  Place of Service where encounter took place: [facility] ([facility]) [[id number]]  Chief Complaint   Patient presents with     Diarrhea       HPI:    Carlos Neri is a 85 year old (1935), who is being seen today for an episodic care visit. HPI information obtained from: facility chart records, facility staff, patient report and [facility] chart review. Today's concern is: diarrhea, dementia, anxiety. Has recent h/o stool incont. Loose stools at times.  Would attempt to clean self in bathroom at times, has h/o falls. Had recent decreased aricept dose due to decreased po intake.  12/18/20 aricept dc'd due to loose stools, stool incont.  Since this time, staff reports some increased anxiety in pms typically starting around 4pm.  Prn neurontin typically effective.  Has memory loss due to dementia.  Cont. On neurontin, effexor, prn klonopin.  Did have non-inj fall last week.  Per staff, has increased anxiety at times, typically following visit from spouse.  Make statements of wanting to leave.      Past Medical and Surgical History reviewed in Epic today.    MEDICATIONS:    Current Outpatient Medications   Medication Sig Dispense Refill     gabapentin (NEURONTIN) 100 MG capsule Take 300 mg by mouth At Bedtime And 100 mg every day at 4pm.  Also 100 mg po tid prn       acetaminophen (TYLENOL) 500 MG tablet Take 1,000 mg by mouth 3 times daily        Artificial Tear Solution (SOOTHE XP) SOLN Place 1 drop into both eyes 2 times daily       carbidopa-levodopa (SINEMET CR)  MG CR tablet Take 1 tablet by mouth At Bedtime       carbidopa-levodopa (SINEMET)  MG per tablet Take 1 tablet by mouth 4 times daily       clonazePAM (KLONOPIN) 0.5 MG tablet Take 1 tablet (0.5 mg) by mouth daily as needed for anxiety 30 tablet 5     entacapone (COMTAN) 200 MG tablet Take 100 mg by mouth 4 times daily        MIDODRINE HCL  PO Take 5 mg by mouth 3 times daily (before meals)       polyethylene glycol (MIRALAX/GLYCOLAX) Packet Take 17 g by mouth daily as needed for constipation (AND every other day scheduled)        rOPINIRole (REQUIP) 0.25 MG tablet Take 0.75 mg by mouth 3 times daily        rOPINIRole (REQUIP) 0.25 MG tablet Take 0.25 mg by mouth At Bedtime       senna-docusate (SENOKOT-S/PERICOLACE) 8.6-50 MG tablet Take 2 tablets by mouth daily       trolamine salicylate (ASPERCREME) 10 % external cream Apply topically 2 times daily       venlafaxine (EFFEXOR) 25 MG tablet Take 25 mg by mouth 2 times daily       REVIEW OF SYSTEMS:  Unobtainable secondary to cognitive impairment. Reports feeling ok today    Objective:  /64   Pulse 71   Resp 16   SpO2 94%   Exam:  GENERAL APPEARANCE:  Alert, in no distress, thin, cooperative  ENT:  Mouth and posterior oropharynx normal, moist mucous membranes, [place]  EYES:  EOM, conjunctivae, lids, pupils and irises normal, PERRL  RESP:  respiratory effort and palpation of chest normal, lungs clear to auscultation , no respiratory distress  CV:  Palpation and auscultation of heart done , regular rate and rhythm, no murmur, rub, or gallop, no edema  ABDOMEN:  normal bowel sounds, soft, nontender, no hepatosplenomegaly or other masses, no guarding or rebound  M/S:   gait stable with walker. muscle strength 5/5 all 4 ext.  NEURO:   Cranial nerves 2-12 are normal tested and grossly at patient's baseline, speech soft, clear  PSYCH:  insight and judgement impaired, memory impaired , affect abnormal -flat    Labs:     Most Recent 3 CBC's:  Recent Labs   Lab Test 08/18/20 01/22/20 07/26/19 06/19/19   WBC  --  5.9 7.8 6.5   HGB 14.7 14.4 14.0 13.6*   MCV  --  103* 99 101*   PLT  --  149 144* 140     Most Recent 3 BMP's:  Recent Labs   Lab Test 08/18/20 01/22/20 07/30/19    143 143   POTASSIUM 4.1 4.5 4.1   CHLORIDE 103 106 107   CO2 28 31 30   BUN 28 25 22   CR 1.01 0.84 0.79   ANIONGAP 10 6 6    SAIDA 9.3 9.4 9.3   * 108 89       ASSESSMENT/PLAN:  Diarrhea, unspecified type  Currently resolved since aricept dc'd 12/18/20  1. Cont. Every other day miralax  2. Monitor for further loose stools, episodes of stool incont.   3. For further loose stools, discontinue sched miralax  4. Encourage fluids    Lewy body dementia with behavioral disturbance (H)  Memory loss, some increased sundowning behaviors past week.  aricept discontinued as above  1. Cont. effexor  2. Sched. neurontin dose at 1 pm. Cont. Hs neurontin, prn neurontin  3. Reassess for ongoing sundowning behaviors over next week  4. Monitor for changes in gait, falls    Anxiety  occ increased s/s  1. Cont. Effexor, neurontin as above  2. Cont. Prn klonopin  3. Monitor for reports of insomnia  4. Reassess over next week      Electronically signed by:  DONY Clark CNP          [id number]

## 2022-02-09 NOTE — PROGRESS NOTES
Progress Notes by Ubaldo Roa PT Student at 4/19/2017  3:10 PM     Author: Ubaldo Roa PT Student Service: -- Author Type: PT Student    Filed: 4/20/2017  7:37 AM Date of Service: 4/19/2017  3:10 PM Status: Attested    : Ubaldo Roa PT Student (PT Student)    Related Notes: Original Note by Ubaldo Roa PT Student (PT Student) filed at 4/19/2017  4:34 PM    Cosigner: Verna Manzano PT at 4/21/2017  8:25 AM    Attestation signed by Verna Manzano PT at 4/21/2017  8:25 AM    SPT in direct supervision of PT with PT directing treatment and plan of care.  Verna Manzano PT, DPT, MTC, NCS                  Physical Therapy  Physical Therapy  Movement Disorder Treatment Note    Medicare    Date: 4/19/2017   Visit #: 2/13  Rx Units: 3TE, 1NR  Total OP Minutes: 55    Pain:  No    Subjective:  Patient has no complaints and states he is doing. No adverse reaction to initial evaluation.    Objective:    Therapeutic Exercise:  Total Minutes: 45  Sustained Movements (sitting)  4reps  x 1-2 sets      Exercise   Reps   Sets   Effort     1A Floor to Ceiling With Flicking   4  2  7, 8     1B Side to Side With Flicking   4  2   6, 7      Comments:    1A: Cueing to increase amplitude of motion, palms up with upright posture   1B: Cueing on proper sequence, palms up and to reach forward with arm, shaping of 50-75%; Reaching across body with R was better than L.    Other:   -Nustep x10 minutes; cueing to stay >80 spm, level 3 resistance, Average METS = 2.5 , SPM = 88   - Big walking x 360 ft without AD, cueing to keep upright posture and upward eye gaze, encouraged to increase step length and to push off with toes and toe off stage   - Heel raises, 2 sets of 10 with 3 second hold with finger tip B UE support, cueing to increase heel raise height and to maintain height for 3 seconds   - Sit to stand, 2 sets of 15, cueing to lean forward prior to trying to stand up, improved with second set   - Bridges with 2  second hold, 2 sets of 12, cueing to increase height of bridge, improved with second set in regards to lateral instability shown during first set    Neuro-Reeducation/Balance:  Total Minutes: 8  Multidirectional Repetitive Movements.  8-16reps  x 1-2 sets    Step and Reach:   UE support:   Chairs Nearby:       Exercise   Reps   Sets   Effort     2A Forward Step and Reach          2B Sideward Step and Reach        2C Backward Step and Reach        Comments:    2A    2B    2C     Rock and Reach:  UE support:   Chairs Nearby: High mat directly behind patient      Exercise   Reps   Sets   Effort     2D Left leg Forward/Backward   20 1 6     2D Right leg Forward/Backward   20 1 6     2E Side to Side        Comments:    2D: Performed with LE rocking only, cueing to weight shift onto toes and then back onto heels.  Needed encouragement for increased anterior weight shift   2E:  Other:     Gait Training:   Total Minutes:     Time Speed (MPH) UE support Direction Incline Comments                                      Other:     Therapeutic Activity  Total Minutes:      Functional Movement  Rep  Sets  Effort    Rolling        Supine to Sit       Sit to Stand from standard chair       Sit to Stand from low, soft chair       Sit and Reach       Walk and Turn       PWR Moves:       PWR Moves:        Comments:     Big Walking:     Other:     Carryover Assignment: Sit to stand, bridges, walking big    Assessment/Plan for week:  4/17/2017-4/21/2017  Introduce 2A + 2B next treatment session.  Added bridging and sit to stand to HEP.  Patient demonstrates gross weakness and significant bradykinesia with some cognitive impairment.  Progress as tolerated.  See next treatment session for weekly A/P.          none